# Patient Record
Sex: FEMALE | Race: WHITE | Employment: OTHER | URBAN - METROPOLITAN AREA
[De-identification: names, ages, dates, MRNs, and addresses within clinical notes are randomized per-mention and may not be internally consistent; named-entity substitution may affect disease eponyms.]

---

## 2018-03-23 ENCOUNTER — HOSPITAL ENCOUNTER (OUTPATIENT)
Dept: MAMMOGRAPHY | Age: 53
Discharge: HOME | End: 2018-03-23

## 2018-03-23 DIAGNOSIS — Z12.31 VISIT FOR SCREENING MAMMOGRAM: ICD-10-CM

## 2020-01-19 ENCOUNTER — APPOINTMENT (OUTPATIENT)
Dept: GENERAL RADIOLOGY | Age: 55
End: 2020-01-19
Payer: MEDICARE

## 2020-01-19 ENCOUNTER — HOSPITAL ENCOUNTER (EMERGENCY)
Age: 55
Discharge: HOME | End: 2020-01-19
Payer: MEDICARE

## 2020-01-19 VITALS
DIASTOLIC BLOOD PRESSURE: 83 MMHG | SYSTOLIC BLOOD PRESSURE: 152 MMHG | BODY MASS INDEX: 23.9 KG/M2 | HEART RATE: 89 BPM | HEIGHT: 64 IN | OXYGEN SATURATION: 97 % | WEIGHT: 140 LBS | RESPIRATION RATE: 16 BRPM | TEMPERATURE: 96.8 F

## 2020-01-19 PROCEDURE — 99284 EMERGENCY DEPT VISIT MOD MDM: CPT

## 2020-01-19 PROCEDURE — 6370000000 HC RX 637 (ALT 250 FOR IP): Performed by: EMERGENCY MEDICINE

## 2020-01-19 PROCEDURE — 73140 X-RAY EXAM OF FINGER(S): CPT

## 2020-01-19 PROCEDURE — 73130 X-RAY EXAM OF HAND: CPT

## 2020-01-19 RX ORDER — HYDROCODONE BITARTRATE AND ACETAMINOPHEN 5; 325 MG/1; MG/1
1 TABLET ORAL ONCE
Status: COMPLETED | OUTPATIENT
Start: 2020-01-19 | End: 2020-01-19

## 2020-01-19 RX ADMIN — HYDROCODONE BITARTRATE AND ACETAMINOPHEN 1 TABLET: 5; 325 TABLET ORAL at 15:34

## 2020-01-19 SDOH — HEALTH STABILITY: MENTAL HEALTH: HOW OFTEN DO YOU HAVE A DRINK CONTAINING ALCOHOL?: NEVER

## 2020-01-19 ASSESSMENT — PAIN DESCRIPTION - LOCATION: LOCATION: HAND

## 2020-01-19 ASSESSMENT — PAIN DESCRIPTION - ORIENTATION: ORIENTATION: LEFT

## 2020-01-19 ASSESSMENT — PAIN SCALES - GENERAL
PAINLEVEL_OUTOF10: 10
PAINLEVEL_OUTOF10: 10

## 2020-01-19 NOTE — ED PROVIDER NOTES
SOCIAL HISTORY       Social History     Tobacco Use    Smoking status: Current Every Day Smoker     Packs/day: 1.00     Types: Cigarettes   Substance Use Topics    Alcohol use: Never     Frequency: Never    Drug use: Not Currently       SCREENINGS    Adrianna Coma Scale  Eye Opening: Spontaneous  Best Verbal Response: Oriented  Best Motor Response: Obeys commands  [place] Coma Scale Score: 15        PHYSICAL EXAM    (up to 7 for level 4, 8 or more for level 5)     ED Triage Vitals [01/19/20 1511]   BP Temp Temp Source Pulse Resp SpO2 Height Weight   (!) 152/83 96.8 °F (36 °C) Temporal 89 16 97 % 5' 4\" (1.626 m) 140 lb (63.5 kg)       Physical Exam  Vitals signs and nursing note reviewed. Constitutional:       Appearance: She is well-developed. She is not diaphoretic. HENT:      Head: Normocephalic and atraumatic. Nose: Nose normal.   Eyes:      General:         Right eye: No discharge. Left eye: No discharge. Neck:      Musculoskeletal: Normal range of motion and neck supple. Pulmonary:      Effort: Pulmonary effort is normal. No respiratory distress. Musculoskeletal: Normal range of motion. Left hand: She exhibits tenderness and swelling. Hands:       Comments: Tenderness, edema, and ecchymosis to left ring finger and left dorsal hand. PMS intact to left ring finger and left hand, decreased movement due to pain. Skin:     General: Skin is warm and dry. Neurological:      Mental Status: She is alert and oriented to person, place, and time. Psychiatric:         Behavior: Behavior normal.         DIAGNOSTIC RESULTS   LABS:    Labs Reviewed - No data to display    All other labs were within normal range or not returned as of this dictation. EKG: All EKG's are interpreted by the Emergency Department Physician in the absence of a cardiologist.  Please see their note for interpretation of EKG.       RADIOLOGY:   Non-plain film images such as CT, Ultrasound and MRI are read by the radiologist. Santiago Ballesteros radiographic images are visualized and preliminarily interpreted by the  ED Provider with the below findings:        Interpretation per the Radiologist below, if available at the time of this note:    XR HAND LEFT (MIN 3 VIEWS)   Final Result   Possible fracture of the proximal aspect of the 4th proximal phalanx. Correlate with focal exam findings. No other fracture suggested         XR FINGER LEFT (MIN 2 VIEWS)   Final Result   Possible fracture of the proximal aspect of the 4th proximal phalanx. Correlate with focal exam findings. No other fracture suggested           No results found. PROCEDURES   Unless otherwise noted below, none     Procedures    CRITICAL CARE TIME   N/A    CONSULTS:  None      EMERGENCY DEPARTMENT COURSE and DIFFERENTIAL DIAGNOSIS/MDM:   Vitals:    Vitals:    01/19/20 1511   BP: (!) 152/83   Pulse: 89   Resp: 16   Temp: 96.8 °F (36 °C)   TempSrc: Temporal   SpO2: 97%   Weight: 140 lb (63.5 kg)   Height: 5' 4\" (1.626 m)       Patient was given thefollowing medications:  Medications   HYDROcodone-acetaminophen (NORCO) 5-325 MG per tablet 1 tablet (1 tablet Oral Given 1/19/20 1534)     Patient is nontoxic, in no apparent distress, sitting on the bed. X-ray of left hand and left ring finger ordered. Patient given Norco for pain. X-ray of left hand and left finger positive for possible fracture of the fourth proximal phalanx. 1615 -patient and family updated at this time that she possibly has a fracture of the fourth proximal phalanx. Informed that a splint will be applied, instructed to follow-up with primary care physician, and orthopedics. Instructed to take Tylenol and/or Motrin for pain, instructed on R ICE.  We have discussed the diagnosis and risks, and we agree with discharging home to follow-up with their primary doctor or the referral orthopedist. We also discussed returning to the Emergency Department immediately if new or worsening symptoms occur. We have discussed the symptoms which are most concerning (e.g., changing or worsening pain, numbness, weakness) that necessitate immediate return. Differential diagnoses include but are not limited to compartment syndrome, tendon or neurovascular injury, fracture, dislocation, and contusion. FINAL IMPRESSION      1. Closed nondisplaced fracture of proximal phalanx of left ring finger, initial encounter    2. Contusion of left hand, initial encounter          DISPOSITION/PLAN   DISPOSITION        PATIENT REFERREDTO:  [place] ([facility] ED  [address]  [phone]    If symptoms worsen    [facility]) Pre-Services  [phone]          DISCHARGE MEDICATIONS:  There are no discharge medications for this patient. DISCONTINUED MEDICATIONS:  There are no discharge medications for this patient.              (Please note that portions of this note were completed with a voice recognition program.  Efforts were made to edit the dictations but occasionally words are mis-transcribed.)    BEST Jung CNP (electronically signed)            BEST Jung CNP  01/19/20 0827

## 2020-01-23 ENCOUNTER — OFFICE VISIT (OUTPATIENT)
Dept: ORTHOPEDIC SURGERY | Age: 55
End: 2020-01-23
Payer: MEDICARE

## 2020-01-23 ENCOUNTER — TELEPHONE (OUTPATIENT)
Dept: ORTHOPEDIC SURGERY | Age: 55
End: 2020-01-23

## 2020-01-23 VITALS — HEIGHT: 64 IN | WEIGHT: 139.99 LBS | RESPIRATION RATE: 16 BRPM | BODY MASS INDEX: 23.9 KG/M2

## 2020-01-23 PROBLEM — S62.645A NONDISPLACED FRACTURE OF PROXIMAL PHALANX OF LEFT RING FINGER, INITIAL ENCOUNTER FOR CLOSED FRACTURE: Status: ACTIVE | Noted: 2020-01-23

## 2020-01-23 PROBLEM — S63.92XA HAND SPRAIN, LEFT, INITIAL ENCOUNTER: Status: ACTIVE | Noted: 2020-01-23

## 2020-01-23 PROCEDURE — G8427 DOCREV CUR MEDS BY ELIG CLIN: HCPCS | Performed by: ORTHOPAEDIC SURGERY

## 2020-01-23 PROCEDURE — G8484 FLU IMMUNIZE NO ADMIN: HCPCS | Performed by: ORTHOPAEDIC SURGERY

## 2020-01-23 PROCEDURE — L3809 WHFO W/O JOINTS PRE OTS: HCPCS | Performed by: ORTHOPAEDIC SURGERY

## 2020-01-23 PROCEDURE — G8420 CALC BMI NORM PARAMETERS: HCPCS | Performed by: ORTHOPAEDIC SURGERY

## 2020-01-23 PROCEDURE — 3017F COLORECTAL CA SCREEN DOC REV: CPT | Performed by: ORTHOPAEDIC SURGERY

## 2020-01-23 PROCEDURE — 99203 OFFICE O/P NEW LOW 30 MIN: CPT | Performed by: ORTHOPAEDIC SURGERY

## 2020-01-23 PROCEDURE — 4004F PT TOBACCO SCREEN RCVD TLK: CPT | Performed by: ORTHOPAEDIC SURGERY

## 2020-01-23 RX ORDER — NAPROXEN 500 MG/1
500 TABLET ORAL 2 TIMES DAILY WITH MEALS
Qty: 60 TABLET | Refills: 3 | Status: ON HOLD | OUTPATIENT
Start: 2020-01-23 | End: 2020-02-07 | Stop reason: ALTCHOICE

## 2020-01-23 NOTE — PROGRESS NOTES
support and facilitate healing. The patient was educated and fit by a healthcare professional with expert knowledge and specialization in brace application while under the direct supervision of the physician. Verbal and written instructions for the use of and application of this item were provided. They were instructed to contact the office immediately should the brace result in increased pain, decreased sensation, increased swelling or worsening of the condition. Treatment Plan: I discussed with the patient and family that I believe this is adequately treated with conservative care, and we will today supply her with a TKO brace that will be more convenient and helpful to use. This can come off for hand washing and bathing and gentle protected range of motion. The patient did inquire about pain medication and I have recommended we change her to Naprosyn and Tylenol in alternating usage. I will see her back in approximately 2 weeks with repeat x-rays and exam.    Please note that this transcription was created using voice recognition software. Any errors are unintentional and may be due to voice recognition transcription.

## 2020-02-06 ENCOUNTER — APPOINTMENT (OUTPATIENT)
Dept: CT IMAGING | Age: 55
DRG: 189 | End: 2020-02-06
Payer: MEDICARE

## 2020-02-06 ENCOUNTER — APPOINTMENT (OUTPATIENT)
Dept: GENERAL RADIOLOGY | Age: 55
DRG: 189 | End: 2020-02-06
Payer: MEDICARE

## 2020-02-06 ENCOUNTER — HOSPITAL ENCOUNTER (INPATIENT)
Age: 55
LOS: 5 days | Discharge: HOME | DRG: 189 | End: 2020-02-12
Attending: EMERGENCY MEDICINE | Admitting: INTERNAL MEDICINE
Payer: MEDICARE

## 2020-02-06 LAB
A/G RATIO: 0.8 (ref 1.1–2.2)
ALBUMIN SERPL-MCNC: 3.4 G/DL (ref 3.4–5)
ALP BLD-CCNC: 159 U/L (ref 40–129)
ALT SERPL-CCNC: 31 U/L (ref 10–40)
ANION GAP SERPL CALCULATED.3IONS-SCNC: 10 MMOL/L (ref 3–16)
AST SERPL-CCNC: 22 U/L (ref 15–37)
BASE EXCESS VENOUS: 8.7 MMOL/L (ref -3–3)
BASOPHILS ABSOLUTE: 0 K/UL (ref 0–0.2)
BASOPHILS RELATIVE PERCENT: 0.2 %
BILIRUB SERPL-MCNC: 1.3 MG/DL (ref 0–1)
BUN BLDV-MCNC: 10 MG/DL (ref 7–20)
CALCIUM SERPL-MCNC: 9.3 MG/DL (ref 8.3–10.6)
CARBOXYHEMOGLOBIN: 2.7 % (ref 0–1.5)
CHLORIDE BLD-SCNC: 91 MMOL/L (ref 99–110)
CO2: 33 MMOL/L (ref 21–32)
CREAT SERPL-MCNC: 0.6 MG/DL (ref 0.6–1.1)
D DIMER: 330 NG/ML DDU (ref 0–229)
EOSINOPHILS ABSOLUTE: 0 K/UL (ref 0–0.6)
EOSINOPHILS RELATIVE PERCENT: 0.1 %
GFR AFRICAN AMERICAN: >60
GFR NON-AFRICAN AMERICAN: >60
GLOBULIN: 4.3 G/DL
GLUCOSE BLD-MCNC: 234 MG/DL (ref 70–99)
HCG QUALITATIVE: NEGATIVE
HCO3 VENOUS: 36.7 MMOL/L (ref 23–29)
HCT VFR BLD CALC: 43.6 % (ref 36–48)
HEMOGLOBIN: 15.1 G/DL (ref 12–16)
LYMPHOCYTES ABSOLUTE: 1.2 K/UL (ref 1–5.1)
LYMPHOCYTES RELATIVE PERCENT: 8.7 %
MAGNESIUM: 2 MG/DL (ref 1.8–2.4)
MCH RBC QN AUTO: 31.6 PG (ref 26–34)
MCHC RBC AUTO-ENTMCNC: 34.6 G/DL (ref 31–36)
MCV RBC AUTO: 91.2 FL (ref 80–100)
METHEMOGLOBIN VENOUS: 0.5 %
MONOCYTES ABSOLUTE: 1.1 K/UL (ref 0–1.3)
MONOCYTES RELATIVE PERCENT: 8.4 %
NEUTROPHILS ABSOLUTE: 11 K/UL (ref 1.7–7.7)
NEUTROPHILS RELATIVE PERCENT: 82.6 %
O2 CONTENT, VEN: 21 VOL %
O2 SAT, VEN: 96 %
O2 THERAPY: ABNORMAL
PCO2, VEN: 63.8 MMHG (ref 40–50)
PDW BLD-RTO: 14 % (ref 12.4–15.4)
PH VENOUS: 7.38 (ref 7.35–7.45)
PLATELET # BLD: 249 K/UL (ref 135–450)
PMV BLD AUTO: 7.6 FL (ref 5–10.5)
PO2, VEN: 86.2 MMHG (ref 25–40)
POTASSIUM SERPL-SCNC: 4 MMOL/L (ref 3.5–5.1)
RAPID INFLUENZA  B AGN: NEGATIVE
RAPID INFLUENZA A AGN: NEGATIVE
RBC # BLD: 4.78 M/UL (ref 4–5.2)
SODIUM BLD-SCNC: 134 MMOL/L (ref 136–145)
TCO2 CALC VENOUS: 39 MMOL/L
TOTAL PROTEIN: 7.7 G/DL (ref 6.4–8.2)
TROPONIN: 0.04 NG/ML
WBC # BLD: 13.3 K/UL (ref 4–11)

## 2020-02-06 PROCEDURE — 82803 BLOOD GASES ANY COMBINATION: CPT

## 2020-02-06 PROCEDURE — 2700000000 HC OXYGEN THERAPY PER DAY

## 2020-02-06 PROCEDURE — 87804 INFLUENZA ASSAY W/OPTIC: CPT

## 2020-02-06 PROCEDURE — 6360000002 HC RX W HCPCS: Performed by: EMERGENCY MEDICINE

## 2020-02-06 PROCEDURE — 71045 X-RAY EXAM CHEST 1 VIEW: CPT

## 2020-02-06 PROCEDURE — 94761 N-INVAS EAR/PLS OXIMETRY MLT: CPT

## 2020-02-06 PROCEDURE — 84703 CHORIONIC GONADOTROPIN ASSAY: CPT

## 2020-02-06 PROCEDURE — 71260 CT THORAX DX C+: CPT

## 2020-02-06 PROCEDURE — 85379 FIBRIN DEGRADATION QUANT: CPT

## 2020-02-06 PROCEDURE — 84484 ASSAY OF TROPONIN QUANT: CPT

## 2020-02-06 PROCEDURE — 93005 ELECTROCARDIOGRAM TRACING: CPT | Performed by: EMERGENCY MEDICINE

## 2020-02-06 PROCEDURE — 36415 COLL VENOUS BLD VENIPUNCTURE: CPT

## 2020-02-06 PROCEDURE — 99291 CRITICAL CARE FIRST HOUR: CPT

## 2020-02-06 PROCEDURE — 83735 ASSAY OF MAGNESIUM: CPT

## 2020-02-06 PROCEDURE — 6370000000 HC RX 637 (ALT 250 FOR IP): Performed by: EMERGENCY MEDICINE

## 2020-02-06 PROCEDURE — 87040 BLOOD CULTURE FOR BACTERIA: CPT

## 2020-02-06 PROCEDURE — 80053 COMPREHEN METABOLIC PANEL: CPT

## 2020-02-06 PROCEDURE — 96374 THER/PROPH/DIAG INJ IV PUSH: CPT

## 2020-02-06 PROCEDURE — 85025 COMPLETE CBC W/AUTO DIFF WBC: CPT

## 2020-02-06 RX ORDER — IPRATROPIUM BROMIDE AND ALBUTEROL SULFATE 2.5; .5 MG/3ML; MG/3ML
1 SOLUTION RESPIRATORY (INHALATION)
Status: ACTIVE | OUTPATIENT
Start: 2020-02-06 | End: 2020-02-06

## 2020-02-06 RX ORDER — ASPIRIN 81 MG/1
324 TABLET, CHEWABLE ORAL ONCE
Status: COMPLETED | OUTPATIENT
Start: 2020-02-06 | End: 2020-02-06

## 2020-02-06 RX ORDER — METHYLPREDNISOLONE SODIUM SUCCINATE 40 MG/ML
40 INJECTION, POWDER, LYOPHILIZED, FOR SOLUTION INTRAMUSCULAR; INTRAVENOUS ONCE
Status: COMPLETED | OUTPATIENT
Start: 2020-02-06 | End: 2020-02-06

## 2020-02-06 RX ADMIN — ASPIRIN 81 MG 324 MG: 81 TABLET ORAL at 22:01

## 2020-02-06 RX ADMIN — METHYLPREDNISOLONE SODIUM SUCCINATE 40 MG: 40 INJECTION, POWDER, FOR SOLUTION INTRAMUSCULAR; INTRAVENOUS at 22:01

## 2020-02-06 ASSESSMENT — HEART SCORE: ECG: 1

## 2020-02-07 PROBLEM — J96.21 ACUTE ON CHRONIC RESPIRATORY FAILURE WITH HYPOXIA AND HYPERCAPNIA (HCC): Status: ACTIVE | Noted: 2020-02-07

## 2020-02-07 PROBLEM — R77.8 ELEVATED TROPONIN: Status: ACTIVE | Noted: 2020-02-07

## 2020-02-07 PROBLEM — R41.82 ALTERED MENTAL STATUS: Status: ACTIVE | Noted: 2020-02-07

## 2020-02-07 PROBLEM — J44.1 COPD EXACERBATION (HCC): Status: ACTIVE | Noted: 2020-02-07

## 2020-02-07 PROBLEM — R79.89 ELEVATED TROPONIN: Status: ACTIVE | Noted: 2020-02-07

## 2020-02-07 PROBLEM — J96.22 ACUTE ON CHRONIC RESPIRATORY FAILURE WITH HYPOXIA AND HYPERCAPNIA (HCC): Status: ACTIVE | Noted: 2020-02-07

## 2020-02-07 PROBLEM — G93.40 ACUTE ENCEPHALOPATHY: Status: ACTIVE | Noted: 2020-02-07

## 2020-02-07 PROBLEM — R91.8 PULMONARY INFILTRATES: Status: ACTIVE | Noted: 2020-02-07

## 2020-02-07 PROBLEM — R59.0 MEDIASTINAL ADENOPATHY: Status: ACTIVE | Noted: 2020-02-07

## 2020-02-07 PROBLEM — Z72.0 TOBACCO ABUSE: Chronic | Status: ACTIVE | Noted: 2020-02-07

## 2020-02-07 PROBLEM — J96.01 ACUTE RESPIRATORY FAILURE WITH HYPOXIA (HCC): Status: ACTIVE | Noted: 2020-02-07

## 2020-02-07 PROBLEM — R73.9 HYPERGLYCEMIA: Status: ACTIVE | Noted: 2020-02-07

## 2020-02-07 PROBLEM — D72.829 LEUKOCYTOSIS: Status: ACTIVE | Noted: 2020-02-07

## 2020-02-07 PROBLEM — T17.908A ASPIRATION INTO AIRWAY: Status: ACTIVE | Noted: 2020-02-07

## 2020-02-07 PROBLEM — E87.1 HYPONATREMIA: Status: ACTIVE | Noted: 2020-02-07

## 2020-02-07 PROBLEM — F03.90 DEMENTIA (HCC): Chronic | Status: ACTIVE | Noted: 2020-02-07

## 2020-02-07 LAB
AMORPHOUS: ABNORMAL /HPF
ANION GAP SERPL CALCULATED.3IONS-SCNC: 9 MMOL/L (ref 3–16)
BACTERIA: ABNORMAL /HPF
BASOPHILS ABSOLUTE: 0 K/UL (ref 0–0.2)
BASOPHILS RELATIVE PERCENT: 0.2 %
BILIRUBIN URINE: ABNORMAL
BLOOD, URINE: NEGATIVE
BUN BLDV-MCNC: 10 MG/DL (ref 7–20)
CALCIUM SERPL-MCNC: 9.1 MG/DL (ref 8.3–10.6)
CHLORIDE BLD-SCNC: 90 MMOL/L (ref 99–110)
CLARITY: ABNORMAL
CO2: 33 MMOL/L (ref 21–32)
COLOR: YELLOW
CREAT SERPL-MCNC: 0.6 MG/DL (ref 0.6–1.1)
EKG ATRIAL RATE: 97 BPM
EKG DIAGNOSIS: NORMAL
EKG P AXIS: 74 DEGREES
EKG P-R INTERVAL: 118 MS
EKG Q-T INTERVAL: 334 MS
EKG QRS DURATION: 80 MS
EKG QTC CALCULATION (BAZETT): 424 MS
EKG R AXIS: 61 DEGREES
EKG T AXIS: 80 DEGREES
EKG VENTRICULAR RATE: 97 BPM
EOSINOPHILS ABSOLUTE: 0 K/UL (ref 0–0.6)
EOSINOPHILS RELATIVE PERCENT: 0 %
EPITHELIAL CELLS, UA: ABNORMAL /HPF
GFR AFRICAN AMERICAN: >60
GFR NON-AFRICAN AMERICAN: >60
GLUCOSE BLD-MCNC: 165 MG/DL (ref 70–99)
GLUCOSE BLD-MCNC: 186 MG/DL (ref 70–99)
GLUCOSE BLD-MCNC: 255 MG/DL (ref 70–99)
GLUCOSE BLD-MCNC: 351 MG/DL (ref 70–99)
GLUCOSE URINE: 500 MG/DL
HCT VFR BLD CALC: 42.7 % (ref 36–48)
HEMOGLOBIN: 14.5 G/DL (ref 12–16)
KETONES, URINE: ABNORMAL MG/DL
LEUKOCYTE ESTERASE, URINE: NEGATIVE
LV EF: 55 %
LVEF MODALITY: NORMAL
LYMPHOCYTES ABSOLUTE: 0.8 K/UL (ref 1–5.1)
LYMPHOCYTES RELATIVE PERCENT: 5.9 %
MAGNESIUM: 2 MG/DL (ref 1.8–2.4)
MCH RBC QN AUTO: 31.2 PG (ref 26–34)
MCHC RBC AUTO-ENTMCNC: 33.9 G/DL (ref 31–36)
MCV RBC AUTO: 92 FL (ref 80–100)
MICROSCOPIC EXAMINATION: YES
MONOCYTES ABSOLUTE: 0.5 K/UL (ref 0–1.3)
MONOCYTES RELATIVE PERCENT: 4.1 %
NEUTROPHILS ABSOLUTE: 11.6 K/UL (ref 1.7–7.7)
NEUTROPHILS RELATIVE PERCENT: 89.8 %
NITRITE, URINE: NEGATIVE
PDW BLD-RTO: 14.1 % (ref 12.4–15.4)
PERFORMED ON: ABNORMAL
PH UA: 6.5 (ref 5–8)
PLATELET # BLD: 236 K/UL (ref 135–450)
PMV BLD AUTO: 7 FL (ref 5–10.5)
POTASSIUM SERPL-SCNC: 4.6 MMOL/L (ref 3.5–5.1)
PROCALCITONIN: 0.29 NG/ML (ref 0–0.15)
PROTEIN UA: ABNORMAL MG/DL
RBC # BLD: 4.65 M/UL (ref 4–5.2)
RBC UA: ABNORMAL /HPF (ref 0–2)
SODIUM BLD-SCNC: 132 MMOL/L (ref 136–145)
SPECIFIC GRAVITY UA: >=1.03 (ref 1–1.03)
TSH REFLEX: 0.51 UIU/ML (ref 0.27–4.2)
URINE TYPE: ABNORMAL
UROBILINOGEN, URINE: >=8 E.U./DL
WBC # BLD: 12.9 K/UL (ref 4–11)
WBC UA: ABNORMAL /HPF (ref 0–5)

## 2020-02-07 PROCEDURE — 99223 1ST HOSP IP/OBS HIGH 75: CPT | Performed by: INTERNAL MEDICINE

## 2020-02-07 PROCEDURE — 87206 SMEAR FLUORESCENT/ACID STAI: CPT

## 2020-02-07 PROCEDURE — 0BC78ZZ EXTIRPATION OF MATTER FROM LEFT MAIN BRONCHUS, VIA NATURAL OR ARTIFICIAL OPENING ENDOSCOPIC: ICD-10-PCS | Performed by: INTERNAL MEDICINE

## 2020-02-07 PROCEDURE — 36415 COLL VENOUS BLD VENIPUNCTURE: CPT

## 2020-02-07 PROCEDURE — 6370000000 HC RX 637 (ALT 250 FOR IP): Performed by: INTERNAL MEDICINE

## 2020-02-07 PROCEDURE — 6370000000 HC RX 637 (ALT 250 FOR IP): Performed by: NURSE PRACTITIONER

## 2020-02-07 PROCEDURE — 99152 MOD SED SAME PHYS/QHP 5/>YRS: CPT | Performed by: INTERNAL MEDICINE

## 2020-02-07 PROCEDURE — 87205 SMEAR GRAM STAIN: CPT

## 2020-02-07 PROCEDURE — 87116 MYCOBACTERIA CULTURE: CPT

## 2020-02-07 PROCEDURE — 94761 N-INVAS EAR/PLS OXIMETRY MLT: CPT

## 2020-02-07 PROCEDURE — 87102 FUNGUS ISOLATION CULTURE: CPT

## 2020-02-07 PROCEDURE — 6360000002 HC RX W HCPCS: Performed by: INTERNAL MEDICINE

## 2020-02-07 PROCEDURE — 87040 BLOOD CULTURE FOR BACTERIA: CPT

## 2020-02-07 PROCEDURE — 2700000000 HC OXYGEN THERAPY PER DAY

## 2020-02-07 PROCEDURE — 87015 SPECIMEN INFECT AGNT CONCNTJ: CPT

## 2020-02-07 PROCEDURE — 0BC38ZZ EXTIRPATION OF MATTER FROM RIGHT MAIN BRONCHUS, VIA NATURAL OR ARTIFICIAL OPENING ENDOSCOPIC: ICD-10-PCS | Performed by: INTERNAL MEDICINE

## 2020-02-07 PROCEDURE — 2580000003 HC RX 258

## 2020-02-07 PROCEDURE — 93306 TTE W/DOPPLER COMPLETE: CPT

## 2020-02-07 PROCEDURE — 3609027000 HC BRONCHOSCOPY: Performed by: INTERNAL MEDICINE

## 2020-02-07 PROCEDURE — 85025 COMPLETE CBC W/AUTO DIFF WBC: CPT

## 2020-02-07 PROCEDURE — 3609011800 HC BRONCHOSCOPY/TRANSBRONCHIAL LUNG BIOPSY: Performed by: INTERNAL MEDICINE

## 2020-02-07 PROCEDURE — 3609010900 HC BRONCHOSCOPY THERAPUTIC ASPIRATION INITIAL: Performed by: INTERNAL MEDICINE

## 2020-02-07 PROCEDURE — 94669 MECHANICAL CHEST WALL OSCILL: CPT

## 2020-02-07 PROCEDURE — 2580000003 HC RX 258: Performed by: INTERNAL MEDICINE

## 2020-02-07 PROCEDURE — 6360000004 HC RX CONTRAST MEDICATION: Performed by: EMERGENCY MEDICINE

## 2020-02-07 PROCEDURE — 0BC18ZZ EXTIRPATION OF MATTER FROM TRACHEA, VIA NATURAL OR ARTIFICIAL OPENING ENDOSCOPIC: ICD-10-PCS | Performed by: INTERNAL MEDICINE

## 2020-02-07 PROCEDURE — 84443 ASSAY THYROID STIM HORMONE: CPT

## 2020-02-07 PROCEDURE — 83735 ASSAY OF MAGNESIUM: CPT

## 2020-02-07 PROCEDURE — 94150 VITAL CAPACITY TEST: CPT

## 2020-02-07 PROCEDURE — 31645 BRNCHSC W/THER ASPIR 1ST: CPT | Performed by: INTERNAL MEDICINE

## 2020-02-07 PROCEDURE — 93010 ELECTROCARDIOGRAM REPORT: CPT | Performed by: INTERNAL MEDICINE

## 2020-02-07 PROCEDURE — 84145 PROCALCITONIN (PCT): CPT

## 2020-02-07 PROCEDURE — 7100000011 HC PHASE II RECOVERY - ADDTL 15 MIN: Performed by: INTERNAL MEDICINE

## 2020-02-07 PROCEDURE — 7100000010 HC PHASE II RECOVERY - FIRST 15 MIN: Performed by: INTERNAL MEDICINE

## 2020-02-07 PROCEDURE — 88305 TISSUE EXAM BY PATHOLOGIST: CPT

## 2020-02-07 PROCEDURE — 80048 BASIC METABOLIC PNL TOTAL CA: CPT

## 2020-02-07 PROCEDURE — 1200000000 HC SEMI PRIVATE

## 2020-02-07 PROCEDURE — 2709999900 HC NON-CHARGEABLE SUPPLY: Performed by: INTERNAL MEDICINE

## 2020-02-07 PROCEDURE — 87070 CULTURE OTHR SPECIMN AEROBIC: CPT

## 2020-02-07 PROCEDURE — 94640 AIRWAY INHALATION TREATMENT: CPT

## 2020-02-07 PROCEDURE — 88112 CYTOPATH CELL ENHANCE TECH: CPT

## 2020-02-07 PROCEDURE — 81001 URINALYSIS AUTO W/SCOPE: CPT

## 2020-02-07 RX ORDER — FENTANYL CITRATE 50 UG/ML
INJECTION, SOLUTION INTRAMUSCULAR; INTRAVENOUS PRN
Status: DISCONTINUED | OUTPATIENT
Start: 2020-02-07 | End: 2020-02-07 | Stop reason: ALTCHOICE

## 2020-02-07 RX ORDER — METHYLPREDNISOLONE SODIUM SUCCINATE 40 MG/ML
40 INJECTION, POWDER, LYOPHILIZED, FOR SOLUTION INTRAMUSCULAR; INTRAVENOUS EVERY 12 HOURS
Status: DISCONTINUED | OUTPATIENT
Start: 2020-02-07 | End: 2020-02-11

## 2020-02-07 RX ORDER — ONDANSETRON 2 MG/ML
4 INJECTION INTRAMUSCULAR; INTRAVENOUS EVERY 4 HOURS PRN
Status: DISCONTINUED | OUTPATIENT
Start: 2020-02-07 | End: 2020-02-12 | Stop reason: HOSPADM

## 2020-02-07 RX ORDER — POTASSIUM CHLORIDE 20 MEQ/1
40 TABLET, EXTENDED RELEASE ORAL PRN
Status: DISCONTINUED | OUTPATIENT
Start: 2020-02-07 | End: 2020-02-10

## 2020-02-07 RX ORDER — IPRATROPIUM BROMIDE AND ALBUTEROL SULFATE 2.5; .5 MG/3ML; MG/3ML
1 SOLUTION RESPIRATORY (INHALATION)
Status: DISCONTINUED | OUTPATIENT
Start: 2020-02-07 | End: 2020-02-12 | Stop reason: HOSPADM

## 2020-02-07 RX ORDER — POTASSIUM CHLORIDE 7.45 MG/ML
10 INJECTION INTRAVENOUS PRN
Status: DISCONTINUED | OUTPATIENT
Start: 2020-02-07 | End: 2020-02-10

## 2020-02-07 RX ORDER — ACETAMINOPHEN 160 MG/5ML
650 SOLUTION ORAL EVERY 4 HOURS PRN
Status: DISCONTINUED | OUTPATIENT
Start: 2020-02-07 | End: 2020-02-10

## 2020-02-07 RX ORDER — DEXTROSE MONOHYDRATE 50 MG/ML
100 INJECTION, SOLUTION INTRAVENOUS PRN
Status: DISCONTINUED | OUTPATIENT
Start: 2020-02-07 | End: 2020-02-12 | Stop reason: HOSPADM

## 2020-02-07 RX ORDER — NICOTINE 21 MG/24HR
1 PATCH, TRANSDERMAL 24 HOURS TRANSDERMAL DAILY
Status: DISCONTINUED | OUTPATIENT
Start: 2020-02-07 | End: 2020-02-12 | Stop reason: HOSPADM

## 2020-02-07 RX ORDER — PREDNISONE 1 MG/1
5 TABLET ORAL DAILY
Status: DISCONTINUED | OUTPATIENT
Start: 2020-02-07 | End: 2020-02-07

## 2020-02-07 RX ORDER — ONDANSETRON 4 MG/1
4 TABLET, ORALLY DISINTEGRATING ORAL EVERY 4 HOURS PRN
Status: DISCONTINUED | OUTPATIENT
Start: 2020-02-07 | End: 2020-02-10

## 2020-02-07 RX ORDER — PROMETHAZINE HYDROCHLORIDE 25 MG/ML
12.5 INJECTION, SOLUTION INTRAMUSCULAR; INTRAVENOUS EVERY 4 HOURS PRN
Status: DISCONTINUED | OUTPATIENT
Start: 2020-02-07 | End: 2020-02-10

## 2020-02-07 RX ORDER — PROMETHAZINE HYDROCHLORIDE 6.25 MG/5ML
12.5 SYRUP ORAL EVERY 4 HOURS PRN
Status: DISCONTINUED | OUTPATIENT
Start: 2020-02-07 | End: 2020-02-10

## 2020-02-07 RX ORDER — ACETAMINOPHEN 325 MG/1
650 TABLET ORAL EVERY 4 HOURS PRN
Status: DISCONTINUED | OUTPATIENT
Start: 2020-02-07 | End: 2020-02-12 | Stop reason: HOSPADM

## 2020-02-07 RX ORDER — PROMETHAZINE HYDROCHLORIDE 25 MG/1
12.5 TABLET ORAL EVERY 4 HOURS PRN
Status: DISCONTINUED | OUTPATIENT
Start: 2020-02-07 | End: 2020-02-10

## 2020-02-07 RX ORDER — ALBUTEROL SULFATE 90 UG/1
2 AEROSOL, METERED RESPIRATORY (INHALATION) EVERY 4 HOURS PRN
Status: DISCONTINUED | OUTPATIENT
Start: 2020-02-07 | End: 2020-02-07

## 2020-02-07 RX ORDER — BENZONATATE 100 MG/1
200 CAPSULE ORAL 3 TIMES DAILY PRN
Status: DISCONTINUED | OUTPATIENT
Start: 2020-02-07 | End: 2020-02-12 | Stop reason: HOSPADM

## 2020-02-07 RX ORDER — NICOTINE POLACRILEX 4 MG
15 LOZENGE BUCCAL PRN
Status: DISCONTINUED | OUTPATIENT
Start: 2020-02-07 | End: 2020-02-12 | Stop reason: HOSPADM

## 2020-02-07 RX ORDER — SODIUM CHLORIDE 9 MG/ML
INJECTION, SOLUTION INTRAVENOUS
Status: COMPLETED
Start: 2020-02-07 | End: 2020-02-07

## 2020-02-07 RX ORDER — MAGNESIUM SULFATE 1 G/100ML
1 INJECTION INTRAVENOUS PRN
Status: DISCONTINUED | OUTPATIENT
Start: 2020-02-07 | End: 2020-02-10

## 2020-02-07 RX ORDER — SODIUM CHLORIDE 0.9 % (FLUSH) 0.9 %
10 SYRINGE (ML) INJECTION PRN
Status: DISCONTINUED | OUTPATIENT
Start: 2020-02-07 | End: 2020-02-12 | Stop reason: HOSPADM

## 2020-02-07 RX ORDER — PREDNISONE 20 MG/1
40 TABLET ORAL DAILY
Status: DISCONTINUED | OUTPATIENT
Start: 2020-02-07 | End: 2020-02-07

## 2020-02-07 RX ORDER — CALCIUM CARBONATE 200(500)MG
1000 TABLET,CHEWABLE ORAL 3 TIMES DAILY PRN
Status: DISCONTINUED | OUTPATIENT
Start: 2020-02-07 | End: 2020-02-12 | Stop reason: HOSPADM

## 2020-02-07 RX ORDER — ACETAMINOPHEN 650 MG/1
650 SUPPOSITORY RECTAL EVERY 4 HOURS PRN
Status: DISCONTINUED | OUTPATIENT
Start: 2020-02-07 | End: 2020-02-10

## 2020-02-07 RX ORDER — DEXTROSE MONOHYDRATE 25 G/50ML
12.5 INJECTION, SOLUTION INTRAVENOUS PRN
Status: DISCONTINUED | OUTPATIENT
Start: 2020-02-07 | End: 2020-02-12 | Stop reason: HOSPADM

## 2020-02-07 RX ORDER — ALBUTEROL SULFATE 90 UG/1
2 AEROSOL, METERED RESPIRATORY (INHALATION) 4 TIMES DAILY
Status: DISCONTINUED | OUTPATIENT
Start: 2020-02-07 | End: 2020-02-07

## 2020-02-07 RX ORDER — MIDAZOLAM HYDROCHLORIDE 5 MG/ML
INJECTION INTRAMUSCULAR; INTRAVENOUS PRN
Status: DISCONTINUED | OUTPATIENT
Start: 2020-02-07 | End: 2020-02-07 | Stop reason: ALTCHOICE

## 2020-02-07 RX ADMIN — ENOXAPARIN SODIUM 40 MG: 40 INJECTION SUBCUTANEOUS at 08:58

## 2020-02-07 RX ADMIN — INSULIN LISPRO 1 UNITS: 100 INJECTION, SOLUTION INTRAVENOUS; SUBCUTANEOUS at 20:43

## 2020-02-07 RX ADMIN — AMPICILLIN SODIUM AND SULBACTAM SODIUM 3 G: 2; 1 INJECTION, POWDER, FOR SOLUTION INTRAMUSCULAR; INTRAVENOUS at 22:40

## 2020-02-07 RX ADMIN — AMPICILLIN SODIUM AND SULBACTAM SODIUM 3 G: 2; 1 INJECTION, POWDER, FOR SOLUTION INTRAMUSCULAR; INTRAVENOUS at 11:54

## 2020-02-07 RX ADMIN — IPRATROPIUM BROMIDE AND ALBUTEROL SULFATE 1 AMPULE: .5; 3 SOLUTION RESPIRATORY (INHALATION) at 03:08

## 2020-02-07 RX ADMIN — IPRATROPIUM BROMIDE AND ALBUTEROL SULFATE 1 AMPULE: .5; 3 SOLUTION RESPIRATORY (INHALATION) at 08:55

## 2020-02-07 RX ADMIN — IPRATROPIUM BROMIDE AND ALBUTEROL SULFATE 1 AMPULE: .5; 3 SOLUTION RESPIRATORY (INHALATION) at 19:51

## 2020-02-07 RX ADMIN — CEFTRIAXONE SODIUM 2 G: 2 INJECTION, POWDER, FOR SOLUTION INTRAMUSCULAR; INTRAVENOUS at 03:23

## 2020-02-07 RX ADMIN — IPRATROPIUM BROMIDE AND ALBUTEROL SULFATE 1 AMPULE: .5; 3 SOLUTION RESPIRATORY (INHALATION) at 11:18

## 2020-02-07 RX ADMIN — INSULIN LISPRO 2 UNITS: 100 INJECTION, SOLUTION INTRAVENOUS; SUBCUTANEOUS at 13:13

## 2020-02-07 RX ADMIN — VANCOMYCIN HYDROCHLORIDE 1250 MG: 10 INJECTION, POWDER, LYOPHILIZED, FOR SOLUTION INTRAVENOUS at 05:44

## 2020-02-07 RX ADMIN — IPRATROPIUM BROMIDE AND ALBUTEROL SULFATE 1 AMPULE: .5; 3 SOLUTION RESPIRATORY (INHALATION) at 16:05

## 2020-02-07 RX ADMIN — IOPAMIDOL 75 ML: 755 INJECTION, SOLUTION INTRAVENOUS at 00:09

## 2020-02-07 RX ADMIN — SODIUM CHLORIDE 500 ML: 9 INJECTION, SOLUTION INTRAVENOUS at 03:23

## 2020-02-07 RX ADMIN — INSULIN LISPRO 10 UNITS: 100 INJECTION, SOLUTION INTRAVENOUS; SUBCUTANEOUS at 17:15

## 2020-02-07 RX ADMIN — METHYLPREDNISOLONE SODIUM SUCCINATE 40 MG: 40 INJECTION, POWDER, FOR SOLUTION INTRAMUSCULAR; INTRAVENOUS at 08:58

## 2020-02-07 RX ADMIN — Medication 10 ML: at 08:58

## 2020-02-07 RX ADMIN — METHYLPREDNISOLONE SODIUM SUCCINATE 40 MG: 40 INJECTION, POWDER, FOR SOLUTION INTRAMUSCULAR; INTRAVENOUS at 20:40

## 2020-02-07 RX ADMIN — AMPICILLIN SODIUM AND SULBACTAM SODIUM 3 G: 2; 1 INJECTION, POWDER, FOR SOLUTION INTRAMUSCULAR; INTRAVENOUS at 17:14

## 2020-02-07 ASSESSMENT — PAIN SCALES - GENERAL
PAINLEVEL_OUTOF10: 3
PAINLEVEL_OUTOF10: 0

## 2020-02-07 NOTE — PROGRESS NOTES
4 Eyes Skin Assessment     The patient is being assess for   Admission    I agree that 2 RN's have performed a thorough Head to Toe Skin Assessment on the patient. ALL assessment sites listed below have been assessed. Areas assessed by both nurses:   [x]   Head, Face, and Ears   [x]   Shoulders, Back, and Chest, Abdomen  [x]   Arms, Elbows, and Hands   [x]   Coccyx, Sacrum, and Ischium  [x]   Legs, Feet, and Heel        Pink bilat heels, scatches to right ankle and an abrasion to left shin. Back pink but blanchable. **SHARE this note so that the co-signing nurse is able to place an eSignature**    Co-signer eSignature: Electronically signed by Reid Dale RN on 2/7/20 at 6:47 AM    Does the Patient have Skin Breakdown?   No          Tony Prevention initiated:  No   Wound Care Orders initiated:  No      [facility] nurse consulted for Pressure Injury (Stage 3,4, Unstageable, DTI, NWPT, Complex wounds)and New or Established Ostomies:  No      Primary Nurse eSignature: Electronically signed by Otilia Gonzlaez RN on 2/7/20 at 2:51 AM      '

## 2020-02-07 NOTE — PROGRESS NOTES
Speech Language Pathology  Attempt Note    SLP acknowledged order for BSE, reviewed pt's chart, spoke with RN. Per order and RN, pt currently NPO for bronch to be completed this AM.  ST to continue to follow and re-attempt BSE at a later time.     Thank you,  Jose L Palomino M.S. [id number] [facility]  Speech-language pathologist  [phone]

## 2020-02-07 NOTE — PLAN OF CARE
Pt bed locked and in lowest position, 2/4 side rails up, call light within reach, fall band and non skid footwear on pt. Bed alarm is on bed and engaged. Pt instructed not to get up without calling the nurse and pt verbalizes understanding. Pt resting comfortably at this time with RR 16. Will continue to assess and monitor.

## 2020-02-07 NOTE — PROGRESS NOTES
RESPIRATORY THERAPY ASSESSMENT    Name:  Claire Caal Record Number:  [id number]  Age: 63 y.o. Gender: female  : 1965  Today's Date:  2020  Room:  1917/4449-14    Assessment     Is the patient being admitted for a COPD or Asthma exacerbation? No   (If yes the patient will be seen every 4 hours for the first 24 hours and then reassessed)    Patient Admission Diagnosis      Allergies  No Known Allergies    Minimum Predicted Vital Capacity:     821          Actual Vital Capacity:      500              Pulmonary History:COPD  Home Oxygen Therapy:  room air  Home Respiratory Therapy:None   Current Respiratory Therapy:  Albuterol/atrovent  Treatment Type: HHN, IS  Medications: Albuterol/Ipratropium    Respiratory Severity Index(RSI)   Patients with orders for inhalation medications, oxygen, or any therapeutic treatment modality will be placed on Respiratory Protocol. They will be assessed with the first treatment and at least every 72 hours thereafter. The following severity scale will be used to determine frequency of treatment intervention. Smoking History: Pulmonary Disease or Smoking History, Greater than 15 pack year = 2    Social History  Social History     Tobacco Use    Smoking status: Current Every Day Smoker     Packs/day: 1.00     Types: Cigarettes    Smokeless tobacco: Never Used   Substance Use Topics    Alcohol use: Never     Frequency: Never    Drug use: Not Currently       Recent Surgical History: None = 0  Past Surgical History  History reviewed. No pertinent surgical history.     Level of Consciousness: Alert, Oriented, and Cooperative = 0    Level of Activity: Walking with assistance = 1    Respiratory Pattern: Dyspnea with exertion;Irregular pattern;or RR less than 6 = 2    Breath Sounds: Absent bilaterally and/or with wheezes = 3    Sputum   ,  ,    Cough: Strong, productive = 1    Vital Signs   BP (!) 144/84   Pulse 90   Temp 98.4 °F (36.9 °C) (Oral)   Resp 18 Ht 5' 4\" (1.626 m)   Wt 170 lb (77.1 kg)   SpO2 94%   BMI 29.18 kg/m²   SPO2 (COPD values may differ): 86-87% on room air or greater than 92% on FiO2 35- 50% = 3    Peak Flow (asthma only): not applicable = 0    RSI: 91-55 = Q6H or QID and Q4HPRN for dyspnea        Plan       Goals: medication delivery    Patient/caregiver was educated on the proper method of use for Respiratory Care Devices:  Yes      Level of patient/caregiver understanding able to:   ? Verbalize understanding   ? Demonstrate understanding       ? Teach back        ? Needs reinforcement       ? No available caregiver               ? Other:     Response to education:  Excellent     Is patient being placed on Home Treatment Regimen? No     Does the patient have everything they need prior to discharge? NA     Comments: pt seen and chart reviewed    Plan of Care: duoneb q4w/a    Electronically signed by Corey Parikh RCP on 2/7/2020 at 3:19 AM    Respiratory Protocol Guidelines     1. Assessment and treatment by Respiratory Therapy will be initiated for medication and therapeutic interventions upon initiation of aerosolized medication. 2. Physician will be contacted for respiratory rate (RR) greater than 35 breaths per minute. Therapy will be held for heart rate (HR) greater than 140 beats per minute, pending direction from physician. 3. Bronchodilators will be administered via Metered Dose Inhaler (MDI) with spacer when the following criteria are met:  a. Alert and cooperative     b. HR < 140 bpm  c. RR < 30 bpm                d. Can demonstrate a 2-3 second inspiratory hold  4. Bronchodilators will be administered via Hand Held Nebulizer [facility] to patients when ANY of the following criteria are met  a. Incognizant or uncooperative          b. Patients treated with HHN at Home        c. Unable to demonstrate proper use of MDI with spacer     d. RR > 30 bpm   5.  Bronchodilators will be delivered via Metered Dose Inhaler (MDI), HHN, Aerogen to intubated patients on mechanical ventilation. 6. Inhalation medication orders will be delivered and/or substituted as outlined below. Aerosolized Medications Ordering and Administration Guidelines:    1. All Medications will be ordered by a physician, and their frequency and/or modality will be adjusted as defined by the patients Respiratory Severity Index (RSI) score. 2. If the patient does not have documented COPD, consider discontinuing anticholinergics when RSI is less than 9.  3. If the bronchospasm worsens (increased RSI), then the bronchodilator frequency can be increased to a maximum of every 4 hours. If greater than every 4 hours is required, the physician will be contacted. 4. If the bronchospasm improves, the frequency of the bronchodilator can be decreased, based on the patient's RSI, but not less than home treatment regimen frequency. 5. Bronchodilator(s) will be discontinued if patient has a RSI less than 9 and has received no scheduled or as needed treatment for 72  Hrs. Patients Ordered on a Mucolytic Agent:    1. Must always be administered with a bronchodilator. 2. Discontinue if patient experiences worsened bronchospasm, or secretions have lessened to the point that the patient is able to clear them with a cough. Anti-inflammatory and Combination Medications:    1. If the patient lacks prior history of lung disease, is not using inhaled anti-inflammatory medication at home, and lacks wheezing by examination or by history for at least 24 hours, contact physician for possible discontinuation.

## 2020-02-07 NOTE — PROCEDURES
Procedure: Bronchoscopy with therapeutic aspiration    Indication: Cavitary pneumonia, lethargy, likely COPD, obesity, smoker. ASA 3, Mallampati 3    The patient was examined by Dr. Angelia Oswald earlier this morning. I examined the patient myself and discussed with her as well as with her daughter. Her daughter has taken the patient to live with her recently, earlier was living with her son. She has been a heavy smoker for several decades, has a diagnosis of COPD but has not seen a primary care physician or pulmonologist.  The patient herself mentions that she has a history of dementia. The patient's daughter mentions that she, her  and her mother all got sick with fever and respiratory symptoms about the same time. The patient herself has got progressively worse with increased confusion, dyspnea and fatigue. There was a history of increased sleepiness. The patient has no history of JAYJAY, but her daughter mentions that she snores \"really bad\". She has a good appetite, denies any dysphagia or choking. There is no recent weight loss. She had no GI or  complaints. The rest of the review of systems was unremarkable. This was an obese patient, requiring supplemental oxygen and mildly tachypneic. HEENT with edentulous state, class III airway, relatively large tongue. Lungs with bilateral wheezes and diminished air entry. Cardiac exam with distant heart sounds. Abdomen was soft and fatty. She has no clubbing, edema. She is neurologically awake, alert and oriented. No obvious focal deficit. The bronchoscopy procedure was discussed with the patient and with her daughter. The small risk of progressive hypoxemia needing intubation and mechanical ventilation was discussed with her. The patient and her daughter agreed to proceed. She was n.p.o. Consent was obtained. Procedure details: The patient was taken to the endoscopy room, a timeout was performed.   Her oropharynx was sprayed with topical

## 2020-02-07 NOTE — CONSULTS
Pharmacy to Dose Vancomycin    Dx: Endocarditis  Goal trough = > 15-20 mcg/mL  Pt wt = 77.1 kg  Estimated Creatinine Clearance: 105 mL/min (based on SCr of 0.6 mg/dL). Start Vancomycin 1250 mg IVPB q12h. Vancomycin trough prior to 4th dose (2/8 1400).   Bijan Mena, Pharm D.2/7/2020 1:46 AM

## 2020-02-07 NOTE — CONSULTS
INPATIENT PULMONARY CRITICAL CARE CONSULT NOTE      Chief Complaint/Referring Provider:  Patient is being seen at the request of Dr. Arianne Sterling  for a consultation for extensive smoking history, hospitalized with COPD exacerbation, acute on chronic respiratory failure, abnormal CT chest, small bilateral cavitary lesion     Presenting HPI: Patient was brought to the hospital because of increasing confusion along with shortness of breath    As per the admitting provider-Yeny Lackey is a 63 y.o. female. She is accompanied by her duahgter. She presents because for the past several days she has felt fatigued, more confused than usual, and dyspneic. Patient apparently has known dementia. Daughter relates she has also been sleeping quite a bit and has been very difficult to wake up. She also describes a chronic cough which seems worse than usual, but she denies expectoraion of sputum, hemoptysis, and pleuritic pain.     Patient has an extensive smoking history and continues to smoke. She lives with her daughter and son in law both of whom also smoke. Patient denies any alcohol use or illicit substance use. Patient denies any known diagnosis of JAYJAY. She does not normally use O2 at home.     Patient apparently has been having increasing shortness of breath along with malaise and change in the mental status for last 3 days time  Patient when seen this morning was sleeping in the bed snoring and not waking up easily, patient was not having any increased work of breathing when seen, patient had a T-max of 99.3 °F when she came to the hospital but has been afebrile this morning, patient's oxygen requirements have gone up, patient is on 4 L of nasal cannula oxygen with saturation 93%, patient was not 2 L of nasal cannula oxygen when she came the saturations of 82%, patient is hemodynamically maintained, patient's blood sugars are not controlled, patient urine output has not been recorded in the epic for review, no other pertinent review of system of concern which could be objectively evaluate  Patient does not take supplemental oxygen at home      Patient Active Problem List    Diagnosis Date Noted    COPD exacerbation ([facility] 02/07/2020    Acute on chronic respiratory failure with hypoxia and hypercapnia (HCC) 02/07/2020    Tobacco abuse 02/07/2020    Dementia ([facility] 02/07/2020    Pulmonary infiltrates 02/07/2020    Mediastinal adenopathy 02/07/2020    Leukocytosis 02/07/2020    Hyponatremia 02/07/2020    Hyperglycemia 02/07/2020    Elevated troponin 02/07/2020    Acute encephalopathy 02/07/2020    Aspiration into airway 02/07/2020    Hand sprain, left, initial encounter 01/23/2020    Nondisplaced fracture of proximal phalanx of left ring finger, initial encounter for closed fracture 01/23/2020       Past Medical History:   Diagnosis Date    COPD (chronic obstructive pulmonary disease) ([facility]     Dementia ([facility]         History reviewed. No pertinent surgical history. History reviewed. No pertinent family history. Social History     Tobacco Use    Smoking status: Current Every Day Smoker     Packs/day: 1.00     Types: Cigarettes    Smokeless tobacco: Never Used   Substance Use Topics    Alcohol use: Never     Frequency: Never        No Known Allergies        Physical Exam:  Blood pressure 133/80, pulse 66, temperature 97.6 °F (36.4 °C), temperature source Oral, resp. rate 16, height 5' 4\" (1.626 m), weight 170 lb (77.1 kg), SpO2 93 %.'   Constitutional:  No acute distress. While lying in the bed, patient was snoring  HENT:  Oropharynx is clear and moist. No thyromegaly. Eyes:  Conjunctivae are normal. Pupils equal, round, and reactive to light. No scleral icterus. Neck: . No tracheal deviation present. No obvious thyroid mass. Cardiovascular: Normal rate, regular rhythm, normal heart sounds. No right ventricular heave. No lower extremity edema. Pulmonary/Chest: Scattered wheezes. No rales. Chest wall is not dull to percussion. No accessory muscle usage or stridor. No increased chest congestion, decreased breath sound density  Abdominal: Soft. Bowel sounds present. No distension or hernia. No tenderness. Musculoskeletal: No cyanosis. No clubbing. No obvious joint deformity. Lymphadenopathy: No cervical or supraclavicular adenopathy. Skin: Skin is warm and dry. No rash or nodules on the exposed extremities. Neurologic: Sleeping and snoring when seen        Results:  CBC:   Recent Labs     02/06/20 2010 02/07/20  0503   WBC 13.3* 12.9*   HGB 15.1 14.5   HCT 43.6 42.7   MCV 91.2 92.0    236     BMP:   Recent Labs     02/06/20 2010 02/07/20  0503   * 132*   K 4.0 4.6   CL 91* 90*   CO2 33* 33*   BUN 10 10   CREATININE 0.6 0.6     LIVER PROFILE:   Recent Labs     02/06/20 2010   AST 22   ALT 31   BILITOT 1.3*   ALKPHOS 159*       Imaging:  I have reviewed radiology images personally. CT Chest Pulmonary Embolism W Contrast   Final Result   1. No evidence of pulmonary embolus, or aortic dissection   2. Scattered ill-defined nodular opacities bilaterally, differential   considerations to include septic emboli. Several appear cavitary   3. Nonspecific mediastinal and hilar adenopathy, which may be reactive or   neoplastic in etiology         XR CHEST PORTABLE   Final Result   Increased bilateral lower lung opacity likely is related to soft tissue   attenuation. Lower lung airspace disease is not excluded. PA and lateral   study may be helpful if indicated. Xr Hand Left (min 3 Views)    Result Date: 1/19/2020  EXAMINATION: THREE XRAY VIEWS OF THE LEFT HAND;   XRAY VIEWS OF THE LEFT FINGER 1/19/2020 3:39 pm COMPARISON: None.  HISTORY: ORDERING SYSTEM PROVIDED HISTORY: injury TECHNOLOGIST PROVIDED HISTORY: Reason for exam:->injury Reason for Exam: finger pain to 3rd, 4th and 5th digits Acuity: Acute Type of Exam: Initial FINDINGS: Thin longitudinal lucency through the lateral aspect of the proximal aspect of the 4th proximal phalanx, seen in the AP view. No definite cortical disruption is seen. No other findings suspicious for fracture. No dislocation. Possible fracture of the proximal aspect of the 4th proximal phalanx. Correlate with focal exam findings. No other fracture suggested     Xr Finger Left (min 2 Views)    Result Date: 1/19/2020  EXAMINATION: THREE XRAY VIEWS OF THE LEFT HAND;   XRAY VIEWS OF THE LEFT FINGER 1/19/2020 3:39 pm COMPARISON: None. HISTORY: ORDERING SYSTEM PROVIDED HISTORY: injury TECHNOLOGIST PROVIDED HISTORY: Reason for exam:->injury Reason for Exam: finger pain to 3rd, 4th and 5th digits Acuity: Acute Type of Exam: Initial FINDINGS: Thin longitudinal lucency through the lateral aspect of the proximal aspect of the 4th proximal phalanx, seen in the AP view. No definite cortical disruption is seen. No other findings suspicious for fracture. No dislocation. Possible fracture of the proximal aspect of the 4th proximal phalanx. Correlate with focal exam findings. No other fracture suggested     Xr Chest Portable    Result Date: 2/6/2020  EXAMINATION: ONE XRAY VIEW OF THE CHEST 2/6/2020 8:16 pm COMPARISON: 11/29/2008 HISTORY: ORDERING SYSTEM PROVIDED HISTORY: SOB TECHNOLOGIST PROVIDED HISTORY: Reason for exam:->SOB Reason for Exam: ams Acuity: Acute Type of Exam: Initial FINDINGS: No acute bony abnormality. The heart size is within normal limits, stable. Increased bibasilar opacity baby due to soft tissue attenuation. No effusion or overt edema is identified. Increased bilateral lower lung opacity likely is related to soft tissue attenuation. Lower lung airspace disease is not excluded. PA and lateral study may be helpful if indicated.      Ct Chest Pulmonary Embolism W Contrast    Result Date: 2/7/2020  EXAMINATION: CTA OF THE CHEST 2/6/2020 11:45 pm TECHNIQUE: CTA of the chest was performed after the administration of intravenous contrast.  Multiplanar reformatted images are provided for review. MIP images are provided for review. Dose modulation, iterative reconstruction, and/or weight based adjustment of the mA/kV was utilized to reduce the radiation dose to as low as reasonably achievable. COMPARISON: Chest x-ray dated February 6, 2020 HISTORY: ORDERING SYSTEM PROVIDED HISTORY: SOB elevated Ddimer TECHNOLOGIST PROVIDED HISTORY: Reason for exam:->SOB elevated Ddimer Reason for Exam: Altered Mental Status (daughter reports that patient has been having decreased conciousness for the past 3 days, pt hard to wake up per daughter, history of dementia ) FINDINGS: Pulmonary Arteries: Pulmonary arteries are adequately opacified for evaluation. No evidence of intraluminal filling defect to suggest pulmonary embolism. Main pulmonary artery is normal in caliber. Mediastinum: Nonspecific mediastinal and hilar lymph nodes are present. Right hilar lymph node measures 1.8 x 1.6 cm. Left hilar lymph node measures 1.7 x 1.2 cm. AP window lymph node measures 7 mm in short axis. Upper right paratracheal lymph node measures 8 mm in short axis. Subcarinal lymph node measures 8 mm in short axis no evidence of thoracic aortic aneurysm formation is present. Coronary arterial calcifications are noted. Heart size appears normal. Lungs/pleura: Ill-defined nodular infiltrates are seen within the lungs bilaterally, likely infectious in etiology. Differential considerations would include septic emboli. Focal areas of cystic change are seen within the lungs, predominantly within the upper lobes, suggesting cavitation. No pneumothorax is present. Bulla formation is seen within the apices bilaterally, right greater than left Upper Abdomen: Images through the upper abdomen demonstrate enlargement of the left lobe of the liver, which crosses the midline, into the left upper quadrant Soft Tissues/Bones: No acute osseous abnormality is present.      1. No evidence of pulmonary embolus, or aortic dissection 2. Scattered ill-defined nodular opacities bilaterally, differential considerations to include septic emboli. Several appear cavitary 3. Nonspecific mediastinal and hilar adenopathy, which may be reactive or neoplastic in etiology     Results for Sheela Baker (MRN [id number]) as of 2/7/2020 09:07   Ref. Range 2/6/2020 20:10 2/7/2020 05:03   Sodium Latest Ref Range: 136 - 145 mmol/L 134 (L) 132 (L)   Potassium Latest Ref Range: 3.5 - 5.1 mmol/L 4.0 4.6   Chloride Latest Ref Range: 99 - 110 mmol/L 91 (L) 90 (L)   CO2 Latest Ref Range: 21 - 32 mmol/L 33 (H) 33 (H)   BUN Latest Ref Range: 7 - 20 mg/dL 10 10   Creatinine Latest Ref Range: 0.6 - 1.1 mg/dL 0.6 0.6   Anion Gap Latest Ref Range: 3 - 16  10 9   GFR Non- Latest Ref Range: >60  >60 >60   GFR  Latest Ref Range: >60  >60 >60   Magnesium Latest Ref Range: 1.80 - 2.40 mg/dL 2.00 2.00   Glucose Latest Ref Range: 70 - 99 mg/dL 234 (H) 255 (H)   Calcium Latest Ref Range: 8.3 - 10.6 mg/dL 9.3 9.1   Total Protein Latest Ref Range: 6.4 - 8.2 g/dL 7.7    Procalcitonin Latest Ref Range: 0.00 - 0.15 ng/mL  0.29 (H)     Results for Sheela Baker (MRN [id number]) as of 2/7/2020 09:07   Ref. Range 2/6/2020 20:10   Troponin Latest Ref Range: <0.01 ng/mL 0.04 (H)     Results for Sheela Baker (MRN [id number]) as of 2/7/2020 09:07   Ref.  Range 2/6/2020 20:10 2/7/2020 05:03   WBC Latest Ref Range: 4.0 - 11.0 K/uL 13.3 (H) 12.9 (H)   RBC Latest Ref Range: 4.00 - 5.20 M/uL 4.78 4.65   Hemoglobin Quant Latest Ref Range: 12.0 - 16.0 g/dL 15.1 14.5   Hematocrit Latest Ref Range: 36.0 - 48.0 % 43.6 42.7   MCV Latest Ref Range: 80.0 - 100.0 fL 91.2 92.0   MCH Latest Ref Range: 26.0 - 34.0 pg 31.6 31.2   MCHC Latest Ref Range: 31.0 - 36.0 g/dL 34.6 33.9   MPV Latest Ref Range: 5.0 - 10.5 fL 7.6 7.0   RDW Latest Ref Range: 12.4 - 15.4 % 14.0 14.1   Platelet Count Latest Ref Range: 135 - 450 K/uL 249 236   Neutrophils % Latest Units: % 82.6 89.8   Lymphocyte % Latest Units: % 8.7 5.9   Monocytes % Latest Units: % 8.4 4.1     Results for Navid Tompkins (MRN [id number]) as of 2/7/2020 09:07   Ref. Range 2/6/2020 20:17   Rapid Influenza A Ag Latest Ref Range: Negative  Negative   Rapid Influenza B Ag Latest Ref Range: Negative  Negative   RAPID INFLUENZA A/B ANTIGENS Unknown Rpt     Results for Navid Tompkins (MRN [id number]) as of 2/7/2020 09:07   Ref. Range 2/6/2020 20:10   Carboxyhemoglobin Latest Ref Range: 0.0 - 1.5 % 2.7 (H)   O2 Therapy Unknown Unknown   pH, Agusto Latest Ref Range: 7.350 - 7.450  7.378   pCO2, Agusto Latest Ref Range: 40.0 - 50.0 mmHg 63.8 (H)   pO2, Agusto Latest Ref Range: 25.0 - 40.0 mmHg 86.2 (H)   HCO3, Venous Latest Ref Range: 23.0 - 29.0 mmol/L 36.7 (H)   TC02 (Calc), Agusto Latest Ref Range: Not Established mmol/L 39   Base Excess, Agusto Latest Ref Range: -3.0 - 3.0 mmol/L 8.7 (H)   O2 Content, Agusto Latest Ref Range: Not Established VOL % 21   MetHgb, Agusto Latest Ref Range: <1.5 % 0.5   O2 Sat, Agusto Latest Ref Range: Not Established % 96       Echocardiogram: None in Epic   PFT: None in Epic         Assessment:  Active Problems:    COPD exacerbation (HCC)    Acute on chronic respiratory failure with hypoxia and hypercapnia (HCC)    Tobacco abuse    Dementia (HCC)    Pulmonary infiltrates    Mediastinal adenopathy    Leukocytosis    Hyponatremia    Hyperglycemia    Elevated troponin    Acute encephalopathy    Aspiration into airway  Resolved Problems:    * No resolved hospital problems.  *          Plan:   · Oxygen supplementation to keep saturation between 90 to 94% only  · Pulmonary toilet  · Bronchodilators to continue  · Low-dose IV Solu-Medrol to continue  · Patient's CT of the chest reviewed and patient has multiple pulmonary infiltrates with some nodular cavities and there is a possibility that patient may be aspirating into the airways and may have chronic aspiration pneumonia which may be contributing to the radiological picture  · Patient has been started on IV Rocephin and vancomycin by the admitting provider-given that patient may have a competent of aspiration into the airways will change antibiotics to IV Unasyn-titration to be done as per clinical status and cultures  · Patient may benefit from a bronchoscopy for diagnostic and therapeutic purposes-patient has been kept provisionally n.p.o.-bronchoscopy to be done by Dr. Ortega Stover  · Patient's blood sugars are on the higher side  · Blood glucose monitoring with sliding scale insulin added-titration of the insulins as per blood glucose monitoring as per IM  · TSH ordered to see if patient has any hypothyroidism  · Patient also has slightly elevated troponin and evaluation management by IM  · Urinalysis ordered to see if patient has any UTI contributing to the clinical picture  · Neurochecks  · Patient has compensated respiratory failure at this time and hypercarbia may be contributing factor for lethargy but it is compensated and if the mentation does not improve then patient may require CT of the head-to be decided upon by internal medicine team  · Nicotine replacement therapy if the patient wants  · Monitor for any worsening hypoventilation  · Patient may have to be evaluated down the line for any suspected sleep apnea by PCP if deemed appropriate  · PUD and DVT prophylaxis as per IM  · Speech evaluation ordered to make sure that patient does not have any oropharyngeal dysphagia and patient is not micro-aspirating  · Please make sure that patient's flu shot and pneumonia vaccines are up-to-date prior to discharge    Case discussed with nursing    Other management depending on patient's clinical status, follow-up on above recommendations and bronchoscopy findings      Electronically signed by:  Renny Blakely MD    2/7/2020    9:18 AM.

## 2020-02-07 NOTE — PLAN OF CARE
Problem: Falls - Risk of:  Goal: Will remain free from falls  Description  Will remain free from falls  2/7/2020 0944 by April Francis RN  Outcome: Ongoing     Problem: Pain:  Goal: Pain level will decrease  Description  Pain level will decrease  Outcome: Ongoing

## 2020-02-07 NOTE — CARE COORDINATION
CASE MANAGEMENT INITIAL ASSESSMENT      Reviewed chart and met with patient today, re: 63 y.o. female   Explained Case Management role/services. Family present: daughter Odilon Morgan  Primary contact information: [phone] date/status: 2/7/20  Diagnosis: AMS  Is this a Readmission?:  n    Insurance: medicare  Precert required for SNF - N        3 night stay required - Y    Living arrangements, Adls, care needs, prior to admission: lives in trailer with daughter son and granddaughter    Transportation: private    [email or web address] [place] at home: Walker__Cane__RTS__ BSC__Shower Chair__  02__ HHN__ CPAP__  [facility] Bed__ W/C___ Other__________    Services in the home and/or outpatient, prior to admission: none    Dialysis Facility (if applicable) none  · Name:  · Address:  · Dialysis Schedule:  · Phone:  · Fax:    PT/OT recs: none    Hospital Exemption Notification (HEN): needed for SNF    Barriers to discharge: none    Plan/comments: spoke with daughter. Patient plans on returning home at discharge. Lives in trailer with multiple family members. [employer] family provides transportation to appointments. Provided PCP list. Encouraged making appointment ASAP. Denied any DCP needs.      ECOC on chart for MD signature

## 2020-02-07 NOTE — H&P
Hospital Medicine History & Physical      Patient: Pura Brown  :   1965  MRN:   [id number]  Date of Service: 20    CHIEF COMPLAINT:  Confusion, dyspnea    HISTORY OF PRESENT ILLNESS:    Pura Brown is a 63 y.o. female. She is accompanied by her duahgter. She presents because for the past several days she has felt fatigued, more confused than usual, and dyspneic. Patient apparently has known dementia. Daughter relates she has also been sleeping quite a bit and has been very difficult to wake up. She also describes a chronic cough which seems worse than usual, but she denies expectoraion of sputum, hemoptysis, and pleuritic pain. Patient has an extensive smoking history and continues to smoke. She lives with her daughter and son in law both of whom also smoke. Patient denies any alcohol use or illicit substance use. Patient denies any known diagnosis of JAYJAY. She does not normally use O2 at home. Review of Systems:  All pertinent positives and negatives are as noted in the HPI section. All other systems were reviewed and are negative. Past Medical History:   Diagnosis Date    [facility]     Dementia ([facility]        History reviewed. No pertinent surgical history. Prior to Admission medications    Medication Sig Start Date End Date Taking? Authorizing Provider   naproxen (NAPROSYN) 500 MG tablet Take 1 tablet by mouth 2 times daily (with meals) 20  Yes Dorys Yousif MD       Allergies:   Patient has no known allergies. Social:   reports that she has been smoking cigarettes. She has been smoking about 1.00 pack per day. She has never used smokeless tobacco.   reports no history of alcohol use. Social History     Substance and Sexual Activity   Drug Use Not Currently       History reviewed. No pertinent family history. PHYSICAL EXAM:  I performed this physical examination.     Vitals:  Patient Vitals for the past TROPONINI 0.04 (H) 02/06/2020     No results for input(s): PHART, JRR8CDS, PO2ART in the last 72 hours. IMAGING:  Xr Hand Left (min 3 Views)    Result Date: 1/19/2020  EXAMINATION: THREE XRAY VIEWS OF THE LEFT HAND;   XRAY VIEWS OF THE LEFT FINGER 1/19/2020 3:39 pm COMPARISON: None. HISTORY: ORDERING SYSTEM PROVIDED HISTORY: injury TECHNOLOGIST PROVIDED HISTORY: Reason for exam:->injury Reason for Exam: finger pain to 3rd, 4th and 5th digits Acuity: Acute Type of Exam: Initial FINDINGS: Thin longitudinal lucency through the lateral aspect of the proximal aspect of the 4th proximal phalanx, seen in the AP view. No definite cortical disruption is seen. No other findings suspicious for fracture. No dislocation. Possible fracture of the proximal aspect of the 4th proximal phalanx. Correlate with focal exam findings. No other fracture suggested     Xr Finger Left (min 2 Views)    Result Date: 1/19/2020  EXAMINATION: THREE XRAY VIEWS OF THE LEFT HAND;   XRAY VIEWS OF THE LEFT FINGER 1/19/2020 3:39 pm COMPARISON: None. HISTORY: ORDERING SYSTEM PROVIDED HISTORY: injury TECHNOLOGIST PROVIDED HISTORY: Reason for exam:->injury Reason for Exam: finger pain to 3rd, 4th and 5th digits Acuity: Acute Type of Exam: Initial FINDINGS: Thin longitudinal lucency through the lateral aspect of the proximal aspect of the 4th proximal phalanx, seen in the AP view. No definite cortical disruption is seen. No other findings suspicious for fracture. No dislocation. Possible fracture of the proximal aspect of the 4th proximal phalanx. Correlate with focal exam findings. No other fracture suggested     Xr Chest Portable    Result Date: 2/6/2020  EXAMINATION: ONE XRAY VIEW OF THE CHEST 2/6/2020 8:16 pm COMPARISON: 11/29/2008 HISTORY: ORDERING SYSTEM PROVIDED HISTORY: SOB TECHNOLOGIST PROVIDED HISTORY: Reason for exam:->SOB Reason for Exam: ams Acuity: Acute Type of Exam: Initial FINDINGS: No acute bony abnormality.   The heart etiology. Differential considerations would include septic emboli. Focal areas of cystic change are seen within the lungs, predominantly within the upper lobes, suggesting cavitation. No pneumothorax is present. Bulla formation is seen within the apices bilaterally, right greater than left Upper Abdomen: Images through the upper abdomen demonstrate enlargement of the left lobe of the liver, which crosses the midline, into the left upper quadrant Soft Tissues/Bones: No acute osseous abnormality is present. 1. No evidence of pulmonary embolus, or aortic dissection 2. Scattered ill-defined nodular opacities bilaterally, differential considerations to include septic emboli. Several appear cavitary 3. Nonspecific mediastinal and hilar adenopathy, which may be reactive or neoplastic in etiology       I directly reviewed all recent imaging studies as well as pertinent prior studies. Radiology reports may or may not be available at the time of my review. My comments and findings are as follows: No additional comment. EKG:  New and pertinent prior tracings were directly reviewed. My interpretation is as follows:  Normal sinus rhythm. Active Hospital Problems    Diagnosis Date Noted    [facility] [J44.1] 02/07/2020    Acute on chronic respiratory failure with hypoxia and hypercapnia (HCC) [C27.20, J96.22] 02/07/2020    Tobacco abuse [Z72.0] 02/07/2020    Dementia ([facility] [F03.90] 02/07/2020       ASSESSMENT & PLAN  COPD,   Respiratory failure  Tobacco abuse  -  Cultures:  Blood, sputum, urine Ag's  -  Abx:  Ceftriaxone, vancomycin  -  Adjuncts: Solumedrol 40mg IV q12h    Abnormal CT chest.  -  Widespread small bilateral cavitary lesions concerning for septic embolization.  -  Blood cultures, high dose ceftriaxone and vancomycin, and transthoracic echocardiogram requested. -  Will request pulmonary input.     DVT prophylaxis: SCDs, lovenox  Code Status:  Full  Disposition:  Inpatient    [facility] MD

## 2020-02-07 NOTE — ED PROVIDER NOTES
[address] [facility]  ED  eMERGENCY dEPARTMENTeNCOUnter      Pt Name: Sanjana Castro  MRN: [id number]  [place] 1965  Date of evaluation: 2/6/2020  Provider: Lisa Evans MD    CHIEF COMPLAINT       Chief Complaint   Patient presents with    Altered Mental Status     daughter reports that patient has been having decreased conciousness for the past 3 days, pt hard to wake up per daughter, history of dementia          HISTORY OF PRESENT ILLNESS   (Location/Symptom, Timing/Onset,Context/Setting, Quality, Duration, Modifying Factors, Severity)  Note limiting factors. Sanjana Castro is a 63 y.o. female who presents to the emergency department for    Shortness of breath, malaise and changes in her mental status according to her daughter. Her daughter states that she is usually active and sharp but over the past 3 days she has noticed decreased mentation. Patient herself complains of shortness of breath. She does have a history of COPD. She denies any cardiac history and denies any chest pain. Nursing notes were reviewed. REVIEW OF SYSTEMS    (2-9 systems for level 4, 10 or more for level 5)     Review of Systems    Positive and pertinent negative as per HPI. Except as noted above in the ROS, all other systems were reviewed and were negative. PAST MEDICAL HISTORY     Past Medical History:   Diagnosis Date    COPD (chronic obstructive pulmonary disease) ([facility] [address]     Dementia ([facility] [address]          SURGICALHISTORY     History reviewed. No pertinent surgical history. CURRENT MEDICATIONS       Previous Medications    NAPROXEN (NAPROSYN) 500 MG TABLET    Take 1 tablet by mouth 2 times daily (with meals)       ALLERGIES     Patient has no known allergies. FAMILY HISTORY     History reviewed. No pertinent family history.        SOCIAL HISTORY       Social History     Socioeconomic History    Marital status:      Spouse name: None    Number of children: None    Years of education: None Nose: Nose normal.      Mouth/Throat:      Mouth: Mucous membranes are dry. Eyes:      General: No scleral icterus. Right eye: No discharge. Left eye: No discharge. Conjunctiva/sclera: Conjunctivae normal.   Neck:      Musculoskeletal: Neck supple. Cardiovascular:      Rate and Rhythm: Normal rate. Pulmonary:      Effort: Pulmonary effort is normal. No respiratory distress. Comments: Diminished air entry bilaterally with wheezing. No prolongation of expiration able to speak in almost full sentences. Chest:      Chest wall: No tenderness. Abdominal:      General: Bowel sounds are normal. There is no distension. Palpations: Abdomen is soft. Tenderness: There is no abdominal tenderness. There is no guarding or rebound. Skin:     Coloration: Skin is not pale. Neurological:      Mental Status: She is alert. Psychiatric:         Mood and Affect: Mood normal.         Behavior: Behavior normal.           DIAGNOSTIC RESULTS     EKG: All EKG's are interpreted by the Emergency Department Physician who either signs or Co-signs this chart in the absence of a cardiologist.    12 lead EKG shows Normal sinus rhythm, NH interval QRS QTC normal.  Normal axis. No acute ischemic changes    RADIOLOGY:   Non-plain film images such as CT, Ultrasound and MRI are read by the radiologist. Plain radiographic images are visualized and preliminarily interpreted by the emergency physician with the below findings:        Interpretation per the Radiologist below, if available at the time of this note:    CT Chest Pulmonary Embolism W Contrast   Final Result   1. No evidence of pulmonary embolus, or aortic dissection   2. Scattered ill-defined nodular opacities bilaterally, differential   considerations to include septic emboli. Several appear cavitary   3.  Nonspecific mediastinal and hilar adenopathy, which may be reactive or   neoplastic in etiology         XR CHEST PORTABLE   Final Result Increased bilateral lower lung opacity likely is related to soft tissue   attenuation. Lower lung airspace disease is not excluded. PA and lateral   study may be helpful if indicated.                ED BEDSIDE ULTRASOUND:   Performed by ED Physician - none    LABS:  Labs Reviewed   CBC WITH AUTO DIFFERENTIAL - Abnormal; Notable for the following components:       Result Value    WBC 13.3 (*)     Neutrophils Absolute 11.0 (*)     All other components within normal limits    Narrative:     Performed at:  [place] [address] [employer]   Phone [phone]   COMPREHENSIVE METABOLIC PANEL - Abnormal; Notable for the following components:    Sodium 134 (*)     Chloride 91 (*)     CO2 33 (*)     Glucose 234 (*)     Albumin/Globulin Ratio 0.8 (*)     Total Bilirubin 1.3 (*)     Alkaline Phosphatase 159 (*)     All other components within normal limits    Narrative:     Performed at:  [place] [address] [employer]   Phone [phone]   BLOOD GAS, VENOUS - Abnormal; Notable for the following components:    pCO2, Agusto 63.8 (*)     pO2, Agusto 86.2 (*)     HCO3, Venous 36.7 (*)     Base Excess, Agusto 8.7 (*)     Carboxyhemoglobin 2.7 (*)     All other components within normal limits    Narrative:     Performed at:  [address], [facility] [employer]   Phone [phone]   TROPONIN - Abnormal; Notable for the following components:    Troponin 0.04 (*)     All other components within normal limits    Narrative:     Performed at:  [address], [facility] [employer]   Phone [phone]   D-DIMER, QUANTITATIVE - Abnormal; Notable for the following components:    D-Dimer, Quant 330 (*)     All other components within normal limits    Narrative:     Performed at:  [facility]  [address] [place] [address], provider and therefore she will need to be admitted to the hospitalist on duty this evening. I did speak to the hospitalist on duty who has agreed to admit this patient to his service. The patient did have an elevated troponin, she is given oral aspirin. EKG does not show any signs of acute ischemia. CRITICAL CARE TIME   Total Critical Care time was 35 minutes, excluding separately reportable procedures. There was a high probability of clinically significant/life threatening deterioration in the patient's condition which required my urgent intervention. CONSULTS:  IP CONSULT TO HOSPITALIST    PROCEDURES:  Unless otherwise noted above, none     Procedures    FINAL IMPRESSION      1. Dyspnea and respiratory abnormalities    2. Elevated troponin    3. Cavitary lesion of lung    4. Hypoxia          DISPOSITION/PLAN   DISPOSITION Decision To Admit 02/07/2020 12:47:37 AM      PATIENT REFERREDTO:  No follow-up provider specified.     DISCHARGEMEDICATIONS:  New Prescriptions    No medications on file          (Please note that portions of this note were completed with a voice recognition program.  Efforts were made to edit the dictations but occasionally words are mis-transcribed.)    Micha Leon MD (electronically signed)  Attending Emergency Physician        Micha Leon MD  02/07/20 2395

## 2020-02-08 PROBLEM — F17.200 SMOKER: Chronic | Status: ACTIVE | Noted: 2020-02-07

## 2020-02-08 LAB
ANION GAP SERPL CALCULATED.3IONS-SCNC: 6 MMOL/L (ref 3–16)
BASOPHILS ABSOLUTE: 0 K/UL (ref 0–0.2)
BASOPHILS RELATIVE PERCENT: 0.1 %
BUN BLDV-MCNC: 14 MG/DL (ref 7–20)
CALCIUM SERPL-MCNC: 9.2 MG/DL (ref 8.3–10.6)
CHLORIDE BLD-SCNC: 94 MMOL/L (ref 99–110)
CO2: 36 MMOL/L (ref 21–32)
CREAT SERPL-MCNC: <0.5 MG/DL (ref 0.6–1.1)
EOSINOPHILS ABSOLUTE: 0 K/UL (ref 0–0.6)
EOSINOPHILS RELATIVE PERCENT: 0 %
GFR AFRICAN AMERICAN: >60
GFR NON-AFRICAN AMERICAN: >60
GLUCOSE BLD-MCNC: 195 MG/DL (ref 70–99)
GLUCOSE BLD-MCNC: 239 MG/DL (ref 70–99)
GLUCOSE BLD-MCNC: 263 MG/DL (ref 70–99)
GLUCOSE BLD-MCNC: 298 MG/DL (ref 70–99)
GLUCOSE BLD-MCNC: 340 MG/DL (ref 70–99)
HCT VFR BLD CALC: 40.9 % (ref 36–48)
HEMOGLOBIN: 13.8 G/DL (ref 12–16)
LYMPHOCYTES ABSOLUTE: 0.9 K/UL (ref 1–5.1)
LYMPHOCYTES RELATIVE PERCENT: 7.3 %
MAGNESIUM: 2.3 MG/DL (ref 1.8–2.4)
MCH RBC QN AUTO: 31.1 PG (ref 26–34)
MCHC RBC AUTO-ENTMCNC: 33.9 G/DL (ref 31–36)
MCV RBC AUTO: 91.9 FL (ref 80–100)
MONOCYTES ABSOLUTE: 0.8 K/UL (ref 0–1.3)
MONOCYTES RELATIVE PERCENT: 6.5 %
NEUTROPHILS ABSOLUTE: 10.5 K/UL (ref 1.7–7.7)
NEUTROPHILS RELATIVE PERCENT: 86.1 %
PDW BLD-RTO: 13.8 % (ref 12.4–15.4)
PERFORMED ON: ABNORMAL
PLATELET # BLD: 237 K/UL (ref 135–450)
PMV BLD AUTO: 7.5 FL (ref 5–10.5)
POTASSIUM SERPL-SCNC: 4.9 MMOL/L (ref 3.5–5.1)
RBC # BLD: 4.45 M/UL (ref 4–5.2)
SODIUM BLD-SCNC: 136 MMOL/L (ref 136–145)
WBC # BLD: 12.2 K/UL (ref 4–11)

## 2020-02-08 PROCEDURE — G0008 ADMIN INFLUENZA VIRUS VAC: HCPCS | Performed by: INTERNAL MEDICINE

## 2020-02-08 PROCEDURE — 94762 N-INVAS EAR/PLS OXIMTRY CONT: CPT

## 2020-02-08 PROCEDURE — 83036 HEMOGLOBIN GLYCOSYLATED A1C: CPT

## 2020-02-08 PROCEDURE — 36415 COLL VENOUS BLD VENIPUNCTURE: CPT

## 2020-02-08 PROCEDURE — 6360000002 HC RX W HCPCS: Performed by: INTERNAL MEDICINE

## 2020-02-08 PROCEDURE — 80048 BASIC METABOLIC PNL TOTAL CA: CPT

## 2020-02-08 PROCEDURE — 2580000003 HC RX 258: Performed by: INTERNAL MEDICINE

## 2020-02-08 PROCEDURE — 6370000000 HC RX 637 (ALT 250 FOR IP): Performed by: INTERNAL MEDICINE

## 2020-02-08 PROCEDURE — 92610 EVALUATE SWALLOWING FUNCTION: CPT

## 2020-02-08 PROCEDURE — 94640 AIRWAY INHALATION TREATMENT: CPT

## 2020-02-08 PROCEDURE — 2700000000 HC OXYGEN THERAPY PER DAY

## 2020-02-08 PROCEDURE — 6370000000 HC RX 637 (ALT 250 FOR IP): Performed by: NURSE PRACTITIONER

## 2020-02-08 PROCEDURE — 90686 IIV4 VACC NO PRSV 0.5 ML IM: CPT | Performed by: INTERNAL MEDICINE

## 2020-02-08 PROCEDURE — 85025 COMPLETE CBC W/AUTO DIFF WBC: CPT

## 2020-02-08 PROCEDURE — 92526 ORAL FUNCTION THERAPY: CPT

## 2020-02-08 PROCEDURE — 1200000000 HC SEMI PRIVATE

## 2020-02-08 PROCEDURE — 99233 SBSQ HOSP IP/OBS HIGH 50: CPT | Performed by: INTERNAL MEDICINE

## 2020-02-08 PROCEDURE — 94669 MECHANICAL CHEST WALL OSCILL: CPT

## 2020-02-08 PROCEDURE — 83735 ASSAY OF MAGNESIUM: CPT

## 2020-02-08 RX ADMIN — INFLUENZA A VIRUS A/BRISBANE/02/2018 IVR-190 (H1N1) ANTIGEN (PROPIOLACTONE INACTIVATED), INFLUENZA A VIRUS A/KANSAS/14/2017 X-327 (H3N2) ANTIGEN (PROPIOLACTONE INACTIVATED), INFLUENZA B VIRUS B/MARYLAND/15/2016 ANTIGEN (PROPIOLACTONE INACTIVATED), INFLUENZA B VIRUS B/PHUKET/3073/2013 BVR-1B ANTIGEN (PROPIOLACTONE INACTIVATED) 0.5 ML: 15; 15; 15; 15 INJECTION, SUSPENSION INTRAMUSCULAR at 10:24

## 2020-02-08 RX ADMIN — IPRATROPIUM BROMIDE AND ALBUTEROL SULFATE 1 AMPULE: .5; 3 SOLUTION RESPIRATORY (INHALATION) at 08:00

## 2020-02-08 RX ADMIN — AMPICILLIN SODIUM AND SULBACTAM SODIUM 3 G: 2; 1 INJECTION, POWDER, FOR SOLUTION INTRAMUSCULAR; INTRAVENOUS at 10:10

## 2020-02-08 RX ADMIN — IPRATROPIUM BROMIDE AND ALBUTEROL SULFATE 1 AMPULE: .5; 3 SOLUTION RESPIRATORY (INHALATION) at 19:47

## 2020-02-08 RX ADMIN — Medication 10 ML: at 20:47

## 2020-02-08 RX ADMIN — Medication 10 ML: at 17:52

## 2020-02-08 RX ADMIN — AMPICILLIN SODIUM AND SULBACTAM SODIUM 3 G: 2; 1 INJECTION, POWDER, FOR SOLUTION INTRAMUSCULAR; INTRAVENOUS at 04:17

## 2020-02-08 RX ADMIN — INSULIN LISPRO 4 UNITS: 100 INJECTION, SOLUTION INTRAVENOUS; SUBCUTANEOUS at 20:44

## 2020-02-08 RX ADMIN — INSULIN LISPRO 6 UNITS: 100 INJECTION, SOLUTION INTRAVENOUS; SUBCUTANEOUS at 12:13

## 2020-02-08 RX ADMIN — AMPICILLIN SODIUM AND SULBACTAM SODIUM 3 G: 2; 1 INJECTION, POWDER, FOR SOLUTION INTRAMUSCULAR; INTRAVENOUS at 17:52

## 2020-02-08 RX ADMIN — AMPICILLIN SODIUM AND SULBACTAM SODIUM 3 G: 2; 1 INJECTION, POWDER, FOR SOLUTION INTRAMUSCULAR; INTRAVENOUS at 22:29

## 2020-02-08 RX ADMIN — METHYLPREDNISOLONE SODIUM SUCCINATE 40 MG: 40 INJECTION, POWDER, FOR SOLUTION INTRAMUSCULAR; INTRAVENOUS at 10:20

## 2020-02-08 RX ADMIN — IPRATROPIUM BROMIDE AND ALBUTEROL SULFATE 1 AMPULE: .5; 3 SOLUTION RESPIRATORY (INHALATION) at 12:45

## 2020-02-08 RX ADMIN — IPRATROPIUM BROMIDE AND ALBUTEROL SULFATE 1 AMPULE: .5; 3 SOLUTION RESPIRATORY (INHALATION) at 16:27

## 2020-02-08 RX ADMIN — INSULIN LISPRO 6 UNITS: 100 INJECTION, SOLUTION INTRAVENOUS; SUBCUTANEOUS at 17:18

## 2020-02-08 RX ADMIN — INSULIN LISPRO 2 UNITS: 100 INJECTION, SOLUTION INTRAVENOUS; SUBCUTANEOUS at 10:19

## 2020-02-08 RX ADMIN — ENOXAPARIN SODIUM 40 MG: 40 INJECTION SUBCUTANEOUS at 10:12

## 2020-02-08 RX ADMIN — METHYLPREDNISOLONE SODIUM SUCCINATE 40 MG: 40 INJECTION, POWDER, FOR SOLUTION INTRAMUSCULAR; INTRAVENOUS at 20:44

## 2020-02-08 ASSESSMENT — PAIN SCALES - GENERAL
PAINLEVEL_OUTOF10: 0
PAINLEVEL_OUTOF10: 5

## 2020-02-08 NOTE — PLAN OF CARE
Problem: Nutrition  Intervention: Swallowing evaluation  Note:   SLP completed evaluation. Please refer to notes in EMR. Ashia Dotson, [address] [facility] [address]  Speech-Language Pathologist  (desk #): [phone]

## 2020-02-08 NOTE — PROGRESS NOTES
jugular venous distention. Trachea midline. Respiratory:  Normal respiratory effort at rest. Bilaterally with wheezing and rhonchi, coarse breath sounds. Currently requiring high flow O2 to maintain sats    Cardiovascular: Regular rate and rhythm with normal S1/S2 without murmurs, rubs or gallops. Abdomen: Soft, non-tender, non-distended with normal bowel sounds. Musculoskeletal: No clubbing, cyanosis or edema bilaterally. Full range of motion without deformity. Skin: Skin color, texture, turgor normal.  No rashes or lesions. Neurologic:  Neurovascularly intact without any focal sensory/motor deficits. Cranial nerves: II-XII intact, grossly non-focal.  Psychiatric: Alert and oriented, known impaired memory and cog   Capillary Refill: Brisk,< 3 seconds   Peripheral Pulses: +2 palpable, equal bilaterally       Labs:   Recent Labs     02/06/20 2010 02/07/20  0503 02/08/20  0634   WBC 13.3* 12.9* 12.2*   HGB 15.1 14.5 13.8   HCT 43.6 42.7 40.9    236 237     Recent Labs     02/06/20 2010 02/07/20  0503 02/08/20  0634   * 132* 136   K 4.0 4.6 4.9   CL 91* 90* 94*   CO2 33* 33* 36*   BUN 10 10 14   CREATININE 0.6 0.6 <0.5*   CALCIUM 9.3 9.1 9.2     Recent Labs     02/06/20 2010   AST 22   ALT 31   BILITOT 1.3*   ALKPHOS 159*     No results for input(s): INR in the last 72 hours. Recent Labs     02/06/20 2010   TROPONINI 0.04*       Urinalysis:      Lab Results   Component Value Date    NITRU Negative 02/07/2020    WBCUA None seen 02/07/2020    BACTERIA 2+ 02/07/2020    RBCUA 0-2 02/07/2020    BLOODU Negative 02/07/2020    SPECGRAV >=1.030 02/07/2020    GLUCOSEU 500 02/07/2020    GLUCOSEU NEGATIVE 08/14/2010       Radiology:  CT Chest Pulmonary Embolism W Contrast   Final Result   1. No evidence of pulmonary embolus, or aortic dissection   2. Scattered ill-defined nodular opacities bilaterally, differential   considerations to include septic emboli. Several appear cavitary   3.  Nonspecific mediastinal and hilar adenopathy, which may be reactive or   neoplastic in etiology         XR CHEST PORTABLE   Final Result   Increased bilateral lower lung opacity likely is related to soft tissue   attenuation. Lower lung airspace disease is not excluded. PA and lateral   study may be helpful if indicated. Assessment/Plan:    Active Hospital Problems    Diagnosis    COPD exacerbation ([facility] [address] [J44.1]    Acute on chronic respiratory failure with hypoxia and hypercapnia (HCC) [J96.21, J96.22]    Tobacco abuse [Z72.0]    Dementia (HCC) [F03.90]    Pulmonary infiltrates [R91.8]    Mediastinal adenopathy [R59.0]    Leukocytosis [D72.829]    Hyponatremia [E87.1]    Hyperglycemia [R73.9]    Elevated troponin [R79.89]    Acute encephalopathy [G93.40]    Aspiration into airway [T17.908A]    Mucus plugging of bronchi [J98.09]    Cavitary lesion of lung [J98.4]     Acute respiratory failure with hypoxia and hypercapnia: multifactorial in the setting of abn CT, COPDE, suspected asp PNA  - supplemental o2 wean as tolerated. PT does not wear O2 at home but suspect may need   - monitor O2  - VBG on admission reviewed   - bronch on 2/7 mucous plugging     Abn CT   - CT on arrival:    1. No evidence of pulmonary embolus, or aortic dissection  2. Scattered ill-defined nodular opacities bilaterally, differential  considerations to include septic emboli.  Several appear cavitary  3.  Nonspecific mediastinal and hilar adenopathy, which may be reactive or  neoplastic in etiology  - long time smoking hx high risk for neoplasm   - Pulmonology consulted appreciate input   - 2/7 Bronchoscopy completed     Possible Asp PNA that occurred with acute encephalopathy   - Cont Unasyn  - follow cultures  - asp prec, SLP- diet per their rec's     Acute on chronic metabolic encephalopathy: resolve back to baseline, suspect in the setting of acute resp failure with hypoxia    - hx of anoxic brain injury from remote OD attempt  - monitor     COPDe  - Pulm following, IHBD, steroids, Pulm expansion   - smoking cessation   - Pulm following  - supportive O2     Hyperglycemia- suspect underlying DM, check A1C  - FSBS ACHS with SSI   - cab control meals     Tobacco use disorder: cessation education, Nicotine replacement     DVT Prophylaxis: Lovenox   Diet: DIET CARDIAC;  Code Status: Full Code    PT/OT Eval Status: NA    Dispo - pending work up     Krysten Shah, APRN - CNP

## 2020-02-08 NOTE — PROGRESS NOTES
Perfect Serve to Dr. Blanca Agustin sent        Jim Evans  2/8/20 9:31 AM   [phone] Hospital or Facility: [facility] From: Madhav Goodwin RE: Annalee Severe 1965 RM: 768 Pt oxygen needs increasing,RN notified me pt sats 85%, placed pt on high flow nasal cannula, 8 lpm sats now 92 - 94%. Need high flow non heated oxygen order.  Thank you Need Callback: NO CALLBACK REQ RESPIRATORY THERAPY

## 2020-02-08 NOTE — PLAN OF CARE
Problem: Falls - Risk of:  Goal: Will remain free from falls  Description  Will remain free from falls  Outcome: Ongoing  Note:   Bed in lowest position, wheels locked, 2/4 side rails up, nonskid footwear on. Bed/ chair check alarm in place, call light within reach. Pt instructed to call out when needing assistance. Pt stated understanding. Nurse will continue to monitor. Problem: Pain:  Goal: Pain level will decrease  Description  Pain level will decrease  Outcome: Ongoing  Note:   Pt scoring pain on 0-10 scale. Pain medications given per MAR. Pt instructed to call out when pain level increasing. Call light within reach. Nurse will continue to reassess and monitor.

## 2020-02-08 NOTE — PROGRESS NOTES
Plan  Requires SLP Intervention: Yes  Duration/Frequency of Treatment: 3x/wk for 1 week until 02/15/20          Recommended Diet and Intervention  Diet Solids Recommendation: Regular  Liquid Consistency Recommendation: Thin  Recommended Form of Meds: PO          Compensatory Swallowing Strategies  · Small bites/sips;  · Eat/Feed slowly;  · Remain upright for 30-45 minutes after meals;  · Upright as possible for all oral intake    Treatment/Goals  Short-term Goals  Timeframe for Short-term Goals: 1 week, 3x/wk until 02/15/20  1. The patient will tolerate recommended diet without observed clinical signs of aspiration    Long-term Goals  Timeframe for Long-term Goals: 1-2 weeks  1. Pt will tolerate recommended diet without oberved s/s of aspiration. General  Chart Reviewed: Yes  Behavior/Cognition: Alert; Cooperative;Pleasant mood;Confused(oriented to self and place only; has dementia)  Respiratory Status: O2 via nasual cannula  O2 Device: Nasal cannula  Liters of Oxygen: 3 L(pt c/o SOB; O2 sat 79-91%. SLP called RN to room. RN increased O2 to 5lpm and O2 sat increased to 86%. RN in room throughout remainder of session.)  Communication Observation: Functional  Follows Directions: Simple  Dentition: Dentures top;Dentures bottom  Patient Positioning: Upright in bed  Baseline Vocal Quality: Normal;Hoarse  Volitional Cough: Strong  Prior Dysphagia History: Pt reports no Hx dysphagia, but pt not reliable historian given dementia. No Hx of dysphagia noted in EMR. Daughter at bedside but unable to corroborate pt's reports as daughter is asleep throughout evaluation. Consistencies Administered: Reg solid; Dysphagia Soft and Bite-Sized (Dysphagia III); Dysphagia Minced and Moist (Dysphagia II); Dysphagia Pureed (Dysphagia I); Thin - teaspoon; Thin - cup; Thin - straw           Vision/Hearing  Vision  Vision: Within Functional Limits  Hearing  Hearing: Within functional limits    Oral Motor Deficits  Oral/Motor  Oral Motor:

## 2020-02-08 NOTE — PROGRESS NOTES
PROFILE:   Recent Labs     02/06/20 2010   AST 22   ALT 31   BILITOT 1.3*   ALKPHOS 159*     UA:  Recent Labs     02/07/20  1920   COLORU Yellow   PHUR 6.5   WBCUA None seen   RBCUA 0-2   BACTERIA 2+*   CLARITYU SL CLOUDY*   SPECGRAV >=1.030   LEUKOCYTESUR Negative   UROBILINOGEN >=8.0*   BILIRUBINUR SMALL*   BLOODU Negative   GLUCOSEU 500*   AMORPHOUS 4+       Imaging:  I have reviewed radiology images personally. CT Chest Pulmonary Embolism W Contrast   Final Result   1. No evidence of pulmonary embolus, or aortic dissection   2. Scattered ill-defined nodular opacities bilaterally, differential   considerations to include septic emboli. Several appear cavitary   3. Nonspecific mediastinal and hilar adenopathy, which may be reactive or   neoplastic in etiology         XR CHEST PORTABLE   Final Result   Increased bilateral lower lung opacity likely is related to soft tissue   attenuation. Lower lung airspace disease is not excluded. PA and lateral   study may be helpful if indicated. Xr Hand Left (min 3 Views)    Result Date: 1/19/2020  EXAMINATION: THREE XRAY VIEWS OF THE LEFT HAND;   XRAY VIEWS OF THE LEFT FINGER 1/19/2020 3:39 pm COMPARISON: None. HISTORY: ORDERING SYSTEM PROVIDED HISTORY: injury TECHNOLOGIST PROVIDED HISTORY: Reason for exam:->injury Reason for Exam: finger pain to 3rd, 4th and 5th digits Acuity: Acute Type of Exam: Initial FINDINGS: Thin longitudinal lucency through the lateral aspect of the proximal aspect of the 4th proximal phalanx, seen in the AP view. No definite cortical disruption is seen. No other findings suspicious for fracture. No dislocation. Possible fracture of the proximal aspect of the 4th proximal phalanx. Correlate with focal exam findings. No other fracture suggested     Xr Finger Left (min 2 Views)    Result Date: 1/19/2020  EXAMINATION: THREE XRAY VIEWS OF THE LEFT HAND;   XRAY VIEWS OF THE LEFT FINGER 1/19/2020 3:39 pm COMPARISON: None.  HISTORY: has been having decreased conciousness for the past 3 days, pt hard to wake up per daughter, history of dementia ) FINDINGS: Pulmonary Arteries: Pulmonary arteries are adequately opacified for evaluation. No evidence of intraluminal filling defect to suggest pulmonary embolism. Main pulmonary artery is normal in caliber. Mediastinum: Nonspecific mediastinal and hilar lymph nodes are present. Right hilar lymph node measures 1.8 x 1.6 cm. Left hilar lymph node measures 1.7 x 1.2 cm. AP window lymph node measures 7 mm in short axis. Upper right paratracheal lymph node measures 8 mm in short axis. Subcarinal lymph node measures 8 mm in short axis no evidence of thoracic aortic aneurysm formation is present. Coronary arterial calcifications are noted. Heart size appears normal. Lungs/pleura: Ill-defined nodular infiltrates are seen within the lungs bilaterally, likely infectious in etiology. Differential considerations would include septic emboli. Focal areas of cystic change are seen within the lungs, predominantly within the upper lobes, suggesting cavitation. No pneumothorax is present. Bulla formation is seen within the apices bilaterally, right greater than left Upper Abdomen: Images through the upper abdomen demonstrate enlargement of the left lobe of the liver, which crosses the midline, into the left upper quadrant Soft Tissues/Bones: No acute osseous abnormality is present. 1. No evidence of pulmonary embolus, or aortic dissection 2. Scattered ill-defined nodular opacities bilaterally, differential considerations to include septic emboli. Several appear cavitary 3. Nonspecific mediastinal and hilar adenopathy, which may be reactive or neoplastic in etiology       Assessment and plan:  Acute on chronic respiratory failure with hypoxia and hypercarbia. Oxygen requirement slightly higher. COPD exacerbation, mucous plugging. Status post bronchoscopy 2/7/2020. Continue Unasyn, Solu-Medrol, DuoNeb. Can change to oral steroid and possibly Augmentin by tomorrow. Cavitary lung lesions, possible pneumonia. Status post bronchoscopy. 4+ WBCs, no AFB or fungi, no bacteria so far. Mediastinal adenopathy. Likely reactive. Leukocytosis. Counts stable. Hyponatremia. Improved  Dementia. Per IM. Family had mentioned she only had memory loss. DVT prophylaxis. Lovenox. Discussed with patient and son at the bedside. She probably needs a couple of days at least to recover.         Electronically signed by:  Edgard Orlando MD    2/8/2020    6:58 PM.

## 2020-02-09 ENCOUNTER — APPOINTMENT (OUTPATIENT)
Dept: GENERAL RADIOLOGY | Age: 55
DRG: 189 | End: 2020-02-09
Payer: MEDICARE

## 2020-02-09 LAB
CULTURE, RESPIRATORY: NORMAL
ESTIMATED AVERAGE GLUCOSE: 125.5 MG/DL
GLUCOSE BLD-MCNC: 191 MG/DL (ref 70–99)
GLUCOSE BLD-MCNC: 197 MG/DL (ref 70–99)
GLUCOSE BLD-MCNC: 374 MG/DL (ref 70–99)
GLUCOSE BLD-MCNC: 386 MG/DL (ref 70–99)
GRAM STAIN RESULT: NORMAL
HBA1C MFR BLD: 6 %
PERFORMED ON: ABNORMAL

## 2020-02-09 PROCEDURE — 2700000000 HC OXYGEN THERAPY PER DAY

## 2020-02-09 PROCEDURE — 2580000003 HC RX 258: Performed by: INTERNAL MEDICINE

## 2020-02-09 PROCEDURE — 6370000000 HC RX 637 (ALT 250 FOR IP): Performed by: INTERNAL MEDICINE

## 2020-02-09 PROCEDURE — 99233 SBSQ HOSP IP/OBS HIGH 50: CPT | Performed by: INTERNAL MEDICINE

## 2020-02-09 PROCEDURE — 6370000000 HC RX 637 (ALT 250 FOR IP): Performed by: NURSE PRACTITIONER

## 2020-02-09 PROCEDURE — 71046 X-RAY EXAM CHEST 2 VIEWS: CPT

## 2020-02-09 PROCEDURE — 94669 MECHANICAL CHEST WALL OSCILL: CPT

## 2020-02-09 PROCEDURE — 6360000002 HC RX W HCPCS: Performed by: INTERNAL MEDICINE

## 2020-02-09 PROCEDURE — 1200000000 HC SEMI PRIVATE

## 2020-02-09 PROCEDURE — 94640 AIRWAY INHALATION TREATMENT: CPT

## 2020-02-09 PROCEDURE — 94761 N-INVAS EAR/PLS OXIMETRY MLT: CPT

## 2020-02-09 RX ADMIN — AMPICILLIN SODIUM AND SULBACTAM SODIUM 3 G: 2; 1 INJECTION, POWDER, FOR SOLUTION INTRAMUSCULAR; INTRAVENOUS at 16:23

## 2020-02-09 RX ADMIN — Medication 10 ML: at 09:37

## 2020-02-09 RX ADMIN — IPRATROPIUM BROMIDE AND ALBUTEROL SULFATE 1 AMPULE: .5; 3 SOLUTION RESPIRATORY (INHALATION) at 20:48

## 2020-02-09 RX ADMIN — AMPICILLIN SODIUM AND SULBACTAM SODIUM 3 G: 2; 1 INJECTION, POWDER, FOR SOLUTION INTRAMUSCULAR; INTRAVENOUS at 04:28

## 2020-02-09 RX ADMIN — IPRATROPIUM BROMIDE AND ALBUTEROL SULFATE 1 AMPULE: .5; 3 SOLUTION RESPIRATORY (INHALATION) at 16:08

## 2020-02-09 RX ADMIN — IPRATROPIUM BROMIDE AND ALBUTEROL SULFATE 1 AMPULE: .5; 3 SOLUTION RESPIRATORY (INHALATION) at 12:50

## 2020-02-09 RX ADMIN — Medication 10 ML: at 20:13

## 2020-02-09 RX ADMIN — INSULIN LISPRO 5 UNITS: 100 INJECTION, SOLUTION INTRAVENOUS; SUBCUTANEOUS at 20:20

## 2020-02-09 RX ADMIN — INSULIN LISPRO 2 UNITS: 100 INJECTION, SOLUTION INTRAVENOUS; SUBCUTANEOUS at 12:41

## 2020-02-09 RX ADMIN — INSULIN LISPRO 2 UNITS: 100 INJECTION, SOLUTION INTRAVENOUS; SUBCUTANEOUS at 09:23

## 2020-02-09 RX ADMIN — METHYLPREDNISOLONE SODIUM SUCCINATE 40 MG: 40 INJECTION, POWDER, FOR SOLUTION INTRAMUSCULAR; INTRAVENOUS at 20:13

## 2020-02-09 RX ADMIN — METHYLPREDNISOLONE SODIUM SUCCINATE 40 MG: 40 INJECTION, POWDER, FOR SOLUTION INTRAMUSCULAR; INTRAVENOUS at 09:36

## 2020-02-09 RX ADMIN — IPRATROPIUM BROMIDE AND ALBUTEROL SULFATE 1 AMPULE: .5; 3 SOLUTION RESPIRATORY (INHALATION) at 08:37

## 2020-02-09 RX ADMIN — AMPICILLIN SODIUM AND SULBACTAM SODIUM 3 G: 2; 1 INJECTION, POWDER, FOR SOLUTION INTRAMUSCULAR; INTRAVENOUS at 22:42

## 2020-02-09 RX ADMIN — AMPICILLIN SODIUM AND SULBACTAM SODIUM 3 G: 2; 1 INJECTION, POWDER, FOR SOLUTION INTRAMUSCULAR; INTRAVENOUS at 09:43

## 2020-02-09 RX ADMIN — INSULIN LISPRO 10 UNITS: 100 INJECTION, SOLUTION INTRAVENOUS; SUBCUTANEOUS at 16:51

## 2020-02-09 RX ADMIN — ENOXAPARIN SODIUM 40 MG: 40 INJECTION SUBCUTANEOUS at 09:35

## 2020-02-09 RX ADMIN — BENZONATATE 200 MG: 100 CAPSULE ORAL at 09:42

## 2020-02-09 ASSESSMENT — PAIN SCALES - GENERAL
PAINLEVEL_OUTOF10: 0
PAINLEVEL_OUTOF10: 0

## 2020-02-09 NOTE — PROGRESS NOTES
normal S1/S2 without murmurs, rubs or gallops. Abdomen: Soft, non-tender, non-distended with normal bowel sounds. Musculoskeletal: No clubbing, cyanosis or edema bilaterally. Full range of motion without deformity. Skin: Skin color, texture, turgor normal.  No rashes or lesions. Neurologic:  Neurovascularly intact without any focal sensory/motor deficits. Cranial nerves: II-XII intact, grossly non-focal.  Psychiatric: Alert and oriented, known impaired memory and cog   Capillary Refill: Brisk,< 3 seconds   Peripheral Pulses: +2 palpable, equal bilaterally       Labs:   Recent Labs     02/06/20 2010 02/07/20  0503 02/08/20  0634   WBC 13.3* 12.9* 12.2*   HGB 15.1 14.5 13.8   HCT 43.6 42.7 40.9    236 237     Recent Labs     02/06/20 2010 02/07/20  0503 02/08/20  0634   * 132* 136   K 4.0 4.6 4.9   CL 91* 90* 94*   CO2 33* 33* 36*   BUN 10 10 14   CREATININE 0.6 0.6 <0.5*   CALCIUM 9.3 9.1 9.2     Recent Labs     02/06/20 2010   AST 22   ALT 31   BILITOT 1.3*   ALKPHOS 159*     No results for input(s): INR in the last 72 hours. Recent Labs     02/06/20 2010   TROPONINI 0.04*       Urinalysis:      Lab Results   Component Value Date    NITRU Negative 02/07/2020    WBCUA None seen 02/07/2020    BACTERIA 2+ 02/07/2020    RBCUA 0-2 02/07/2020    BLOODU Negative 02/07/2020    SPECGRAV >=1.030 02/07/2020    GLUCOSEU 500 02/07/2020    GLUCOSEU NEGATIVE 08/14/2010       Radiology:  CT Chest Pulmonary Embolism W Contrast   Final Result   1. No evidence of pulmonary embolus, or aortic dissection   2. Scattered ill-defined nodular opacities bilaterally, differential   considerations to include septic emboli. Several appear cavitary   3. Nonspecific mediastinal and hilar adenopathy, which may be reactive or   neoplastic in etiology         XR CHEST PORTABLE   Final Result   Increased bilateral lower lung opacity likely is related to soft tissue   attenuation. Lower lung airspace disease is not excluded. PA and lateral   study may be helpful if indicated. Assessment/Plan:    Active Hospital Problems    Diagnosis    COPD exacerbation ([facility] [address] [J44.1]    Acute on chronic respiratory failure with hypoxia and hypercapnia (HCC) [J96.21, J96.22]    Smoker [F17.200]    Dementia (HCC) [F03.90]    Pulmonary infiltrates [R91.8]    Mediastinal adenopathy [R59.0]    Leukocytosis [D72.829]    Hyponatremia [E87.1]    Hyperglycemia [R73.9]    Elevated troponin [R79.89]    Acute encephalopathy [G93.40]    Aspiration into airway [T17.908A]    Mucus plugging of bronchi [J98.09]    Cavitary lesion of lung [J98.4]     Acute respiratory failure with hypoxia and hypercapnia: multifactorial in the setting of abn CT, COPDE, suspected asp PNA  - supplemental o2 wean as tolerated. PT does not wear O2 at home but suspect may need   - monitor O2  - VBG on admission reviewed   - bronch on 2/7 mucous plugging     Abn CT   - CT on arrival:    1. No evidence of pulmonary embolus, or aortic dissection  2. Scattered ill-defined nodular opacities bilaterally, differential  considerations to include septic emboli.  Several appear cavitary  3.  Nonspecific mediastinal and hilar adenopathy, which may be reactive or  neoplastic in etiology  - long time smoking hx high risk for neoplasm   - Pulmonology consulted appreciate input   - 2/7 Bronchoscopy completed - nothing growing thus far     Possible Asp PNA that occurred with acute encephalopathy   - Cont Unasyn  - follow cultures  - asp prec, SLP- diet per their rec's     Acute on chronic metabolic encephalopathy: resolve back to baseline, suspect in the setting of acute resp failure with hypoxia    - hx of anoxic brain injury from remote OD attempt  - monitor     COPDe  - Pulm following, IHBD, steroids, Pulm expansion   - smoking cessation   - Pulm following  - supportive O2     Hyperglycemia- suspect underlying DM, check A1C  - FSBS ACHS with SSI   - cab control meals

## 2020-02-09 NOTE — PROGRESS NOTES
CREATININE 0.6 0.6 <0.5*     LIVER PROFILE:   Recent Labs     02/06/20 2010   AST 22   ALT 31   BILITOT 1.3*   ALKPHOS 159*     UA:  Recent Labs     02/07/20  1920   COLORU Yellow   PHUR 6.5   WBCUA None seen   RBCUA 0-2   BACTERIA 2+*   CLARITYU SL CLOUDY*   SPECGRAV >=1.030   LEUKOCYTESUR Negative   UROBILINOGEN >=8.0*   BILIRUBINUR SMALL*   BLOODU Negative   GLUCOSEU 500*   AMORPHOUS 4+       Imaging:  I have reviewed radiology images personally. CT Chest Pulmonary Embolism W Contrast   Final Result   1. No evidence of pulmonary embolus, or aortic dissection   2. Scattered ill-defined nodular opacities bilaterally, differential   considerations to include septic emboli. Several appear cavitary   3. Nonspecific mediastinal and hilar adenopathy, which may be reactive or   neoplastic in etiology         XR CHEST PORTABLE   Final Result   Increased bilateral lower lung opacity likely is related to soft tissue   attenuation. Lower lung airspace disease is not excluded. PA and lateral   study may be helpful if indicated. Xr Hand Left (min 3 Views)    Result Date: 1/19/2020  EXAMINATION: THREE XRAY VIEWS OF THE LEFT HAND;   XRAY VIEWS OF THE LEFT FINGER 1/19/2020 3:39 pm COMPARISON: None. HISTORY: ORDERING SYSTEM PROVIDED HISTORY: injury TECHNOLOGIST PROVIDED HISTORY: Reason for exam:->injury Reason for Exam: finger pain to 3rd, 4th and 5th digits Acuity: Acute Type of Exam: Initial FINDINGS: Thin longitudinal lucency through the lateral aspect of the proximal aspect of the 4th proximal phalanx, seen in the AP view. No definite cortical disruption is seen. No other findings suspicious for fracture. No dislocation. Possible fracture of the proximal aspect of the 4th proximal phalanx. Correlate with focal exam findings.   No other fracture suggested     Xr Finger Left (min 2 Views)    Result Date: 1/19/2020  EXAMINATION: THREE XRAY VIEWS OF THE LEFT HAND;   XRAY VIEWS OF THE LEFT FINGER 1/19/2020 Status (daughter reports that patient has been having decreased conciousness for the past 3 days, pt hard to wake up per daughter, history of dementia ) FINDINGS: Pulmonary Arteries: Pulmonary arteries are adequately opacified for evaluation. No evidence of intraluminal filling defect to suggest pulmonary embolism. Main pulmonary artery is normal in caliber. Mediastinum: Nonspecific mediastinal and hilar lymph nodes are present. Right hilar lymph node measures 1.8 x 1.6 cm. Left hilar lymph node measures 1.7 x 1.2 cm. AP window lymph node measures 7 mm in short axis. Upper right paratracheal lymph node measures 8 mm in short axis. Subcarinal lymph node measures 8 mm in short axis no evidence of thoracic aortic aneurysm formation is present. Coronary arterial calcifications are noted. Heart size appears normal. Lungs/pleura: Ill-defined nodular infiltrates are seen within the lungs bilaterally, likely infectious in etiology. Differential considerations would include septic emboli. Focal areas of cystic change are seen within the lungs, predominantly within the upper lobes, suggesting cavitation. No pneumothorax is present. Bulla formation is seen within the apices bilaterally, right greater than left Upper Abdomen: Images through the upper abdomen demonstrate enlargement of the left lobe of the liver, which crosses the midline, into the left upper quadrant Soft Tissues/Bones: No acute osseous abnormality is present. 1. No evidence of pulmonary embolus, or aortic dissection 2. Scattered ill-defined nodular opacities bilaterally, differential considerations to include septic emboli. Several appear cavitary 3. Nonspecific mediastinal and hilar adenopathy, which may be reactive or neoplastic in etiology       Assessment and plan:  Acute on chronic respiratory failure with hypoxia and hypercarbia. Oxygen to keep SaO2 >90%  COPD exacerbation, mucous plugging. Status post bronchoscopy 2/7/2020.  Continue Unasyn, Solu-Medrol, DuoNeb. Can change to oral steroid and possibly Augmentin by tomorrow. Has not improved clinically. I have recommended repeat bronchoscopy by my partner Dr. Kirstin Bejarano tomorrow. The procedure was discussed with her. She agrees. Repeat CXR. Cavitary lung lesions, possible pneumonia. Status post bronchoscopy. 4+ WBCs, no AFB or fungi, no bacteria so far. Mediastinal adenopathy. Likely reactive. Leukocytosis. Counts stable. Hyponatremia. Improved  Dementia. Per IM. Family had mentioned she only had memory loss. DVT prophylaxis. Lovenox. Discussed with patient, no family at bedside.         Electronically signed by:  Ramirez Boyle MD    2/9/2020    7:02 AM.

## 2020-02-10 LAB
APPEARANCE BAL (LAVAGE): ABNORMAL
CLOT EVALUATION BAL: ABNORMAL
COLOR LAVAGE: ABNORMAL
GLUCOSE BLD-MCNC: 175 MG/DL (ref 70–99)
GLUCOSE BLD-MCNC: 197 MG/DL (ref 70–99)
GLUCOSE BLD-MCNC: 268 MG/DL (ref 70–99)
GLUCOSE BLD-MCNC: 444 MG/DL (ref 70–99)
MACROPHAGES, BAL: 3 % (ref 90–95)
NUMBER OF CELLS COUNTED BAL (LAVAGE): 100
PERFORMED ON: ABNORMAL
RBC, BAL: 4850 /CUMM
SEGMENTED NEUTROPHILS, BAL: 97 % (ref 5–10)
WBC/EPI CELLS BAL: 2825 /CUMM

## 2020-02-10 PROCEDURE — 88312 SPECIAL STAINS GROUP 1: CPT

## 2020-02-10 PROCEDURE — 6360000002 HC RX W HCPCS: Performed by: INTERNAL MEDICINE

## 2020-02-10 PROCEDURE — 2500000003 HC RX 250 WO HCPCS: Performed by: INTERNAL MEDICINE

## 2020-02-10 PROCEDURE — 7100000011 HC PHASE II RECOVERY - ADDTL 15 MIN: Performed by: INTERNAL MEDICINE

## 2020-02-10 PROCEDURE — 94150 VITAL CAPACITY TEST: CPT

## 2020-02-10 PROCEDURE — 87070 CULTURE OTHR SPECIMN AEROBIC: CPT

## 2020-02-10 PROCEDURE — 3609010800 HC BRONCHOSCOPY ALVEOLAR LAVAGE: Performed by: INTERNAL MEDICINE

## 2020-02-10 PROCEDURE — 31624 DX BRONCHOSCOPE/LAVAGE: CPT | Performed by: INTERNAL MEDICINE

## 2020-02-10 PROCEDURE — 94640 AIRWAY INHALATION TREATMENT: CPT

## 2020-02-10 PROCEDURE — 87102 FUNGUS ISOLATION CULTURE: CPT

## 2020-02-10 PROCEDURE — 0B9D8ZX DRAINAGE OF RIGHT MIDDLE LUNG LOBE, VIA NATURAL OR ARTIFICIAL OPENING ENDOSCOPIC, DIAGNOSTIC: ICD-10-PCS | Performed by: INTERNAL MEDICINE

## 2020-02-10 PROCEDURE — 94669 MECHANICAL CHEST WALL OSCILL: CPT

## 2020-02-10 PROCEDURE — 0B978ZZ DRAINAGE OF LEFT MAIN BRONCHUS, VIA NATURAL OR ARTIFICIAL OPENING ENDOSCOPIC: ICD-10-PCS | Performed by: INTERNAL MEDICINE

## 2020-02-10 PROCEDURE — 2709999900 HC NON-CHARGEABLE SUPPLY: Performed by: INTERNAL MEDICINE

## 2020-02-10 PROCEDURE — 6370000000 HC RX 637 (ALT 250 FOR IP): Performed by: INTERNAL MEDICINE

## 2020-02-10 PROCEDURE — 87206 SMEAR FLUORESCENT/ACID STAI: CPT

## 2020-02-10 PROCEDURE — 87106 FUNGI IDENTIFICATION YEAST: CPT

## 2020-02-10 PROCEDURE — 88305 TISSUE EXAM BY PATHOLOGIST: CPT

## 2020-02-10 PROCEDURE — 89051 BODY FLUID CELL COUNT: CPT

## 2020-02-10 PROCEDURE — 88112 CYTOPATH CELL ENHANCE TECH: CPT

## 2020-02-10 PROCEDURE — 94761 N-INVAS EAR/PLS OXIMETRY MLT: CPT

## 2020-02-10 PROCEDURE — 99233 SBSQ HOSP IP/OBS HIGH 50: CPT | Performed by: INTERNAL MEDICINE

## 2020-02-10 PROCEDURE — 2580000003 HC RX 258: Performed by: INTERNAL MEDICINE

## 2020-02-10 PROCEDURE — 87015 SPECIMEN INFECT AGNT CONCNTJ: CPT

## 2020-02-10 PROCEDURE — 3609011000 HC BRONCHOSCOPY THERAPUTIC ASPIRATION SUBSEQUENT: Performed by: INTERNAL MEDICINE

## 2020-02-10 PROCEDURE — 1200000000 HC SEMI PRIVATE

## 2020-02-10 PROCEDURE — 99152 MOD SED SAME PHYS/QHP 5/>YRS: CPT | Performed by: INTERNAL MEDICINE

## 2020-02-10 PROCEDURE — G0500 MOD SEDAT ENDO SERVICE >5YRS: HCPCS | Performed by: INTERNAL MEDICINE

## 2020-02-10 PROCEDURE — 2700000000 HC OXYGEN THERAPY PER DAY

## 2020-02-10 PROCEDURE — 31645 BRNCHSC W/THER ASPIR 1ST: CPT | Performed by: INTERNAL MEDICINE

## 2020-02-10 PROCEDURE — 87205 SMEAR GRAM STAIN: CPT

## 2020-02-10 PROCEDURE — 0B938ZZ DRAINAGE OF RIGHT MAIN BRONCHUS, VIA NATURAL OR ARTIFICIAL OPENING ENDOSCOPIC: ICD-10-PCS | Performed by: INTERNAL MEDICINE

## 2020-02-10 PROCEDURE — 87116 MYCOBACTERIA CULTURE: CPT

## 2020-02-10 PROCEDURE — 6370000000 HC RX 637 (ALT 250 FOR IP): Performed by: NURSE PRACTITIONER

## 2020-02-10 PROCEDURE — 7100000010 HC PHASE II RECOVERY - FIRST 15 MIN: Performed by: INTERNAL MEDICINE

## 2020-02-10 RX ORDER — MIDAZOLAM HYDROCHLORIDE 5 MG/ML
INJECTION INTRAMUSCULAR; INTRAVENOUS PRN
Status: DISCONTINUED | OUTPATIENT
Start: 2020-02-10 | End: 2020-02-10 | Stop reason: ALTCHOICE

## 2020-02-10 RX ORDER — 0.9 % SODIUM CHLORIDE 0.9 %
INTRAVENOUS SOLUTION INTRAVENOUS CONTINUOUS PRN
Status: COMPLETED | OUTPATIENT
Start: 2020-02-10 | End: 2020-02-10

## 2020-02-10 RX ORDER — LIDOCAINE HYDROCHLORIDE 20 MG/ML
INJECTION, SOLUTION INFILTRATION; PERINEURAL PRN
Status: DISCONTINUED | OUTPATIENT
Start: 2020-02-10 | End: 2020-02-10 | Stop reason: ALTCHOICE

## 2020-02-10 RX ORDER — FENTANYL CITRATE 50 UG/ML
INJECTION, SOLUTION INTRAMUSCULAR; INTRAVENOUS PRN
Status: DISCONTINUED | OUTPATIENT
Start: 2020-02-10 | End: 2020-02-10 | Stop reason: ALTCHOICE

## 2020-02-10 RX ORDER — MAGNESIUM HYDROXIDE 1200 MG/15ML
LIQUID ORAL CONTINUOUS PRN
Status: COMPLETED | OUTPATIENT
Start: 2020-02-10 | End: 2020-02-10

## 2020-02-10 RX ADMIN — ENOXAPARIN SODIUM 40 MG: 40 INJECTION SUBCUTANEOUS at 14:15

## 2020-02-10 RX ADMIN — AMPICILLIN SODIUM AND SULBACTAM SODIUM 3 G: 2; 1 INJECTION, POWDER, FOR SOLUTION INTRAMUSCULAR; INTRAVENOUS at 23:01

## 2020-02-10 RX ADMIN — INSULIN LISPRO 6 UNITS: 100 INJECTION, SOLUTION INTRAVENOUS; SUBCUTANEOUS at 17:39

## 2020-02-10 RX ADMIN — IPRATROPIUM BROMIDE AND ALBUTEROL SULFATE 1 AMPULE: .5; 3 SOLUTION RESPIRATORY (INHALATION) at 21:04

## 2020-02-10 RX ADMIN — AMPICILLIN SODIUM AND SULBACTAM SODIUM 3 G: 2; 1 INJECTION, POWDER, FOR SOLUTION INTRAMUSCULAR; INTRAVENOUS at 14:16

## 2020-02-10 RX ADMIN — AMPICILLIN SODIUM AND SULBACTAM SODIUM 3 G: 2; 1 INJECTION, POWDER, FOR SOLUTION INTRAMUSCULAR; INTRAVENOUS at 17:39

## 2020-02-10 RX ADMIN — IPRATROPIUM BROMIDE AND ALBUTEROL SULFATE 1 AMPULE: .5; 3 SOLUTION RESPIRATORY (INHALATION) at 15:13

## 2020-02-10 RX ADMIN — INSULIN LISPRO 6 UNITS: 100 INJECTION, SOLUTION INTRAVENOUS; SUBCUTANEOUS at 20:41

## 2020-02-10 RX ADMIN — METHYLPREDNISOLONE SODIUM SUCCINATE 40 MG: 40 INJECTION, POWDER, FOR SOLUTION INTRAMUSCULAR; INTRAVENOUS at 14:15

## 2020-02-10 RX ADMIN — INSULIN LISPRO 2 UNITS: 100 INJECTION, SOLUTION INTRAVENOUS; SUBCUTANEOUS at 14:32

## 2020-02-10 RX ADMIN — AMPICILLIN SODIUM AND SULBACTAM SODIUM 3 G: 2; 1 INJECTION, POWDER, FOR SOLUTION INTRAMUSCULAR; INTRAVENOUS at 05:05

## 2020-02-10 RX ADMIN — METHYLPREDNISOLONE SODIUM SUCCINATE 40 MG: 40 INJECTION, POWDER, FOR SOLUTION INTRAMUSCULAR; INTRAVENOUS at 20:40

## 2020-02-10 RX ADMIN — IPRATROPIUM BROMIDE AND ALBUTEROL SULFATE 1 AMPULE: .5; 3 SOLUTION RESPIRATORY (INHALATION) at 07:32

## 2020-02-10 RX ADMIN — Medication 10 ML: at 20:40

## 2020-02-10 ASSESSMENT — PAIN SCALES - GENERAL
PAINLEVEL_OUTOF10: 5
PAINLEVEL_OUTOF10: 0

## 2020-02-10 ASSESSMENT — PAIN DESCRIPTION - PAIN TYPE
TYPE: ACUTE PAIN

## 2020-02-10 ASSESSMENT — PAIN - FUNCTIONAL ASSESSMENT: PAIN_FUNCTIONAL_ASSESSMENT: 0-10

## 2020-02-10 ASSESSMENT — PAIN DESCRIPTION - LOCATION
LOCATION: ABDOMEN

## 2020-02-10 NOTE — PROGRESS NOTES
Pulmonary & Critical Care Inpatient Progress Note   Chantal Connor MD     REASON FOR TODAY'S VISIT:  pulm nodules    SUBJECTIVE:   Plan for bronch today  Has congestion  On high flow oxygen still     Scheduled Meds:   enoxaparin  40 mg Subcutaneous Daily    ipratropium-albuterol  1 ampule Inhalation Q4H WA    methylPREDNISolone  40 mg Intravenous Q12H    insulin lispro  0-12 Units Subcutaneous TID WC    insulin lispro  0-6 Units Subcutaneous Nightly    ampicillin-sulbactam  3 g Intravenous Q6H    nicotine  1 patch Transdermal Daily       Continuous Infusions:   dextrose         PRN Meds:  midazolam, fentaNYL, sodium chloride flush, potassium chloride **OR** potassium alternative oral replacement **OR** potassium chloride, magnesium sulfate, acetaminophen, acetaminophen, acetaminophen, ondansetron, melatonin ER, ondansetron, promethazine, promethazine, promethazine, calcium carbonate, benzonatate, perflutren lipid microspheres, glucose, dextrose, glucagon (rDNA), dextrose    ALLERGIES:  Patient has No Known Allergies. Objective:   PHYSICAL EXAM:  /80   Pulse 83   Temp 98 °F (36.7 °C) (Temporal)   Resp 16   Ht 5' 4\" (1.626 m)   Wt 170 lb (77.1 kg)   SpO2 95%   BMI 29.18 kg/m²    Physical Exam  Constitutional:       General: She is not in acute distress. Appearance: She is well-developed. She is not diaphoretic. HENT:      Head: Normocephalic and atraumatic. Mouth/Throat:      Pharynx: No oropharyngeal exudate. Eyes:      Pupils: Pupils are equal, round, and reactive to light. Neck:      Musculoskeletal: Neck supple. Vascular: No JVD. Cardiovascular:      Heart sounds: Normal heart sounds. No murmur. No friction rub. No gallop. Pulmonary:      Effort: Pulmonary effort is normal.      Breath sounds: No wheezing or rales. Abdominal:      General: Bowel sounds are normal. There is no distension. Palpations: Abdomen is soft. Tenderness:  There is no abdominal tenderness. Musculoskeletal: Normal range of motion. Lymphadenopathy:      Cervical: No cervical adenopathy. Skin:     General: Skin is warm and dry. Findings: No rash. Neurological:      Mental Status: She is alert. Cranial Nerves: No cranial nerve deficit. Comments: CN 2-12 grossly intact            Data Reviewed:   LABS:  CBC:  Recent Labs     02/08/20  0634   WBC 12.2*   HGB 13.8   HCT 40.9   MCV 91.9        BMP:  Recent Labs     02/08/20  0634      K 4.9   CL 94*   CO2 36*   BUN 14   CREATININE <0.5*     LIVER PROFILE: No results for input(s): AST, ALT, LIPASE, ALB, BILIDIR, BILITOT, ALKPHOS in the last 72 hours. Invalid input(s): AMYLASE  PT/INR:No results for input(s): PROTIME, INR in the last 72 hours. APTT: No results for input(s): APTT in the last 72 hours. UA:  Recent Labs     02/07/20  1920   COLORU Yellow   PHUR 6.5   WBCUA None seen   RBCUA 0-2   BACTERIA 2+*   CLARITYU SL CLOUDY*   SPECGRAV >=1.030   LEUKOCYTESUR Negative   UROBILINOGEN >=8.0*   BILIRUBINUR SMALL*   BLOODU Negative   GLUCOSEU 500*   AMORPHOUS 4+     No results for input(s): PHART, LEX7CXH, PO2ART in the last 72 hours. CT chest personally reviewed, bilateral nodular opacities          Assessment:     1. Acute resp failure, hypoxic  2. Multifocal pneumonia, multiple pulmonary nodules  3. COPD with acute exac  4.  Tobacco dependence    Plan:      -Plan for bronch today  -Empiric atb  -Pulm toileting and bronchodilators  -Steroids  -Titrate FIO2 as needed  -Smoking cessation counseling    Krystal Barnhart MD

## 2020-02-10 NOTE — PRE SEDATION
Sedation Pre-Procedure Note    Patient Name: Tram Rodriguez   YOB: 1965  Room/Bed: 7099/4672-23  Medical Record Number: [id number]  Date: 2/10/2020   Time: 8:02 AM       Indication:  Resp Failure    Consent: I have discussed with the patient and/or the patient representative the indication, alternatives, and the possible risks and/or complications of the planned procedure and the anesthesia methods. The patient and/or patient representative appear to understand and agree to proceed. Vital Signs:   Vitals:    02/10/20 0734   BP:    Pulse:    Resp:    Temp:    SpO2: 95%       Past Medical History:   has a past medical history of COPD (chronic obstructive pulmonary disease) ([facility] [address] and Dementia ([facility] [address] Past Surgical History:   has no past surgical history on file. Medications:   Scheduled Meds:    enoxaparin  40 mg Subcutaneous Daily    ipratropium-albuterol  1 ampule Inhalation Q4H WA    methylPREDNISolone  40 mg Intravenous Q12H    insulin lispro  0-12 Units Subcutaneous TID WC    insulin lispro  0-6 Units Subcutaneous Nightly    ampicillin-sulbactam  3 g Intravenous Q6H    nicotine  1 patch Transdermal Daily     Continuous Infusions:    dextrose       PRN Meds: sodium chloride flush, potassium chloride **OR** potassium alternative oral replacement **OR** potassium chloride, magnesium sulfate, acetaminophen, acetaminophen, acetaminophen, ondansetron, melatonin ER, ondansetron, promethazine, promethazine, promethazine, calcium carbonate, benzonatate, perflutren lipid microspheres, glucose, dextrose, glucagon (rDNA), dextrose  Home Meds:   Prior to Admission medications    Not on File     Coumadin Use Last 7 Days:  no  Antiplatelet drug therapy use last 7 days: no  Other anticoagulant use last 7 days: no  Additional Medication Information:        Pre-Sedation Documentation and Exam:   I have reviewed the patient's history and review of systems.     Mallampati Airway Assessment:  Mallampati Class II - (soft palate, fauces & uvula are visible)    Prior History of Anesthesia Complications:   none    ASA Classification:  Class 3 - A patient with severe systemic disease that limits activity but is not incapacitating    Sedation/ Anesthesia Plan:   intravenous sedation    Medications Planned:   midazolam (Versed) intravenously and fentanyl intravenously    Patient is an appropriate candidate for plan of sedation: yes    Electronically signed by Bill Esposito MD on 2/10/2020 at 8:02 AM

## 2020-02-10 NOTE — PROGRESS NOTES
Report called to Floor RN,VSS, pt on 6 liters and remain NPO until 1330 due to lidocaine given during procedure

## 2020-02-10 NOTE — PROGRESS NOTES
normal S1/S2 without murmurs, rubs or gallops. Abdomen: Soft, non-tender, non-distended with normal bowel sounds. Musculoskeletal: No clubbing, cyanosis or edema bilaterally. Full range of motion without deformity. Skin: Skin color, texture, turgor normal.  No rashes or lesions. Neurologic:  Neurovascularly intact without any focal sensory/motor deficits. Cranial nerves: II-XII intact, grossly non-focal.  Psychiatric: Alert and oriented, known impaired memory and cog   Capillary Refill: Brisk,< 3 seconds   Peripheral Pulses: +2 palpable, equal bilaterally       Labs:   Recent Labs     02/08/20  0634   WBC 12.2*   HGB 13.8   HCT 40.9        Recent Labs     02/08/20  0634      K 4.9   CL 94*   CO2 36*   BUN 14   CREATININE <0.5*   CALCIUM 9.2     Urinalysis:      Lab Results   Component Value Date    NITRU Negative 02/07/2020    WBCUA None seen 02/07/2020    BACTERIA 2+ 02/07/2020    RBCUA 0-2 02/07/2020    BLOODU Negative 02/07/2020    SPECGRAV >=1.030 02/07/2020    GLUCOSEU 500 02/07/2020    GLUCOSEU NEGATIVE 08/14/2010       Radiology:  XR CHEST STANDARD (2 VW)   Final Result   No acute process. CT Chest Pulmonary Embolism W Contrast   Final Result   1. No evidence of pulmonary embolus, or aortic dissection   2. Scattered ill-defined nodular opacities bilaterally, differential   considerations to include septic emboli. Several appear cavitary   3. Nonspecific mediastinal and hilar adenopathy, which may be reactive or   neoplastic in etiology         XR CHEST PORTABLE   Final Result   Increased bilateral lower lung opacity likely is related to soft tissue   attenuation. Lower lung airspace disease is not excluded. PA and lateral   study may be helpful if indicated.              Assessment/Plan:    Active Hospital Problems    Diagnosis    COPD exacerbation ([facility] [address] [J44.1]    Acute on chronic respiratory failure with hypoxia and hypercapnia (HCC) [J96.21, J96.22]    Smoker [F17.200]   

## 2020-02-10 NOTE — CARE COORDINATION
CM spoke to Olaf Reed and updated that pt is having a bronchoscopy today and is still on 2L NC. Spoke to [place] with [facility] regarding updates and he is following for possible new home O2 needs.  CM following-Ary Ny RN

## 2020-02-10 NOTE — OP NOTE
Preoperative Diagnosis:  1. Multiple pulm nodules  2. Acute resp failure    Postoperative Diagnosis:  1. Multiple pulm nodules  2. Acute resp failure    Procedure Performed:  1. Flexible bronchoscopy  2. Therapeutic aspiration of endobronchial secretions  3. Bronchoalveolar lavage of right middle lobe    Findings:  1. Improved airway patency after aspiration of secretions from the tracheobronchial tree    Recommendations:  1. Await results of collected specimen    Indications: Willian Arredondo is a pleasant 63year old female who has had ongoing respiratory failure and nodules on imaging. Bronchoscopy indicated for further evaluation of findings and to assist with pulmonary toileting. After review of the procedure and associated risks consent was obtained to proceed. ASA 3, Mallampati 2    Procedure Details: The patient was correctly identified as  Willian Arredondo, a safety timeout was performed. After sedation was administered with versed and fentanyl intravenously as well as topical lidocaine an adult therapeutic bronchoscope was inserted through the mouth and into the airways. Tenacious white secretions were aspirated from the tracheobronchial tree with subsequent improved airway patency. An airway exam was then performed, all subsegments were well visualized and did not appear to show any acute abnormality. The bronchoscope was wedged into the right middle lobe and a bronchoalveolar lavage was performed. Bronchoscope was withdrawn and the procedure was terminated. There was no immediate complications, the patient tolerated the procedure well. Estimated blood loss was less than 1 cc. Specimens:  RML BAL    Sedation Details:  Sedation start time 1013  Sedation stop time 1023  Total moderate sedation time 10 minutes  I was physically present and the patient was monitored continuously 1:1 throughout the entire procedure while administering sedation.

## 2020-02-10 NOTE — PROGRESS NOTES
Speech Language Pathology  Attempt Note    SLP reviewed chart and per chart review, pt is currently NPO for possible bronchoscopy this date. SLP to re-attempt as schedule allows. Thank you!      Anjelica Le, [address], [address] [facility]  Speech-Language Pathologist

## 2020-02-10 NOTE — PLAN OF CARE
Pt bed locked and in lowest position, 2/4 side rails up, call light within reach, fall band and non skid footwear on pt. Bed alarm is on bed and engaged. Pt instructed not to get up without calling the nurse and pt verbalizes understanding but is forgetful. Family bedside and avasys in room to help remind pt. Pt resting comfortably at this time with RR 18. Will continue to assess and monitor.

## 2020-02-11 LAB
BLOOD CULTURE, ROUTINE: NORMAL
CULTURE, BLOOD 2: NORMAL
GLUCOSE BLD-MCNC: 191 MG/DL (ref 70–99)
GLUCOSE BLD-MCNC: 210 MG/DL (ref 70–99)
GLUCOSE BLD-MCNC: 354 MG/DL (ref 70–99)
GLUCOSE BLD-MCNC: 466 MG/DL (ref 70–99)
PERFORMED ON: ABNORMAL

## 2020-02-11 PROCEDURE — 6370000000 HC RX 637 (ALT 250 FOR IP): Performed by: INTERNAL MEDICINE

## 2020-02-11 PROCEDURE — 1200000000 HC SEMI PRIVATE

## 2020-02-11 PROCEDURE — 94761 N-INVAS EAR/PLS OXIMETRY MLT: CPT

## 2020-02-11 PROCEDURE — 2580000003 HC RX 258: Performed by: INTERNAL MEDICINE

## 2020-02-11 PROCEDURE — 6370000000 HC RX 637 (ALT 250 FOR IP): Performed by: NURSE PRACTITIONER

## 2020-02-11 PROCEDURE — 2580000003 HC RX 258

## 2020-02-11 PROCEDURE — 2700000000 HC OXYGEN THERAPY PER DAY

## 2020-02-11 PROCEDURE — 94640 AIRWAY INHALATION TREATMENT: CPT

## 2020-02-11 PROCEDURE — 99233 SBSQ HOSP IP/OBS HIGH 50: CPT | Performed by: INTERNAL MEDICINE

## 2020-02-11 PROCEDURE — 6360000002 HC RX W HCPCS: Performed by: INTERNAL MEDICINE

## 2020-02-11 PROCEDURE — 94669 MECHANICAL CHEST WALL OSCILL: CPT

## 2020-02-11 RX ORDER — PREDNISONE 20 MG/1
40 TABLET ORAL DAILY
Status: DISCONTINUED | OUTPATIENT
Start: 2020-02-12 | End: 2020-02-12 | Stop reason: HOSPADM

## 2020-02-11 RX ORDER — SODIUM CHLORIDE 9 MG/ML
INJECTION, SOLUTION INTRAVENOUS
Status: COMPLETED
Start: 2020-02-11 | End: 2020-02-11

## 2020-02-11 RX ADMIN — AMPICILLIN SODIUM AND SULBACTAM SODIUM 3 G: 2; 1 INJECTION, POWDER, FOR SOLUTION INTRAMUSCULAR; INTRAVENOUS at 17:42

## 2020-02-11 RX ADMIN — BENZONATATE 200 MG: 100 CAPSULE ORAL at 23:03

## 2020-02-11 RX ADMIN — ENOXAPARIN SODIUM 40 MG: 40 INJECTION SUBCUTANEOUS at 09:33

## 2020-02-11 RX ADMIN — INSULIN LISPRO 4 UNITS: 100 INJECTION, SOLUTION INTRAVENOUS; SUBCUTANEOUS at 12:14

## 2020-02-11 RX ADMIN — INSULIN LISPRO 5 UNITS: 100 INJECTION, SOLUTION INTRAVENOUS; SUBCUTANEOUS at 20:06

## 2020-02-11 RX ADMIN — IPRATROPIUM BROMIDE AND ALBUTEROL SULFATE 1 AMPULE: .5; 3 SOLUTION RESPIRATORY (INHALATION) at 09:04

## 2020-02-11 RX ADMIN — INSULIN HUMAN 5 UNITS: 100 INJECTION, SOLUTION PARENTERAL at 01:03

## 2020-02-11 RX ADMIN — IPRATROPIUM BROMIDE AND ALBUTEROL SULFATE 1 AMPULE: .5; 3 SOLUTION RESPIRATORY (INHALATION) at 19:23

## 2020-02-11 RX ADMIN — AMPICILLIN SODIUM AND SULBACTAM SODIUM 3 G: 2; 1 INJECTION, POWDER, FOR SOLUTION INTRAMUSCULAR; INTRAVENOUS at 09:37

## 2020-02-11 RX ADMIN — INSULIN LISPRO 12 UNITS: 100 INJECTION, SOLUTION INTRAVENOUS; SUBCUTANEOUS at 18:18

## 2020-02-11 RX ADMIN — AMPICILLIN SODIUM AND SULBACTAM SODIUM 3 G: 2; 1 INJECTION, POWDER, FOR SOLUTION INTRAMUSCULAR; INTRAVENOUS at 05:18

## 2020-02-11 RX ADMIN — METHYLPREDNISOLONE SODIUM SUCCINATE 40 MG: 40 INJECTION, POWDER, FOR SOLUTION INTRAMUSCULAR; INTRAVENOUS at 09:34

## 2020-02-11 RX ADMIN — SODIUM CHLORIDE 250 ML: 9 INJECTION, SOLUTION INTRAVENOUS at 17:43

## 2020-02-11 RX ADMIN — IPRATROPIUM BROMIDE AND ALBUTEROL SULFATE 1 AMPULE: .5; 3 SOLUTION RESPIRATORY (INHALATION) at 12:42

## 2020-02-11 RX ADMIN — Medication 10 ML: at 09:35

## 2020-02-11 RX ADMIN — INSULIN LISPRO 2 UNITS: 100 INJECTION, SOLUTION INTRAVENOUS; SUBCUTANEOUS at 09:29

## 2020-02-11 RX ADMIN — AMPICILLIN SODIUM AND SULBACTAM SODIUM 3 G: 2; 1 INJECTION, POWDER, FOR SOLUTION INTRAMUSCULAR; INTRAVENOUS at 22:53

## 2020-02-11 RX ADMIN — IPRATROPIUM BROMIDE AND ALBUTEROL SULFATE 1 AMPULE: .5; 3 SOLUTION RESPIRATORY (INHALATION) at 15:22

## 2020-02-11 ASSESSMENT — PAIN SCALES - GENERAL
PAINLEVEL_OUTOF10: 0
PAINLEVEL_OUTOF10: 5
PAINLEVEL_OUTOF10: 0
PAINLEVEL_OUTOF10: 0

## 2020-02-11 NOTE — PROGRESS NOTES
Oriented, and Cooperative = 0    Level of Activity: Walking with assistance = 1    Respiratory Pattern: Dyspnea with exertion;Irregular pattern;or RR less than 6 = 2    Breath Sounds: Absent bilaterally and/or with wheezes = 3    Sputum   ,  , Sputum How Obtained: Spontaneous cough  Cough: Strong, productive = 1    Vital Signs   /74   Pulse 61   Temp 98.1 °F (36.7 °C) (Oral)   Resp 16   Ht 5' 4\" (1.626 m)   Wt 170 lb (77.1 kg)   SpO2 95%   BMI 29.18 kg/m²   SPO2 (COPD values may differ): 86-87% on room air or greater than 92% on FiO2 35- 50% = 3    Peak Flow (asthma only): not applicable = 0    RSI: 32-54 = Q6H or QID and Q4HPRN for dyspnea        Plan       Goals: medication delivery    Patient/caregiver was educated on the proper method of use for Respiratory Care Devices:  Yes      Level of patient/caregiver understanding able to:   ? Verbalize understanding   ? Demonstrate understanding       ? Teach back        ? Needs reinforcement       ? No available caregiver               ? Other:     Response to education:  Very Good     Is patient being placed on Home Treatment Regimen? No     Does the patient have everything they need prior to discharge? NA     Comments: pt seen and chart reviewed    Plan of Care: duoneb q4w/a    Electronically signed by Juli Poole RCP on 2/10/2020 at 9:09 PM    Respiratory Protocol Guidelines     1. Assessment and treatment by Respiratory Therapy will be initiated for medication and therapeutic interventions upon initiation of aerosolized medication. 2. Physician will be contacted for respiratory rate (RR) greater than 35 breaths per minute. Therapy will be held for heart rate (HR) greater than 140 beats per minute, pending direction from physician. 3. Bronchodilators will be administered via Metered Dose Inhaler (MDI) with spacer when the following criteria are met:  a.  Alert and cooperative     b. HR < 140 bpm  c. RR < 30 bpm                d. Can demonstrate a 2-3 second inspiratory hold  4. Bronchodilators will be administered via Hand Held Nebulizer [facility]) to patients when ANY of the following criteria are met  a. Incognizant or uncooperative          b. Patients treated with HHN at Home        c. Unable to demonstrate proper use of MDI with spacer     d. RR > 30 bpm   5. Bronchodilators will be delivered via Metered Dose Inhaler (MDI), HHN, Aerogen to intubated patients on mechanical ventilation. 6. Inhalation medication orders will be delivered and/or substituted as outlined below. Aerosolized Medications Ordering and Administration Guidelines:    1. All Medications will be ordered by a physician, and their frequency and/or modality will be adjusted as defined by the patients Respiratory Severity Index (RSI) score. 2. If the patient does not have documented COPD, consider discontinuing anticholinergics when RSI is less than 9.  3. If the bronchospasm worsens (increased RSI), then the bronchodilator frequency can be increased to a maximum of every 4 hours. If greater than every 4 hours is required, the physician will be contacted. 4. If the bronchospasm improves, the frequency of the bronchodilator can be decreased, based on the patient's RSI, but not less than home treatment regimen frequency. 5. Bronchodilator(s) will be discontinued if patient has a RSI less than 9 and has received no scheduled or as needed treatment for 72  Hrs. Patients Ordered on a Mucolytic Agent:    1. Must always be administered with a bronchodilator. 2. Discontinue if patient experiences worsened bronchospasm, or secretions have lessened to the point that the patient is able to clear them with a cough. Anti-inflammatory and Combination Medications:    1.  If the patient lacks prior history of lung disease, is not using inhaled anti-inflammatory medication at home, and lacks wheezing by examination or by history for at least 24 hours, contact physician for possible discontinuation.

## 2020-02-11 NOTE — PROGRESS NOTES
Hospitalist Progress Note      PCP: No primary care provider on file. Date of Admission: 2/6/2020    Chief Complaint: Confusion, dyspnea    Hospital Course:   64 yo female with extensive hx was admitted to [facility] with acute respiratory failure, COPDAE, abd chest CT. Subjective:   Supplemental O2 has been weaned down to 3 LPM from 6. Afebrile. VSS. Dyspnea slightly better today. No chest pain. No N/V/D. Medications:  Reviewed    Infusion Medications    dextrose       Scheduled Medications    [START ON 2/12/2020] predniSONE  40 mg Oral Daily    enoxaparin  40 mg Subcutaneous Daily    ipratropium-albuterol  1 ampule Inhalation Q4H WA    insulin lispro  0-12 Units Subcutaneous TID WC    insulin lispro  0-6 Units Subcutaneous Nightly    ampicillin-sulbactam  3 g Intravenous Q6H    nicotine  1 patch Transdermal Daily     PRN Meds: sodium chloride flush, acetaminophen, ondansetron, melatonin ER, calcium carbonate, benzonatate, perflutren lipid microspheres, glucose, dextrose, glucagon (rDNA), dextrose    No intake or output data in the 24 hours ending 02/11/20 1413    Physical Exam Performed:    /80   Pulse 87   Temp 97.3 °F (36.3 °C) (Axillary)   Resp 16   Ht 5' 4\" (1.626 m)   Wt 170 lb (77.1 kg)   SpO2 94%   BMI 29.18 kg/m²     General appearance: No apparent distress, appears stated age and cooperative. HEENT: Pupils equal, round, and reactive to light. Conjunctivae/corneas clear. Neck: Supple, with full range of motion. No jugular venous distention. Trachea midline. Respiratory:  Normal respiratory effort at rest. Bilaterally with wheezing and rhonchi, coarse breath sounds. Currently requiring 6L O2 to maintain sats    Cardiovascular: Regular rate and rhythm with normal S1/S2 without murmurs, rubs or gallops. Abdomen: Soft, non-tender, non-distended with normal bowel sounds. Musculoskeletal: No clubbing, cyanosis or edema bilaterally.   Full range of motion without deformity. Skin: Skin color, texture, turgor normal.  No rashes or lesions. Neurologic:  Neurovascularly intact without any focal sensory/motor deficits. Cranial nerves: II-XII intact, grossly non-focal.  Psychiatric: Alert and oriented, known impaired memory and cog   Capillary Refill: Brisk,< 3 seconds   Peripheral Pulses: +2 palpable, equal bilaterally       Labs:   No results for input(s): WBC, HGB, HCT, PLT in the last 72 hours. No results for input(s): NA, K, CL, CO2, BUN, CREATININE, CALCIUM, PHOS in the last 72 hours. Invalid input(s): MAGNES  Urinalysis:      Lab Results   Component Value Date    NITRU Negative 02/07/2020    WBCUA None seen 02/07/2020    BACTERIA 2+ 02/07/2020    RBCUA 0-2 02/07/2020    BLOODU Negative 02/07/2020    SPECGRAV >=1.030 02/07/2020    GLUCOSEU 500 02/07/2020    GLUCOSEU NEGATIVE 08/14/2010       Radiology:  XR CHEST STANDARD (2 VW)   Final Result   No acute process. CT Chest Pulmonary Embolism W Contrast   Final Result   1. No evidence of pulmonary embolus, or aortic dissection   2. Scattered ill-defined nodular opacities bilaterally, differential   considerations to include septic emboli. Several appear cavitary   3. Nonspecific mediastinal and hilar adenopathy, which may be reactive or   neoplastic in etiology         XR CHEST PORTABLE   Final Result   Increased bilateral lower lung opacity likely is related to soft tissue   attenuation. Lower lung airspace disease is not excluded. PA and lateral   study may be helpful if indicated.              Assessment/Plan:    Active Hospital Problems    Diagnosis    COPD exacerbation ([facility] [address] [J44.1]    Acute on chronic respiratory failure with hypoxia and hypercapnia (HCC) [J96.21, J96.22]    Smoker [F17.200]    Dementia (HCC) [F03.90]    Pulmonary infiltrates [R91.8]    Mediastinal adenopathy [R59.0]    Leukocytosis [D72.829]    Hyponatremia [E87.1]    Hyperglycemia [R73.9]    Elevated troponin [R79.89]    Acute encephalopathy [G93.40]    Aspiration into airway [T17.908A]    Mucus plugging of bronchi [J98.09]    Cavitary lesion of lung [J98.4]       Acute hypoxic hypercarbic respiratory failure:  - Multifactorial in the setting of abnormal CT, COPDAE, suspected aspiration pneumonia. CT chest showed:  1. No evidence of pulmonary embolus, or aortic dissection. 2. Scattered ill-defined nodular opacities bilaterally, differential considerations to include septic emboli. Several appear cavitary. 3. Nonspecific mediastinal and hilar adenopathy, which may be reactive or neoplastic in etiology. - Wean supplemental O2 as tolerated. Pt does not wear O2 at home but suspect may need at discharge. She is currently on 3 LPM.   - Pulmonology consulted/following. S/p bronch on 2/7 and 2/10. Bronch wash with NRF. F/u repeat bronch cx's. - Pt is currently on IV unasyn (s 2/7/20). Blood cultures are NGTD.  - Continue steroids and inhaled bronchodilator therapy. - Pulmonary toilet with IS and acapella. Aspiration pneumonia POA w/o evidence of sepsis:  - Management as noted above. Acute on chronic metabolic encephalopathy:   - Resolved back to baseline, suspect in the setting of acute resp failure with hypoxia/hypercarbia. - History of anoxic brain injury from remote OD attempt. Hyperglycemia likely secondary to steroids:  - A1C 6.0. Continue medium dose SSI ACHS. Tobacco use disorder:  - Cessation education, Nicotine patch. DVT Prophylaxis: Lovenox   Diet: DIET GENERAL;  Code Status: Full Code    PT/OT Eval Status: NA    Dispo - 1-2 days and when okay from Pulmonary standpoint.      [address] [facility], APRN - CNP

## 2020-02-11 NOTE — PROGRESS NOTES
Pt transferred to a1 room 104. Pt alert to self and place, but forgetful and repeats questions. Assessment completed as documented. Call light within reach. Bed check in place, call light within reach.

## 2020-02-11 NOTE — PLAN OF CARE
Pt bed locked and in lowest position, 2/4 side rails up, call light within reach, fall band and non skid footwear on pt. Pt resting comfortably at this time with RR 16. Will continue to assess and monitor.

## 2020-02-12 ENCOUNTER — TELEPHONE (OUTPATIENT)
Dept: PULMONOLOGY | Age: 55
End: 2020-02-12

## 2020-02-12 VITALS
WEIGHT: 177.6 LBS | TEMPERATURE: 98 F | OXYGEN SATURATION: 97 % | SYSTOLIC BLOOD PRESSURE: 129 MMHG | HEIGHT: 64 IN | DIASTOLIC BLOOD PRESSURE: 74 MMHG | RESPIRATION RATE: 18 BRPM | HEART RATE: 85 BPM | BODY MASS INDEX: 30.32 KG/M2

## 2020-02-12 LAB
ANION GAP SERPL CALCULATED.3IONS-SCNC: 11 MMOL/L (ref 3–16)
BUN BLDV-MCNC: 18 MG/DL (ref 7–20)
CALCIUM SERPL-MCNC: 8.7 MG/DL (ref 8.3–10.6)
CHLORIDE BLD-SCNC: 97 MMOL/L (ref 99–110)
CO2: 26 MMOL/L (ref 21–32)
CREAT SERPL-MCNC: 0.7 MG/DL (ref 0.6–1.1)
GFR AFRICAN AMERICAN: >60
GFR NON-AFRICAN AMERICAN: >60
GLUCOSE BLD-MCNC: 138 MG/DL (ref 70–99)
GLUCOSE BLD-MCNC: 362 MG/DL (ref 70–99)
GLUCOSE BLD-MCNC: 379 MG/DL (ref 70–99)
HCT VFR BLD CALC: 45.3 % (ref 36–48)
HEMOGLOBIN: 15.1 G/DL (ref 12–16)
MCH RBC QN AUTO: 30.9 PG (ref 26–34)
MCHC RBC AUTO-ENTMCNC: 33.4 G/DL (ref 31–36)
MCV RBC AUTO: 92.6 FL (ref 80–100)
PDW BLD-RTO: 13.7 % (ref 12.4–15.4)
PERFORMED ON: ABNORMAL
PERFORMED ON: ABNORMAL
PLATELET # BLD: 255 K/UL (ref 135–450)
PMV BLD AUTO: 7.7 FL (ref 5–10.5)
POTASSIUM REFLEX MAGNESIUM: 4.5 MMOL/L (ref 3.5–5.1)
RBC # BLD: 4.89 M/UL (ref 4–5.2)
SODIUM BLD-SCNC: 134 MMOL/L (ref 136–145)
WBC # BLD: 7.1 K/UL (ref 4–11)

## 2020-02-12 PROCEDURE — 6360000002 HC RX W HCPCS: Performed by: INTERNAL MEDICINE

## 2020-02-12 PROCEDURE — 36415 COLL VENOUS BLD VENIPUNCTURE: CPT

## 2020-02-12 PROCEDURE — 94640 AIRWAY INHALATION TREATMENT: CPT

## 2020-02-12 PROCEDURE — 6370000000 HC RX 637 (ALT 250 FOR IP): Performed by: INTERNAL MEDICINE

## 2020-02-12 PROCEDURE — 2580000003 HC RX 258: Performed by: INTERNAL MEDICINE

## 2020-02-12 PROCEDURE — 6370000000 HC RX 637 (ALT 250 FOR IP): Performed by: NURSE PRACTITIONER

## 2020-02-12 PROCEDURE — 94761 N-INVAS EAR/PLS OXIMETRY MLT: CPT

## 2020-02-12 PROCEDURE — 85027 COMPLETE CBC AUTOMATED: CPT

## 2020-02-12 PROCEDURE — 2700000000 HC OXYGEN THERAPY PER DAY

## 2020-02-12 PROCEDURE — 94669 MECHANICAL CHEST WALL OSCILL: CPT

## 2020-02-12 PROCEDURE — 99233 SBSQ HOSP IP/OBS HIGH 50: CPT | Performed by: INTERNAL MEDICINE

## 2020-02-12 PROCEDURE — 80048 BASIC METABOLIC PNL TOTAL CA: CPT

## 2020-02-12 RX ORDER — GREEN TEA/HOODIA GORDONII 315-12.5MG
1 CAPSULE ORAL 2 TIMES DAILY
Qty: 28 TABLET | Refills: 0 | Status: SHIPPED | OUTPATIENT
Start: 2020-02-12 | End: 2020-02-26

## 2020-02-12 RX ORDER — AMOXICILLIN AND CLAVULANATE POTASSIUM 875; 125 MG/1; MG/1
1 TABLET, FILM COATED ORAL 2 TIMES DAILY
Qty: 28 TABLET | Refills: 0 | Status: SHIPPED | OUTPATIENT
Start: 2020-02-12 | End: 2020-02-26

## 2020-02-12 RX ORDER — NICOTINE 21 MG/24HR
1 PATCH, TRANSDERMAL 24 HOURS TRANSDERMAL DAILY
Qty: 30 PATCH | Refills: 3 | Status: SHIPPED | OUTPATIENT
Start: 2020-02-13 | End: 2021-08-04

## 2020-02-12 RX ORDER — PREDNISONE 20 MG/1
40 TABLET ORAL DAILY
Qty: 8 TABLET | Refills: 0 | Status: SHIPPED | OUTPATIENT
Start: 2020-02-13 | End: 2020-02-17

## 2020-02-12 RX ADMIN — IPRATROPIUM BROMIDE AND ALBUTEROL SULFATE 1 AMPULE: .5; 3 SOLUTION RESPIRATORY (INHALATION) at 11:32

## 2020-02-12 RX ADMIN — AMPICILLIN SODIUM AND SULBACTAM SODIUM 3 G: 2; 1 INJECTION, POWDER, FOR SOLUTION INTRAMUSCULAR; INTRAVENOUS at 04:34

## 2020-02-12 RX ADMIN — AMPICILLIN SODIUM AND SULBACTAM SODIUM 3 G: 2; 1 INJECTION, POWDER, FOR SOLUTION INTRAMUSCULAR; INTRAVENOUS at 10:48

## 2020-02-12 RX ADMIN — ENOXAPARIN SODIUM 40 MG: 40 INJECTION SUBCUTANEOUS at 08:53

## 2020-02-12 RX ADMIN — Medication 10 ML: at 04:34

## 2020-02-12 RX ADMIN — IPRATROPIUM BROMIDE AND ALBUTEROL SULFATE 1 AMPULE: .5; 3 SOLUTION RESPIRATORY (INHALATION) at 07:50

## 2020-02-12 RX ADMIN — IPRATROPIUM BROMIDE AND ALBUTEROL SULFATE 1 AMPULE: .5; 3 SOLUTION RESPIRATORY (INHALATION) at 15:31

## 2020-02-12 RX ADMIN — INSULIN LISPRO 10 UNITS: 100 INJECTION, SOLUTION INTRAVENOUS; SUBCUTANEOUS at 14:28

## 2020-02-12 RX ADMIN — Medication 10 ML: at 10:49

## 2020-02-12 RX ADMIN — ACETAMINOPHEN 650 MG: 325 TABLET ORAL at 00:09

## 2020-02-12 RX ADMIN — PREDNISONE 40 MG: 20 TABLET ORAL at 08:53

## 2020-02-12 RX ADMIN — Medication 1 MG: at 00:09

## 2020-02-12 ASSESSMENT — PAIN SCALES - GENERAL
PAINLEVEL_OUTOF10: 2
PAINLEVEL_OUTOF10: 0
PAINLEVEL_OUTOF10: 0
PAINLEVEL_OUTOF10: 2
PAINLEVEL_OUTOF10: 0

## 2020-02-12 ASSESSMENT — PAIN DESCRIPTION - PAIN TYPE
TYPE: ACUTE PAIN
TYPE: ACUTE PAIN

## 2020-02-12 ASSESSMENT — PAIN DESCRIPTION - DESCRIPTORS
DESCRIPTORS: CRAMPING
DESCRIPTORS: ACHING;DISCOMFORT

## 2020-02-12 ASSESSMENT — PAIN DESCRIPTION - LOCATION
LOCATION: ABDOMEN
LOCATION: GENERALIZED

## 2020-02-12 NOTE — PROGRESS NOTES
Oxygen documentation:    1. O2 saturation at REST on ROOM AIR = 93%    If saturation is 89% or above please proceed with steps 2 and 3. 2. O2 saturation with AMBULATION of 20 feet on ROOM AIR = 88%  3.  O2 saturation with AMBULATION on 2 liter/min = 93%    DCP notified: ______

## 2020-02-12 NOTE — CARE COORDINATION
CASE MANAGEMENT DISCHARGE SUMMARY      Discharge to: Home    New Durable Medical Equipment ordered/agency: new continuous home oxygen. Order written, [employer] aware of need and will deliver to bedside. Transportation:    Family/car: Pt to arrange    Writer met with pt, asked if she would like any home care due to new O2, pt declined wanting any home care. Pt denies any other needs.   RAYRAY Gill-JODY

## 2020-02-12 NOTE — PROGRESS NOTES
Mental Status: She is alert. Cranial Nerves: No cranial nerve deficit. Comments: CN 2-12 grossly intact            Data Reviewed:   LABS:  CBC:  Recent Labs     02/12/20  0953   WBC 7.1   HGB 15.1   HCT 45.3   MCV 92.6        BMP:  No results for input(s): NA, K, CL, CO2, PHOS, BUN, CREATININE in the last 72 hours. Invalid input(s): CA  LIVER PROFILE: No results for input(s): AST, ALT, LIPASE, ALB, BILIDIR, BILITOT, ALKPHOS in the last 72 hours. Invalid input(s): AMYLASE  PT/INR:No results for input(s): PROTIME, INR in the last 72 hours. APTT: No results for input(s): APTT in the last 72 hours. UA:  No results for input(s): NITRITE, COLORU, PHUR, LABCAST, WBCUA, RBCUA, MUCUS, TRICHOMONAS, YEAST, BACTERIA, CLARITYU, SPECGRAV, LEUKOCYTESUR, UROBILINOGEN, BILIRUBINUR, BLOODU, GLUCOSEU, AMORPHOUS in the last 72 hours. Invalid input(s): KETONESU  No results for input(s): PHART, [id number], PO2ART in the last 72 hours. CT chest personally reviewed, bilateral nodular opacities          Assessment:     1. Acute resp failure, hypoxic  2. Multifocal pneumonia, multiple pulmonary nodules  3. COPD with acute exac  4.  Tobacco dependence    Plan:      -Augmentin for a 2 week total atb course  -Pulm toileting and bronchodilators  -prednisone 40mg for 4 more days  -Titrate FIO2 as needed, anticipate need for home O2 post discharge   -Smoking cessation counseling  -Plan for further outpatient pulm follow up after eventual discharge, I suspect a component of JAYJAY contributing to her intermittent worsening hypoxia as well     Please contact with questions, DC planning per BINU Boland MD

## 2020-02-12 NOTE — PLAN OF CARE
Assess respiratory status q shift and prn. Educate pt on COPD disease process, medications, s/s of exacerbation, and follow up.

## 2020-02-12 NOTE — DISCHARGE SUMMARY
Hospital Medicine Discharge Summary    Patient ID: Aye Martin      Patient's PCP: No primary care provider on file. Admit Date: 2/6/2020     Discharge Date:   2/12/2020    Admitting Physician: Manpreet Soni MD     Discharge Physician: BEST Murray - CNP     Discharge Diagnoses: Active Hospital Problems    Diagnosis    COPD exacerbation ([facility] [address] [J44.1]    Acute on chronic respiratory failure with hypoxia and hypercapnia (HCC) [J96.21, J96.22]    Smoker [F17.200]    Dementia (HCC) [F03.90]    Pulmonary infiltrates [R91.8]    Mediastinal adenopathy [R59.0]    Leukocytosis [D72.829]    Hyponatremia [E87.1]    Hyperglycemia [R73.9]    Elevated troponin [R79.89]    Acute encephalopathy [G93.40]    Aspiration into airway [T17.908A]    Mucus plugging of bronchi [J98.09]    Cavitary lesion of lung [J98.4]       The patient was seen and examined on day of discharge and this discharge summary is in conjunction with any daily progress note from day of discharge. Hospital Course: Aye Martin is a 63 y.o. female. She is accompanied by her daughter. She presents because for the past several days she has felt fatigued, more confused than usual, and dyspneic. Patient apparently has known dementia. Daughter relates she has also been sleeping quite a bit and has been very difficult to wake up. She also describes a chronic cough which seems worse than usual, but she denies expectoraion of sputum, hemoptysis, and pleuritic pain. Patient has an extensive smoking history and continues to smoke. She lives with her daughter and son in law both of whom also smoke. Patient denies any alcohol use or illicit substance use. Patient denies any known diagnosis of JAYJAY. She does not normally use O2 at home. Acute hypoxic hypercarbic respiratory failure (clinically improved):  - Multifactorial in the setting of abnormal CT, COPDAE, suspected aspiration pneumonia. CT chest showed:  1. No evidence of pulmonary embolus, or aortic dissection. 2. Scattered ill-defined nodular opacities bilaterally, differential considerations to include septic emboli. Several appear cavitary. 3. Nonspecific mediastinal and hilar adenopathy, which may be reactive or neoplastic in etiology. - Wean supplemental O2 as tolerated. Pt does not wear O2 at home. She is currently on 1.5 LPM. Home O2 evaluation completed, pt needs 2 LPM with exertion/ambulation. Counseled pt to not smoke while on oxygen -- pt verbalized understanding.   - Pulmonology consulted. S/p bronch on 2/7 and 2/10. Bronch wash from 2/7 grew normal respiratory hadley. BAL from 2/10 shows no organisms. - Pt is started on IV unasyn (s 2/7/20). Blood cultures are NGTD. Pulmonary recommending 2 week course of augmentin. Pt started on probiotic. - She was started on IV solumedrol on admission, changed to PO prednisone 40 mg for 5 days.   - Pulmonary toilet with IS and acapella. - She is to follow-up with Pulmonary as an outpt.      Aspiration pneumonia POA w/o evidence of sepsis:  - Management as noted above.      Acute on chronic metabolic encephalopathy:   - Resolved back to baseline, suspect in the setting of acute resp failure with hypoxia/hypercarbia. - History of anoxic brain injury from remote OD attempt.     Hyperglycemia likely secondary to steroids:  - A1C 6.0. SSI ACHS while inpt.      Tobacco use disorder:  - Cessation education, Nicotine patch. Physical Exam Performed:     BP (!) 106/54   Pulse 88   Temp 98.1 °F (36.7 °C) (Oral)   Resp 16   Ht 5' 4\" (1.626 m)   Wt 177 lb 9.6 oz (80.6 kg)   SpO2 94%   BMI 30.48 kg/m²       General appearance:  No apparent distress, appears stated age and cooperative. HEENT:  Normal cephalic, atraumatic without obvious deformity. Pupils equal, round, and reactive to light. Extra ocular muscles intact. Conjunctivae/corneas clear. Neck: Supple, with full range of motion.  No jugular venous distention. Trachea midline. Respiratory:  Normal respiratory effort. Clear to auscultation, bilaterally without Rales/Wheezes/Rhonchi. Cardiovascular:  Regular rate and rhythm with normal S1/S2 without murmurs, rubs or gallops. Abdomen: Soft, non-tender, non-distended with normal bowel sounds. Musculoskeletal:  No clubbing, cyanosis or edema bilaterally. Full range of motion without deformity. Skin: Skin color, texture, turgor normal.  No rashes or lesions. Neurologic:  Neurovascularly intact without any focal sensory/motor deficits. Cranial nerves: II-XII intact, grossly non-focal.  Psychiatric:  Alert and oriented, thought content appropriate, normal insight  Capillary Refill: Brisk,< 3 seconds   Peripheral Pulses: +2 palpable, equal bilaterally       Labs: For convenience and continuity at follow-up the following most recent labs are provided:      CBC:    Lab Results   Component Value Date    WBC 7.1 02/12/2020    HGB 15.1 02/12/2020    HCT 45.3 02/12/2020     02/12/2020       Renal:    Lab Results   Component Value Date     02/12/2020    K 4.5 02/12/2020    CL 97 02/12/2020    CO2 26 02/12/2020    BUN 18 02/12/2020    CREATININE 0.7 02/12/2020    CALCIUM 8.7 02/12/2020         Significant Diagnostic Studies    Radiology:   XR CHEST STANDARD (2 VW)   Final Result   No acute process. CT Chest Pulmonary Embolism W Contrast   Final Result   1. No evidence of pulmonary embolus, or aortic dissection   2. Scattered ill-defined nodular opacities bilaterally, differential   considerations to include septic emboli. Several appear cavitary   3. Nonspecific mediastinal and hilar adenopathy, which may be reactive or   neoplastic in etiology         XR CHEST PORTABLE   Final Result   Increased bilateral lower lung opacity likely is related to soft tissue   attenuation. Lower lung airspace disease is not excluded. PA and lateral   study may be helpful if indicated.                 Consults:

## 2020-02-13 LAB
CULTURE, RESPIRATORY: NORMAL
GRAM STAIN RESULT: NORMAL

## 2020-03-08 PROBLEM — R77.8 ELEVATED TROPONIN: Status: RESOLVED | Noted: 2020-02-07 | Resolved: 2020-03-08

## 2020-03-08 PROBLEM — R79.89 ELEVATED TROPONIN: Status: RESOLVED | Noted: 2020-02-07 | Resolved: 2020-03-08

## 2020-03-09 LAB
FUNGUS (MYCOLOGY) CULTURE: NORMAL
FUNGUS STAIN: NORMAL

## 2020-03-16 LAB
FUNGUS (MYCOLOGY) CULTURE: ABNORMAL
FUNGUS STAIN: ABNORMAL
ORGANISM: ABNORMAL

## 2020-03-24 LAB
AFB CULTURE (MYCOBACTERIA): NORMAL
AFB SMEAR: NORMAL

## 2020-03-31 LAB
AFB CULTURE (MYCOBACTERIA): NORMAL
AFB SMEAR: NORMAL

## 2020-09-22 ENCOUNTER — APPOINTMENT (OUTPATIENT)
Dept: CT IMAGING | Age: 55
DRG: 907 | End: 2020-09-22
Payer: COMMERCIAL

## 2020-09-22 ENCOUNTER — APPOINTMENT (OUTPATIENT)
Dept: GENERAL RADIOLOGY | Age: 55
DRG: 907 | End: 2020-09-22
Payer: COMMERCIAL

## 2020-09-22 ENCOUNTER — HOSPITAL ENCOUNTER (INPATIENT)
Age: 55
LOS: 2 days | Discharge: HOME | DRG: 907 | End: 2020-09-25
Attending: EMERGENCY MEDICINE | Admitting: INTERNAL MEDICINE
Payer: COMMERCIAL

## 2020-09-22 DIAGNOSIS — R09.02 HYPOXIA: ICD-10-CM

## 2020-09-22 DIAGNOSIS — T40.601A OPIATE OVERDOSE, ACCIDENTAL OR UNINTENTIONAL, INITIAL ENCOUNTER (HCC): Primary | ICD-10-CM

## 2020-09-22 DIAGNOSIS — S82.851A CLOSED TRIMALLEOLAR FRACTURE OF RIGHT ANKLE, INITIAL ENCOUNTER: ICD-10-CM

## 2020-09-22 DIAGNOSIS — J44.1 COPD EXACERBATION (HCC): ICD-10-CM

## 2020-09-22 DIAGNOSIS — F19.10 POLYSUBSTANCE ABUSE (HCC): ICD-10-CM

## 2020-09-22 LAB
A/G RATIO: 1.6 (ref 1.1–2.2)
ALBUMIN SERPL-MCNC: 4.2 G/DL (ref 3.4–5)
ALP BLD-CCNC: 162 U/L (ref 40–129)
ALT SERPL-CCNC: 25 U/L (ref 10–40)
ANION GAP SERPL CALCULATED.3IONS-SCNC: 10 MMOL/L (ref 3–16)
AST SERPL-CCNC: 38 U/L (ref 15–37)
BASOPHILS ABSOLUTE: 0 K/UL (ref 0–0.2)
BASOPHILS RELATIVE PERCENT: 0.2 %
BILIRUB SERPL-MCNC: 0.7 MG/DL (ref 0–1)
BUN BLDV-MCNC: 7 MG/DL (ref 7–20)
CALCIUM SERPL-MCNC: 9.1 MG/DL (ref 8.3–10.6)
CHLORIDE BLD-SCNC: 97 MMOL/L (ref 99–110)
CO2: 29 MMOL/L (ref 21–32)
CREAT SERPL-MCNC: 0.8 MG/DL (ref 0.6–1.1)
EKG ATRIAL RATE: 92 BPM
EKG DIAGNOSIS: NORMAL
EKG P-R INTERVAL: 128 MS
EKG Q-T INTERVAL: 386 MS
EKG QRS DURATION: 92 MS
EKG QTC CALCULATION (BAZETT): 477 MS
EKG R AXIS: 142 DEGREES
EKG T AXIS: 139 DEGREES
EKG VENTRICULAR RATE: 92 BPM
EOSINOPHILS ABSOLUTE: 0 K/UL (ref 0–0.6)
EOSINOPHILS RELATIVE PERCENT: 0.3 %
GFR AFRICAN AMERICAN: >60
GFR NON-AFRICAN AMERICAN: >60
GLOBULIN: 2.7 G/DL
GLUCOSE BLD-MCNC: 327 MG/DL (ref 70–99)
HCT VFR BLD CALC: 45.7 % (ref 36–48)
HEMOGLOBIN: 15.8 G/DL (ref 12–16)
LYMPHOCYTES ABSOLUTE: 0.4 K/UL (ref 1–5.1)
LYMPHOCYTES RELATIVE PERCENT: 2.6 %
MCH RBC QN AUTO: 31.6 PG (ref 26–34)
MCHC RBC AUTO-ENTMCNC: 34.5 G/DL (ref 31–36)
MCV RBC AUTO: 91.6 FL (ref 80–100)
MONOCYTES ABSOLUTE: 0.4 K/UL (ref 0–1.3)
MONOCYTES RELATIVE PERCENT: 2.5 %
NEUTROPHILS ABSOLUTE: 14.4 K/UL (ref 1.7–7.7)
NEUTROPHILS RELATIVE PERCENT: 94.4 %
PDW BLD-RTO: 13 % (ref 12.4–15.4)
PLATELET # BLD: 242 K/UL (ref 135–450)
PMV BLD AUTO: 7.8 FL (ref 5–10.5)
POTASSIUM REFLEX MAGNESIUM: 4.8 MMOL/L (ref 3.5–5.1)
RBC # BLD: 4.98 M/UL (ref 4–5.2)
SODIUM BLD-SCNC: 136 MMOL/L (ref 136–145)
TOTAL PROTEIN: 6.9 G/DL (ref 6.4–8.2)
TROPONIN: <0.01 NG/ML
WBC # BLD: 15.3 K/UL (ref 4–11)

## 2020-09-22 PROCEDURE — 73610 X-RAY EXAM OF ANKLE: CPT

## 2020-09-22 PROCEDURE — 99221 1ST HOSP IP/OBS SF/LOW 40: CPT | Performed by: NURSE PRACTITIONER

## 2020-09-22 PROCEDURE — 72125 CT NECK SPINE W/O DYE: CPT

## 2020-09-22 PROCEDURE — 73590 X-RAY EXAM OF LOWER LEG: CPT

## 2020-09-22 PROCEDURE — 70450 CT HEAD/BRAIN W/O DYE: CPT

## 2020-09-22 PROCEDURE — G0378 HOSPITAL OBSERVATION PER HR: HCPCS

## 2020-09-22 PROCEDURE — 27818 TREATMENT OF ANKLE FRACTURE: CPT

## 2020-09-22 PROCEDURE — 93010 ELECTROCARDIOGRAM REPORT: CPT | Performed by: INTERNAL MEDICINE

## 2020-09-22 PROCEDURE — 6370000000 HC RX 637 (ALT 250 FOR IP)

## 2020-09-22 PROCEDURE — 2580000003 HC RX 258: Performed by: INTERNAL MEDICINE

## 2020-09-22 PROCEDURE — 85025 COMPLETE CBC W/AUTO DIFF WBC: CPT

## 2020-09-22 PROCEDURE — 6360000002 HC RX W HCPCS: Performed by: EMERGENCY MEDICINE

## 2020-09-22 PROCEDURE — 99285 EMERGENCY DEPT VISIT HI MDM: CPT

## 2020-09-22 PROCEDURE — 71046 X-RAY EXAM CHEST 2 VIEWS: CPT

## 2020-09-22 PROCEDURE — 99152 MOD SED SAME PHYS/QHP 5/>YRS: CPT

## 2020-09-22 PROCEDURE — 73564 X-RAY EXAM KNEE 4 OR MORE: CPT

## 2020-09-22 PROCEDURE — 96374 THER/PROPH/DIAG INJ IV PUSH: CPT

## 2020-09-22 PROCEDURE — 80053 COMPREHEN METABOLIC PANEL: CPT

## 2020-09-22 PROCEDURE — 6360000002 HC RX W HCPCS: Performed by: INTERNAL MEDICINE

## 2020-09-22 PROCEDURE — 6370000000 HC RX 637 (ALT 250 FOR IP): Performed by: INTERNAL MEDICINE

## 2020-09-22 PROCEDURE — 84484 ASSAY OF TROPONIN QUANT: CPT

## 2020-09-22 PROCEDURE — 93005 ELECTROCARDIOGRAM TRACING: CPT | Performed by: NURSE PRACTITIONER

## 2020-09-22 RX ORDER — NICOTINE 21 MG/24HR
1 PATCH, TRANSDERMAL 24 HOURS TRANSDERMAL DAILY
Status: DISCONTINUED | OUTPATIENT
Start: 2020-09-22 | End: 2020-09-25 | Stop reason: HOSPADM

## 2020-09-22 RX ORDER — SODIUM CHLORIDE 9 MG/ML
INJECTION, SOLUTION INTRAVENOUS CONTINUOUS
Status: DISCONTINUED | OUTPATIENT
Start: 2020-09-22 | End: 2020-09-23

## 2020-09-22 RX ORDER — SODIUM CHLORIDE 0.9 % (FLUSH) 0.9 %
10 SYRINGE (ML) INJECTION EVERY 12 HOURS SCHEDULED
Status: DISCONTINUED | OUTPATIENT
Start: 2020-09-22 | End: 2020-09-25 | Stop reason: HOSPADM

## 2020-09-22 RX ORDER — OXYCODONE HYDROCHLORIDE 5 MG/1
TABLET ORAL
Status: COMPLETED
Start: 2020-09-22 | End: 2020-09-22

## 2020-09-22 RX ORDER — OXYCODONE HYDROCHLORIDE 5 MG/1
5 TABLET ORAL ONCE
Status: COMPLETED | OUTPATIENT
Start: 2020-09-22 | End: 2020-09-22

## 2020-09-22 RX ORDER — PROMETHAZINE HYDROCHLORIDE 25 MG/1
12.5 TABLET ORAL EVERY 6 HOURS PRN
Status: DISCONTINUED | OUTPATIENT
Start: 2020-09-22 | End: 2020-09-25 | Stop reason: HOSPADM

## 2020-09-22 RX ORDER — PROPOFOL 10 MG/ML
200 INJECTION, EMULSION INTRAVENOUS ONCE
Status: COMPLETED | OUTPATIENT
Start: 2020-09-22 | End: 2020-09-22

## 2020-09-22 RX ORDER — ONDANSETRON 2 MG/ML
4 INJECTION INTRAMUSCULAR; INTRAVENOUS EVERY 6 HOURS PRN
Status: DISCONTINUED | OUTPATIENT
Start: 2020-09-22 | End: 2020-09-25 | Stop reason: HOSPADM

## 2020-09-22 RX ORDER — POLYETHYLENE GLYCOL 3350 17 G/17G
17 POWDER, FOR SOLUTION ORAL DAILY PRN
Status: DISCONTINUED | OUTPATIENT
Start: 2020-09-22 | End: 2020-09-25 | Stop reason: HOSPADM

## 2020-09-22 RX ORDER — KETOROLAC TROMETHAMINE 30 MG/ML
15 INJECTION, SOLUTION INTRAMUSCULAR; INTRAVENOUS EVERY 6 HOURS PRN
Status: DISPENSED | OUTPATIENT
Start: 2020-09-22 | End: 2020-09-24

## 2020-09-22 RX ORDER — ACETAMINOPHEN 650 MG/1
650 SUPPOSITORY RECTAL EVERY 6 HOURS PRN
Status: DISCONTINUED | OUTPATIENT
Start: 2020-09-22 | End: 2020-09-25 | Stop reason: HOSPADM

## 2020-09-22 RX ORDER — SODIUM CHLORIDE 0.9 % (FLUSH) 0.9 %
10 SYRINGE (ML) INJECTION PRN
Status: DISCONTINUED | OUTPATIENT
Start: 2020-09-22 | End: 2020-09-25 | Stop reason: HOSPADM

## 2020-09-22 RX ORDER — TRAMADOL HYDROCHLORIDE 50 MG/1
50 TABLET ORAL EVERY 4 HOURS PRN
Status: DISCONTINUED | OUTPATIENT
Start: 2020-09-22 | End: 2020-09-25 | Stop reason: HOSPADM

## 2020-09-22 RX ORDER — ACETAMINOPHEN 325 MG/1
650 TABLET ORAL EVERY 6 HOURS PRN
Status: DISCONTINUED | OUTPATIENT
Start: 2020-09-22 | End: 2020-09-25 | Stop reason: HOSPADM

## 2020-09-22 RX ADMIN — PROPOFOL 70 MG: 10 INJECTION, EMULSION INTRAVENOUS at 15:19

## 2020-09-22 RX ADMIN — OXYCODONE HYDROCHLORIDE 5 MG: 5 TABLET ORAL at 15:32

## 2020-09-22 RX ADMIN — KETOROLAC TROMETHAMINE 15 MG: 30 INJECTION, SOLUTION INTRAMUSCULAR at 20:42

## 2020-09-22 RX ADMIN — SODIUM CHLORIDE: 9 INJECTION, SOLUTION INTRAVENOUS at 20:41

## 2020-09-22 RX ADMIN — Medication 10 ML: at 20:42

## 2020-09-22 ASSESSMENT — PAIN SCALES - GENERAL
PAINLEVEL_OUTOF10: 9
PAINLEVEL_OUTOF10: 8

## 2020-09-22 ASSESSMENT — PAIN DESCRIPTION - PROGRESSION: CLINICAL_PROGRESSION: NOT CHANGED

## 2020-09-22 ASSESSMENT — PAIN DESCRIPTION - ORIENTATION
ORIENTATION: RIGHT
ORIENTATION: RIGHT;LOWER

## 2020-09-22 ASSESSMENT — PAIN DESCRIPTION - DESCRIPTORS: DESCRIPTORS: SHARP;THROBBING

## 2020-09-22 ASSESSMENT — PAIN DESCRIPTION - ONSET: ONSET: ON-GOING

## 2020-09-22 ASSESSMENT — PAIN DESCRIPTION - LOCATION
LOCATION: ANKLE
LOCATION: ANKLE;BACK

## 2020-09-22 ASSESSMENT — PAIN - FUNCTIONAL ASSESSMENT: PAIN_FUNCTIONAL_ASSESSMENT: PREVENTS OR INTERFERES WITH MANY ACTIVE NOT PASSIVE ACTIVITIES

## 2020-09-22 ASSESSMENT — PAIN DESCRIPTION - PAIN TYPE: TYPE: ACUTE PAIN

## 2020-09-22 NOTE — ED PROVIDER NOTES
CHIEF COMPLAINT  Drug Overdose (pt snorted fentanyl.  squad called for unresponsive.  squad gave 4 mg of narcan IN.  pt started to come around.)      [facility]  Beti Tran is a 63 y.o. female who presents to the ED complaining of overdose. Patient is nontoxic in appearance and in no acute distress. Patient presents to the emergency department via EMS. EMS reports dispatched for unresponsive. Patient reports that she snorted fentanyl. States that she does have a history of opiate drug abuse. EMS gave 4 mg of Narcan intranasally and patient returned to baseline mentation. Patient did vomit in route to the emergency department. She currently complains of right ankle pain as well as midsternal chest discomfort. Patient does not recall the event. Reports 9 out of 10 aching discomfort of the right ankle. Exacerbated with touch or movement. Denies relieving factors or radiation of pain. Denies any numbness or tingling or weakness of the extremity. Unknown if patient struck her head. She denies any neck pain. No back pain. Denies headache or visual disturbances, lightheadedness or dizziness. No associated shortness of breath or difficulty breathing. Denies abdominal pain. Currently denies nausea. No diarrhea constipation reported. She denies any recent illness, cough or congestion or URI-like symptoms. No recent fever or chills reported. No other complaints, modifying factors or associated symptoms. Nursing notes reviewed.    Past Medical History:   Diagnosis Date    COPD (chronic obstructive pulmonary disease) ([facility]     Dementia ([facility]      Past Surgical History:   Procedure Laterality Date    BRONCHOSCOPY N/A 2/7/2020    BRONCHOSCOPY performed by Arturo Pat MD at [address] [place] [address]  2/7/2020    BRONCHOSCOPY THERAPUTIC ASPIRATION INITIAL performed by Arturo Pat MD at [address] [place] [address] N/A 2/10/2020    BRONCHOSCOPY ALVEOLAR LAVAGE performed by Bridger Holt MD at [address]  2/10/2020    BRONCHOSCOPY THERAPUTIC ASPIRATION SUBSEQUENT [facility]     No family history on file. Social History     Socioeconomic History    Marital status:      Spouse name: Not on file    Number of children: Not on file    Years of education: Not on file    Highest education level: Not on file   Occupational History    Not on file   Social Needs    Financial resource strain: Not on file    Food insecurity     Worry: Not on file     Inability: Not on file    Transportation needs     Medical: Not on file     Non-medical: Not on file   Tobacco Use    Smoking status: Current Every Day Smoker     Packs/day: 1.00     Types: Cigarettes    Smokeless tobacco: Never Used   Substance and Sexual Activity    Alcohol use: Never     Frequency: Never    Drug use: Not Currently    Sexual activity: Not on file   Lifestyle    Physical activity     Days per week: Not on file     Minutes per session: Not on file    Stress: Not on file   Relationships    Social connections     Talks on phone: Not on file     Gets together: Not on file     Attends Baptism service: Not on file     Active member of club or organization: Not on file     Attends meetings of clubs or organizations: Not on file     Relationship status: Not on file    Intimate partner violence     Fear of current or ex partner: Not on file     Emotionally abused: Not on file     Physically abused: Not on file     Forced sexual activity: Not on file   Other Topics Concern    Not on file   Social History Narrative    Not on file     No current facility-administered medications for this encounter.       Current Outpatient Medications   Medication Sig Dispense Refill    nicotine (NICODERM CQ) 14 MG/24HR Place 1 patch onto the skin daily 30 patch 3     No Known Allergies    REVIEW OF SYSTEMS  10 systems reviewed, pertinent positives per HPI otherwise noted to be negative    PHYSICAL EXAM  There were no vitals taken for this visit. GENERAL APPEARANCE: Awake and alert. Cooperative. No acute distress. Poor hygiene. HEAD: Normocephalic. Atraumatic. EYES: EOM's grossly intact. Pupils 1 mm bilaterally. Bilateral conjunctiva clear and without exudate. No conjunctival injection bilaterally. No scleral icterus. ENT: Mucous membranes are moist.    NECK: Normal ROM. Trachea is midline. CHEST: Equal and symmetric chest rise and fall. Reproducible tenderness with palpation to the mid sternum. No crepitus. No step-offs. HEART: Regular rate and rhythm without murmurs. LUNGS: Respirations unlabored. Speaking comfortably in full sentences. Bilateral lung sounds clear to auscultation without wheezing, rhonchi or rales. ABDOMEN: Soft, non-distended and non-tender. No hernias or masses appreciated. No organomegaly. No rebound or guarding. Bowel sounds audible and active in all four quadrants. EXTREMITIES: Obvious deformity of the right ankle. Patient is able to move the toes appropriately. Pedal pulses palpable, circulation intact. She does have diffuse tenderness with palpation to the medial lateral malleoli. SKIN: Pink ,warm and dry. No acute rashes. NEUROLOGICAL: Alert and oriented x 4. Speech clear. No gross facial drooping. RADIOLOGY  Xr Chest (2 Vw)    Result Date: 9/22/2020  EXAMINATION: TWO XRAY VIEWS OF THE CHEST 9/22/2020 11:56 am COMPARISON: 02/09/2020 HISTORY: ORDERING SYSTEM PROVIDED HISTORY: chest pain TECHNOLOGIST PROVIDED HISTORY: Reason for exam:->chest pain Reason for Exam: Chest pain Acuity: Acute Type of Exam: Initial FINDINGS: The lungs are without acute focal process. There is no effusion or pneumothorax. The cardiomediastinal silhouette is stable. The osseous structures are stable. No acute process.      Xr Knee Right (min 4 Views)    Result Date: 9/22/2020  EXAMINATION: FOUR XRAY VIEWS OF THE Right (min 3 Views)    Result Date: 9/22/2020  EXAMINATION: THREE XRAY VIEWS OF THE RIGHT ANKLE 9/22/2020 11:56 am COMPARISON: None. HISTORY: ORDERING SYSTEM PROVIDED HISTORY: fall TECHNOLOGIST PROVIDED HISTORY: Reason for exam:->fall Reason for Exam: Drug overdose Acuity: Acute Type of Exam: Initial FINDINGS: Lateral and posterior talar dislocation. Trimalleolar fracture. No other fractures. Diffuse soft tissue swelling. Trimalleolar fracture with posterolateral dislocation of the talus     Ct Head Wo Contrast    Result Date: 9/22/2020  EXAMINATION: CT OF THE HEAD WITHOUT CONTRAST  9/22/2020 11:31 am TECHNIQUE: CT of the head was performed without the administration of intravenous contrast. Dose modulation, iterative reconstruction, and/or weight based adjustment of the mA/kV was utilized to reduce the radiation dose to as low as reasonably achievable. COMPARISON: None. HISTORY: ORDERING SYSTEM PROVIDED HISTORY: fall TECHNOLOGIST PROVIDED HISTORY: If patient is on cardiac monitor and/or pulse ox, they may be taken off cardiac monitor and pulse ox, left on O2 if currently on. All monitors reattached when patient returns to room. Has a \"code stroke\" or \"stroke alert\" been called? ->No Reason for exam:->fall Reason for Exam: no head or neck trauma complaints of pain at time of scan. pt fell and possibly broke her rt  ankle, already xrayed prior to scan Acuity: Acute Type of Exam: Initial FINDINGS: BRAIN/VENTRICLES: There is no acute intracranial hemorrhage, mass effect or midline shift. No abnormal extra-axial fluid collection. The gray-white differentiation is maintained without evidence of an acute infarct. There is prominence of the ventricles and sulci due to global parenchymal volume loss. There are nonspecific areas of hypoattenuation within the periventricular and subcortical white matter, which likely represent chronic microvascular ischemic change.  ORBITS: The visualized portion of the orbits demonstrate no acute abnormality. SINUSES: The visualized paranasal sinuses and mastoid air cells demonstrate no acute abnormality. SOFT TISSUES/SKULL: No acute abnormality of the visualized skull or soft tissues. No acute intracranial abnormality. Ct Cervical Spine Wo Contrast    Result Date: 9/22/2020  EXAMINATION: CT OF THE CERVICAL SPINE WITHOUT CONTRAST 9/22/2020 11:31 am TECHNIQUE: CT of the cervical spine was performed without the administration of intravenous contrast. Multiplanar reformatted images are provided for review. Dose modulation, iterative reconstruction, and/or weight based adjustment of the mA/kV was utilized to reduce the radiation dose to as low as reasonably achievable. COMPARISON: None. HISTORY: ORDERING SYSTEM PROVIDED HISTORY: fall TECHNOLOGIST PROVIDED HISTORY: If patient is on cardiac monitor and/or pulse ox, they may be taken off cardiac monitor and pulse ox, left on O2 if currently on. All monitors reattached when patient returns to room. Reason for exam:->fall neck pain. FINDINGS: BONES/ALIGNMENT: There is no acute fracture or traumatic malalignment. DEGENERATIVE CHANGES: No significant degenerative changes. SOFT TISSUES: There is no prevertebral soft tissue swelling. No acute abnormality of the cervical spine. ED COURSE  I have evaluated this patient in collaboration with Dr. Ana Pastrana. Patient seen and evaluated. Old records reviewed. Patient presenting for evaluation of opiate overdose and  obvious deformity of right ankle. Afebrile. Vital stable. Not tachycardic or tachypneic. Not hypoxic on supplemental oxygen following receiving 4 mg of intranasal Narcan after snorting fentanyl. CBC with white blood cell count 15.3 and neutrophil 40.4. No bandemia with leukocytosis. CMP with hyperglycemia, glucose 327 and otherwise unremarkable. Troponin is negative. CT head and cervical spine unremarkable.   EKG interpretation per attending, please see her documentation for additional details. X-ray of the right ankle with reports of trimalleolar fracture with posterior lateral dislocation of the talus. X-ray of the chest unremarkable. I spoke with Dr. Mobley Overall. We thoroughly discussed the history, physical exam, laboratory and imaging studies, as well as, emergency department course. Based upon that discussion, plan for reduction and pending medical clearance Ortho will either see patient inpatient or outpatient. X-ray of the right knee with reports of cortical irregularity proximal tibia involving the medial corner, could represent a nondisplaced fracture. X-ray of the right tib-fib with reports of oblique fracture through the distal fibula with approximately 10 to 15 degrees of apex anterior angulation. Please see conscious sedation and reduction per attending's documentation. Post reduction of the right ankle with reports of successful partial reduction of the ankle, acute comminuted fracture of the distal fibula with widening of the medial space of the ankle mortise, suggesting ligamentous injury. Patient was placed in a long leg OCL per attending, please see her documentation for additional details regarding his placement. Patient received oxycodone per attending while in the ED. A discussion was had with the patient regarding labs and imaging results as well as plan of care. Patient was monitored in the emergency department for several hours, unable to be weaned from oxygen and maintain oxygen saturation independently. Plan for admission to the hospital.   Patient verbalizes understanding, all questions were answered and she is agreeable with the plan of care. Dr. Diaz Miguel spoke with hospitalist. They thoroughly discussed the history, physical exam, laboratory and imaging studies, as well as, emergency department course. Based upon that discussion, it was decided to admit Colby Felix to the hospital for further evaulation/treatment. MDM  Results for orders placed or performed during the hospital encounter of 09/22/20   CBC Auto Differential   Result Value Ref Range    WBC 15.3 (H) 4.0 - 11.0 K/uL    RBC 4.98 4.00 - 5.20 M/uL    Hemoglobin 15.8 12.0 - 16.0 g/dL    Hematocrit 45.7 36.0 - 48.0 %    MCV 91.6 80.0 - 100.0 fL    MCH 31.6 26.0 - 34.0 pg    MCHC 34.5 31.0 - 36.0 g/dL    RDW 13.0 12.4 - 15.4 %    Platelets 951 561 - 607 K/uL    MPV 7.8 5.0 - 10.5 fL    Neutrophils % 94.4 %    Lymphocytes % 2.6 %    Monocytes % 2.5 %    Eosinophils % 0.3 %    Basophils % 0.2 %    Neutrophils Absolute 14.4 (H) 1.7 - 7.7 K/uL    Lymphocytes Absolute 0.4 (L) 1.0 - 5.1 K/uL    Monocytes Absolute 0.4 0.0 - 1.3 K/uL    Eosinophils Absolute 0.0 0.0 - 0.6 K/uL    Basophils Absolute 0.0 0.0 - 0.2 K/uL   Comprehensive Metabolic Panel w/ Reflex to MG   Result Value Ref Range    Sodium 136 136 - 145 mmol/L    Potassium reflex Magnesium 4.8 3.5 - 5.1 mmol/L    Chloride 97 (L) 99 - 110 mmol/L    CO2 29 21 - 32 mmol/L    Anion Gap 10 3 - 16    Glucose 327 (H) 70 - 99 mg/dL    BUN 7 7 - 20 mg/dL    CREATININE 0.8 0.6 - 1.1 mg/dL    GFR Non-African American >60 >60    GFR African American >60 >60    Calcium 9.1 8.3 - 10.6 mg/dL    Total Protein 6.9 6.4 - 8.2 g/dL    Alb 4.2 3.4 - 5.0 g/dL    Albumin/Globulin Ratio 1.6 1.1 - 2.2    Total Bilirubin 0.7 0.0 - 1.0 mg/dL    Alkaline Phosphatase 162 (H) 40 - 129 U/L    ALT 25 10 - 40 U/L    AST 38 (H) 15 - 37 U/L    Globulin 2.7 g/dL   Troponin   Result Value Ref Range    Troponin <0.01 <0.01 ng/mL   EKG 12 Lead   Result Value Ref Range    Ventricular Rate 92 BPM    Atrial Rate 92 BPM    P-R Interval 128 ms    QRS Duration 92 ms    Q-T Interval 386 ms    QTc Calculation (Bazett) 477 ms    R Axis 142 degrees    T Axis 139 degrees    Diagnosis       Normal sinus rhythm arm leads are reversed. please repeat EKGAbnormal ECGConfirmed by Jovany Amador MD, [address] ([id number]) on 9/22/2020 3:59:18 PM       Final Impression    1.  Opiate overdose, accidental or unintentional, initial encounter ([facility]    2. Closed trimalleolar fracture of right ankle, initial encounter    3. Polysubstance abuse ([facility]    4. Hypoxia        Blood pressure 114/63, pulse 79, temperature 97.4 °F (36.3 °C), temperature source Temporal, resp. rate 11, weight 160 lb (72.6 kg), SpO2 98 %. DISPOSITION  Patient was admitted to the hospital.       DISCLAIMER:  Please note this report has been produced using speech recognition Dragon software and may contain errors related to that system including errors in grammar, punctuation, and spelling, as well as words and phrases that may be inappropriate. If there are any questions or concerns please feel free to contact the dictating provider for clarification.                 Moe Dang, BEST - [facility]  09/25/20 1437

## 2020-09-22 NOTE — H&P
Hospital Medicine History & Physical      PCP: No primary care provider on file. Date of Admission: 9/22/2020    Date of Service: Pt seen/examined on 9/22/2020     Chief Complaint:    Chief Complaint   Patient presents with    Drug Overdose     pt snorted fentanyl.  squad called for unresponsive.  squad gave 4 mg of narcan IN.  pt started to come around. History Of Present Illness: The patient is a 63 y.o. female with dementia and COPD who presents to [facility] with c/o drug overdose. She states she does not normally use drugs. She uses Cannabis. Today she snorted Fentanyl. EMS was called as she was unresponsive. She was given Narcan. Patient is alert and oriented. O2 sats stable on RA. She denies fevers, chills, cough, chest pain, dyspnea, abdominal pain, nausea, vomiting, diarrhea, or dysuria. Labs with leukocytosis. She fell and has a Trimalleolar fracture of the right ankle. Patient admitted to med-surg. Ortho consulted. Past Medical History:        Diagnosis Date    COPD [facility]chronic obstructive [facility] disease) ([facility]     Dementia ([facility]        Past Surgical History:        Procedure Laterality Date    BRONCHOSCOPY N/A 2/7/2020    BRONCHOSCOPY performed by Trey Weeks MD at [address] [place] [address]  2/7/2020    BRONCHOSCOPY THERAPUTIC ASPIRATION INITIAL performed by Trey Weeks MD at [address] [place] [address] N/A 2/10/2020    BRONCHOSCOPY ALVEOLAR LAVAGE performed by Maxx Martinez MD at [address] [place] [address]  2/10/2020    BRONCHOSCOPY THERAPUTIC ASPIRATION SUBSEQUENT performed by Maxx Martinez MD at [address] [place] [address]       Medications Prior to Admission:    Prior to Admission medications    Medication Sig Start Date End Date Taking? Authorizing Provider   nicotine (NICODERM CQ) 14 MG/24HR Place 1 patch onto the skin daily 2/13/20   [address] [place] [address], [facility]       Allergies:  Patient has no known allergies.     Social History:    TOBACCO:   reports that she has been smoking cigarettes. She has been smoking about 1.00 pack per day. She has never used smokeless tobacco.  ETOH:   reports no history of alcohol use. Family History:   Positive as follows:    History reviewed. No pertinent family history. REVIEW OF SYSTEMS:       Constitutional: Negative for fever   HENT: Negative for sore throat   Respiratory: Negative  for dyspnea, cough   Cardiovascular: Negative for chest pain   Gastrointestinal: Negative for vomiting, diarrhea   Genitourinary: Negative for hematuria   Musculoskeletal: + right ankle pain  Skin: Negative for rash   Neurological: Negative for syncope   Psychiatric/Behavorial: Negative for anxiety    PHYSICAL EXAM:    /70   Pulse 76   Temp 97.5 °F (36.4 °C) (Temporal)   Resp 18   Wt 160 lb (72.6 kg)   SpO2 93%   BMI 27.46 kg/m²     Gen: No distress. Alert. Eyes: PERRL. No sclera icterus. No conjunctival injection. ENT: No discharge. Pharynx clear. Neck: Trachea midline. Resp: No accessory muscle use. No crackles. + wheezes. No rhonchi. CV: Regular rate. Regular rhythm. No murmur. No rub. No edema. GI: Non-tender. Non-distended. No masses. No organomegaly. Normal bowel sounds. No hernia. Skin: Warm and dry. No nodule on exposed extremities. No rash on exposed extremities. M/S: Right ankle splint  Neuro: Awake. Grossly nonfocal    Psych: Oriented x 3. No anxiety or agitation.      CBC:   Recent Labs     09/22/20  1208   WBC 15.3*   HGB 15.8   HCT 45.7   MCV 91.6        BMP:   Recent Labs     09/22/20  1208      K 4.8   CL 97*   CO2 29   BUN 7   CREATININE 0.8     LIVER PROFILE:   Recent Labs     09/22/20  1208   AST 38*   ALT 25   BILITOT 0.7   ALKPHOS 162*         CARDIAC ENZYMES  Recent Labs     09/22/20  1208   TROPONINI <0.01       U/A:    Lab Results   Component Value Date    COLORU Yellow 02/07/2020    WBCUA None seen 02/07/2020    RBCUA 0-2 02/07/2020    BACTERIA 2+ 02/07/2020    CLARITYU SL CLOUDY 02/07/2020    SPECGRAV >=1.030 02/07/2020    LEUKOCYTESUR Negative 02/07/2020    BLOODU Negative 02/07/2020    GLUCOSEU 500 02/07/2020    GLUCOSEU NEGATIVE 08/14/2010    AMORPHOUS 4+ 02/07/2020       CULTURES  N/A    EKG:  I have reviewed the EKG with the following interpretation:   NSR, arm leads reversed    RADIOLOGY  XR ANKLE RIGHT (MIN 3 VIEWS)   Final Result   Successful partial reduction of the ankle. Acute, comminuted fracture of the distal fibula. Widening of the medial space of the ankle mortise, suggesting ligamentous   injury. XR TIBIA FIBULA RIGHT (2 VIEWS)   Final Result   Oblique fracture through the distal fibula metadiaphysis with approximately   10-15 degrees of apex anterior angulation. XR KNEE RIGHT (MIN 4 VIEWS)   Final Result   Cortical irregularity proximal tibia involving the medial corner could   represent a nondisplaced fracture      Otherwise, no bony or joint abnormality         CT CERVICAL SPINE WO CONTRAST   Final Result   No acute abnormality of the cervical spine. XR CHEST (2 VW)   Final Result   No acute process. XR ANKLE RIGHT (MIN 3 VIEWS)   Final Result   Trimalleolar fracture with posterolateral dislocation of the talus         CT HEAD WO CONTRAST   Final Result   No acute intracranial abnormality. Active Problems:    * No active hospital problems. *  Resolved Problems:    * No resolved hospital problems. *        ASSESSMENT/PLAN:  Accidental drug overdose  - snorted fentanyl. Responded to Narcan. Does not normally use drugs. If she does, she states she smokes Cannabis. Has a h/o opiate drug abuse. - admitted to med-surg. Trimalleolar fracture  Posterolateral dislocation of talus  Possible nondisplaced fracture of proximal tibia- denies knee pain  - s/p reduction in the ED  - Splint in place  - Ortho consult  - pain control: Tramadol, Toradol. Acute hypoxia  - supplemental O2. Weaned as tolerated. - sats on RA in the ED. Leukocytosis  - likely reactive  - repeat CBC. Dementia  - not on any medications. COPD  - stable. No AE. Tobacco Dependence  - Recommended cessation  - nicotine patch ordered    Cannabis use  Opiate use (denies use on a daily basis)  - recommended cessation    Not on any medications currently. She does not f/w PCP.      DVT Prophylaxis: Lovenox  Diet: General diet; NPO after midnight  Code Status: Full Code    Osiris Whitney FNP-C  9/22/2020

## 2020-09-22 NOTE — ED NOTES
Family called and updated that she would be admitted to the hospital instead of discharged.      Mk Borrego RN  09/22/20 [id number]

## 2020-09-22 NOTE — PROGRESS NOTES
Bedside for conscious sedation. Patient placed on 5lpm nc with etc02. sp02 97%, etc02 47-08, rr 15, no complications. Patient recovering etc02 45-53.

## 2020-09-22 NOTE — ED NOTES
Bed: 12  Expected date:   Expected time:   Means of arrival:   Comments:  cristiana Grullon, JODY  09/22/20 0164

## 2020-09-22 NOTE — ED NOTES
1727- Perfect serve sent to Dr. Lus Scheuermann for admission     [phone]- Call was returned by Dr. Fuentes Weathers for admission      [facility]  09/22/20 [facility]  09/22/20 8351

## 2020-09-22 NOTE — ED PROVIDER NOTES
I independently examined and evaluated Henry Burleson. In brief, patient is a 13-year-old female presents to the emergency department for evaluation after patient was found unconscious s/p narcan and awake on presentation to ED. Focused exam revealed gross deformity over the right ankle, intact skin, 2+ DP and DP pulses, and patient was able to move all toes bilaterally. Differential diagnosis includes fracture, dislocation, fracture dislocation, sprain, among others. Given this, x-ray of the right ankle, tib-fib, and knee obtained. CT head obtained to assess for possible intracranial bleed given patient's altered mental status at seen and patient unable to recall what happened to cause her ankle deformity. CT head and CT C-spine negative. X-ray showed trimalleolar fracture, dislocation of talus, and cortical irregularity of the proximal tibia involving the medial corner which can represent a nondisplaced fracture. Orthopedics physician on call consulted. No acute surgical intervention for orthopedics. They recommended close reduction and splint at bedside and close follow-up. Consent for sedation and closed reduction obtained. Preparations made including placing patient on cardiac monitor and call respiratory to bedside. PROCEDURE:  PROCEDURAL SEDATION    Pre-sedation Assessment    Intended Procedure: bedside reduction of right trimalleolar fracture with talus dislocation     Pre-Procedure Assessment/Plan:  Airway:Normal  ASA 2 - Patient with mild systemic disease with no functional limitations    Level of Sedation Plan: Moderate sedation    Post Procedure plan: Return to same level of care    I assessed the patient and find that the patient is in satisfactory condition to proceed with the planned procedure and sedation plan. The benefits, risks, and alternatives of procedural sedation were discussed with Henry Burleson.  We discussed the potential side effects or adverse reactions that could occur with propofol. An opportunity to ask questions was provided, and consent was given for the procedure. Oxygen was administered, and the appropriate pre-procedural policies were followed. Hospital protocol for cardiac, oxygen saturation, and blood pressure monitoring occurred. For details of the dosage administered, see the procedural sedation documentation. Nestor Fontaine tolerated the medication and procedure without complications. Nestor Fontaine woke up without difficulty, and was sent home with someone to evaluate them during the post-sedation period. Total amount of inservice time: start time [phone], end time 1530   Propofol total of 70mg given 40mg at 1519, 30mg at 1521    PROCEDURE:  JOINT REDUCTION  The benefits, risks, and alternatives of closed reduction of right trimalleolar fracture were discussed with Nestor Fontaine . An opportunity to ask questions was provided, and consent was given for the procedure. Once adequately anesthetized, the right trimalleolar fracture with talus dislocation was easily reduced using traction-countertraction. Following successful reduction, immobilization was performed and the extremity's neurovascular status was checked and it was intact. Short splint applied. The patient tolerated the procedure without complications. Patien able to move toes and had intact sensation after procedure. The Ekg interpreted by me shows  normal sinus rhythm with a rate of 92  Axis is   Normal  QTc is  within an acceptable range  Intervals and Durations are unremarkable. ST Segments: nonspecific changes  No significant change from prior EKG dated 02/28/20    Patient observed in the emergency department after the sedation. Despite almost 2 hours of observation and adequate time to metabolize propofol, patient continued to intermittently desaturate to 80s on room air. Given this, hospitalist consulted for admission for hypoxia and trimalleolar fracture. Admit.     All diagnostic, treatment, and disposition decisions were made by myself in conjunction with the advanced practice provider. For all further details of the patient's emergency department visit, please see the advanced practice provider's documentation. Comment: Please note this report has been produced using speech recognition software and may contain errors related to that system including errors in grammar, punctuation, and spelling, as well as words and phrases that may be inappropriate. If there are any questions or concerns please feel free to contact the dictating provider for clarification.        Saul Crook MD  09/23/20 5194

## 2020-09-23 ENCOUNTER — APPOINTMENT (OUTPATIENT)
Dept: GENERAL RADIOLOGY | Age: 55
DRG: 907 | End: 2020-09-23
Payer: COMMERCIAL

## 2020-09-23 ENCOUNTER — ANESTHESIA EVENT (OUTPATIENT)
Dept: OPERATING ROOM | Age: 55
DRG: 907 | End: 2020-09-23
Payer: COMMERCIAL

## 2020-09-23 ENCOUNTER — ANESTHESIA (OUTPATIENT)
Dept: OPERATING ROOM | Age: 55
DRG: 907 | End: 2020-09-23
Payer: COMMERCIAL

## 2020-09-23 LAB
ANION GAP SERPL CALCULATED.3IONS-SCNC: 13 MMOL/L (ref 3–16)
BASE EXCESS ARTERIAL: -6.1 MMOL/L (ref -3–3)
BASE EXCESS ARTERIAL: 0.2 MMOL/L (ref -3–3)
BUN BLDV-MCNC: 7 MG/DL (ref 7–20)
CALCIUM SERPL-MCNC: 10.1 MG/DL (ref 8.3–10.6)
CARBOXYHEMOGLOBIN ARTERIAL: 1 % (ref 0–1.5)
CARBOXYHEMOGLOBIN ARTERIAL: 1.3 % (ref 0–1.5)
CHLORIDE BLD-SCNC: 104 MMOL/L (ref 99–110)
CO2: 22 MMOL/L (ref 21–32)
CREAT SERPL-MCNC: 0.7 MG/DL (ref 0.6–1.1)
GFR AFRICAN AMERICAN: >60
GFR NON-AFRICAN AMERICAN: >60
GLUCOSE BLD-MCNC: 119 MG/DL (ref 70–99)
GLUCOSE BLD-MCNC: 183 MG/DL (ref 70–99)
HCO3 ARTERIAL: 23.9 MMOL/L (ref 21–29)
HCO3 ARTERIAL: 25.4 MMOL/L (ref 21–29)
HEMOGLOBIN, ART, EXTENDED: 15.7 G/DL (ref 12–16)
HEMOGLOBIN, ART, EXTENDED: 15.9 G/DL (ref 12–16)
LACTIC ACID, SEPSIS: 3 MMOL/L (ref 0.4–1.9)
LACTIC ACID: 4.4 MMOL/L (ref 0.4–2)
METHEMOGLOBIN ARTERIAL: 0.1 %
METHEMOGLOBIN ARTERIAL: 0.3 %
O2 CONTENT ARTERIAL: 21 ML/DL
O2 CONTENT ARTERIAL: 21 ML/DL
O2 SAT, ARTERIAL: 91 %
O2 SAT, ARTERIAL: 96.1 %
O2 THERAPY: ABNORMAL
O2 THERAPY: NORMAL
PCO2 ARTERIAL: 42.9 MMHG (ref 35–45)
PCO2 ARTERIAL: 66.6 MMHG (ref 35–45)
PERFORMED ON: ABNORMAL
PH ARTERIAL: 7.17 (ref 7.35–7.45)
PH ARTERIAL: 7.39 (ref 7.35–7.45)
PO2 ARTERIAL: 75.6 MMHG (ref 75–108)
PO2 ARTERIAL: 83.1 MMHG (ref 75–108)
POTASSIUM SERPL-SCNC: 4.6 MMOL/L (ref 3.5–5.1)
SARS-COV-2, NAAT: NOT DETECTED
SODIUM BLD-SCNC: 139 MMOL/L (ref 136–145)
TCO2 ARTERIAL: 26 MMOL/L
TCO2 ARTERIAL: 26.7 MMOL/L

## 2020-09-23 PROCEDURE — 94750 HC PULMONARY COMPLIANCE STUDY: CPT

## 2020-09-23 PROCEDURE — 83605 ASSAY OF LACTIC ACID: CPT

## 2020-09-23 PROCEDURE — 6370000000 HC RX 637 (ALT 250 FOR IP): Performed by: INTERNAL MEDICINE

## 2020-09-23 PROCEDURE — 6360000002 HC RX W HCPCS: Performed by: NURSE ANESTHETIST, CERTIFIED REGISTERED

## 2020-09-23 PROCEDURE — 3600000004 HC SURGERY LEVEL 4 BASE: Performed by: ORTHOPAEDIC SURGERY

## 2020-09-23 PROCEDURE — 2580000003 HC RX 258: Performed by: ORTHOPAEDIC SURGERY

## 2020-09-23 PROCEDURE — 2500000003 HC RX 250 WO HCPCS: Performed by: NURSE ANESTHETIST, CERTIFIED REGISTERED

## 2020-09-23 PROCEDURE — 36415 COLL VENOUS BLD VENIPUNCTURE: CPT

## 2020-09-23 PROCEDURE — 36600 WITHDRAWAL OF ARTERIAL BLOOD: CPT

## 2020-09-23 PROCEDURE — 99232 SBSQ HOSP IP/OBS MODERATE 35: CPT | Performed by: INTERNAL MEDICINE

## 2020-09-23 PROCEDURE — C1769 GUIDE WIRE: HCPCS | Performed by: ORTHOPAEDIC SURGERY

## 2020-09-23 PROCEDURE — 94761 N-INVAS EAR/PLS OXIMETRY MLT: CPT

## 2020-09-23 PROCEDURE — 6360000002 HC RX W HCPCS: Performed by: INTERNAL MEDICINE

## 2020-09-23 PROCEDURE — 94640 AIRWAY INHALATION TREATMENT: CPT

## 2020-09-23 PROCEDURE — C1713 ANCHOR/SCREW BN/BN,TIS/BN: HCPCS | Performed by: ORTHOPAEDIC SURGERY

## 2020-09-23 PROCEDURE — 6360000002 HC RX W HCPCS: Performed by: PHYSICIAN ASSISTANT

## 2020-09-23 PROCEDURE — 2580000003 HC RX 258: Performed by: INTERNAL MEDICINE

## 2020-09-23 PROCEDURE — 3700000000 HC ANESTHESIA ATTENDED CARE: Performed by: ORTHOPAEDIC SURGERY

## 2020-09-23 PROCEDURE — 80048 BASIC METABOLIC PNL TOTAL CA: CPT

## 2020-09-23 PROCEDURE — 99291 CRITICAL CARE FIRST HOUR: CPT | Performed by: INTERNAL MEDICINE

## 2020-09-23 PROCEDURE — 2720000010 HC SURG SUPPLY STERILE: Performed by: ORTHOPAEDIC SURGERY

## 2020-09-23 PROCEDURE — 0QSJ04Z REPOSITION RIGHT FIBULA WITH INTERNAL FIXATION DEVICE, OPEN APPROACH: ICD-10-PCS | Performed by: ORTHOPAEDIC SURGERY

## 2020-09-23 PROCEDURE — 3209999900 FLUORO FOR SURGICAL PROCEDURES

## 2020-09-23 PROCEDURE — U0002 COVID-19 LAB TEST NON-CDC: HCPCS

## 2020-09-23 PROCEDURE — 2700000000 HC OXYGEN THERAPY PER DAY

## 2020-09-23 PROCEDURE — 64445 NJX AA&/STRD SCIATIC NRV IMG: CPT | Performed by: ANESTHESIOLOGY

## 2020-09-23 PROCEDURE — 3600000014 HC SURGERY LEVEL 4 ADDTL 15MIN: Performed by: ORTHOPAEDIC SURGERY

## 2020-09-23 PROCEDURE — 96376 TX/PRO/DX INJ SAME DRUG ADON: CPT

## 2020-09-23 PROCEDURE — 6360000002 HC RX W HCPCS: Performed by: ANESTHESIOLOGY

## 2020-09-23 PROCEDURE — 2500000003 HC RX 250 WO HCPCS: Performed by: INTERNAL MEDICINE

## 2020-09-23 PROCEDURE — 82803 BLOOD GASES ANY COMBINATION: CPT

## 2020-09-23 PROCEDURE — 0BH17EZ INSERTION OF ENDOTRACHEAL AIRWAY INTO TRACHEA, VIA NATURAL OR ARTIFICIAL OPENING: ICD-10-PCS | Performed by: INTERNAL MEDICINE

## 2020-09-23 PROCEDURE — 2709999900 HC NON-CHARGEABLE SUPPLY: Performed by: ORTHOPAEDIC SURGERY

## 2020-09-23 PROCEDURE — 6360000002 HC RX W HCPCS

## 2020-09-23 PROCEDURE — 6370000000 HC RX 637 (ALT 250 FOR IP): Performed by: ORTHOPAEDIC SURGERY

## 2020-09-23 PROCEDURE — 2000000000 HC ICU R&B

## 2020-09-23 PROCEDURE — 94002 VENT MGMT INPAT INIT DAY: CPT

## 2020-09-23 PROCEDURE — 6360000002 HC RX W HCPCS: Performed by: ORTHOPAEDIC SURGERY

## 2020-09-23 PROCEDURE — 3700000001 HC ADD 15 MINUTES (ANESTHESIA): Performed by: ORTHOPAEDIC SURGERY

## 2020-09-23 PROCEDURE — 71045 X-RAY EXAM CHEST 1 VIEW: CPT

## 2020-09-23 PROCEDURE — 2580000003 HC RX 258: Performed by: ANESTHESIOLOGY

## 2020-09-23 PROCEDURE — 5A1935Z RESPIRATORY VENTILATION, LESS THAN 24 CONSECUTIVE HOURS: ICD-10-PCS | Performed by: INTERNAL MEDICINE

## 2020-09-23 DEVICE — LOCKING SCREW
Type: IMPLANTABLE DEVICE | Site: ANKLE | Status: FUNCTIONAL
Brand: VARIAX

## 2020-09-23 DEVICE — DISTAL LATERAL FIBULA PLATE, 5 HOLE
Type: IMPLANTABLE DEVICE | Site: ANKLE | Status: FUNCTIONAL
Brand: VARIAX

## 2020-09-23 DEVICE — BONE SCREW
Type: IMPLANTABLE DEVICE | Site: ANKLE | Status: FUNCTIONAL
Brand: VARIAX

## 2020-09-23 RX ORDER — GLYCOPYRROLATE 0.2 MG/ML
INJECTION INTRAMUSCULAR; INTRAVENOUS PRN
Status: DISCONTINUED | OUTPATIENT
Start: 2020-09-23 | End: 2020-09-25 | Stop reason: SDUPTHER

## 2020-09-23 RX ORDER — SODIUM CHLORIDE, SODIUM LACTATE, POTASSIUM CHLORIDE, CALCIUM CHLORIDE 600; 310; 30; 20 MG/100ML; MG/100ML; MG/100ML; MG/100ML
INJECTION, SOLUTION INTRAVENOUS CONTINUOUS PRN
Status: DISCONTINUED | OUTPATIENT
Start: 2020-09-23 | End: 2020-09-25 | Stop reason: SDUPTHER

## 2020-09-23 RX ORDER — SODIUM CHLORIDE, SODIUM LACTATE, POTASSIUM CHLORIDE, CALCIUM CHLORIDE 600; 310; 30; 20 MG/100ML; MG/100ML; MG/100ML; MG/100ML
INJECTION, SOLUTION INTRAVENOUS CONTINUOUS
Status: DISCONTINUED | OUTPATIENT
Start: 2020-09-23 | End: 2020-09-23

## 2020-09-23 RX ORDER — SODIUM CHLORIDE 0.9 % (FLUSH) 0.9 %
10 SYRINGE (ML) INJECTION PRN
Status: DISCONTINUED | OUTPATIENT
Start: 2020-09-23 | End: 2020-09-25 | Stop reason: HOSPADM

## 2020-09-23 RX ORDER — LEVOFLOXACIN 5 MG/ML
750 INJECTION, SOLUTION INTRAVENOUS EVERY 24 HOURS
Status: DISCONTINUED | OUTPATIENT
Start: 2020-09-23 | End: 2020-09-25 | Stop reason: HOSPADM

## 2020-09-23 RX ORDER — CHLORHEXIDINE GLUCONATE 0.12 MG/ML
15 RINSE ORAL 2 TIMES DAILY
Status: DISCONTINUED | OUTPATIENT
Start: 2020-09-23 | End: 2020-09-24

## 2020-09-23 RX ORDER — PROPOFOL 10 MG/ML
INJECTION, EMULSION INTRAVENOUS
Status: DISPENSED
Start: 2020-09-23 | End: 2020-09-24

## 2020-09-23 RX ORDER — SODIUM CHLORIDE 0.9 % (FLUSH) 0.9 %
10 SYRINGE (ML) INJECTION EVERY 12 HOURS SCHEDULED
Status: DISCONTINUED | OUTPATIENT
Start: 2020-09-23 | End: 2020-09-25 | Stop reason: HOSPADM

## 2020-09-23 RX ORDER — PROPOFOL 10 MG/ML
INJECTION, EMULSION INTRAVENOUS
Status: COMPLETED
Start: 2020-09-23 | End: 2020-09-23

## 2020-09-23 RX ORDER — MIDAZOLAM HYDROCHLORIDE 1 MG/ML
2 INJECTION INTRAMUSCULAR; INTRAVENOUS
Status: DISCONTINUED | OUTPATIENT
Start: 2020-09-23 | End: 2020-09-24

## 2020-09-23 RX ORDER — MIDAZOLAM HYDROCHLORIDE 1 MG/ML
2 INJECTION INTRAMUSCULAR; INTRAVENOUS ONCE
Status: DISCONTINUED | OUTPATIENT
Start: 2020-09-23 | End: 2020-09-23

## 2020-09-23 RX ORDER — FENTANYL CITRATE 50 UG/ML
50 INJECTION, SOLUTION INTRAMUSCULAR; INTRAVENOUS
Status: DISCONTINUED | OUTPATIENT
Start: 2020-09-23 | End: 2020-09-24

## 2020-09-23 RX ORDER — PROPOFOL 10 MG/ML
INJECTION, EMULSION INTRAVENOUS PRN
Status: DISCONTINUED | OUTPATIENT
Start: 2020-09-23 | End: 2020-09-25 | Stop reason: SDUPTHER

## 2020-09-23 RX ORDER — IPRATROPIUM BROMIDE AND ALBUTEROL SULFATE 2.5; .5 MG/3ML; MG/3ML
1 SOLUTION RESPIRATORY (INHALATION)
Status: DISCONTINUED | OUTPATIENT
Start: 2020-09-23 | End: 2020-09-25 | Stop reason: HOSPADM

## 2020-09-23 RX ORDER — FUROSEMIDE 10 MG/ML
40 INJECTION INTRAMUSCULAR; INTRAVENOUS ONCE
Status: COMPLETED | OUTPATIENT
Start: 2020-09-23 | End: 2020-09-23

## 2020-09-23 RX ORDER — POLYVINYL ALCOHOL 14 MG/ML
1 SOLUTION/ DROPS OPHTHALMIC EVERY 4 HOURS
Status: DISCONTINUED | OUTPATIENT
Start: 2020-09-23 | End: 2020-09-24

## 2020-09-23 RX ORDER — SENNA AND DOCUSATE SODIUM 50; 8.6 MG/1; MG/1
1 TABLET, FILM COATED ORAL 2 TIMES DAILY
Status: DISCONTINUED | OUTPATIENT
Start: 2020-09-23 | End: 2020-09-25 | Stop reason: HOSPADM

## 2020-09-23 RX ORDER — IPRATROPIUM BROMIDE AND ALBUTEROL SULFATE 2.5; .5 MG/3ML; MG/3ML
1 SOLUTION RESPIRATORY (INHALATION) EVERY 4 HOURS PRN
Status: DISCONTINUED | OUTPATIENT
Start: 2020-09-23 | End: 2020-09-25 | Stop reason: HOSPADM

## 2020-09-23 RX ORDER — LIDOCAINE HYDROCHLORIDE 10 MG/ML
INJECTION, SOLUTION INFILTRATION; PERINEURAL PRN
Status: DISCONTINUED | OUTPATIENT
Start: 2020-09-23 | End: 2020-09-25 | Stop reason: SDUPTHER

## 2020-09-23 RX ORDER — OXYCODONE HYDROCHLORIDE 5 MG/1
5 TABLET ORAL EVERY 4 HOURS PRN
Status: DISCONTINUED | OUTPATIENT
Start: 2020-09-23 | End: 2020-09-25 | Stop reason: HOSPADM

## 2020-09-23 RX ORDER — ONDANSETRON 2 MG/ML
INJECTION INTRAMUSCULAR; INTRAVENOUS PRN
Status: DISCONTINUED | OUTPATIENT
Start: 2020-09-23 | End: 2020-09-25 | Stop reason: SDUPTHER

## 2020-09-23 RX ORDER — MIDAZOLAM HYDROCHLORIDE 1 MG/ML
INJECTION INTRAMUSCULAR; INTRAVENOUS
Status: DISPENSED
Start: 2020-09-23 | End: 2020-09-24

## 2020-09-23 RX ORDER — MINERAL OIL AND WHITE PETROLATUM 150; 830 MG/G; MG/G
OINTMENT OPHTHALMIC EVERY 4 HOURS
Status: DISCONTINUED | OUTPATIENT
Start: 2020-09-23 | End: 2020-09-24

## 2020-09-23 RX ORDER — FENTANYL CITRATE 50 UG/ML
INJECTION, SOLUTION INTRAMUSCULAR; INTRAVENOUS PRN
Status: DISCONTINUED | OUTPATIENT
Start: 2020-09-23 | End: 2020-09-25 | Stop reason: SDUPTHER

## 2020-09-23 RX ORDER — MAGNESIUM HYDROXIDE 1200 MG/15ML
LIQUID ORAL CONTINUOUS PRN
Status: COMPLETED | OUTPATIENT
Start: 2020-09-23 | End: 2020-09-23

## 2020-09-23 RX ORDER — ROCURONIUM BROMIDE 10 MG/ML
INJECTION, SOLUTION INTRAVENOUS PRN
Status: DISCONTINUED | OUTPATIENT
Start: 2020-09-23 | End: 2020-09-25 | Stop reason: SDUPTHER

## 2020-09-23 RX ORDER — PROPOFOL 10 MG/ML
10 INJECTION, EMULSION INTRAVENOUS
Status: DISCONTINUED | OUTPATIENT
Start: 2020-09-23 | End: 2020-09-24

## 2020-09-23 RX ORDER — METHYLPREDNISOLONE SODIUM SUCCINATE 40 MG/ML
40 INJECTION, POWDER, LYOPHILIZED, FOR SOLUTION INTRAMUSCULAR; INTRAVENOUS DAILY
Status: DISCONTINUED | OUTPATIENT
Start: 2020-09-23 | End: 2020-09-24

## 2020-09-23 RX ADMIN — METHYLPREDNISOLONE SODIUM SUCCINATE 40 MG: 40 INJECTION, POWDER, FOR SOLUTION INTRAMUSCULAR; INTRAVENOUS at 11:52

## 2020-09-23 RX ADMIN — Medication 2 G: at 14:52

## 2020-09-23 RX ADMIN — GLYCOPYRROLATE 0.2 MG: 0.2 INJECTION, SOLUTION INTRAMUSCULAR; INTRAVENOUS at 15:42

## 2020-09-23 RX ADMIN — ROCURONIUM BROMIDE 50 MG: 10 INJECTION, SOLUTION INTRAVENOUS at 16:30

## 2020-09-23 RX ADMIN — FUROSEMIDE 40 MG: 10 INJECTION, SOLUTION INTRAMUSCULAR; INTRAVENOUS at 18:50

## 2020-09-23 RX ADMIN — IPRATROPIUM BROMIDE AND ALBUTEROL SULFATE 1 AMPULE: .5; 3 SOLUTION RESPIRATORY (INHALATION) at 22:40

## 2020-09-23 RX ADMIN — PROPOFOL 50 MG: 10 INJECTION, EMULSION INTRAVENOUS at 16:18

## 2020-09-23 RX ADMIN — MIDAZOLAM HYDROCHLORIDE 2 MG: 1 INJECTION, SOLUTION INTRAMUSCULAR; INTRAVENOUS at 18:15

## 2020-09-23 RX ADMIN — IPRATROPIUM BROMIDE AND ALBUTEROL SULFATE 1 AMPULE: .5; 3 SOLUTION RESPIRATORY (INHALATION) at 17:23

## 2020-09-23 RX ADMIN — TRAMADOL HYDROCHLORIDE 50 MG: 50 TABLET, FILM COATED ORAL at 00:11

## 2020-09-23 RX ADMIN — PROPOFOL 50 MCG/KG/MIN: 10 INJECTION, EMULSION INTRAVENOUS at 18:51

## 2020-09-23 RX ADMIN — KETOROLAC TROMETHAMINE 15 MG: 30 INJECTION, SOLUTION INTRAMUSCULAR at 06:13

## 2020-09-23 RX ADMIN — FAMOTIDINE 20 MG: 10 INJECTION INTRAVENOUS at 20:47

## 2020-09-23 RX ADMIN — TRAMADOL HYDROCHLORIDE 50 MG: 50 TABLET, FILM COATED ORAL at 08:29

## 2020-09-23 RX ADMIN — SODIUM CHLORIDE: 9 INJECTION, SOLUTION INTRAVENOUS at 06:54

## 2020-09-23 RX ADMIN — LIDOCAINE HYDROCHLORIDE 40 MG: 10 INJECTION, SOLUTION INFILTRATION; PERINEURAL at 15:05

## 2020-09-23 RX ADMIN — PROPOFOL 150 MG: 10 INJECTION, EMULSION INTRAVENOUS at 16:30

## 2020-09-23 RX ADMIN — FENTANYL CITRATE 50 MCG: 50 INJECTION INTRAMUSCULAR; INTRAVENOUS at 15:05

## 2020-09-23 RX ADMIN — WHITE PETROLATUM 57.7 %-MINERAL OIL 31.9 % EYE OINTMENT: at 18:50

## 2020-09-23 RX ADMIN — SODIUM CHLORIDE, POTASSIUM CHLORIDE, SODIUM LACTATE AND CALCIUM CHLORIDE: 600; 310; 30; 20 INJECTION, SOLUTION INTRAVENOUS at 15:00

## 2020-09-23 RX ADMIN — PROPOFOL 30 MG: 10 INJECTION, EMULSION INTRAVENOUS at 16:07

## 2020-09-23 RX ADMIN — PROPOFOL 30 MG: 10 INJECTION, EMULSION INTRAVENOUS at 16:13

## 2020-09-23 RX ADMIN — IPRATROPIUM BROMIDE AND ALBUTEROL SULFATE 1 AMPULE: .5; 3 SOLUTION RESPIRATORY (INHALATION) at 11:58

## 2020-09-23 RX ADMIN — PROPOFOL 30 MCG/KG/MIN: 10 INJECTION, EMULSION INTRAVENOUS at 17:12

## 2020-09-23 RX ADMIN — FENTANYL CITRATE 50 MCG: 0.05 INJECTION, SOLUTION INTRAMUSCULAR; INTRAVENOUS at 18:15

## 2020-09-23 RX ADMIN — FENTANYL CITRATE 50 MCG: 50 INJECTION INTRAMUSCULAR; INTRAVENOUS at 15:57

## 2020-09-23 RX ADMIN — CHLORHEXIDINE GLUCONATE 0.12% ORAL RINSE 15 ML: 1.2 LIQUID ORAL at 20:47

## 2020-09-23 RX ADMIN — PROPOFOL 30 MG: 10 INJECTION, EMULSION INTRAVENOUS at 16:01

## 2020-09-23 RX ADMIN — MIDAZOLAM HYDROCHLORIDE 2 MG: 1 INJECTION, SOLUTION INTRAMUSCULAR; INTRAVENOUS at 20:20

## 2020-09-23 RX ADMIN — Medication 10 ML: at 20:48

## 2020-09-23 RX ADMIN — PROPOFOL 50 MCG/KG/MIN: 10 INJECTION, EMULSION INTRAVENOUS at 23:54

## 2020-09-23 RX ADMIN — SUGAMMADEX 200 MG: 100 INJECTION, SOLUTION INTRAVENOUS at 16:20

## 2020-09-23 RX ADMIN — PROPOFOL 150 MG: 10 INJECTION, EMULSION INTRAVENOUS at 15:05

## 2020-09-23 RX ADMIN — LEVOFLOXACIN 750 MG: 5 INJECTION, SOLUTION INTRAVENOUS at 19:30

## 2020-09-23 RX ADMIN — ONDANSETRON 4 MG: 2 INJECTION, SOLUTION INTRAMUSCULAR; INTRAVENOUS at 15:05

## 2020-09-23 RX ADMIN — Medication 2 G: at 20:52

## 2020-09-23 RX ADMIN — MIDAZOLAM HYDROCHLORIDE 2 MG: 1 INJECTION, SOLUTION INTRAMUSCULAR; INTRAVENOUS at 23:21

## 2020-09-23 RX ADMIN — ROCURONIUM BROMIDE 40 MG: 10 INJECTION, SOLUTION INTRAVENOUS at 15:05

## 2020-09-23 ASSESSMENT — PULMONARY FUNCTION TESTS
PIF_VALUE: 0
PIF_VALUE: 0
PIF_VALUE: 4
PIF_VALUE: 18
PIF_VALUE: 0
PIF_VALUE: 26
PIF_VALUE: 0
PIF_VALUE: 26
PIF_VALUE: 0
PIF_VALUE: 0
PIF_VALUE: 3
PIF_VALUE: 0
PIF_VALUE: 26
PIF_VALUE: 0
PIF_VALUE: 26
PIF_VALUE: 0
PIF_VALUE: 26
PIF_VALUE: 0
PIF_VALUE: 30
PIF_VALUE: 0
PIF_VALUE: 25
PIF_VALUE: 23
PIF_VALUE: 0
PIF_VALUE: 26
PIF_VALUE: 30
PIF_VALUE: 0
PIF_VALUE: 0
PIF_VALUE: 30
PIF_VALUE: 34
PIF_VALUE: 0
PIF_VALUE: 25
PIF_VALUE: 26
PIF_VALUE: 0
PIF_VALUE: 26
PIF_VALUE: 0
PIF_VALUE: 0
PIF_VALUE: 41
PIF_VALUE: 0
PIF_VALUE: 34
PIF_VALUE: 24
PIF_VALUE: 0
PIF_VALUE: 24
PIF_VALUE: 0
PIF_VALUE: 20
PIF_VALUE: 0
PIF_VALUE: 27
PIF_VALUE: 0
PIF_VALUE: 2
PIF_VALUE: 0
PIF_VALUE: 24
PIF_VALUE: 0
PIF_VALUE: 26
PIF_VALUE: 29
PIF_VALUE: 0
PIF_VALUE: 1
PIF_VALUE: 0
PIF_VALUE: 0
PIF_VALUE: 25
PIF_VALUE: 25
PIF_VALUE: 0
PIF_VALUE: 25
PIF_VALUE: 0
PIF_VALUE: 15
PIF_VALUE: 0
PIF_VALUE: 26
PIF_VALUE: 0
PIF_VALUE: 26
PIF_VALUE: 0
PIF_VALUE: 25
PIF_VALUE: 0
PIF_VALUE: 27
PIF_VALUE: 0
PIF_VALUE: 26
PIF_VALUE: 0
PIF_VALUE: 29
PIF_VALUE: 0
PIF_VALUE: 27
PIF_VALUE: 0
PIF_VALUE: 33
PIF_VALUE: 26
PIF_VALUE: 0
PIF_VALUE: 3
PIF_VALUE: 0
PIF_VALUE: 26
PIF_VALUE: 0
PIF_VALUE: 31
PIF_VALUE: 0
PIF_VALUE: 20
PIF_VALUE: 0
PIF_VALUE: 26
PIF_VALUE: 27
PIF_VALUE: 15
PIF_VALUE: 0
PIF_VALUE: 0
PIF_VALUE: 27
PIF_VALUE: 25
PIF_VALUE: 0
PIF_VALUE: 27
PIF_VALUE: 0
PIF_VALUE: 0
PIF_VALUE: 27
PIF_VALUE: 24
PIF_VALUE: 8
PIF_VALUE: 0
PIF_VALUE: 21
PIF_VALUE: 0
PIF_VALUE: 24
PIF_VALUE: 0
PIF_VALUE: 24
PIF_VALUE: 0
PIF_VALUE: 36
PIF_VALUE: 0
PIF_VALUE: 37
PIF_VALUE: 0
PIF_VALUE: 25
PIF_VALUE: 0
PIF_VALUE: 27
PIF_VALUE: 0
PIF_VALUE: 26
PIF_VALUE: 26
PIF_VALUE: 29
PIF_VALUE: 25
PIF_VALUE: 0
PIF_VALUE: 26
PIF_VALUE: 0
PIF_VALUE: 1
PIF_VALUE: 0
PIF_VALUE: 32
PIF_VALUE: 0
PIF_VALUE: 25
PIF_VALUE: 0
PIF_VALUE: 0
PIF_VALUE: 20
PIF_VALUE: 26
PIF_VALUE: 0
PIF_VALUE: 27
PIF_VALUE: 0
PIF_VALUE: 38
PIF_VALUE: 0
PIF_VALUE: 30
PIF_VALUE: 0
PIF_VALUE: 0
PIF_VALUE: 24
PIF_VALUE: 36
PIF_VALUE: 0
PIF_VALUE: 0
PIF_VALUE: 24
PIF_VALUE: 0
PIF_VALUE: 1
PIF_VALUE: 0
PIF_VALUE: 0
PIF_VALUE: 35
PIF_VALUE: 0
PIF_VALUE: 0
PIF_VALUE: 11
PIF_VALUE: 0
PIF_VALUE: 25
PIF_VALUE: 0
PIF_VALUE: 36
PIF_VALUE: 0
PIF_VALUE: 0
PIF_VALUE: 25
PIF_VALUE: 0
PIF_VALUE: 26
PIF_VALUE: 0
PIF_VALUE: 26
PIF_VALUE: 0
PIF_VALUE: 6
PIF_VALUE: 0
PIF_VALUE: 22
PIF_VALUE: 0
PIF_VALUE: 33
PIF_VALUE: 0
PIF_VALUE: 29
PIF_VALUE: 0
PIF_VALUE: 6
PIF_VALUE: 0
PIF_VALUE: 25
PIF_VALUE: 35
PIF_VALUE: 0
PIF_VALUE: 0
PIF_VALUE: 29
PIF_VALUE: 32
PIF_VALUE: 0
PIF_VALUE: 35
PIF_VALUE: 0
PIF_VALUE: 34
PIF_VALUE: 0
PIF_VALUE: 41
PIF_VALUE: 0
PIF_VALUE: 26
PIF_VALUE: 0
PIF_VALUE: 24
PIF_VALUE: 48
PIF_VALUE: 0
PIF_VALUE: 0
PIF_VALUE: 20
PIF_VALUE: 0
PIF_VALUE: 0
PIF_VALUE: 23
PIF_VALUE: 0
PIF_VALUE: 26
PIF_VALUE: 0
PIF_VALUE: 21
PIF_VALUE: 0
PIF_VALUE: 31
PIF_VALUE: 0
PIF_VALUE: 26
PIF_VALUE: 0
PIF_VALUE: 25
PIF_VALUE: 0
PIF_VALUE: 0
PIF_VALUE: 26
PIF_VALUE: 25
PIF_VALUE: 15
PIF_VALUE: 0
PIF_VALUE: 24
PIF_VALUE: 0
PIF_VALUE: 24
PIF_VALUE: 0
PIF_VALUE: 0
PIF_VALUE: 24
PIF_VALUE: 3
PIF_VALUE: 0
PIF_VALUE: 29
PIF_VALUE: 0
PIF_VALUE: 24
PIF_VALUE: 0
PIF_VALUE: 26
PIF_VALUE: 25
PIF_VALUE: 2
PIF_VALUE: 0
PIF_VALUE: 26
PIF_VALUE: 0
PIF_VALUE: 26
PIF_VALUE: 26
PIF_VALUE: 0
PIF_VALUE: 5
PIF_VALUE: 0
PIF_VALUE: 43
PIF_VALUE: 17
PIF_VALUE: 0
PIF_VALUE: 29
PIF_VALUE: 24
PIF_VALUE: 0

## 2020-09-23 ASSESSMENT — PAIN SCALES - GENERAL
PAINLEVEL_OUTOF10: 10
PAINLEVEL_OUTOF10: 0
PAINLEVEL_OUTOF10: 10
PAINLEVEL_OUTOF10: 9
PAINLEVEL_OUTOF10: 0
PAINLEVEL_OUTOF10: 4

## 2020-09-23 ASSESSMENT — PAIN DESCRIPTION - FREQUENCY: FREQUENCY: CONTINUOUS

## 2020-09-23 ASSESSMENT — PAIN DESCRIPTION - DESCRIPTORS: DESCRIPTORS: ACHING;SHARP

## 2020-09-23 ASSESSMENT — PAIN DESCRIPTION - PAIN TYPE: TYPE: ACUTE PAIN

## 2020-09-23 NOTE — CONSULTS
0.9 % injection 10 mL, 10 mL, Intravenous, 2 times per day  sodium chloride flush 0.9 % injection 10 mL, 10 mL, Intravenous, PRN  acetaminophen (TYLENOL) tablet 650 mg, 650 mg, Oral, Q6H PRN **OR** acetaminophen (TYLENOL) suppository 650 mg, 650 mg, Rectal, Q6H PRN  polyethylene glycol (GLYCOLAX) packet 17 g, 17 g, Oral, Daily PRN  promethazine (PHENERGAN) tablet 12.5 mg, 12.5 mg, Oral, Q6H PRN **OR** ondansetron (ZOFRAN) injection 4 mg, 4 mg, Intravenous, Q6H PRN  enoxaparin (LOVENOX) injection 40 mg, 40 mg, Subcutaneous, Daily  0.9 % sodium chloride infusion, , Intravenous, Continuous  traMADol (ULTRAM) tablet 50 mg, 50 mg, Oral, Q4H PRN  ketorolac (TORADOL) injection 15 mg, 15 mg, Intravenous, Q6H PRN    Allergies:  Patient has no known allergies. Social History:   TOBACCO:   reports that she has been smoking cigarettes. She has been smoking about 1.00 pack per day. She has never used smokeless tobacco.  ETOH:   reports no history of alcohol use. DRUGS:   reports current drug use. Drug: IV. Family History:       Problem Relation Age of Onset    Cancer Father        REVIEW OF SYSTEMS:    CONSTITUTIONAL:  negative  RESPIRATORY:  negative  CARDIOVASCULAR:  negative  MUSCULOSKELETAL:  positive for  pain, joint swelling, decreased range of motion and bone pain    PHYSICAL EXAM:      General: alert, appears stated age and cooperative   Skin: Skin intact, No Erythema and Positive for Edema   Extremity: Distal NVI   DVT Exam: No evidence of DVT seen on physical exam.  Negative Liza's sign. No significant calf/ankle edema. RLE: + dorsal pedis and posterior tibial pulse palpable, +sensation intact to light touch L4-S1, thigh and calf compartments soft and compressible, motor function intact, wiggles toes.   Patient in a splint  Good cap refill <2 sec      DATA:        CBC with Differential:    Lab Results   Component Value Date    WBC 15.3 09/22/2020    RBC 4.98 09/22/2020    HGB 15.8 09/22/2020    HCT 45.7 09/22/2020     09/22/2020    MCV 91.6 09/22/2020    MCH 31.6 09/22/2020    MCHC 34.5 09/22/2020    RDW 13.0 09/22/2020    LYMPHOPCT 2.6 09/22/2020    MONOPCT 2.5 09/22/2020    BASOPCT 0.2 09/22/2020    MONOSABS 0.4 09/22/2020    LYMPHSABS 0.4 09/22/2020    EOSABS 0.0 09/22/2020    BASOSABS 0.0 09/22/2020     CMP:    Lab Results   Component Value Date     09/22/2020    K 4.8 09/22/2020    CL 97 09/22/2020    CO2 29 09/22/2020    BUN 7 09/22/2020    CREATININE 0.8 09/22/2020    GFRAA >60 09/22/2020    AGRATIO 1.6 09/22/2020    LABGLOM >60 09/22/2020    GLUCOSE 327 09/22/2020    PROT 6.9 09/22/2020    CALCIUM 9.1 09/22/2020    BILITOT 0.7 09/22/2020    ALKPHOS 162 09/22/2020    AST 38 09/22/2020    ALT 25 09/22/2020     BMP:    Lab Results   Component Value Date     09/22/2020    K 4.8 09/22/2020    CL 97 09/22/2020    CO2 29 09/22/2020    BUN 7 09/22/2020    CREATININE 0.8 09/22/2020    CALCIUM 9.1 09/22/2020    GFRAA >60 09/22/2020    LABGLOM >60 09/22/2020    GLUCOSE 327 09/22/2020         Radiology:     @  XR ANKLE RIGHT (MIN 3 VIEWS)   Final Result   Successful partial reduction of the ankle. Acute, comminuted fracture of the distal fibula. Widening of the medial space of the ankle mortise, suggesting ligamentous   injury. XR TIBIA FIBULA RIGHT (2 VIEWS)   Final Result   Oblique fracture through the distal fibula metadiaphysis with approximately   10-15 degrees of apex anterior angulation. XR KNEE RIGHT (MIN 4 VIEWS)   Final Result   Cortical irregularity proximal tibia involving the medial corner could   represent a nondisplaced fracture      Otherwise, no bony or joint abnormality         CT CERVICAL SPINE WO CONTRAST   Final Result   No acute abnormality of the cervical spine. XR CHEST (2 VW)   Final Result   No acute process.          XR ANKLE RIGHT (MIN 3 VIEWS)   Final Result   Trimalleolar fracture with posterolateral dislocation of the talus         CT

## 2020-09-23 NOTE — CONSULTS
Reason for referral and CC: *hypoxia    HISTORY OF PRESENT ILLNESS: 78-year-old female with history of opioid abuse and tobacco abuse presented to the emergency room yesterday after falling and sustaining a right ankle fracture. She had snorted fentanyl and was unresponsive. She was revived and has been stable on oxygen since yesterday. She went to the OR today for ORIF. Postoperatively she was extubated but became apneic and was reintubated and admitted to the ICU. Past Medical History:   Diagnosis Date    COPD [facility]chronic obstructive [facility] disease) [facility]     Dementia ([facility]      Past Surgical History:   Procedure Laterality Date    ANKLE SURGERY Right 09/23/2020    ORIF    BRONCHOSCOPY N/A 2/7/2020    BRONCHOSCOPY performed by Jareth Jose MD at [address] [place] [address]  2/7/2020    BRONCHOSCOPY THERAPUTIC ASPIRATION INITIAL performed by Jareth Jose MD at [address] [place] [address] N/A 2/10/2020    BRONCHOSCOPY ALVEOLAR LAVAGE performed by Minda Spencer MD at [address] [place] [address]  2/10/2020    BRONCHOSCOPY THERAPUTIC ASPIRATION SUBSEQUENT performed by Minda Spencer MD at [address] [place] [address] History  family history includes Cancer in her father. Social History:  reports that she has been smoking cigarettes. She has been smoking about 1.00 pack per day. She has never used smokeless tobacco.   reports no history of alcohol use. ALLERGIES:  Patient has No Known Allergies.   Continuous Infusions:   propofol 50 mcg/kg/min (09/23/20 1742)    lactated ringers       Scheduled Meds:   methylPREDNISolone  40 mg Intravenous Daily    ipratropium-albuterol  1 ampule Inhalation Q4H WA    midazolam        sodium chloride flush  10 mL Intravenous 2 times per day    ceFAZolin (ANCEF) IVPB  2 g Intravenous Q8H    sennosides-docusate sodium  1 tablet Oral BID    chlorhexidine  15 mL Mouth/Throat BID    famotidine (PEPCID) injection  20 mg Intravenous BID 09/23/20  1755   PHART 7.173*   RKI0CZU 66.6*   PO2ART 75.6       Chest imaging was reviewed by me and showed new diffuse R> L air space disease. ETT in place    ASSESSMENT:  · Acute hypoxic respiratory failure with apnea post-op requiring reintubation in PACU  · Post -op Bilateral ASD - possibly aspiration or pulmonary edema or pneumonitis from snorting  · Fentanyl OD(snorted) resulting in the Right ankle trauma  · Right trimalleolar fracture status post ORIF  · Tobacco abuse  · H/o COPD    PLAN:  Mechanical ventilation as per my orders. The ventilator was adjusted by me at the bedside for unstable, life threatening respiratory failure. IV Propofol for sedation, target RASS -2, with daily spontaneous awakening trial.  Fentanyl PRN pain, gtt as needed  Head of bed 30 degrees or higher at all times  Daily spontaneous breathing trial once PEEP less than 8, FiO2 less than 55%.   Increase RR and repeat ABG  lactate and bnp  · Start Levaquin for new air space disease  · Lasix 40mg IV x 1  · Sputum cx  · Lovenox DVT prophylaxis  · CCT 38 min    Thank you Nkechi Durbin MD for this consult no

## 2020-09-23 NOTE — OP NOTE
Anthony Moreno (1965)    Date of Surgery- 9/23/2020      PREOPERATIVE DIAGNOSIS:  Right ankle fracture. POSTOPERATIVE DIAGNOSIS:  Right ankle fracture. PROCEDURES PERFORMED:    1. Right ankle open reduction with internal fixation of lateral malleolus (CPT 29875)  2. Intraoperative use of x-ray with interpretation right ankle (CPT F9264774)     ASSISTANT:   Surgical Assistant: Ellie Alvarenga Yary  Scrub Person First: Henrik Sarkar; Celio Salinas      ANESTHESIA:  General with a regional block. TOURNIQUET TIME:  See OR chart     IMPLANTS USED:  [place] distal fibula locking plate      Estimated blood loss: Minimal    INDICATIONS FOR OPERATION:  The patient fell and sustained the aforementioned injury. She chose to proceed with operative intervention. At no time were guarantees implied or stated. DESCRIPTION OF OPERATION:  The patient was seen in the holding area, the appropriate extremity marked with an indelible pen. Regional anesthesia was administered by anesthesia. The patient was taken to the operative suite, identified, and placed on the OR table. General anesthesia was administered. A sandbag was placed under the Right hip. A well-padded tourniquet was placed above the Right thigh. The Right lower extremity was elevated, prepped with ChloraPrep from the toes to knee, draped in normal sterile fashion. The patient received antibiotics prior to incision. The leg was elevated, exsanguinated, the tourniquet inflated to 300 mmHg. An incision was made laterally, centered over the lateral malleolus fracture. The incision was carried through subcutaneous tissue. The fracture was identified. Soft tissue debris was removed. The fracture was reduced and held with a orthocord suture. Bone quality not amenable to lag screw. A vidal distal locking plate was then placed and secured  proximally and distally. Intraoperative fluoroscopic images were obtained.  This confirmed satisfactory placement of hardware and a well-reduced mortise. Final fluoroscopic images conformed that the mortise and medial clear space was reduced and stable to lateral stress and external rotation. The wounds were copiously irrigated. Hemostasis was obtained with electrocautery, the subcutaneous tissue closed with 0 and 2-0 Vicryl, skin closed with 4-0 Nylon. Sterile dressings and a well-padded splint were applied. The patient was awakened and taken to the postoperative area in stable condition. All sponge and needle counts were correct.        Family was not available to speak with postoperatively        Doneta Certain  Intraoperatively, 3 views Right ankle were obtained to confirm satisfactory reduction of the fractures and the mortise

## 2020-09-23 NOTE — PROGRESS NOTES
4 Eyes Skin Assessment     The patient is being assess for   Admission    I agree that 2 RN's have performed a thorough Head to Toe Skin Assessment on the patient. ALL assessment sites listed below have been assessed. Areas assessed by both nurses:   [x]   Head, Face, and Ears   [x]   Shoulders, Back, and Chest, Abdomen  [x]   Arms, Elbows, and Hands   [x]   Coccyx, Sacrum, and Ischium  [x]   Legs, Feet, and Heels        Scattered bruising and abrasions. Unable to view injury to R foot. Fluid filled abscess to top of L foot. **SHARE this note so that the co-signing nurse is able to place an eSignature**    Co-signer eSignature: Electronically signed by Issa Choi RN on 9/23/20 at 5:44 AM EDT    Does the Patient have Skin Breakdown?   No          Tony Prevention initiated:  Yes   Wound Care Orders initiated:  NA      [facility] nurse consulted for Pressure Injury (Stage 3,4, Unstageable, DTI, NWPT, Complex wounds)and New or Established Ostomies:  NA      Primary Nurse eSignature: Electronically signed by Agustin Dubon RN on 9/23/20 at 5:40 AM EDT

## 2020-09-23 NOTE — CARE COORDINATION
Case Management Assessment  Initial Evaluation      Patient Name: Taurus Guzmán  YOB: 1965  Diagnosis: Drug overdose, accidental or unintentional, initial encounter Jayme Barrientos  Date / Time: 9/22/2020 11:07 AM    Admission status/Date: OBS  Chart Reviewed: Yes      Patient Interviewed: Yes   Family Interviewed:  No      Hospitalization in the last 30 days:  No      Health Care Decision Mk@[employer] care decision maker relationship@    Met with: pt  Interview conducted  (bedside/phone): bedside  Current PCP: Saige    [facility] required for SNF : Eileen Smith          3 night stay required - Y, N    ADLS  Support Systems/Care Needs: Children, Family Members  Transportation: family    Meal Preparation: self    Housing  Living Arrangements: home with family (dtr, son, granddtr)  Steps: n/a  Intent for return to present living arrangements: Yes  Identified Issues: [id number] [facility] with Home Health Care : No Agency:(Services)  Type of Home Care Services: None  Passport/Waiver : No  :                      Phone Number:    Passport/Waiver Services: n/a          Durable Medical Equiptment   DME Provider: [employer]  Equipment:   Walker___Cane___RTS___ BSC___Shower Chair___Hospital Bed___W/C____Other________  02 at _2___Liter(s)---wears(frequency) cont_______ HHN ___ CPAP___ BiPap___   N/A____      Home O2 Use :  Yes    If No for home O2---if presently on O2 during hospitalization:  Yes  if yes CM to follow for potential DC O2 need  Informed of need for care provider to bring portable home O2 tank on day of discharge for nursing to connect prior to leaving:   Yes  Verbalized agreement/Understanding:   Yes    Community Service Affiliation  Dialysis:  No    · Agency:  · Location:  · Dialysis Schedule:  · Phone:   · Fax:     Other Community Services: (ex:PT/OT,Mental Health,Wound Clinic, [address] [place] [address] [place])    DISCHARGE PLAN: Explained Case

## 2020-09-23 NOTE — ANESTHESIA PROCEDURE NOTES
Peripheral Block    Patient location during procedure: pre-op  Start time: 9/23/2020 2:48 PM  End time: 9/23/2020 2:49 PM  Staffing  Anesthesiologist: Alana Garcia MD  Performed: anesthesiologist   Preanesthetic Checklist  Completed: patient identified, site marked, surgical consent, pre-op evaluation, timeout performed, IV checked, risks and benefits discussed, monitors and equipment checked, anesthesia consent given, oxygen available and patient being monitored  Peripheral Block  Prep: ChloraPrep  Patient monitoring: cardiac monitor, continuous pulse ox, frequent blood pressure checks and IV access  Block type: Sciatic  Laterality: right  Injection technique: single-shot  Procedures: ultrasound guided and nerve stimulator  Local infiltration: lidocaine  Infiltration strength: 1 %  Dose: 3 mL  Popliteal  Provider prep: mask and sterile gloves  Local infiltration: lidocaine  Needle  Needle gauge: 21 G  Needle length: 10 cm  Needle localization: ultrasound guidance, nerve stimulator and anatomical landmarks  Assessment  Injection assessment: negative aspiration for heme, no paresthesia on injection and local visualized surrounding nerve on ultrasound  Paresthesia pain: none  Slow fractionated injection: yes  Hemodynamics: stable  Additional Notes  Immediately prior to procedure a \"time out\" was called to verify the correct patient, allergies, laterality, procedure and equipment. Time out performed with  RN    Local Anesthetic: 0.5 %  Bupivacaine   Amount: 20 ml  in 5 ml increments after negative aspiration each time. Adductor Soto and Gluteus Rafael muscles, Femur and Sciatic Nerve are identified, the tip of the needle and the spread of the local anesthetic around the Sciatic nerve are visualized. The Sciatic nerve appeared to be anatomically normal and there were no abnormal pathologically findings seen.      Versed 2mg  Reason for block: post-op pain management and at surgeon's request

## 2020-09-23 NOTE — PROGRESS NOTES
Progress Note    Admit Date:  9/22/2020    Subjective:  Ms. Stephen Beavers seen this am. C/o right ankle pain. States she does not feel like she can care for herself at home. Objective:   Patient Vitals for the past 4 hrs:   BP Temp Temp src Pulse Resp SpO2   09/23/20 0725 116/68 98.8 °F (37.1 °C) Oral 84 16 96 %            Intake/Output Summary (Last 24 hours) at 9/23/2020 1047  Last data filed at 9/23/2020 0651  Gross per 24 hour   Intake 883 ml   Output 400 ml   Net 483 ml       Physical Exam:  Gen: No distress. Alert. Eyes: PERRL. No sclera icterus. No conjunctival injection. ENT: No discharge. Pharynx clear. Neck: Trachea midline. Resp: No accessory muscle use. No crackles. + expiratory wheezes. No rhonchi. CV: Regular rate. Regular rhythm. No murmur. No rub. No edema. GI: Non-tender. Non-distended. Normal bowel sounds. No hernia. Skin: Warm and dry. No nodule on exposed extremities. No rash on exposed extremities. M/S: Right ankle splint  Neuro: Awake. Grossly nonfocal    Psych: Oriented x 3. No anxiety or agitation. I Geoffrey Bryan have reviewed the chart on Kirsten Sebastián and personally interviewed and examined patient, reviewed the data (labs and imaging) and after discussion with my PA formulated the plan. Agree with note with the following edits. HPI:     I reviewed the patient's Past Medical History, Past Surgical History, Medications, and Allergies. Admitted for heroin OD, narcan given in Er, persistent hypoxia  Hx of copd on home oxygen  Fall with ankle fracture    Feels ok except for pain in right foot pain        General: middle aged female, Awake, alert and oriented.  Appears to be not in any distress  Mucous Membranes:  Pink , anicteric  Neck: No JVD, no carotid bruit, no thyromegaly  Chest: diminished marshall with scattered wheeze   Cardiovascular:  RRR S1S2 heard, no murmurs or gallops  Abdomen:  Soft, undistended, non tender, no organomegaly, BS present  Extremities: right ankle in cast  No edema or cyanosis. Distal pulses well felt  Neurological : grossly normal                Data:  CBC:   Recent Labs     09/22/20  1208   WBC 15.3*   HGB 15.8   HCT 45.7   MCV 91.6        BMP:   Recent Labs     09/22/20  1208      K 4.8   CL 97*   CO2 29   BUN 7   CREATININE 0.8     LIVER PROFILE:   Recent Labs     09/22/20  1208   AST 38*   ALT 25   BILITOT 0.7   ALKPHOS 162*     PT/INR: No results for input(s): PROTIME, INR in the last 72 hours. CULTURES  N/A    RADIOLOGY  XR ANKLE RIGHT (MIN 3 VIEWS)   Final Result   Successful partial reduction of the ankle. Acute, comminuted fracture of the distal fibula. Widening of the medial space of the ankle mortise, suggesting ligamentous   injury. XR TIBIA FIBULA RIGHT (2 VIEWS)   Final Result   Oblique fracture through the distal fibula metadiaphysis with approximately   10-15 degrees of apex anterior angulation. XR KNEE RIGHT (MIN 4 VIEWS)   Final Result   Cortical irregularity proximal tibia involving the medial corner could   represent a nondisplaced fracture      Otherwise, no bony or joint abnormality         CT CERVICAL SPINE WO CONTRAST   Final Result   No acute abnormality of the cervical spine. XR CHEST (2 VW)   Final Result   No acute process. XR ANKLE RIGHT (MIN 3 VIEWS)   Final Result   Trimalleolar fracture with posterolateral dislocation of the talus         CT HEAD WO CONTRAST   Final Result   No acute intracranial abnormality. Assessment/Plan:    Accidental drug overdose  - snorted fentanyl. Responded to Narcan. Does not normally use drugs. If she does, she states she smokes Cannabis. Has a h/o opiate drug abuse.    - remained neurologically stable since admission     Trimalleolar fracture - right   Posterolateral dislocation of talus  Possible nondisplaced fracture of proximal tibia- denies knee pain  - from fall after drug OD   - s/p

## 2020-09-23 NOTE — PROGRESS NOTES
Tele #[id number] with cont pulse ox and no leads placed in bag and tied to the end of patients bed along with upper dentures.

## 2020-09-23 NOTE — PROGRESS NOTES
Pt brought to 224-1 via wheelchair for hypoxia related to drug OD. Admission  complete- see flowsheets. Pt is A&Ox4. Some confusion noted at times but able to answer orientation questions. VSS  Respirations are easy, even and unlabored. Pt is on 2L via NC. SpO2 96%. Pt is on continuous pulse ox. Lungs diminished throughout with some expiratory wheezing. PIV WDL. NS infusing at 100mL/hr. Armboard in place. Plan of care and goals reviewed. Call light within reach. Bed in lowest position with wheels locked. No needs expressed at this time. Will continue to monitor.

## 2020-09-23 NOTE — PROGRESS NOTES
Physical/Occupational Therapy  PT/OT orders received. Pt is scheduled for OR today with Dr. Joy Lakhani at 1700. Will hold until after surgery. Please clarify any weight bearing restrictions and activity status post-op. Will follow-up. No charge.    Jim Hunter, PT, DPT #[id number]

## 2020-09-23 NOTE — ANESTHESIA PRE PROCEDURE
Department of Anesthesiology  Preprocedure Note       Name:  Yashira Gore   Age:  63 y.o.  :  1965                                          MRN:  [id number]         Date:  2020      Surgeon: Maribel Tabor):  Amy Gann DO    Procedure: Procedure(s):  OPEN REDUCTION INTERNAL FIXATION RIGHT ANKLE FRACTURE    Medications prior to admission:   Prior to Admission medications    Medication Sig Start Date End Date Taking?  Authorizing Provider   nicotine (NICODERM CQ) 14 MG/24HR Place 1 patch onto the skin daily 20   BEST Schneider - CNP       Current medications:    Current Facility-Administered Medications   Medication Dose Route Frequency Provider Last Rate Last Dose    nicotine (NICODERM CQ) 14 MG/24HR 1 patch  1 patch Transdermal Daily Marissa Valenzuela MD   1 patch at 20    sodium chloride flush 0.9 % injection 10 mL  10 mL Intravenous 2 times per day Marissa Valenzuela MD   10 mL at 20    sodium chloride flush 0.9 % injection 10 mL  10 mL Intravenous PRN Marissa Valenzuela MD        acetaminophen (TYLENOL) tablet 650 mg  650 mg Oral Q6H PRN Marissa Valenzuela MD        Or    acetaminophen (TYLENOL) suppository 650 mg  650 mg Rectal Q6H PRN Marissa Valenzuela MD        polyethylene glycol [facility]) packet 17 g  17 g Oral Daily PRN Marissa Valenzuela MD        promethazine (PHENERGAN) tablet 12.5 mg  12.5 mg Oral Q6H PRN Marissa Valenzuela MD        Or    [facility] 4 mg  4 mg Intravenous Q6H PRN Marissa Valenzuela MD        enoxaparin (LOVENOX) injection 40 mg  40 mg Subcutaneous Daily Marissa Valenzuela MD        0.9 % sodium chloride infusion   Intravenous Continuous Marissa Valenzuela  mL/hr at 20 0654      traMADol (ULTRAM) tablet 50 mg  50 mg Oral Q4H PRN Marissa Valenzuela MD   50 mg at 20 0011    ketorolac (TORADOL) injection 15 mg  15 mg Intravenous Q6H PRN Marissa Valenzuela MD   15 mg at 09/23/20 6767       Allergies:  No Known Allergies    Problem List:    Patient Active Problem List   Diagnosis Code    Hand sprain, left, initial encounter S63. 92XA    Nondisplaced fracture of proximal phalanx of left ring finger, initial encounter for closed fracture S62.645A    COPD exacerbation ([facility]) J44.1    Acute on chronic respiratory failure with hypoxia and hypercapnia ([facility]) J96.21, J96.22    Smoker F17.200    Dementia ([facility]) F03.90    Pulmonary infiltrates R91.8    Mediastinal adenopathy R59.0    Leukocytosis D72.829    Hyponatremia E87.1    Hyperglycemia R73.9    Acute encephalopathy G93.40    Aspiration into airway T17.908A    Mucus plugging of bronchi J98.09    Cavitary lesion of lung J98.4    Opiate overdose ([facility]) T40.601A    Closed trimalleolar fracture of right ankle S82.851A    Hypoxia R09.02    Polysubstance abuse ([facility]) F19.10       Past Medical History:        Diagnosis Date    COPD (chronic obstructive pulmonary disease) ([facility])     Dementia ([facility]        Past Surgical History:        Procedure Laterality Date    BRONCHOSCOPY N/A 2/7/2020    BRONCHOSCOPY performed by Luis Antonio Devlin MD at [address] [place] [address]  2/7/2020    BRONCHOSCOPY THERAPUTIC ASPIRATION INITIAL performed by Luis Antonio Devlin MD at [address] [place] [address] N/A 2/10/2020    BRONCHOSCOPY ALVEOLAR LAVAGE performed by Galileo Mejia MD at [address] [place] [address]  2/10/2020    BRONCHOSCOPY THERAPUTIC ASPIRATION SUBSEQUENT performed by Galileo Mejia MD at [address] History:    Social History     Tobacco Use    Smoking status: Current Every Day Smoker     Packs/day: 1.00     Types: Cigarettes    Smokeless tobacco: Never Used   Substance Use Topics    Alcohol use: Never     Frequency: Never                                Ready to quit: No  Counseling given: Yes      Vital Signs (Current):   Vitals:    09/22/20 1812 09/22/20 2034 09/23/20 7892 09/23/20 0725   BP: 124/70 (!) 108/55 127/68 116/68   Pulse: 76 81 82 84   Resp: 18 16 16 16   Temp: 97.5 °F (36.4 °C) 99.1 °F (37.3 °C) 98.9 °F (37.2 °C) 98.8 °F (37.1 °C)   TempSrc: Temporal Oral Oral Oral   SpO2: 93% 96% 94% 96%   Weight:  179 lb 3.2 oz (81.3 kg)     Height:  5' 4\" (1.626 m)                                                BP Readings from Last 3 Encounters:   09/23/20 116/68   02/12/20 129/74   01/19/20 (!) 152/83       NPO Status:                                                                                 BMI:   Wt Readings from Last 3 Encounters:   09/22/20 179 lb 3.2 oz (81.3 kg)   02/12/20 177 lb 9.6 oz (80.6 kg)   01/23/20 139 lb 15.9 oz (63.5 kg)     Body mass index is 30.76 kg/m².     CBC:   Lab Results   Component Value Date    WBC 15.3 09/22/2020    RBC 4.98 09/22/2020    HGB 15.8 09/22/2020    HCT 45.7 09/22/2020    MCV 91.6 09/22/2020    RDW 13.0 09/22/2020     09/22/2020       CMP:   Lab Results   Component Value Date     09/22/2020    K 4.8 09/22/2020    CL 97 09/22/2020    CO2 29 09/22/2020    BUN 7 09/22/2020    CREATININE 0.8 09/22/2020    GFRAA >60 09/22/2020    AGRATIO 1.6 09/22/2020    LABGLOM >60 09/22/2020    GLUCOSE 327 09/22/2020    PROT 6.9 09/22/2020    CALCIUM 9.1 09/22/2020    BILITOT 0.7 09/22/2020    ALKPHOS 162 09/22/2020    AST 38 09/22/2020    ALT 25 09/22/2020       POC Tests:   Recent Labs     09/23/20  0001   POCGLU 119*       Coags: No results found for: PROTIME, INR, APTT    HCG (If Applicable):   Lab Results   Component Value Date    PREGTESTUR Neg 08/14/2010        ABGs: No results found for: PHART, PO2ART, CSA3IJL, NVD9UGT, BEART, X0HDGHZP     Type & Screen (If Applicable):  No results found for: LABABO, LABRH    Drug/Infectious Status (If Applicable):  No results found for: HIV, HEPCAB    COVID-19 Screening (If Applicable): No results found for: COVID19      Anesthesia Evaluation  Patient summary reviewed and Nursing notes reviewed no history of anesthetic complications:   Airway: Mallampati: II     Neck ROM: full   Dental:          Pulmonary:Negative Pulmonary ROS and normal exam    (+) COPD:                             Cardiovascular:Negative CV ROS                      Neuro/Psych:   Negative Neuro/Psych ROS  (+) psychiatric history:             ROS comment: Dementia   GI/Hepatic/Renal: Neg GI/Hepatic/Renal ROS       (-) hiatal hernia and GERD       Endo/Other: Negative Endo/Other ROS                    Abdominal:           Vascular:                                        Anesthesia Plan      general     ASA 2     (I discussed with the patient the risks and benefits of regional anesthesia (inlcuding infection, bleeding, damage to nerves and surrounding structures) PIV, General, IV Narcotics, PACU. All questions were answered the patient agrees with the plan and wishes to proceed.)  Induction: intravenous. Pre-Operative Diagnosis: Drug overdose, accidental or unintentional, initial encounter [T50.585A]    63 y.o.   BMI:  Body mass index is 30.76 kg/m².      Vitals:    09/23/20 0725 09/23/20 1200 09/23/20 1311 09/23/20 1420   BP: 116/68  111/65 129/74   Pulse: 84  79 71   Resp: 16 16 17 16   Temp: 98.8 °F (37.1 °C)  98.9 °F (37.2 °C) 96.9 °F (36.1 °C)   TempSrc: Oral  Oral Temporal   SpO2: 96% 98% 99% 97%   Weight:       Height:           No Known Allergies    Social History     Tobacco Use    Smoking status: Current Every Day Smoker     Packs/day: 1.00     Types: Cigarettes    Smokeless tobacco: Never Used   Substance Use Topics    Alcohol use: Never     Frequency: Never       LABS:    CBC  Lab Results   Component Value Date/Time    WBC 15.3 (H) 09/22/2020 12:08 PM    HGB 15.8 09/22/2020 12:08 PM    HCT 45.7 09/22/2020 12:08 PM     09/22/2020 12:08 PM     RENAL  Lab Results   Component Value Date/Time     09/22/2020 12:08 PM    K 4.8 09/22/2020 12:08 PM    CL 97 (L) 09/22/2020 12:08 PM    CO2 29 09/22/2020 12:08

## 2020-09-23 NOTE — PROGRESS NOTES
09/23/20 1927   Vent Information   $Ventilation $Initial Day   Skin Assessment Clean, dry, & intact   Vent Type 840   Vent Mode AC/VC   Vt Ordered 450 mL   Rate Set 20 bmp   Peak Flow 60 L/min   Pressure Support 0 cmH20   FiO2  50 %   SpO2 100 %   SpO2/FiO2 ratio 200   Sensitivity 3   PEEP/CPAP 5   I Time/ I Time % 0 s   Humidification Source Heated wire   Humidification Temp 37   Humidification Temp Measured 37   Circuit Condensation Drained   Vent Patient Data   High Peep/I Pressure 0   Peak Inspiratory Pressure 24 cmH2O   Mean Airway Pressure 10 cmH20   Rate Measured 20 br/min   Vt Exhaled 479 mL   Minute Volume 9.64 Liters   I:E Ratio 1:2.70   Plateau Pressure 16 FIO81   Static Compliance 44 mL/cmH2O   Dynamic Compliance 25 mL/cmH2O   Cough/Sputum   Sputum How Obtained Suctioned;Endotracheal   Cough Productive   Sputum Amount Large   Sputum Color Creamy; Red   Tenacity Thick   Spontaneous Breathing Trial (SBT) RT Doc   Pulse 91   Additional Respiratory  Assessments   Resp 20   Position Semi-Donaldson's   Alarm Settings   High Pressure Alarm 40 cmH2O   Low Minute Volume Alarm 4 L/min   Apnea (secs) 20 secs   High Respiratory Rate 40 br/min   Low Exhaled Vt  300 mL   ETT (adult)   Placement Date/Time: 09/23/20 1507   Preoxygenation: Yes  Mask Ventilation: Ventilated by mask with oral airway (2)  Technique: Direct laryngoscopy;Stylet  Type: Cuffed  Tube Size: 7 mm  Laryngoscope: Mac  Blade Size: 3  Location: Oral  Grade View: Fu. ..    Secured at 21 cm   Measured From Lips   ET Placement Left   Secured By Commercial tube kearns   Site Condition Dry

## 2020-09-23 NOTE — PROGRESS NOTES
RESPIRATORY THERAPY ASSESSMENT    Name:  Claire Caal Record Number:  [id number]  Age: 63 y.o. Gender: female  : 1965  Today's Date:  2020  Room:  0224/0224-01    Assessment     Is the patient being admitted for a COPD or Asthma exacerbation? No   (If yes the patient will be seen every 4 hours for the first 24 hours and then reassessed)    Patient Admission Diagnosis      Allergies  No Known Allergies    Minimum Predicted Vital Capacity:                Actual Vital Capacity:                     Pulmonary History:COPD  Home Oxygen Therapy:  room air  Home Respiratory Therapy:None   Current Respiratory Therapy:  duoneb q 4 w/a  Treatment Type: HHN  Medications: Albuterol/Ipratropium    Respiratory Severity Index(RSI)   Patients with orders for inhalation medications, oxygen, or any therapeutic treatment modality will be placed on Respiratory Protocol. They will be assessed with the first treatment and at least every 72 hours thereafter. The following severity scale will be used to determine frequency of treatment intervention.     Smoking History: Pulmonary Disease or Smoking History, Greater than 15 pack year = 2    Social History  Social History     Tobacco Use    Smoking status: Current Every Day Smoker     Packs/day: 1.00     Types: Cigarettes    Smokeless tobacco: Never Used   Substance Use Topics    Alcohol use: Never     Frequency: Never    Drug use: Yes     Types: IV     Comment: Fenanyl and heroin       Recent Surgical History: Surgery of Extremities = 1  Past Surgical History  Past Surgical History:   Procedure Laterality Date    BRONCHOSCOPY N/A 2020    BRONCHOSCOPY performed by Krishna Bailey MD at [address] [place] [address]  2020    BRONCHOSCOPY THERAPUTIC [place] performed by Krishna Bailey MD at [address] [place] [address] N/A 2/10/2020    BRONCHOSCOPY ALVEOLAR LAVAGE performed by Sofia Gabriel MD at [facility] BRONCHOSCOPY  2/10/2020    BRONCHOSCOPY THERAPUTIC ASPIRATION SUBSEQUENT performed by Julio Corona MD at [facility]       Level of Consciousness: Alert, Oriented, and Cooperative = 0    Level of Activity: Walking with assistance = 1    Respiratory Pattern: Regular Pattern; RR 8-20 = 0    Breath Sounds: Absent bilaterally and/or with wheezes = 3    Sputum   ,  , Sputum How Obtained: Spontaneous cough  Cough: Strong, spontaneous, non-productive = 0    Vital Signs   /68   Pulse 84   Temp 98.8 °F (37.1 °C) (Oral)   Resp 16   Ht 5' 4\" (1.626 m)   Wt 179 lb 3.2 oz (81.3 kg)   SpO2 98%   BMI 30.76 kg/m²   SPO2 (COPD values may differ): 86-87% on room air or greater than 92% on FiO2 35- 50% = 3    Peak Flow (asthma only): not applicable = 0    RSI: 9-99 = TID (three times daily) and Q4hr PRN for dyspnea        Plan       Goals: medication delivery, mobilize retained secretions, volume expansion and improve oxygenation    Patient/caregiver was educated on the proper method of use for Respiratory Care Devices:  Yes      Level of patient/caregiver understanding able to:   ? Verbalize understanding   ? Demonstrate understanding       ? Teach back        ? Needs reinforcement       ? No available caregiver               ? Other:     Response to education:  Excellent     Is patient being placed on Home Treatment Regimen? No     Does the patient have everything they need prior to discharge? NA     Comments: chart reviewed and patient assessed    Plan of Care: as ordered and prn for wheezing and hypoxia    Electronically signed by Niki Everett RCP on 9/23/2020 at 12:49 PM    Respiratory Protocol Guidelines     1. Assessment and treatment by Respiratory Therapy will be initiated for medication and therapeutic interventions upon initiation of aerosolized medication. 2. Physician will be contacted for respiratory rate (RR) greater than 35 breaths per minute.  Therapy will be held for heart rate (HR) greater than 140 beats per minute, pending direction from physician. 3. Bronchodilators will be administered via Metered Dose Inhaler (MDI) with spacer when the following criteria are met:  a. Alert and cooperative     b. HR < 140 bpm  c. RR < 30 bpm                d. Can demonstrate a 2-3 second inspiratory hold  4. Bronchodilators will be administered via Hand Held Nebulizer [facility]) to patients when ANY of the following criteria are met  a. Incognizant or uncooperative          b. Patients treated with HHN at Home        c. Unable to demonstrate proper use of MDI with spacer     d. RR > 30 bpm   5. Bronchodilators will be delivered via Metered Dose Inhaler (MDI), HHN, Aerogen to intubated patients on mechanical ventilation. 6. Inhalation medication orders will be delivered and/or substituted as outlined below. Aerosolized Medications Ordering and Administration Guidelines:    1. All Medications will be ordered by a physician, and their frequency and/or modality will be adjusted as defined by the patients Respiratory Severity Index (RSI) score. 2. If the patient does not have documented COPD, consider discontinuing anticholinergics when RSI is less than 9.  3. If the bronchospasm worsens (increased RSI), then the bronchodilator frequency can be increased to a maximum of every 4 hours. If greater than every 4 hours is required, the physician will be contacted. 4. If the bronchospasm improves, the frequency of the bronchodilator can be decreased, based on the patient's RSI, but not less than home treatment regimen frequency. 5. Bronchodilator(s) will be discontinued if patient has a RSI less than 9 and has received no scheduled or as needed treatment for 72  Hrs. Patients Ordered on a Mucolytic Agent:    1. Must always be administered with a bronchodilator.     2. Discontinue if patient experiences worsened bronchospasm, or secretions have lessened to the point that the patient is able to clear them with a cough.    Anti-inflammatory and Combination Medications:    1. If the patient lacks prior history of lung disease, is not using inhaled anti-inflammatory medication at home, and lacks wheezing by examination or by history for at least 24 hours, contact physician for possible discontinuation.

## 2020-09-24 ENCOUNTER — APPOINTMENT (OUTPATIENT)
Dept: GENERAL RADIOLOGY | Age: 55
DRG: 907 | End: 2020-09-24
Payer: COMMERCIAL

## 2020-09-24 VITALS — SYSTOLIC BLOOD PRESSURE: 111 MMHG | DIASTOLIC BLOOD PRESSURE: 62 MMHG | OXYGEN SATURATION: 80 %

## 2020-09-24 LAB
ALBUMIN SERPL-MCNC: 3.5 G/DL (ref 3.4–5)
ALP BLD-CCNC: 106 U/L (ref 40–129)
ALT SERPL-CCNC: 14 U/L (ref 10–40)
ANION GAP SERPL CALCULATED.3IONS-SCNC: 8 MMOL/L (ref 3–16)
AST SERPL-CCNC: 19 U/L (ref 15–37)
BASE EXCESS ARTERIAL: 4.7 MMOL/L (ref -3–3)
BASOPHILS ABSOLUTE: 0 K/UL (ref 0–0.2)
BASOPHILS RELATIVE PERCENT: 0.3 %
BILIRUB SERPL-MCNC: 0.7 MG/DL (ref 0–1)
BILIRUBIN DIRECT: 0.3 MG/DL (ref 0–0.3)
BILIRUBIN, INDIRECT: 0.4 MG/DL (ref 0–1)
BUN BLDV-MCNC: 9 MG/DL (ref 7–20)
CALCIUM SERPL-MCNC: 9.4 MG/DL (ref 8.3–10.6)
CARBOXYHEMOGLOBIN ARTERIAL: 1.1 % (ref 0–1.5)
CHLORIDE BLD-SCNC: 97 MMOL/L (ref 99–110)
CO2: 28 MMOL/L (ref 21–32)
CREAT SERPL-MCNC: 0.8 MG/DL (ref 0.6–1.1)
EOSINOPHILS ABSOLUTE: 0 K/UL (ref 0–0.6)
EOSINOPHILS RELATIVE PERCENT: 0.1 %
GFR AFRICAN AMERICAN: >60
GFR NON-AFRICAN AMERICAN: >60
GLUCOSE BLD-MCNC: 126 MG/DL (ref 70–99)
GLUCOSE BLD-MCNC: 138 MG/DL (ref 70–99)
GLUCOSE BLD-MCNC: 247 MG/DL (ref 70–99)
HCO3 ARTERIAL: 27 MMOL/L (ref 21–29)
HCT VFR BLD CALC: 40.2 % (ref 36–48)
HEMOGLOBIN, ART, EXTENDED: 14.2 G/DL (ref 12–16)
HEMOGLOBIN: 14.1 G/DL (ref 12–16)
LACTIC ACID: 2.2 MMOL/L (ref 0.4–2)
LACTIC ACID: 3.5 MMOL/L (ref 0.4–2)
LYMPHOCYTES ABSOLUTE: 1.3 K/UL (ref 1–5.1)
LYMPHOCYTES RELATIVE PERCENT: 13.3 %
MAGNESIUM: 1.6 MG/DL (ref 1.8–2.4)
MCH RBC QN AUTO: 32 PG (ref 26–34)
MCHC RBC AUTO-ENTMCNC: 34.9 G/DL (ref 31–36)
MCV RBC AUTO: 91.5 FL (ref 80–100)
METHEMOGLOBIN ARTERIAL: 0.3 %
MONOCYTES ABSOLUTE: 0.4 K/UL (ref 0–1.3)
MONOCYTES RELATIVE PERCENT: 4.4 %
NEUTROPHILS ABSOLUTE: 7.7 K/UL (ref 1.7–7.7)
NEUTROPHILS RELATIVE PERCENT: 81.9 %
O2 CONTENT ARTERIAL: 19 ML/DL
O2 SAT, ARTERIAL: 95.5 %
O2 THERAPY: ABNORMAL
PCO2 ARTERIAL: 33 MMHG (ref 35–45)
PDW BLD-RTO: 12.7 % (ref 12.4–15.4)
PERFORMED ON: ABNORMAL
PERFORMED ON: ABNORMAL
PH ARTERIAL: 7.53 (ref 7.35–7.45)
PHOSPHORUS: 1 MG/DL (ref 2.5–4.9)
PLATELET # BLD: 166 K/UL (ref 135–450)
PMV BLD AUTO: 8.1 FL (ref 5–10.5)
PO2 ARTERIAL: 67.9 MMHG (ref 75–108)
POTASSIUM SERPL-SCNC: 3.8 MMOL/L (ref 3.5–5.1)
PRO-BNP: 543 PG/ML (ref 0–124)
PROCALCITONIN: 1.7 NG/ML (ref 0–0.15)
RBC # BLD: 4.4 M/UL (ref 4–5.2)
SODIUM BLD-SCNC: 133 MMOL/L (ref 136–145)
TCO2 ARTERIAL: 28 MMOL/L
TOTAL PROTEIN: 6.2 G/DL (ref 6.4–8.2)
TROPONIN: <0.01 NG/ML
WBC # BLD: 9.4 K/UL (ref 4–11)

## 2020-09-24 PROCEDURE — 80069 RENAL FUNCTION PANEL: CPT

## 2020-09-24 PROCEDURE — 82803 BLOOD GASES ANY COMBINATION: CPT

## 2020-09-24 PROCEDURE — 71045 X-RAY EXAM CHEST 1 VIEW: CPT

## 2020-09-24 PROCEDURE — 84145 PROCALCITONIN (PCT): CPT

## 2020-09-24 PROCEDURE — 6360000002 HC RX W HCPCS: Performed by: ORTHOPAEDIC SURGERY

## 2020-09-24 PROCEDURE — 36415 COLL VENOUS BLD VENIPUNCTURE: CPT

## 2020-09-24 PROCEDURE — 80076 HEPATIC FUNCTION PANEL: CPT

## 2020-09-24 PROCEDURE — 6360000002 HC RX W HCPCS: Performed by: INTERNAL MEDICINE

## 2020-09-24 PROCEDURE — 85025 COMPLETE CBC W/AUTO DIFF WBC: CPT

## 2020-09-24 PROCEDURE — 2580000003 HC RX 258: Performed by: ORTHOPAEDIC SURGERY

## 2020-09-24 PROCEDURE — 99233 SBSQ HOSP IP/OBS HIGH 50: CPT | Performed by: INTERNAL MEDICINE

## 2020-09-24 PROCEDURE — 6370000000 HC RX 637 (ALT 250 FOR IP): Performed by: ORTHOPAEDIC SURGERY

## 2020-09-24 PROCEDURE — 2500000003 HC RX 250 WO HCPCS: Performed by: INTERNAL MEDICINE

## 2020-09-24 PROCEDURE — 6360000002 HC RX W HCPCS: Performed by: HOSPITALIST

## 2020-09-24 PROCEDURE — 2000000000 HC ICU R&B

## 2020-09-24 PROCEDURE — 83880 ASSAY OF NATRIURETIC PEPTIDE: CPT

## 2020-09-24 PROCEDURE — 93005 ELECTROCARDIOGRAM TRACING: CPT | Performed by: INTERNAL MEDICINE

## 2020-09-24 PROCEDURE — 2700000000 HC OXYGEN THERAPY PER DAY

## 2020-09-24 PROCEDURE — 83605 ASSAY OF LACTIC ACID: CPT

## 2020-09-24 PROCEDURE — 87070 CULTURE OTHR SPECIMN AEROBIC: CPT

## 2020-09-24 PROCEDURE — 94750 HC PULMONARY COMPLIANCE STUDY: CPT

## 2020-09-24 PROCEDURE — 94640 AIRWAY INHALATION TREATMENT: CPT

## 2020-09-24 PROCEDURE — 94003 VENT MGMT INPAT SUBQ DAY: CPT

## 2020-09-24 PROCEDURE — 6370000000 HC RX 637 (ALT 250 FOR IP): Performed by: INTERNAL MEDICINE

## 2020-09-24 PROCEDURE — 84484 ASSAY OF TROPONIN QUANT: CPT

## 2020-09-24 PROCEDURE — 83735 ASSAY OF MAGNESIUM: CPT

## 2020-09-24 PROCEDURE — 99291 CRITICAL CARE FIRST HOUR: CPT | Performed by: INTERNAL MEDICINE

## 2020-09-24 PROCEDURE — 87205 SMEAR GRAM STAIN: CPT

## 2020-09-24 PROCEDURE — 2580000003 HC RX 258: Performed by: INTERNAL MEDICINE

## 2020-09-24 PROCEDURE — 6360000002 HC RX W HCPCS: Performed by: ANESTHESIOLOGY

## 2020-09-24 PROCEDURE — 94761 N-INVAS EAR/PLS OXIMETRY MLT: CPT

## 2020-09-24 RX ORDER — CARBOXYMETHYLCELLULOSE SODIUM 10 MG/ML
1 GEL OPHTHALMIC EVERY 4 HOURS
Status: DISCONTINUED | OUTPATIENT
Start: 2020-09-24 | End: 2020-09-24

## 2020-09-24 RX ORDER — PROPOFOL 10 MG/ML
10 INJECTION, EMULSION INTRAVENOUS
Status: DISCONTINUED | OUTPATIENT
Start: 2020-09-24 | End: 2020-09-24

## 2020-09-24 RX ORDER — MAGNESIUM SULFATE IN WATER 40 MG/ML
4 INJECTION, SOLUTION INTRAVENOUS ONCE
Status: COMPLETED | OUTPATIENT
Start: 2020-09-24 | End: 2020-09-24

## 2020-09-24 RX ADMIN — Medication 10 ML: at 08:22

## 2020-09-24 RX ADMIN — MIDAZOLAM HYDROCHLORIDE 2 MG: 1 INJECTION, SOLUTION INTRAMUSCULAR; INTRAVENOUS at 04:52

## 2020-09-24 RX ADMIN — IPRATROPIUM BROMIDE AND ALBUTEROL SULFATE 1 AMPULE: .5; 3 SOLUTION RESPIRATORY (INHALATION) at 09:34

## 2020-09-24 RX ADMIN — FENTANYL CITRATE 50 MCG: 0.05 INJECTION, SOLUTION INTRAMUSCULAR; INTRAVENOUS at 03:26

## 2020-09-24 RX ADMIN — PROPOFOL 50 MCG/KG/MIN: 10 INJECTION, EMULSION INTRAVENOUS at 02:55

## 2020-09-24 RX ADMIN — OXYCODONE HYDROCHLORIDE 5 MG: 5 TABLET ORAL at 23:06

## 2020-09-24 RX ADMIN — LEVOFLOXACIN 750 MG: 5 INJECTION, SOLUTION INTRAVENOUS at 18:35

## 2020-09-24 RX ADMIN — FENTANYL CITRATE 50 MCG: 0.05 INJECTION, SOLUTION INTRAMUSCULAR; INTRAVENOUS at 05:47

## 2020-09-24 RX ADMIN — SODIUM PHOSPHATE, MONOBASIC, MONOHYDRATE 30 MMOL: 276; 142 INJECTION, SOLUTION INTRAVENOUS at 09:07

## 2020-09-24 RX ADMIN — FAMOTIDINE 20 MG: 10 INJECTION INTRAVENOUS at 19:50

## 2020-09-24 RX ADMIN — KETOROLAC TROMETHAMINE 15 MG: 30 INJECTION, SOLUTION INTRAMUSCULAR at 17:34

## 2020-09-24 RX ADMIN — Medication 2 G: at 02:48

## 2020-09-24 RX ADMIN — MIDAZOLAM HYDROCHLORIDE 2 MG: 1 INJECTION, SOLUTION INTRAMUSCULAR; INTRAVENOUS at 06:58

## 2020-09-24 RX ADMIN — MIDAZOLAM HYDROCHLORIDE 2 MG: 1 INJECTION, SOLUTION INTRAMUSCULAR; INTRAVENOUS at 03:03

## 2020-09-24 RX ADMIN — FENTANYL CITRATE 50 MCG: 0.05 INJECTION, SOLUTION INTRAMUSCULAR; INTRAVENOUS at 08:22

## 2020-09-24 RX ADMIN — IPRATROPIUM BROMIDE AND ALBUTEROL SULFATE 1 AMPULE: .5; 3 SOLUTION RESPIRATORY (INHALATION) at 07:02

## 2020-09-24 RX ADMIN — TRAMADOL HYDROCHLORIDE 50 MG: 50 TABLET, FILM COATED ORAL at 19:49

## 2020-09-24 RX ADMIN — WHITE PETROLATUM 57.7 %-MINERAL OIL 31.9 % EYE OINTMENT: at 04:28

## 2020-09-24 RX ADMIN — METHYLPREDNISOLONE SODIUM SUCCINATE 40 MG: 40 INJECTION, POWDER, FOR SOLUTION INTRAMUSCULAR; INTRAVENOUS at 08:21

## 2020-09-24 RX ADMIN — CHLORHEXIDINE GLUCONATE 0.12% ORAL RINSE 15 ML: 1.2 LIQUID ORAL at 08:22

## 2020-09-24 RX ADMIN — ACETAMINOPHEN 650 MG: 325 TABLET ORAL at 23:06

## 2020-09-24 RX ADMIN — IPRATROPIUM BROMIDE AND ALBUTEROL SULFATE 1 AMPULE: .5; 3 SOLUTION RESPIRATORY (INHALATION) at 23:11

## 2020-09-24 RX ADMIN — MAGNESIUM SULFATE 4 G: 4 INJECTION INTRAVENOUS at 04:38

## 2020-09-24 RX ADMIN — ENOXAPARIN SODIUM 40 MG: 40 INJECTION SUBCUTANEOUS at 08:22

## 2020-09-24 RX ADMIN — DOCUSATE SODIUM 50MG AND SENNOSIDES 8.6MG 1 TABLET: 8.6; 5 TABLET, FILM COATED ORAL at 19:49

## 2020-09-24 RX ADMIN — IPRATROPIUM BROMIDE AND ALBUTEROL SULFATE 1 AMPULE: .5; 3 SOLUTION RESPIRATORY (INHALATION) at 19:03

## 2020-09-24 RX ADMIN — OXYCODONE HYDROCHLORIDE 5 MG: 5 TABLET ORAL at 17:27

## 2020-09-24 RX ADMIN — IPRATROPIUM BROMIDE AND ALBUTEROL SULFATE 1 AMPULE: .5; 3 SOLUTION RESPIRATORY (INHALATION) at 15:35

## 2020-09-24 RX ADMIN — Medication 10 ML: at 19:50

## 2020-09-24 RX ADMIN — Medication 10 ML: at 23:06

## 2020-09-24 RX ADMIN — HYDROMORPHONE HYDROCHLORIDE 0.5 MG: 1 INJECTION, SOLUTION INTRAMUSCULAR; INTRAVENOUS; SUBCUTANEOUS at 18:45

## 2020-09-24 RX ADMIN — FAMOTIDINE 20 MG: 10 INJECTION INTRAVENOUS at 08:22

## 2020-09-24 ASSESSMENT — PULMONARY FUNCTION TESTS
PIF_VALUE: 0
PIF_VALUE: 22
PIF_VALUE: 0
PIF_VALUE: 28
PIF_VALUE: 0
PIF_VALUE: 21
PIF_VALUE: 0
PIF_VALUE: 18
PIF_VALUE: 0
PIF_VALUE: 22
PIF_VALUE: 0
PIF_VALUE: 20
PIF_VALUE: 0

## 2020-09-24 ASSESSMENT — PAIN SCALES - GENERAL
PAINLEVEL_OUTOF10: 9
PAINLEVEL_OUTOF10: 6
PAINLEVEL_OUTOF10: 8
PAINLEVEL_OUTOF10: 0
PAINLEVEL_OUTOF10: 6
PAINLEVEL_OUTOF10: 9
PAINLEVEL_OUTOF10: 9
PAINLEVEL_OUTOF10: 4
PAINLEVEL_OUTOF10: 0

## 2020-09-24 ASSESSMENT — PAIN DESCRIPTION - ONSET: ONSET: ON-GOING

## 2020-09-24 ASSESSMENT — PAIN - FUNCTIONAL ASSESSMENT
PAIN_FUNCTIONAL_ASSESSMENT: PREVENTS OR INTERFERES SOME ACTIVE ACTIVITIES AND ADLS
PAIN_FUNCTIONAL_ASSESSMENT: PREVENTS OR INTERFERES SOME ACTIVE ACTIVITIES AND ADLS

## 2020-09-24 ASSESSMENT — PAIN DESCRIPTION - PAIN TYPE: TYPE: ACUTE PAIN

## 2020-09-24 ASSESSMENT — PAIN DESCRIPTION - LOCATION: LOCATION: ANKLE

## 2020-09-24 NOTE — PROGRESS NOTES
Pt medicated with Ultram 50mg po for C/O R chest pain rated 9/10 Pt given an incentive spirometer. Pt able to reach 1250cc on incentive spirometer. Pt coughing and deep breathing well. Ice applied to R ankle.

## 2020-09-24 NOTE — FLOWSHEET NOTE
09/24/20 0077   Pain Assessment   Pain Level 8   Pain Type Acute pain   Pain Location Ankle   Pain Orientation Right   Patient's Stated Pain Goal 3   Pain Descriptors Stabbing; Sharp   Pain Frequency Continuous   Pain Onset On-going   Clinical Progression Not changed   Functional Pain Assessment Prevents or interferes some active activities and ADLs   Non-Pharmaceutical Pain Intervention(s) Rest;Repositioned     Gave prn dilaudid per order, see MAR.

## 2020-09-24 NOTE — PROGRESS NOTES
Comprehensive Nutrition Assessment    Type and Reason for Visit:  Initial, Consult(TF management)    Nutrition Recommendations/Plan:   1. Continue NPO until medically cleared to receive nutrition therapy. 2. TF recommendations - Vital High-Protein with a goal rate of 45 ml/hr x 20 hours. Start with 20 ml/hr and increase by 15 ml every 4 hours, as tolerated by patient, until goal rate can be achieved and maintained. Water flushes, 60 ml every 6 hours or per MD guidance. Please order and administer one proteinex P2Go once daily via feeding tube. 3. Monitor po intake, weight and nutrition related labs. Nutrition Assessment:  pt is nutritionally compromised AEB history of drug use and she is at risk for further compromise AEB current intubation and need for EN as sole source of nutrition; will provide TF recommendation so it can be started as appropriate    Malnutrition Assessment:  Malnutrition Status: At risk for malnutrition  Findings of the 6 clinical characteristics of malnutrition:  Energy Intake:  Unable to assess  Weight Loss:  No significant weight loss     Body Fat Loss:  No significant body fat loss     Muscle Mass Loss:  No significant muscle mass loss    Fluid Accumulation:  Unable to assess     Strength:  Not Performed    Estimated Daily Nutrient Needs:  Energy (kcal):  914-1163 kcals/day based on 11-14 kcals/kg/CBW; Weight Used for Energy Requirements:  Current     Protein (g):  108-124 g protein/day based on 2-2.3 g/kg/IBW;  Weight Used for Protein Requirements:  Ideal        Fluid (ml/day):  900-1200 mls/day; Weight Used for Fluid Requirements:  Current      Nutrition Related Findings:  pt was admitted for drug overdose from snorting fentanyl, she responded when given narcan; she also had a fractured right ankle and surgery was performed yesterday; she is on famotidine, senokot, midazolam, ketorolac, ultram and propofol 10mcg/kg/min; she has a history of dementia (untreated), COPD, chronic drug use, and she smokes; she has poor dentition (upper dentures); skin color is appropriate for ethnicity, warm, dry w/ scattered abrasion and hemorrhoids; abdomen is soft, round, non-tender w/ hypoactive bowel sounds      Wounds:  Surgical Wound       Current Nutrition Therapies:    DIET GENERAL; Anthropometric Measures:  · Height: 5' 4\" (162.6 cm)  · Current Body Weight: 183 lb 1.6 oz (83.1 kg)(9/24/20 bed)   · Admission Body Weight: 179 lb 3.2 oz (81.3 kg)(9/22/20 standing scale)    · Usual Body Weight: 177 lb (80.3 kg)(previous admission 2/6/20)     · Ideal Body Weight: 120 lbs; % Ideal Body Weight 152.6 %   · BMI: 31.4   · BMI Categories: Obese Class 1 (BMI 30.0-34. 9)       Nutrition Diagnosis:   · Inadequate oral intake related to impaired respiratory function, acute injury/trauma, inadequate protein-energy intake as evidenced by NPO or clear liquid status due to medical condition, intubation, wounds      Nutrition Interventions:   Food and/or Nutrient Delivery:  Continue NPO, Start Tube Feeding  Nutrition Education/Counseling:  No recommendation at this time   Coordination of Nutrition Care:  Continued Inpatient Monitoring, Interdisciplinary Rounds    Goals:  pt will continue NPO until medically cleared to start tube feed by MD       Nutrition Monitoring and Evaluation:   Behavioral-Environmental Outcomes:  (n/a)   Food/Nutrient Intake Outcomes:  Diet Advancement/Tolerance  Physical Signs/Symptoms Outcomes:  Biochemical Data, Hemodynamic Status, Nutrition Focused Physical Findings, Skin, Weight     Discharge Planning:     Too soon to determine     Electronically signed by Lewis Dexter on 9/24/20 at 1:09 PM EDT    Contact: [phone]

## 2020-09-24 NOTE — PROGRESS NOTES
Physical/Occupational Therapy Note  PT/OT orders received yesterday (9/23) prior to surgery. Pt was apneic after surgery and had to be re-intubated and transferred up to ICU. Per protocol will need new orders to initiate therapy once pt is medically appropriate. No charge.    Sylwia Diallo, PT, DPT #[id number]

## 2020-09-24 NOTE — PROGRESS NOTES
Pt c/o CP over r chest. Not radiating. Constant. HR , Bp 151/74 (98), RR 20, SPO2 98% on 1L/Nc. Gave 15 mg toradol IVP. Troponin and ECG ordered.

## 2020-09-24 NOTE — PROGRESS NOTES
Patient is not able to demonstrate the ability to move from a reclining position to an upright position within the recliner due to being intubated and sedated. Lima Busby

## 2020-09-24 NOTE — PROGRESS NOTES
Tried to call daughter, Chelsea Lara. Phone was disconnected. Need to update phone number when she calls back.

## 2020-09-24 NOTE — PROGRESS NOTES
Dr. Lonn Holstein notified of new ABG results. No new vent changes. Also notified of negative covid result. Able to take patient out of precautions at this time.

## 2020-09-24 NOTE — PROGRESS NOTES
09/23/20 2240   Vent Information   Vent Type 840   Vent Mode AC/VC   Vt Ordered 450 mL   Rate Set 20 bmp   Peak Flow 60 L/min   Pressure Support 0 cmH20   FiO2  40 %  (decreased to 30)   SpO2 100 %   SpO2/FiO2 ratio 250   Sensitivity 3   PEEP/CPAP 5   I Time/ I Time % 0 s   Humidification Source Heated wire   Humidification Temp 37   Humidification Temp Measured 36.9   Vent Patient Data   High Peep/I Pressure 0   Peak Inspiratory Pressure 20 cmH2O   Mean Airway Pressure 9.4 cmH20   Rate Measured 20 br/min   Vt Exhaled 466 mL   Minute Volume 9.34 Liters   I:E Ratio 1:2.70   Plateau Pressure 17 FSS23   Cough/Sputum   Sputum How Obtained Suctioned;Endotracheal   Cough Productive   Sputum Amount Moderate   Sputum Color Red;Creamy   Tenacity Thick   Spontaneous Breathing Trial (SBT) RT Doc   Pulse 97   Breath Sounds   Right Upper Lobe Diminished;Rhonchi   Right Middle Lobe Diminished   Right Lower Lobe Diminished   Left Upper Lobe Diminished;Rhonchi   Left Lower Lobe Diminished   Additional Respiratory  Assessments   Resp 20   Position Semi-Donaldson's   Alarm Settings   High Pressure Alarm 40 cmH2O   Low Minute Volume Alarm 4 L/min   Apnea (secs) 20 secs   High Respiratory Rate 40 br/min   Low Exhaled Vt  300 mL   ETT (adult)   Placement Date/Time: 09/23/20 1507   Preoxygenation: Yes  Mask Ventilation: Ventilated by mask with oral airway (2)  Technique: Direct laryngoscopy;Stylet  Type: Cuffed  Tube Size: 7 mm  Laryngoscope: Mac  Blade Size: 3  Location: Oral  Grade View: Fu. ..    Secured at 21 cm   Measured From Lips   ET Placement Right  (moved to right)   Secured By Commercial tube kearns   Site Condition Dry

## 2020-09-24 NOTE — PROGRESS NOTES
Care rounds completed with Dr. Roby Kanner and multidisciplinary team. Reviewed labs, meds, VS, assessment, & plan of care for today. See dictated note and new orders for details. Made aware of LA 2.2, , procalcitonin drawn, collect sputum cx, extubate without ABG.

## 2020-09-24 NOTE — PROGRESS NOTES
Pulmonary & Critical Care Medicine ICU Progress Note    CC: Post-op Resp failure    Events of Last 24 hours: awake on SBT this morning    Invasive Lines: PIV    MV:  9/24-  Vent Mode: AC/VC Rate Set: 0 bmp/Vt Ordered: 450 mL/ /FiO2 : 30 %  Recent Labs     09/23/20  1935 09/24/20  0500   PHART 7.390 7.531*   RAL3FMV 42.9 33.0*   PO2ART 83.1 67.9*       IV:   propofol Stopped (09/24/20 0730)       Vitals:  BP (!) 143/71   Pulse 118   Temp 99.8 °F (37.7 °C) (Axillary)   Resp 26   Ht 5' 4\" (1.626 m)   Wt 183 lb 1.6 oz (83.1 kg)   SpO2 96%   BMI 31.43 kg/m²   on vent    Intake/Output Summary (Last 24 hours) at 9/24/2020 0818  Last data filed at 9/24/2020 0810  Gross per 24 hour   Intake 1464 ml   Output 2340 ml   Net -876 ml     EXAM:  Constitutional: ill appearing. Not In distress. Eyes: PERRL. No sclera icterus. ENT: Normal Nose. Normal Tongue. Neck:  Trachea is midline. No thyroid tenderness. Respiratory: No accessory muscle usage. No decreased breath sounds. No wheezes. No rales. No Rhonchi. Cardiovascular: Normal S1S2. No murmur. No digit cyanosis. No digit clubbing. No LE edema. GI: Non-tender. Non-distended. Normal bowel sounds. No masses. No umbilical hernia. Skin: No rash on the exposed extremities. No Nodules on exposed extremities. Neuro: Alert. Follows commands. Moves all extremities. Psych: No agitation, no anxiety.     Scheduled Meds:   magnesium sulfate  4 g Intravenous Once    carboxymethylcellulose PF  1 drop Both Eyes Q4H    methylPREDNISolone  40 mg Intravenous Daily    ipratropium-albuterol  1 ampule Inhalation Q4H WA    sodium chloride flush  10 mL Intravenous 2 times per day    sennosides-docusate sodium  1 tablet Oral BID    chlorhexidine  15 mL Mouth/Throat BID    famotidine (PEPCID) injection  20 mg Intravenous BID    artificial tears   Both Eyes Q4H    levofloxacin  750 mg Intravenous Q24H    nicotine  1 patch Transdermal Daily    sodium chloride flush  10 mL Intravenous 2 times per day    enoxaparin  40 mg Subcutaneous Daily     PRN Meds:  ipratropium-albuterol, sodium chloride flush, oxyCODONE, HYDROmorphone, fentanNYL, midazolam, sodium chloride flush, acetaminophen **OR** acetaminophen, polyethylene glycol, promethazine **OR** ondansetron, traMADol, ketorolac    Results:  CBC:   Recent Labs     09/22/20  1208 09/24/20  0209   WBC 15.3* 9.4   HGB 15.8 14.1   HCT 45.7 40.2   MCV 91.6 91.5    166     BMP:   Recent Labs     09/22/20  1208 09/23/20  1755 09/24/20  0209    139 133*   K 4.8 4.6 3.8   CL 97* 104 97*   CO2 29 22 28   PHOS  --   --  1.0*   BUN 7 7 9   CREATININE 0.8 0.7 0.8     LIVER PROFILE:   Recent Labs     09/22/20  1208 09/24/20  0209   AST 38* 19   ALT 25 14   BILIDIR  --  0.3   BILITOT 0.7 0.7   ALKPHOS 162* 106       Cultures:  Neg rapid covid 19 test    Chest imaging was reviewed by me 9/24/20 and showed   Significantly improved aeration in the lungs.  Persistent mild ground-glass    opacities asymmetric on the right. ASSESSMENT:  · Acute hypoxic respiratory failure with apnea post-op requiring reintubation in PACU  · Post -op Bilateral ASD - most likely pulmonary edema  · Fentanyl OD(snorted) resulting in the Right ankle trauma  · Right trimalleolar fracture status post ORIF  · Tobacco abuse  · H/o COPD     PLAN:  · Mechanical ventilation as per my orders. The ventilator was adjusted by me at the bedside for unstable, life threatening respiratory failure. · IV Propofol for sedation, target RASS -2, with daily spontaneous awakening trial.  Fentanyl PRN pain, gtt as needed  · Head of bed 30 degrees or higher at all times  · Daily spontaneous breathing trial once PEEP less than 8, FiO2 less than 55%.   · Duonebs  · Levaquin for new air space disease  · Replace Phos  · Lovenox DVT prophylaxis  · CCT 32 min

## 2020-09-24 NOTE — PROGRESS NOTES
Internal Medicine ICU Progress Note      Events of Last 24 hours:     Patient is admitted to hospital with complaints of right ankle pain. She admits to snorting fentanyl. Given Narcan in the ER. Has a history of COPD. Work-up showed right ankle fracture. She was taken to the operating room yesterday. She was extubated in the PACU but developed apneic spells and was reintubated. This morning when I saw her in the ICU she is awake and alert on the ventilator. She is on a spontaneous breathing trial.  She does admit to snorting heroin. Denies any chest pain. Wants to be extubated. Invasive Lines: PIV    MV: Intubated on 2020    Recent Labs     20  19320  0500   PHART 7.390 7.531*   EXZ3JUU 42.9 33.0*   PO2ART 83.1 67.9*       MV Settings:  Vent Mode: AC/VC Rate Set: 0 bmp/Vt Ordered: 450 mL/ /FiO2 : 30 %    IV:   propofol Stopped (20 0730)       Vitals:  Temp  Av.5 °F (36.9 °C)  Min: 96.9 °F (36.1 °C)  Max: 99.8 °F (37.7 °C)  Pulse  Av.6  Min: 71  Max: 118  BP  Min: 89/56  Max: 143/71  SpO2  Av.3 %  Min: 33 %  Max: 100 %  FiO2   Av.9 %  Min: 20 %  Max: 98 %  Patient Vitals for the past 4 hrs:   BP Temp Temp src Pulse Resp SpO2 Weight   20 0800 (!) 143/71 -- -- 118 26 96 % --   20 0708 -- -- -- 100 20 100 % --   20 0700 118/78 99.8 °F (37.7 °C) Axillary 96 20 94 % --   20 0600 114/68 -- -- 94 20 97 % 183 lb 1.6 oz (83.1 kg)   20 0500 124/74 -- -- 96 18 99 % --       CVP:        Intake/Output Summary (Last 24 hours) at 2020 0817  Last data filed at 2020 0810  Gross per 24 hour   Intake 1464 ml   Output 2340 ml   Net -876 ml       EXAM:  General: Awake and alert on the ventilator. Follows commands. Eyes: PERRL. No sclera icterus. No conjunctiva injected. ENT: No discharge. Intubated  Neck: Trachea midline. Normal thyroid. Resp: No accessory muscle use. No crackles. No wheezing. + rhonchi.  No dullness on ALKPHOS 162* 106     PT/INR: No results for input(s): PROTIME, INR in the last 72 hours. APTT: No results for input(s): APTT in the last 72 hours. UA:No results for input(s): NITRITE, COLORU, PHUR, LABCAST, WBCUA, RBCUA, MUCUS, TRICHOMONAS, YEAST, BACTERIA, CLARITYU, SPECGRAV, LEUKOCYTESUR, UROBILINOGEN, BILIRUBINUR, BLOODU, GLUCOSEU, AMORPHOUS in the last 72 hours. Invalid input(s): Aleatha [place]    Cultures:  Results for Morrie Monday (MRN [id number]) as of 9/24/2020 07:59   Ref. Range 9/23/2020 18:40   SARS-CoV-2, NAAT Latest Ref Range: Not Detected  Not Detected       Films:    XR CHEST PORTABLE   Final Result   Significantly improved aeration in the lungs. Persistent mild ground-glass   opacities asymmetric on the right. XR CHEST PORTABLE   Final Result   Supportive tubing projects in normal position. Progressive bilateral airspace disease, asymmetrically greater on the right,   consistent with pulmonary edema. FLUORO FOR SURGICAL PROCEDURES   Final Result   Successful internal reduction of internal fibula. Please refer to procedure   notes for additional details. XR ANKLE RIGHT (MIN 3 VIEWS)   Final Result   Successful partial reduction of the ankle. Acute, comminuted fracture of the distal fibula. Widening of the medial space of the ankle mortise, suggesting ligamentous   injury. XR TIBIA FIBULA RIGHT (2 VIEWS)   Final Result   Oblique fracture through the distal fibula metadiaphysis with approximately   10-15 degrees of apex anterior angulation. XR KNEE RIGHT (MIN 4 VIEWS)   Final Result   Cortical irregularity proximal tibia involving the medial corner could   represent a nondisplaced fracture      Otherwise, no bony or joint abnormality         CT CERVICAL SPINE WO CONTRAST   Final Result   No acute abnormality of the cervical spine. XR CHEST (2 VW)   Final Result   No acute process.          XR ANKLE RIGHT (MIN 3 VIEWS)   Final Result Trimalleolar fracture with posterolateral dislocation of the talus         CT HEAD WO CONTRAST   Final Result   No acute intracranial abnormality. Assessment:    Principal Problem:    Closed trimalleolar fracture of right ankle  Active Problems:    COPD exacerbation (HCC)    Acute on chronic respiratory failure with hypoxia and hypercapnia (HCC)    Hyponatremia    Opiate overdose (HCC)    Polysubstance abuse ([facility] [address]  Resolved Problems:    * No resolved hospital problems. *         Plan:    #Closed fracture of the right ankle. Status post open reduction internal fixation on 9/23/2020. Today's postoperative day 1. Chest x-ray after surgery showed bilateral infiltrates. It is possible she developed flash pulmonary edema after surgery. This is considerably improved today. Her IV fluids were stopped and she was given Lasix. #Acute respiratory failure. Developed apneic spells after she was extubated after the surgery yesterday. She is reintubated. She is on a breathing trial today. I think she can likely be extubated today. Pulmonary consulted. #Drug overdose. Admits to snorting fentanyl. Was given Narcan in the ER. Awake and alert at present. Need outpatient rehab. #Acute exacerbation of COPD. Treat with nebulizers and steroids. #Mild hyponatremia. Monitor sodium. #Lovenox for DVT prophylaxis. The patient can be transferred to medical floor after extubation later today. All questions and concerns were addressed to the patient/family. Alternatives to my treatment were discussed. The note was completed using EMR. Every effort was made to ensure accuracy; however, inadvertent computerized transcription errors may be present.          [address] [place] [address] Road 8:17 AM 9/24/2020

## 2020-09-24 NOTE — PROGRESS NOTES
Shift assessment completed, see flow sheet. Pt is alert. Following commands. RASS -1    Intubated and sedated on AC # 8.0 ETT, at 21 LL. 20 / 450 /40%/ +5. SpO2 99%. Respirations are easy, even, and unlabored. Bilateral lung sounds diminished with some expiratory wheezes noted. More prevalent on the R side. VSS  BP 98/64 MAP 74    Propofol infusing at 50 mcg/kg/min. OG in place at 54. When checking residual, 25mL of coffee ground output was noted. Pt hooked to ILWS at this time. PIVs WDL. All lines infusing at this time. Bilateral soft wrist restraints in place for patient safety. All lines and monitoring devices in place. Brown is patent and secured with yellow/clear urine. Good output. Bed in lowest position with wheels locked. No needs expressed at this time. Will continue to monitor.

## 2020-09-24 NOTE — PROGRESS NOTES
Shift assessment, completed, see flow sheet. Pt is alert. RASS 0 to +1. Following commands. Intubated  # 7.5 ETT, at 22 LL. SBT 5/5 FiO2 30%. , /71 (91), SpO2 96%. Respirations are easy, even, and unlabored. Bilateral lung sounds diminished. OG in place at 55, TO LIWS. 2 PIVs, WNL with 4 g magnesium infusing. Propofol stopped for SBT. Brown in place and patent with STAT lock. Bilateral soft wrist restraints in place for pt safety. Call light within reach. Bed in lowest position. Bed alarm on.  Will continue to monitor

## 2020-09-24 NOTE — FLOWSHEET NOTE
09/24/20 1817   Pain Assessment   Pain Assessment 0-10   Pain Level 9   Pain Type Acute pain   Pain Location Ankle   Pain Orientation Right   Patient's Stated Pain Goal 3   Pain Descriptors Stabbing; Sharp   Pain Frequency Continuous   Pain Onset On-going   Clinical Progression Rapidly worsening   Functional Pain Assessment Prevents or interferes some active activities and ADLs   Non-Pharmaceutical Pain Intervention(s) Rest;Repositioned       Pt c/o R ankle pain, moaning and tachypneic. Gave prn oxycodone per order, see MAR.

## 2020-09-24 NOTE — PROGRESS NOTES
Orthopedic Surgery Progress Note    Carolyn Gayle  9/24/2020    Subjective:     Post-Operative Day # 1 s/p right ankle ORIF  Systemic or Specific Complaints: Patient reintubated after surgery and admitted to ICU. Currently under care of pulmonologist and hospitalist.  Patient is awake this morning, opens eyes to conversation. Motions she has pain in the right ankle. Objective:     /73   Pulse 110   Temp 99.8 °F (37.7 °C) (Axillary)   Resp 15   Ht 5' 4\" (1.626 m)   Wt 183 lb 1.6 oz (83.1 kg)   SpO2 93%   BMI 31.43 kg/m²     Intake/Output Summary (Last 24 hours) at 9/24/2020 0954  Last data filed at 9/24/2020 6918  Gross per 24 hour   Intake 1484 ml   Output 2340 ml   Net -856 ml         General: alert, appears stated age and cooperative   Wound: Positive for Edema and splint in place   Extremity: Distal NVI   DVT Exam: No evidence of DVT seen on physical exam.     + dp palp, +SILT L4-S1, thigh and calf compartments soft and compressible, wiggles toes    Data Review  CBC:   Lab Results   Component Value Date    WBC 9.4 09/24/2020    RBC 4.40 09/24/2020    HGB 14.1 09/24/2020    HCT 40.2 09/24/2020     09/24/2020       Assessment:     Post-Operative Day # 1 s/p right ankle ORIF, respiratory failure postoperatively required reintubation, per patient stable in ICU     Plan:      1: Continues current post-op course   2:  Continue pain control  3:  Physical therapy as per protocol -nonweightbearing right lower extremity when able to participate in therapy  4:  Medical management per hospitalist and ICU team  5: Social work for discharge planning   6: DVT prophylaxis: Per hospitalist  7: ortho stable for D/C when medically stable, pain controlled and progressing with physical therapy. We will continue to monitor the patient closely while in house.       Estee Delgadillo

## 2020-09-24 NOTE — FLOWSHEET NOTE
09/24/20 1538   Oxygen Therapy   SpO2 (!) 80 %   O2 Device Nasal cannula   O2 Flow Rate (L/min) 3 L/min    Pt was sleeping. Woke pt up and increased oxygen up to 3L/NC, pt recovered within 30 seconds. Will continue to monitor.

## 2020-09-25 ENCOUNTER — APPOINTMENT (OUTPATIENT)
Dept: GENERAL RADIOLOGY | Age: 55
DRG: 907 | End: 2020-09-25
Payer: COMMERCIAL

## 2020-09-25 VITALS
TEMPERATURE: 98.3 F | DIASTOLIC BLOOD PRESSURE: 62 MMHG | BODY MASS INDEX: 31.26 KG/M2 | HEIGHT: 64 IN | WEIGHT: 183.1 LBS | HEART RATE: 74 BPM | RESPIRATION RATE: 14 BRPM | OXYGEN SATURATION: 98 % | SYSTOLIC BLOOD PRESSURE: 103 MMHG

## 2020-09-25 LAB
ANION GAP SERPL CALCULATED.3IONS-SCNC: 9 MMOL/L (ref 3–16)
BASOPHILS ABSOLUTE: 0 K/UL (ref 0–0.2)
BASOPHILS RELATIVE PERCENT: 0.2 %
BUN BLDV-MCNC: 10 MG/DL (ref 7–20)
CALCIUM SERPL-MCNC: 8.6 MG/DL (ref 8.3–10.6)
CHLORIDE BLD-SCNC: 98 MMOL/L (ref 99–110)
CO2: 29 MMOL/L (ref 21–32)
CREAT SERPL-MCNC: 0.6 MG/DL (ref 0.6–1.1)
EKG ATRIAL RATE: 106 BPM
EKG DIAGNOSIS: NORMAL
EKG P AXIS: 66 DEGREES
EKG P-R INTERVAL: 128 MS
EKG Q-T INTERVAL: 346 MS
EKG QRS DURATION: 96 MS
EKG QTC CALCULATION (BAZETT): 459 MS
EKG R AXIS: 73 DEGREES
EKG T AXIS: 63 DEGREES
EKG VENTRICULAR RATE: 106 BPM
EOSINOPHILS ABSOLUTE: 0 K/UL (ref 0–0.6)
EOSINOPHILS RELATIVE PERCENT: 0.4 %
GFR AFRICAN AMERICAN: >60
GFR NON-AFRICAN AMERICAN: >60
GLUCOSE BLD-MCNC: 126 MG/DL (ref 70–99)
HCT VFR BLD CALC: 33.6 % (ref 36–48)
HEMOGLOBIN: 11.6 G/DL (ref 12–16)
LYMPHOCYTES ABSOLUTE: 1.4 K/UL (ref 1–5.1)
LYMPHOCYTES RELATIVE PERCENT: 19 %
MAGNESIUM: 2.2 MG/DL (ref 1.8–2.4)
MCH RBC QN AUTO: 31.7 PG (ref 26–34)
MCHC RBC AUTO-ENTMCNC: 34.4 G/DL (ref 31–36)
MCV RBC AUTO: 92.2 FL (ref 80–100)
MONOCYTES ABSOLUTE: 0.4 K/UL (ref 0–1.3)
MONOCYTES RELATIVE PERCENT: 5.3 %
NEUTROPHILS ABSOLUTE: 5.4 K/UL (ref 1.7–7.7)
NEUTROPHILS RELATIVE PERCENT: 75.1 %
PDW BLD-RTO: 12.6 % (ref 12.4–15.4)
PHOSPHORUS: 2.6 MG/DL (ref 2.5–4.9)
PLATELET # BLD: 133 K/UL (ref 135–450)
PMV BLD AUTO: 8.2 FL (ref 5–10.5)
POTASSIUM SERPL-SCNC: 3.3 MMOL/L (ref 3.5–5.1)
RBC # BLD: 3.65 M/UL (ref 4–5.2)
SODIUM BLD-SCNC: 136 MMOL/L (ref 136–145)
WBC # BLD: 7.3 K/UL (ref 4–11)

## 2020-09-25 PROCEDURE — 84100 ASSAY OF PHOSPHORUS: CPT

## 2020-09-25 PROCEDURE — 2500000003 HC RX 250 WO HCPCS: Performed by: INTERNAL MEDICINE

## 2020-09-25 PROCEDURE — 97167 OT EVAL HIGH COMPLEX 60 MIN: CPT

## 2020-09-25 PROCEDURE — 6360000002 HC RX W HCPCS: Performed by: ORTHOPAEDIC SURGERY

## 2020-09-25 PROCEDURE — 6370000000 HC RX 637 (ALT 250 FOR IP): Performed by: ORTHOPAEDIC SURGERY

## 2020-09-25 PROCEDURE — 97530 THERAPEUTIC ACTIVITIES: CPT

## 2020-09-25 PROCEDURE — 97161 PT EVAL LOW COMPLEX 20 MIN: CPT

## 2020-09-25 PROCEDURE — 36415 COLL VENOUS BLD VENIPUNCTURE: CPT

## 2020-09-25 PROCEDURE — 85025 COMPLETE CBC W/AUTO DIFF WBC: CPT

## 2020-09-25 PROCEDURE — 71045 X-RAY EXAM CHEST 1 VIEW: CPT

## 2020-09-25 PROCEDURE — 93010 ELECTROCARDIOGRAM REPORT: CPT | Performed by: INTERNAL MEDICINE

## 2020-09-25 PROCEDURE — 80048 BASIC METABOLIC PNL TOTAL CA: CPT

## 2020-09-25 PROCEDURE — 2580000003 HC RX 258: Performed by: ORTHOPAEDIC SURGERY

## 2020-09-25 PROCEDURE — 83735 ASSAY OF MAGNESIUM: CPT

## 2020-09-25 PROCEDURE — 99233 SBSQ HOSP IP/OBS HIGH 50: CPT | Performed by: INTERNAL MEDICINE

## 2020-09-25 PROCEDURE — 94640 AIRWAY INHALATION TREATMENT: CPT

## 2020-09-25 PROCEDURE — 71100 X-RAY EXAM RIBS UNI 2 VIEWS: CPT

## 2020-09-25 RX ORDER — OXYCODONE HYDROCHLORIDE 5 MG/1
5 TABLET ORAL EVERY 4 HOURS PRN
Qty: 20 TABLET | Refills: 0 | Status: SHIPPED | OUTPATIENT
Start: 2020-09-25 | End: 2020-09-30

## 2020-09-25 RX ORDER — IPRATROPIUM BROMIDE AND ALBUTEROL SULFATE 2.5; .5 MG/3ML; MG/3ML
3 SOLUTION RESPIRATORY (INHALATION)
Qty: 360 ML | Refills: 2 | Status: SHIPPED | OUTPATIENT
Start: 2020-09-25

## 2020-09-25 RX ORDER — LEVOFLOXACIN 750 MG/1
750 TABLET ORAL DAILY
Qty: 4 TABLET | Refills: 0 | Status: SHIPPED | OUTPATIENT
Start: 2020-09-25 | End: 2020-09-29

## 2020-09-25 RX ADMIN — Medication 10 ML: at 09:54

## 2020-09-25 RX ADMIN — FAMOTIDINE 20 MG: 10 INJECTION INTRAVENOUS at 09:53

## 2020-09-25 RX ADMIN — IPRATROPIUM BROMIDE AND ALBUTEROL SULFATE 1 AMPULE: .5; 3 SOLUTION RESPIRATORY (INHALATION) at 11:18

## 2020-09-25 RX ADMIN — DOCUSATE SODIUM 50MG AND SENNOSIDES 8.6MG 1 TABLET: 8.6; 5 TABLET, FILM COATED ORAL at 09:54

## 2020-09-25 RX ADMIN — TRAMADOL HYDROCHLORIDE 50 MG: 50 TABLET, FILM COATED ORAL at 12:33

## 2020-09-25 RX ADMIN — ENOXAPARIN SODIUM 40 MG: 40 INJECTION SUBCUTANEOUS at 09:53

## 2020-09-25 RX ADMIN — IPRATROPIUM BROMIDE AND ALBUTEROL SULFATE 1 AMPULE: .5; 3 SOLUTION RESPIRATORY (INHALATION) at 07:30

## 2020-09-25 ASSESSMENT — PAIN SCALES - GENERAL: PAINLEVEL_OUTOF10: 7

## 2020-09-25 NOTE — PROGRESS NOTES
Pt daughter unable to come pick her up after metnioned that daughter would need to come to unit to review discharge. Pt friend coming to pick her up with pt sons girlfriend Ba Ribeiro. Discharge reviewed and Ba Ribeiro states that they are moving their bed into pt room that has bathroom to better care fof patient. All belongings and DME carried to friend (Dawson's) car and Luisanadavey Gema also asked that contact phone number for Kortney Michelle be added to list as he is apparently the responsible party who gives all phone messages to them as they do not have cell service at the home. Personal hygiene poor of persons picking up pt at this time. Emphasis placed on need to home care to assist in care for pt leg until discharge from Ortho to be weightbearing.

## 2020-09-25 NOTE — CARE COORDINATION
[facility]        Referral received from CM to follow for home care services. I will follow for needs, and speak with patient to verify demos.         Darrion Carlson  Work mobile: [phone]  [facility] office: [phone]

## 2020-09-25 NOTE — DISCHARGE INSTR - COC
Continuity of Care Form    Patient Name: Gama Cooper   :  1965  MRN:  [id number]    Admit date:  2020  Discharge date:  2020    Code Status Order: Full Code   Advance Directives:   Advance Care Flowsheet Documentation     Date/Time Healthcare Directive Type of Healthcare Directive Copy in [address] Agent's Name Healthcare Agent's Phone Number    20 [phone]  Yes, patient has an advance directive for healthcare treatment  --  No, copy requested from family  --  --  --    20  Yes, patient has an advance directive for healthcare treatment  Durable power of  for health care  No, copy requested from family  Healthcare power of   Alok Bridges  [phone]          Admitting Physician:  Jackie Ramesh MD  PCP: No primary care provider on file. Discharging Nurse: [facility] Unit/Room#: 3008/3008-01  Discharging Unit Phone Number: ***    Emergency Contact:   Extended Emergency Contact Information  Primary Emergency Contact: cipollone, crystal  Mobile Phone: [phone]  Relation: Child   needed?  No    Past Surgical History:  Past Surgical History:   Procedure Laterality Date    ANKLE SURGERY Right 2020    ORIF    BRONCHOSCOPY N/A 2020    BRONCHOSCOPY performed by Fidelina Silva MD at [address] [place] [address]  2020    BRONCHOSCOPY THERAPUTIC ASPIRATION INITIAL performed by Fidelina Silva MD at [address] [place] [address] N/A 2/10/2020    BRONCHOSCOPY ALVEOLAR LAVAGE performed by Sylvia Carrington MD at [address] [place] [address]  2/10/2020    BRONCHOSCOPY THERAPUTIC ASPIRATION SUBSEQUENT performed by Sylvia Carrington MD at [address] [place] [address]       Immunization History:   Immunization History   Administered Date(s) Administered    Influenza, Quadv, IM, PF (6 mo and older Fluzone, Flulaval, Fluarix, and 3 yrs and older Afluria) 2020       Active Problems:  Patient Active Problem List   Diagnosis Code    Hand sprain, left, initial encounter S63. 92XA    Nondisplaced fracture of proximal phalanx of left ring finger, initial encounter for closed fracture S62.645A    COPD exacerbation ([facility]) J44.1    Acute on chronic respiratory failure with hypoxia and hypercapnia ([facility]) J96.21, J96.22    Smoker F17.200    Dementia ([facility]) F03.90    Pulmonary infiltrates R91.8    Mediastinal adenopathy R59.0    Leukocytosis D72.829    Hyponatremia E87.1    Hyperglycemia R73.9    Acute encephalopathy G93.40    Aspiration into airway T17.908A    Mucus plugging of bronchi J98.09    Cavitary lesion of lung J98.4    Opiate overdose ([facility]) T40.601A    Closed trimalleolar fracture of right ankle S82.851A    Hypoxia R09.02    Polysubstance abuse ([facility]) F19.10       Isolation/Infection:   Isolation          No Isolation        Patient Infection Status     Infection Onset Added Last Indicated Last Indicated By Review Planned Expiration Resolved Resolved By    None active    Resolved    COVID-19 Rule Out 09/23/20 09/23/20 09/23/20 COVID-19 (Ordered)   09/23/20 Rule-Out Test Resulted          Nurse Assessment:  Last Vital Signs: /62   Pulse 74   Temp 98.3 °F (36.8 °C) (Oral)   Resp 15   Ht 5' 4\" (1.626 m)   Wt 183 lb 1.6 oz (83.1 kg)   SpO2 91%   BMI 31.43 kg/m²     Last documented pain score (0-10 scale): Pain Level: 9  Last Weight:   Wt Readings from Last 1 Encounters:   09/24/20 183 lb 1.6 oz (83.1 kg)     Mental Status:  {IP PT MENTAL STATUS:20030}    IV Access:  { FREDDY IV ACCESS:277743915}    Nursing Mobility/ADLs:  Walking   {P DME SPGD:441546190}  Transfer  {P DME TRYM:770928118}  Bathing  {P DME TGGY:082435318}  Dressing  {P DME OIIH:264356953}  Toileting  {P DME HXRH:653971572}  Feeding  {[facility] DME DRJZ:839725433}  Med Admin  {P DME BSDP:771638158}  Med Delivery   {[facility] FREDDY MED Delivery:889642416}    Wound Care Documentation and Therapy: Elimination:  Continence:   · Bowel: {YES / ZP:94085}  · Bladder: {YES / KM:45796}  Urinary Catheter: {Urinary Catheter:334952341}   Colostomy/Ileostomy/Ileal Conduit: {YES / LV:78192}       Date of Last BM: ***    Intake/Output Summary (Last 24 hours) at 2020 1040  Last data filed at 2020 0812  Gross per 24 hour   Intake 1656 ml   Output 2125 ml   Net -469 ml     I/O last 3 completed shifts:   In: 8786 [P.O.:1080; I.V.:405; IV Piggyback:100]  Out:  [Urine:]    Safety Concerns:     [facility] Safety Concerns:763824191}    Impairments/Disabilities:      [facility] Impairments/Disabilities:049125362}    Nutrition Therapy:  Current Nutrition Therapy:   [facility] Diet List:919963606}    Routes of Feeding: {CHP DME Other Feedings:883851791}  Liquids: {Slp liquid thickness:29912}  Daily Fluid Restriction: {CHP DME Yes amt example:931532201}  Last Modified Barium Swallow with Video (Video Swallowing Test): {Done Not Done QSVW:117441827}    Treatments at the Time of Hospital Discharge:   Respiratory Treatments: ***  Oxygen Therapy:  {Therapy; copd oxygen:31881}  Ventilator:    {MH CC Vent JZLR:455673309}    Rehab Therapies: {THERAPEUTIC INTERVENTION:0901676150}  Weight Bearing Status/Restrictions: [address]  Weight Bearin}  Other Medical Equipment (for information only, NOT a DME order):  {EQUIPMENT:212525970}  Other Treatments: ***    Patient's personal belongings (please select all that are sent with patient):  {CHP DME Belongings:241419511}    RN SIGNATURE:  {Esignature:161538101}    CASE MANAGEMENT/SOCIAL WORK SECTION    Inpatient Status Date: 2020    Readmission Risk Assessment Score:  Readmission Risk              Risk of Unplanned Readmission:        22           Discharging to Facility/ Agency   · Name: [facility]  · Address:  · Phone:   · Fax:     / signature: {Esignature:759613174}    PHYSICIAN SECTION    Prognosis: {Prognosis:8851009842}    Condition at Discharge: [facility] Patient Condition:171907679}    Rehab Potential (if transferring to Rehab): {Prognosis:0896641213}    Recommended Labs or Other Treatments After Discharge: ***    Physician Certification: I certify the above information and transfer of Gama Cooper  is necessary for the continuing treatment of the diagnosis listed and that she requires {Admit to Appropriate Level of Care:82713} for {GREATER/LESS:603294330} 30 days.      Update Admission H&P: {CHP DME Changes in TFJMM:228323631}    PHYSICIAN SIGNATURE:  {Esignature:929032468}

## 2020-09-25 NOTE — PROGRESS NOTES
Inpatient Physical Therapy Evaluation and Treatment    Unit: ICU  Date:  9/25/2020  Patient Name:    Michel Jones  Admitting diagnosis:  Drug overdose, accidental or unintentional, initial encounter Berta Hare  Admit Date:  9/22/2020  Precautions/Restrictions/WB Status/ Lines/ Wounds/ Oxygen: Fall risk, Bed/chair alarm, Lines -IV and Brown catheter and WB Restrictions (NWB RLE)   R ankle ORIF 9/23/20    Treatment Time:  13:06 - 13:40  Treatment Number:  1   Timed Code Treatment Minutes: 24 minutes  Total Treatment Minutes:  34  minutes    Patient Goals for Therapy: \" go home \"          Discharge Recommendations: SNF (pt refusing SNF and wants to return home. If returning home, recommend 24 hr assistance)  DME needs for discharge: defer to facility (pt refusing SNF. If she returns home, recommend RW, tub transfer bench, and BSC)       Therapy recommendation for EMS Transport: can transport by wheelchair    Therapy recommendations for staff:   Assist of 1 with use of rolling walker (RW) and gait belt for all transfers to/from [facility]  to/from [facility]    History of Present Illness: The patient is a 63 y.o. female with dementia and COPD who presents to [facility] with c/o drug overdose. She states she does not normally use drugs. She uses Cannabis. Today she snorted Fentanyl. EMS was called as she was unresponsive. She was given Narcan. Patient is alert and oriented. O2 sats stable on RA. She denies fevers, chills, cough, chest pain, dyspnea, abdominal pain, nausea, vomiting, diarrhea, or dysuria. Labs with leukocytosis. She fell and has a Trimalleolar fracture of the right ankle. Patient admitted to med-surg. Home Health S4 Level Recommendation:  Level 3 Safety if returning home  AM-PAC Mobility Score    AM-PAC Inpatient Mobility Raw Score : 15       Preadmission Environment    Pt.  Lives with family (dtr + son and granddtr (2 y/o))  Home environment:  one story home  Steps to enter first floor: Colin to complete stairs at this time      Activity Tolerance   Pt completed therapy session with No adverse symptoms noted w/activity    Positioning Needs   Pt reclined in chair, no alarm needed, positioned in proper neutral alignment and pressure relief provided. Call light provided and all needs within reach    Exercises Initiated  Inocencio deferred secondary to treatment focus on functional mobility    Other  None. Patient/Family Education   Pt educated on role of inpatient PT, POC, importance of continued activity, DC recommendations, safety awareness, transfer techniques and calling for assist with mobility. Assessment  Pt seen for Physical Therapy evaluation in acute care setting. Pt demonstrated decreased Activity tolerance, Balance, ROM, Safety and Strength as well as decreased independence with Ambulation, Bed Mobility  and Transfers. Recommending SNF upon discharge as patient functioning well below baseline, demonstrates good rehab potential and unable to return home due to limited or no family support, inability to negotiate stairs to enter home/bedroom/bathroom, burden of care beyond caregiver ability, home environment not conducive to patient recovery and limited safety awareness. Goals : To be met in 3 visits:  1). Independent with LE Ex x 10 reps    To be met in 6 visits:  1). Supine to/from sit: Supervision  2). Sit to/from stand: Supervision  3). Bed to chair: Supervision  4). Gait: Ambulate 50 ft.  with  Supervision and use of gait belt and rolling walker (RW) while maintaining NWB status RLE  5).   Tolerate B LE exercises 3 sets of 10-15 reps    Rehabilitation Potential: Good  Strengths for achieving goals include:   PLOF and Pt cooperative   Barriers to achieving goals include:    Pain    Plan    To be seen 3-5 x / week  while in acute care setting for therapeutic exercises, bed mobility, transfers, progressive gait training, balance training, and family/patient education. Signature: Mynor Flores, PT, DPT, OMT-C  #[id number]      If patient discharges from this facility prior to next visit, this note will serve as the Discharge Summary.

## 2020-09-25 NOTE — PROGRESS NOTES
Plan of care reviewed. Pt verbalized understanding and has no questions at this time. Pt awake in bed. Pt states no needs at this time. Pt call light in reach. Pt knows to notify RN if there are any needs. All lines and monitoring devices in place. Pt states that she is still having pain in the right upper chest just below the shoulder. Pt unaware of any falls or trauma but states that she does have dementia and would not remember. Denies pain in the foot/ankle at this time. Pt states that she and daughter both do drugs and live together with pt granddaughter. Pt states that she does have desire to stop using drugs. Pt states that granddaughter is 3years old and states lolita father does not use drugs. Will reach out to [facility] for resources. Pt eating at this time. Remains 96% on 1L. O2 removed at this time.

## 2020-09-25 NOTE — PROGRESS NOTES
In to give pt update on moving to 2W and reason we are unable to discharge her regarding her safety and inability to remember address and any contacts. Pt states that she isnt happy but has no choice and understands. Pt calling number on file and leaving message. Pt also states that her daughter always has her phone. Asked if we should send  to check on pt daughter at address listed to make sure she is ok or if possible daughter is avoiding her. Pt states that she doesn't think that daughter is avoiding and no reason to suspect she is injured.

## 2020-09-25 NOTE — DISCHARGE SUMMARY
Name:  Karla Melara  Room:  3008/3008-01  MRN:    [id number]    Discharge Summary      This discharge summary is in conjunction with a complete physical exam done on the day of discharge. Attending Physician: Dr. Kaci Benton  Discharging Physician: Dr. Lo [place]: 9/22/2020  Discharge:   9/25/2020    HPI:  The patient is a 63 y.o. female with dementia and COPD who presents to [facility] with c/o drug overdose. She states she does not normally use drugs. She uses Cannabis. Today she snorted Fentanyl. EMS was called as she was unresponsive. She was given Narcan. Patient is alert and oriented. O2 sats stable on RA. She denies fevers, chills, cough, chest pain, dyspnea, abdominal pain, nausea, vomiting, diarrhea, or dysuria. Labs with leukocytosis. She fell and has a Trimalleolar fracture of the right ankle. Patient admitted to med-surg. Ortho consulted. Diagnoses this Admission and Hospital Course   #Closed fracture of the right ankle. Status post open reduction internal fixation on 9/23/2020. Today's postoperative day 2. Chest x-ray after surgery showed bilateral infiltrates. It is possible she developed flash pulmonary edema after surgery. She is much improved today     #Acute respiratory failure. Developed apneic spells after she was extubated after the surgery yesterday. She was reintubated. She was extubated on 9/24/2020. Pulmonary seeing patient.     #Drug overdose. Admits to snorting fentanyl. Was given Narcan in the ER. Awake and alert at present. Need outpatient rehab.     #Acute exacerbation of COPD. Treated with nebulizers and steroids.     #Mild hyponatremia resolved. Monitored sodium.     #Lovenox for DVT prophylaxis. D/c to SNF.      Procedures (Please Review Full Report for Details)  N/A    Consults    Pulmonology  Ortho      Physical Exam at Discharge:    /62   Pulse 74   Temp 98.3 °F (36.8 °C) (Oral)   Resp 14   Ht 5' 4\" (1.626 m)   Wt 183 consistent with pulmonary edema. FLUORO FOR SURGICAL PROCEDURES   Final Result   Successful internal reduction of internal fibula. Please refer to procedure   notes for additional details. XR ANKLE RIGHT (MIN 3 VIEWS)   Final Result   Successful partial reduction of the ankle. Acute, comminuted fracture of the distal fibula. Widening of the medial space of the ankle mortise, suggesting ligamentous   injury. XR TIBIA FIBULA RIGHT (2 VIEWS)   Final Result   Oblique fracture through the distal fibula metadiaphysis with approximately   10-15 degrees of apex anterior angulation. XR KNEE RIGHT (MIN 4 VIEWS)   Final Result   Cortical irregularity proximal tibia involving the medial corner could   represent a nondisplaced fracture      Otherwise, no bony or joint abnormality         CT CERVICAL SPINE WO CONTRAST   Final Result   No acute abnormality of the cervical spine. XR CHEST (2 VW)   Final Result   No acute process. XR ANKLE RIGHT (MIN 3 VIEWS)   Final Result   Trimalleolar fracture with posterolateral dislocation of the talus         CT HEAD WO CONTRAST   Final Result   No acute intracranial abnormality. Discharge Medications     Medication List      START taking these medications    ipratropium-albuterol 0.5-2.5 (3) MG/3ML Soln nebulizer solution  Commonly known as:  DUONEB  Inhale 3 mLs into the lungs every 4 hours (while awake)     levoFLOXacin 750 MG tablet  Commonly known as:  Levaquin  Take 1 tablet by mouth daily for 4 days     oxyCODONE 5 MG immediate release tablet  Commonly known as:  ROXICODONE  Take 1 tablet by mouth every 4 hours as needed for Pain for up to 5 days.         CONTINUE taking these medications    nicotine 14 MG/24HR  Commonly known as:  [address] [facility] 1 patch onto the skin daily           Where to Get Your Medications      You can get these medications from any pharmacy    Bring a paper prescription for each of

## 2020-09-25 NOTE — PROGRESS NOTES
Pt taking off O2 every hour or so and desats into the 80's when her O2 is off. O2 replaced at 1 liter/min.

## 2020-09-25 NOTE — PROGRESS NOTES
Pt is very forgetful.  Pt has asked \"When am I going home?\" 6 times tonight not ever remembering she asked the question already until the same answer is given then she says- \"Oh I already asked that question didn't I?\"

## 2020-09-25 NOTE — PROGRESS NOTES
Inpatient Occupational Therapy  Evaluation and Treatment    Unit: ICU  Date:  9/25/2020  Patient Name:    Matt Fernández  Admitting diagnosis:  Drug overdose, accidental or unintentional, initial encounter Soraida Miller Date:  9/22/2020  Precautions/Restrictions/WB Status/ Lines/ Wounds/ Oxygen: fall risk, bed/chair alarm, nieves catheter  and WB restrictions (NWB right) LE    Treatment Time:  9443-9196  Treatment Number: 1     Billable Treatment Time: 24 minutes   Total Treatment Time:   34  minutes    Patient Goals for Therapy:  \" go home \"      Discharge Recommendations: SNF, however patient wanting to go back home  DME needs for discharge: RW, Shower Chair and [facility]       Therapy recommendations for staff:   Assist of 1 with use of rolling walker (RW) for all ambulation to/from BSC/chair    History of Present Illness: per chart: 50-year-old female with history of opioid abuse and tobacco abuse presented to the emergency room yesterday after falling and sustaining a right ankle fracture. She had snorted fentanyl and was unresponsive. She was revived and has been stable on oxygen since yesterday. She went to the OR 9/23 for ORIF. Postoperatively she was extubated but became apneic and was reintubated and admitted to the ICU. Extubated in ICU 9/24     Home Health S4 Level Recommendation:  Level 3 Safety  AM-PAC Score:  16    Preadmission Environment    Pt. Lives with family (dtr + son and granddtr (2 y/o))  Home environment:    one story home  Steps to enter first floor: \"a couple\" steps to enter, cannot remember if there is a railing  Steps to second floor: N/A  Bathroom: tub/shower unit  Equipment owned: none     Preadmission Status:  Pt. Able to drive: Yes  Pt Fully independent with ADLs: Yes  Pt. Required assistance from family for:  Independent PTA, share homemaking with dtr  Pt. independent for transfers and gait and walked with No Device  History of falls Yes - just the one that resulted in R ankle fx     Pain   Yes  Location: R ankle, chest  Ratin /10  Pain Medicine Status: RN notified     Cognition    A&O Person, Place and Situation   Able to follow 2 step commands     Subjective  Patient lying supine in bed with no family present. Pt agreeable to this OT eval & tx. Upper Extremity ROM:    WFL,  pt able to perform all bed mobility, transfers, and gait without ROM limitation. Upper Extremity Strength:    BUE strength WFL, but not formally assessed w/ MMT   Attempted to test strength, however patient complained of pain in right chest area around pectoral muscle with resistance to right shld flexion    Upper Extremity Sensation    WFL    Upper Extremity Proprioception:  WFL    Coordination and Tone  WFL    Balance  Functional Sitting Balance:  WFL SBA  Functional Standing Balance:Impaired Min assist with RW    Bed mobility:    Supine to sit:   Min A  Sit to supine:   Not Tested  Rolling:    Not Tested  Scooting in sitting:  SBA  Scooting to head of bed:   Not Tested    Bridging:   Not Tested    Transfers:    Sit to stand:  Min A with RW  Stand to sit:  Min A with RW  Bed to chair:   Min A with RW  Standard toilet: Not Tested  Bed to [facility]:  Not Tested    Dressing:      UE:   Not Tested  LE:    Mod A    Bathing:    UE:  Not Tested  LE:  Not Tested    Eating:   Not Tested    Toileting:  Not Tested    Activity Tolerance   Pt completed therapy session with Pain noted with movement of right ankle  SpO2: 97% On room air  HR:   BP:     Positioning Needs:   Reclined in chair with call light and needs in reach. Exercise / Activities Initiated:   N/A    Patient/Family Education:   Role of OT  Recommendations for DC  Use of AE  Safe RW use/hand placement    Assessment of Deficits: Pt seen for Occupational therapy evaluation in acute care setting. Pt demonstrated decreased Activity tolerance, ADLs, Balance , Bed mobility, Safety Awareness, Strength, Transfers, Cognition and Coping Skills.  Pt functioning below baseline and will likely benefit from skilled occupational therapy services to maximize safety and independence. Goal(s) : To be met in 3 Visits:  1). Bed to toilet/BSC: CGA    To be met in 5 Visits:  1). Supine to/from Sit:  CGA  2). Upper Body Bathing:   SBA  3). Lower Body Bathing:   Min A  4). Upper Body Dressing:  CGA  5). Lower Body Dressing:  Min A  6). Pt to demonstrate UE exs x 15 reps with minimal cues    Rehabilitation Potential:  Good for goals listed above. Strengths for achieving goals include: Pt motivated  Barriers to achieving goals include:  Pain     Plan: To be seen 3-5 x/wk while in acute care setting for therapeutic exercises, bed mobility, transfers, dressing, bathing, family/patient education, ADL/IADL retraining, energy conservation training.      Sonam Sahni OTR/L [id number]            If patient discharges from this facility prior to next visit, this note will serve as the Discharge Summary

## 2020-09-25 NOTE — PROGRESS NOTES
Internal Medicine ICU Progress Note      Events of Last 24 hours:     Patient is admitted to hospital with complaints of right ankle pain. She admits to snorting fentanyl. Given Narcan in the ER. Has a history of COPD. Work-up showed right ankle fracture. She was taken to the operating room yesterday. She was extubated in the PACU but developed apneic spells and was reintubated. This morning when I saw her in the ICU she is awake and alert on the ventilator. She is on a spontaneous breathing trial.  She does admit to snorting heroin. Denies any chest pain. Wants to be extubated. - she is extubated on 2020. She had some chest wall pain but rib x-rays negative. Patient apparently lives with her daughter. She has a granddaughter. Both the patient and the daughter use drugs. Not sure who cares for the granddaughter. Also physical therapy is recommending SNF. Invasive Lines: PIV    MV: Intubated on 2020    Recent Labs     20  1935 20  0500   PHART 7.390 7.531*   WLU9HWA 42.9 33.0*   PO2ART 83.1 67.9*       MV Settings:  Vent Mode: AC/VC Rate Set: 0 bmp/Vt Ordered: 450 mL/ /FiO2 : 100 %    IV:      Vitals:  Temp  Av.5 °F (36.9 °C)  Min: 98.3 °F (36.8 °C)  Max: 98.7 °F (37.1 °C)  Pulse  Av.9  Min: 68  Max: 100  BP  Min: 95/49  Max: 132/61  SpO2  Av.8 %  Min: 80 %  Max: 98 %  FiO2   Av.6 %  Min: 31 %  Max: 100 %  Patient Vitals for the past 4 hrs:   Resp SpO2   20 1119 14 98 %       CVP:        Intake/Output Summary (Last 24 hours) at 2020 1355  Last data filed at 2020 6610  Gross per 24 hour   Intake 1168 ml   Output 1990 ml   Net -822 ml       EXAM:  General: Awake and alert on room air  Eyes: PERRL. No sclera icterus. No conjunctiva injected. ENT: No discharge. Neck: Trachea midline. Normal thyroid. Resp: No accessory muscle use. No crackles. No wheezing. + rhonchi. No dullness on percussion. CV: Increased rate.  Regular rhythm. No mumur or rub. No edema. No JVD. Palpable pedal pulses. GI: Non-tender. Non-distended. No masses. No organmegaly. Normal bowel sounds. No hernia. Skin: Warm and dry. No nodule on exposed extremities. No rash on exposed extremities. Lymph: No cervical LAD. No supraclavicular LAD. M/S: No cyanosis. Right ankle is wrapped. No clubbing. Neuro: Awake. Follows commands. Positive pupils/gag/corneals. Normal pain response. Psych: Oriented to time and place. Medications:  Scheduled Meds:   ipratropium-albuterol  1 ampule Inhalation Q4H WA    sodium chloride flush  10 mL Intravenous 2 times per day    sennosides-docusate sodium  1 tablet Oral BID    famotidine (PEPCID) injection  20 mg Intravenous BID    levofloxacin  750 mg Intravenous Q24H    nicotine  1 patch Transdermal Daily    sodium chloride flush  10 mL Intravenous 2 times per day    enoxaparin  40 mg Subcutaneous Daily       PRN Meds:  HYDROmorphone, ipratropium-albuterol, sodium chloride flush, oxyCODONE, sodium chloride flush, acetaminophen **OR** acetaminophen, polyethylene glycol, promethazine **OR** ondansetron, traMADol    Results:  CBC:   Recent Labs     09/24/20  0209 09/25/20  0421   WBC 9.4 7.3   HGB 14.1 11.6*   HCT 40.2 33.6*   MCV 91.5 92.2    133*     BMP:   Recent Labs     09/23/20  1755 09/24/20  0209 09/25/20  0420    133* 136   K 4.6 3.8 3.3*    97* 98*   CO2 22 28 29   PHOS  --  1.0* 2.6   BUN 7 9 10   CREATININE 0.7 0.8 0.6     LIVER PROFILE:   Recent Labs     09/24/20  0209   AST 19   ALT 14   BILIDIR 0.3   BILITOT 0.7   ALKPHOS 106     PT/INR: No results for input(s): PROTIME, INR in the last 72 hours. APTT: No results for input(s): APTT in the last 72 hours. UA:No results for input(s): NITRITE, COLORU, PHUR, LABCAST, WBCUA, RBCUA, MUCUS, TRICHOMONAS, YEAST, BACTERIA, CLARITYU, SPECGRAV, LEUKOCYTESUR, UROBILINOGEN, BILIRUBINUR, BLOODU, GLUCOSEU, AMORPHOUS in the last 72 hours.     Invalid input(s): KETONESU    Cultures:  Results for Grady Eubanks (MRN [id number]) as of 9/24/2020 07:59   Ref. Range 9/23/2020 18:40   SARS-CoV-2, NAAT Latest Ref Range: Not Detected  Not Detected       Films:    XR RIBS RIGHT (2 VIEWS)   Final Result   No acute osseous abnormality of the right ribs. XR CHEST PORTABLE   Final Result   Continued resolution of parenchymal lung disease with no new abnormality         XR CHEST PORTABLE   Final Result   Significantly improved aeration in the lungs. Persistent mild ground-glass   opacities asymmetric on the right. XR CHEST PORTABLE   Final Result   Supportive tubing projects in normal position. Progressive bilateral airspace disease, asymmetrically greater on the right,   consistent with pulmonary edema. FLUORO FOR SURGICAL PROCEDURES   Final Result   Successful internal reduction of internal fibula. Please refer to procedure   notes for additional details. XR ANKLE RIGHT (MIN 3 VIEWS)   Final Result   Successful partial reduction of the ankle. Acute, comminuted fracture of the distal fibula. Widening of the medial space of the ankle mortise, suggesting ligamentous   injury. XR TIBIA FIBULA RIGHT (2 VIEWS)   Final Result   Oblique fracture through the distal fibula metadiaphysis with approximately   10-15 degrees of apex anterior angulation. XR KNEE RIGHT (MIN 4 VIEWS)   Final Result   Cortical irregularity proximal tibia involving the medial corner could   represent a nondisplaced fracture      Otherwise, no bony or joint abnormality         CT CERVICAL SPINE WO CONTRAST   Final Result   No acute abnormality of the cervical spine. XR CHEST (2 VW)   Final Result   No acute process. XR ANKLE RIGHT (MIN 3 VIEWS)   Final Result   Trimalleolar fracture with posterolateral dislocation of the talus         CT HEAD WO CONTRAST   Final Result   No acute intracranial abnormality. Assessment:    Principal Problem:    Closed trimalleolar fracture of right ankle  Active Problems:    COPD exacerbation (HCC)    Acute on chronic respiratory failure with hypoxia and hypercapnia (HCC)    Hyponatremia    Opiate overdose (HCC)    Polysubstance abuse ([facility] [address]  Resolved Problems:    * No resolved hospital problems. *         Plan:    #Closed fracture of the right ankle. Status post open reduction internal fixation on 9/23/2020. Today's postoperative day 1. Chest x-ray after surgery showed bilateral infiltrates. It is possible she developed flash pulmonary edema after surgery. She is much improved today    #Acute respiratory failure. Developed apneic spells after she was extubated after the surgery yesterday. She was reintubated. She was extubated on 9/24/2020. Pulmonary seeing patient. #Drug overdose. Admits to snorting fentanyl. Was given Narcan in the ER. Awake and alert at present. Need outpatient rehab. #Acute exacerbation of COPD. Treat with nebulizers and steroids. #Mild hyponatremia resolved. Monitor sodium. #Lovenox for DVT prophylaxis. The patient can be transferred to medical floor after extubation later today. All questions and concerns were addressed to the patient/family. Alternatives to my treatment were discussed. The note was completed using EMR. Every effort was made to ensure accuracy; however, inadvertent computerized transcription errors may be present.          Josh Dale 1:55 PM 9/25/2020

## 2020-09-25 NOTE — CARE COORDINATION
[facility]      All docs completed and received by [facility].       Darrion Carlson  Work mobile: [phone]  [facility] office: [phone]

## 2020-09-25 NOTE — PROGRESS NOTES
Call placed to CPS regarding pt reports that pt child and pt do drugs and also are 3year old child's (pt grandkid) main caregiver. Pt reports she isnt sure how she will get home or help care for child. Pt unsure how she is to ambulate at home. Pt states she has no assistive devices, and is non-weightbearing per ortho. PT/OT order placed and called for eval prior to discharge.  called also and reviewed case and pt needs prior to discharge. CM aware pt knows no phone numbers to come pick her up. Pt has no money. Pt has no ability stated to pay for a cab. CM states that if we or pt are unable to verify safe to return home to address provided by the end of the day, will speak with MD regarding transfer to  and continue working toward pt discharge.

## 2020-09-25 NOTE — ANESTHESIA POSTPROCEDURE EVALUATION
09/25/2020 04:21 AM        HGB                      11.6 (L)            09/25/2020 04:21 AM        HCT                      33.6 (L)            09/25/2020 04:21 AM        PLT                      133 (L)             09/25/2020 04:21 AM   RENAL  Lab Results       Component                Value               Date/Time                  NA                       136                 09/25/2020 04:20 AM        K                        3.3 (L)             09/25/2020 04:20 AM        K                        4.8                 09/22/2020 12:08 PM        CL                       98 (L)              09/25/2020 04:20 AM        CO2                      29                  09/25/2020 04:20 AM        BUN                      10                  09/25/2020 04:20 AM        CREATININE               0.6                 09/25/2020 04:20 AM        GLUCOSE                  126 (H)             09/25/2020 04:20 AM   COAGS  No results found for: PROTIME, INR, APTT    Intake & Output:  @24HRIO@    Nausea & Vomiting:  No    Level of Consciousness:  Awake    Pain Assessment:  Adequate analgesia    Anesthesia Complications:  No apparent anesthetic complications, post intubation while in OR pt following commands, but not moving air when instructed to breath. Changed OA, attempted to assist with ventilation, still unable to move a significant amount of air. When Sats began to drop reintubated patient. Possible TRUJILLO? Remain intubated in ICU. Never bit down on ETT, OA in place on emergence. SUMMARY      Vital signs stable  OK to discharge from Stage I post anesthesia care.   Care transferred from Anesthesiology department on discharge from perioperative area

## 2020-09-25 NOTE — PROGRESS NOTES
Asked pt if she knew her address. Pt states no, but I am pretty sure it is in [facility]. Asked pt thoughts on going to rehab. She states she is not sure and would need to think a little bit on it. Attempt to call pt contact listed on [address]- child [phone]. Number no longer in service. Attempt again to call pt contact [address]. Number goes right to voicemail. Another message left. 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 States granddaughter lives there and is 1years old.     Order for pt to transfer to [address] [place] [address] per Hospitalist.

## 2020-09-25 NOTE — PROGRESS NOTES
Pulmonary & Critical Care Medicine ICU Progress Note    CC: Post-op Resp failure    Events of Last 24 hours: extubated to 1lpm o2    Invasive Lines: PIV    MV:  9/24-9/25/2-  Vitals:  /62   Pulse 74   Temp 98.3 °F (36.8 °C) (Oral)   Resp 15   Ht 5' 4\" (1.626 m)   Wt 183 lb 1.6 oz (83.1 kg)   SpO2 91%   BMI 31.43 kg/m²   on 1lpm    Intake/Output Summary (Last 24 hours) at 9/25/2020 0816  Last data filed at 9/25/2020 2767  Gross per 24 hour   Intake 1676 ml   Output 2125 ml   Net -449 ml     EXAM:  Constitutional: ill appearing. Not In distress. Eyes: PERRL. No sclera icterus. ENT: Normal Nose. Normal Tongue. Neck:  Trachea is midline. No thyroid tenderness. Respiratory: No accessory muscle usage. No decreased breath sounds. No wheezes. No rales. No Rhonchi. Cardiovascular: Normal S1S2. No murmur. No digit cyanosis. No digit clubbing. No LE edema. GI: Non-tender. Non-distended. Normal bowel sounds. No masses. No umbilical hernia. Skin: No rash on the exposed extremities. No Nodules on exposed extremities. Neuro: Alert. Follows commands. Moves all extremities. Psych: No agitation, no anxiety.     Scheduled Meds:   ipratropium-albuterol  1 ampule Inhalation Q4H WA    sodium chloride flush  10 mL Intravenous 2 times per day    sennosides-docusate sodium  1 tablet Oral BID    famotidine (PEPCID) injection  20 mg Intravenous BID    levofloxacin  750 mg Intravenous Q24H    nicotine  1 patch Transdermal Daily    sodium chloride flush  10 mL Intravenous 2 times per day    enoxaparin  40 mg Subcutaneous Daily     PRN Meds:  HYDROmorphone, ipratropium-albuterol, sodium chloride flush, oxyCODONE, sodium chloride flush, acetaminophen **OR** acetaminophen, polyethylene glycol, promethazine **OR** ondansetron, traMADol    Results:  CBC:   Recent Labs     09/22/20  1208 09/24/20  0209 09/25/20  0421   WBC 15.3* 9.4 7.3   HGB 15.8 14.1 11.6*   HCT 45.7 40.2 33.6*   MCV 91.6 91.5 92.2    166 133*     BMP:   Recent Labs     09/23/20  1755 09/24/20  0209 09/25/20  0420    133* 136   K 4.6 3.8 3.3*    97* 98*   CO2 22 28 29   PHOS  --  1.0* 2.6   BUN 7 9 10   CREATININE 0.7 0.8 0.6     LIVER PROFILE:   Recent Labs     09/22/20  1208 09/24/20  0209   AST 38* 19   ALT 25 14   BILIDIR  --  0.3   BILITOT 0.7 0.7   ALKPHOS 162* 106       Cultures:  Neg rapid covid 19 test  9/24 resp cx pending    Chest imaging was reviewed by me 9/24/20 and showed   Significantly improved aeration in the lungs.  Persistent mild ground-glass    opacities asymmetric on the right.         ASSESSMENT:  · Acute hypoxic respiratory failure with apnea post-op requiring reintubation in PACU  · Post -op Bilateral ASD - most likely pulmonary edema  · Fentanyl OD(snorted) resulting in the Right ankle trauma  · Right trimalleolar fracture status post ORIF  · Tobacco abuse  · H/o COPD  · Heroin abuse     PLAN:  Supplemental oxygen to maintain SaO2 >92%; wean as tolerated   · Duonebs  · Levaquin x 5 days  · Replace Phos  · PT/OT  · Lovenox DVT prophylaxis  · DC planning

## 2020-09-25 NOTE — DISCHARGE INSTR - ACTIVITY
Please take it easy at home. Increase activity as tolerated. It is good practice to ambulate often to keep your lungs healthy. Be careful not to overwork yourself and allow your body to heal. Eat a healthy, balanced diet and drink a lot of water to stay hydrated.

## 2020-09-26 LAB
CULTURE, RESPIRATORY: NORMAL
GRAM STAIN RESULT: NORMAL

## 2020-09-29 ENCOUNTER — TELEPHONE (OUTPATIENT)
Dept: INTERNAL MEDICINE CLINIC | Age: 55
End: 2020-09-29

## 2020-09-29 NOTE — TELEPHONE ENCOUNTER
Tried calling patient to make a New Patient appt. Phone number in chart is not a working number. Unable to reach Pt.

## 2020-10-02 ENCOUNTER — TELEPHONE (OUTPATIENT)
Dept: INTERNAL MEDICINE CLINIC | Age: 55
End: 2020-10-02

## 2020-10-02 RX ORDER — OXYCODONE HYDROCHLORIDE AND ACETAMINOPHEN 5; 325 MG/1; MG/1
1 TABLET ORAL EVERY 6 HOURS PRN
Qty: 20 TABLET | Refills: 0 | Status: SHIPPED | OUTPATIENT
Start: 2020-10-02 | End: 2020-10-07

## 2020-10-02 NOTE — TELEPHONE ENCOUNTER
Pt's daughter called and set Pt up for a hospital follow up appointment. Pt did not have home health come out to house yet, daughter stated the address and phone number in the chart where wrong, I call [facility] and gave them the correct info. And they stated they would get in touch with daughter and set up the home visits. In the mean time daughter was encouraged to call luna who did Pt's surgery about patients on going pain and being out of pain medication. Pt will follow up wed.  10/7/2020 in the [facility]

## 2020-10-02 NOTE — TELEPHONE ENCOUNTER
Patient is requesting a refill on pain medication. ANKLE ORIF 9/23.  Has post op appt scheduled for 10/6

## 2020-10-06 ENCOUNTER — OFFICE VISIT (OUTPATIENT)
Dept: ORTHOPEDIC SURGERY | Age: 55
End: 2020-10-06
Payer: COMMERCIAL

## 2020-10-06 VITALS — HEIGHT: 64 IN | BODY MASS INDEX: 31.24 KG/M2 | WEIGHT: 183 LBS

## 2020-10-06 PROBLEM — Z98.890 S/P ORIF (OPEN REDUCTION INTERNAL FIXATION) FRACTURE: Status: ACTIVE | Noted: 2020-10-06

## 2020-10-06 PROBLEM — Z87.81 S/P ORIF (OPEN REDUCTION INTERNAL FIXATION) FRACTURE: Status: ACTIVE | Noted: 2020-10-06

## 2020-10-06 PROCEDURE — 29405 APPL SHORT LEG CAST: CPT | Performed by: ORTHOPAEDIC SURGERY

## 2020-10-06 PROCEDURE — 99024 POSTOP FOLLOW-UP VISIT: CPT | Performed by: ORTHOPAEDIC SURGERY

## 2020-10-06 RX ORDER — OXYCODONE HYDROCHLORIDE AND ACETAMINOPHEN 5; 325 MG/1; MG/1
1 TABLET ORAL EVERY 6 HOURS PRN
Qty: 28 TABLET | Refills: 0 | Status: SHIPPED | OUTPATIENT
Start: 2020-10-06 | End: 2020-10-13

## 2020-10-06 NOTE — PROGRESS NOTES
Chief Complaint:  Post-Op Check (right ankle orif sx: 9/23/2020)      Date of surgery: 9/23/20    History of Present of Illness: The patient returns today for the first postoperative visit after right ankle ORIF. Currently has 8 out of 10 pain. Daughter admits that the patient has been bearing weight with a walker, they are requesting a prescription for a rollabout. Patient is requesting a pain medication refill. There have been no fevers or chills. Vital Signs: There were no vitals filed for this visit. Pain Assessment:       Examination:  Inspection reveals expected swelling. Incision is clean and dry. The skin is warm. Range of motion is limited by pain and swelling. The distal neurovascular exam is grossly intact. Xrays: 3 views of the right ankle are obtained and reviewed today they show anatomic reduction of the fracture, hardware is in place, there is no evidence of loosening     Impression:  Encounter Diagnoses   Name Primary?  Closed trimalleolar fracture of right ankle, initial encounter     S/P ORIF (open reduction internal fixation) fracture Yes       Office Procedures:  Orders Placed This Encounter   Procedures    XR ANKLE RIGHT (MIN 3 VIEWS)    HI APPLY SHORT LEG CAST    HI CAST SUPL SHORT LEG ADULT FIBERGLASS         Procedures    HI APPLY SHORT LEG CAST    HI CAST SUPL SHORT LEG ADULT FIBERGLASS         Treatment Plan:  Sutures were removed and steri-strips applied. Specific precautions were reviewed and emphasized. Patient will remain nonweightbearing to the right lower extremity. A well-padded well molded cast was applied. Pain medication was refilled. They will follow up in approximately four weeks for repeat evaluation and xray. Demian Florence      This dictation was performed with a verbal recognition program (DRAGON) and it was checked for errors. It is possible that there are still dictated errors within this office note.  If so, please bring any errors to my attention for an addendum. All efforts were made to ensure that this office note is accurate.

## 2020-10-07 ENCOUNTER — OFFICE VISIT (OUTPATIENT)
Dept: INTERNAL MEDICINE CLINIC | Age: 55
End: 2020-10-07

## 2020-10-07 VITALS
TEMPERATURE: 100.2 F | DIASTOLIC BLOOD PRESSURE: 79 MMHG | HEART RATE: 84 BPM | OXYGEN SATURATION: 96 % | SYSTOLIC BLOOD PRESSURE: 117 MMHG

## 2020-10-07 PROBLEM — Z00.00 HEALTH CARE MAINTENANCE: Status: ACTIVE | Noted: 2020-10-07

## 2020-10-07 PROCEDURE — 1111F DSCHRG MED/CURRENT MED MERGE: CPT | Performed by: FAMILY MEDICINE

## 2020-10-07 PROCEDURE — 99214 OFFICE O/P EST MOD 30 MIN: CPT | Performed by: FAMILY MEDICINE

## 2020-10-07 PROCEDURE — G8926 SPIRO NO PERF OR DOC: HCPCS | Performed by: FAMILY MEDICINE

## 2020-10-07 PROCEDURE — 4004F PT TOBACCO SCREEN RCVD TLK: CPT | Performed by: FAMILY MEDICINE

## 2020-10-07 PROCEDURE — 90686 IIV4 VACC NO PRSV 0.5 ML IM: CPT | Performed by: FAMILY MEDICINE

## 2020-10-07 PROCEDURE — 3023F SPIROM DOC REV: CPT | Performed by: FAMILY MEDICINE

## 2020-10-07 PROCEDURE — 3017F COLORECTAL CA SCREEN DOC REV: CPT | Performed by: FAMILY MEDICINE

## 2020-10-07 PROCEDURE — G8417 CALC BMI ABV UP PARAM F/U: HCPCS | Performed by: FAMILY MEDICINE

## 2020-10-07 PROCEDURE — G8482 FLU IMMUNIZE ORDER/ADMIN: HCPCS | Performed by: FAMILY MEDICINE

## 2020-10-07 PROCEDURE — G0008 ADMIN INFLUENZA VIRUS VAC: HCPCS | Performed by: FAMILY MEDICINE

## 2020-10-07 PROCEDURE — G8427 DOCREV CUR MEDS BY ELIG CLIN: HCPCS | Performed by: FAMILY MEDICINE

## 2020-10-07 RX ORDER — IBUPROFEN 200 MG
600 TABLET ORAL EVERY 6 HOURS PRN
COMMUNITY
End: 2021-08-04

## 2020-10-07 RX ORDER — ALBUTEROL SULFATE 90 UG/1
2 AEROSOL, METERED RESPIRATORY (INHALATION) EVERY 6 HOURS PRN
Qty: 1 INHALER | Refills: 3 | Status: SHIPPED | OUTPATIENT
Start: 2020-10-07

## 2020-10-07 NOTE — PROGRESS NOTES
SUBJECTIVE:    Chief Complaint   Patient presents with    Follow-Up from Hospital     Pt had opiate overdose and broke her ankle Pt states her chest hurts from the CPR and her ankle is in pain, she saw surgeon yesterday who is over seeing her progress.  Dementia     Is seen in the office today for follow-up of her recent hospitalization. Patient had a fentanyl overdose and was transported to the hospital after receiving Narcan. During this encounter patient had a fall with a tri-malleoli fracture of her right ankle. Patient was hospitalized and had an ORIF performed by orthopedics. Patient continues on duo nebs for treatment of her COPD. Patient has not been very active so states she has not been needing it. Patient has only using the duo nebs now at night before bed. Patient states that she is not on a controller medication nor does she have a rescue inhaler. Patient also states that she was diagnosed with dementia approximately 10 years ago after having a benzodiazepine overdose with a hypoxic brain injury. Patient did not follow-up with neurology or received any treatment for that at that point. Review of systems: Patient continues with some chest discomfort after CPR, no chest tightness, no shortness of breath, no abdominal pain, denies headaches, blurry vision, or pedal edema. MEDICATIONS:  Current Outpatient Medications   Medication Sig Dispense Refill    ibuprofen (ADVIL;MOTRIN) 200 MG tablet Take 600 mg by mouth every 6 hours as needed for Pain      albuterol sulfate HFA (PROVENTIL HFA) 108 (90 Base) MCG/ACT inhaler Inhale 2 puffs into the lungs every 6 hours as needed for Wheezing 1 Inhaler 3    oxyCODONE-acetaminophen (PERCOCET) 5-325 MG per tablet Take 1 tablet by mouth every 6 hours as needed for Pain for up to 5 days.  20 tablet 0    ipratropium-albuterol (DUONEB) 0.5-2.5 (3) MG/3ML SOLN nebulizer solution Inhale 3 mLs into the lungs every 4 hours (while awake) 360 mL 2    oxyCODONE-acetaminophen (PERCOCET) 5-325 MG per tablet Take 1 tablet by mouth every 6 hours as needed for Pain for up to 7 days. 28 tablet 0    nicotine (NICODERM CQ) 14 MG/24HR Place 1 patch onto the skin daily (Patient not taking: Reported on 10/7/2020) 30 patch 3     No current facility-administered medications for this visit. Lab Results   Component Value Date    LABA1C 6.0 02/08/2020     Lab Results   Component Value Date    WBC 7.3 09/25/2020    HGB 11.6 (L) 09/25/2020     (L) 09/25/2020    MCV 92.2 09/25/2020     Lab Results   Component Value Date     09/25/2020    K 3.3 (L) 09/25/2020    CL 98 (L) 09/25/2020    CO2 29 09/25/2020    BUN 10 09/25/2020    CREATININE 0.6 09/25/2020    GLUCOSE 126 (H) 09/25/2020       OBJECTIVE:    /79   Pulse 84   Temp 100.2 °F (37.9 °C)   SpO2 96%   HEENT:  Oropharynx clear, no lymphadenopathy,   LUNGS:  Clear and without wheezes  HEART:  Regular rhythm, no appreciable murmur  ABD:  Benign, active bowel sounds  EXT:  No edema, pulses palpable  NEURO:  No focal neurologic deficits noted. ASSESSMENT / PLAN:   1. Hypoxic respiratory injury secondary to fentanyl overdose. Resolved. 2.  COPD. Patient will have pulmonary function test ordered for gold class staging. Consider tiotropium addition after PFTs performed. Patient was given a prescription for an albuterol rescue inhaler. Tobacco cessation recommended. 3.  Trimalleolar fracture right ankle status post ORIF. Patient will continue to follow with orthopedics as scheduled. 4.  Dementia. Patient will be referred to neurology for follow-up and continued work-up. 5.  Health maintenance. Patient will have lipids, hemoglobin A1c, HIV, hepatitis C testing performed. Patient will have mammogram ordered, low-dose CT for lung cancer screening, and colonoscopy for colorectal cancer screening. Patient did have flu vaccination updated. Follow-up  3 months.

## 2020-10-16 RX ORDER — OXYCODONE HYDROCHLORIDE AND ACETAMINOPHEN 5; 325 MG/1; MG/1
1 TABLET ORAL EVERY 6 HOURS PRN
Qty: 28 TABLET | Refills: 0 | Status: SHIPPED | OUTPATIENT
Start: 2020-10-16 | End: 2020-10-23

## 2020-10-23 ENCOUNTER — TELEPHONE (OUTPATIENT)
Dept: ORTHOPEDIC SURGERY | Age: 55
End: 2020-10-23

## 2020-10-23 NOTE — TELEPHONE ENCOUNTER
PATIENT'S DAUGHTER CALLED AND STSTED THAT MOTHER WAS IN A LOT OF PAIN AND NEEDED A REFILL ON PAIN MED

## 2020-10-26 RX ORDER — TRAMADOL HYDROCHLORIDE 50 MG/1
50 TABLET ORAL EVERY 6 HOURS PRN
Qty: 28 TABLET | Refills: 0 | Status: SHIPPED | OUTPATIENT
Start: 2020-10-26 | End: 2020-11-02

## 2020-10-26 NOTE — TELEPHONE ENCOUNTER
Patients daughter called and left message requesting a refill for the patient.      Sx date 9/23/2020  Ankle ORIF

## 2020-11-03 ENCOUNTER — TELEPHONE (OUTPATIENT)
Dept: ORTHOPEDIC SURGERY | Age: 55
End: 2020-11-03

## 2020-11-06 PROBLEM — Z00.00 HEALTH CARE MAINTENANCE: Status: RESOLVED | Noted: 2020-10-07 | Resolved: 2020-11-06

## 2020-11-10 ENCOUNTER — OFFICE VISIT (OUTPATIENT)
Dept: ORTHOPEDIC SURGERY | Age: 55
End: 2020-11-10
Payer: COMMERCIAL

## 2020-11-10 VITALS — HEIGHT: 63 IN | BODY MASS INDEX: 32.43 KG/M2 | WEIGHT: 183 LBS

## 2020-11-10 PROCEDURE — L4361 PNEUMA/VAC WALK BOOT PRE OTS: HCPCS | Performed by: ORTHOPAEDIC SURGERY

## 2020-11-10 PROCEDURE — 99024 POSTOP FOLLOW-UP VISIT: CPT | Performed by: ORTHOPAEDIC SURGERY

## 2020-11-10 NOTE — PROGRESS NOTES
Chief Complaint:  Post-Op Check (CK RIGHT ANKLE )      Date of surgery: 9/23/20    History of Present of Illness: The patient returns today 7 weeks s/p right ankle ORIF. Currently has 5 out of 10 pain. Patient has been in a cast, she has been bearing weight occasionally around the house. She is here today with her daughter. Vital Signs: There were no vitals filed for this visit. Pain Assessment:  Pain Assessment  Location of Pain: Ankle  Location Modifiers: Right  Severity of Pain: 5  Quality of Pain: Sharp, Dull, Aching  Duration of Pain: A few minutes  Frequency of Pain: Constant  Result of Injury: No  Work-Related Injury: No  Are there other pain locations you wish to document?: No    Examination:  Incision is well-healed, she has 4 out of 5 strength with dorsiflexion and plantar flexion. She tolerates passive and active range of motion in all planes. The distal neurovascular exam is grossly intact. Xrays: 3 views of the right ankle are obtained and reviewed today they show anatomic reduction of the fracture with routine healing, hardware is in place, there is no evidence of loosening     Impression:  Encounter Diagnoses   Name Primary?  S/P ORIF (open reduction internal fixation) fracture Yes    Closed trimalleolar fracture of right ankle, initial encounter        Office Procedures:  Orders Placed This Encounter   Procedures    XR ANKLE RIGHT (MIN 3 VIEWS)    Breg [place] [employer] Walking Boot     Patient was prescribed a Breg [employer] Walking Boot. The right ankle will require stabilization / immobilization from this semi-rigid / rigid orthosis to improve their function. The orthosis will assist in protecting the affected area, provide functional support and facilitate healing. Patient was instructed to progress ambulation  as non weight bearing in the device. The plan of care is to progress the patient to full weight bearing status.      The patient was educated and fit by a attention for an addendum. All efforts were made to ensure that this office note is accurate.

## 2020-11-25 ENCOUNTER — TELEPHONE (OUTPATIENT)
Dept: INTERNAL MEDICINE CLINIC | Age: 55
End: 2020-11-25

## 2020-11-25 NOTE — TELEPHONE ENCOUNTER
Sent letter to Pt to reschedule her January 6th appointment with DR. Worley.  At the [facility]

## 2021-04-14 ENCOUNTER — TELEPHONE (OUTPATIENT)
Dept: PULMONOLOGY | Age: 56
End: 2021-04-14

## 2021-04-21 NOTE — TELEPHONE ENCOUNTER
Pt has not returned multiple calls to schedule appt with Dr. Zunilda Dickinson. I s/kane Conrad at referring provider office and informed them that we will wait for patient to contact us to schedule appointment. She will let Baldo Carrington know.

## 2021-08-04 ENCOUNTER — HOSPITAL ENCOUNTER (INPATIENT)
Age: 56
LOS: 14 days | Discharge: NURSING, REHAB OR LONG-TERM CARE | DRG: 207 | End: 2021-08-19
Attending: EMERGENCY MEDICINE | Admitting: INTERNAL MEDICINE
Payer: MEDICARE

## 2021-08-04 ENCOUNTER — APPOINTMENT (OUTPATIENT)
Dept: CT IMAGING | Age: 56
DRG: 207 | End: 2021-08-04
Payer: MEDICARE

## 2021-08-04 ENCOUNTER — APPOINTMENT (OUTPATIENT)
Dept: GENERAL RADIOLOGY | Age: 56
DRG: 207 | End: 2021-08-04
Payer: MEDICARE

## 2021-08-04 DIAGNOSIS — N30.00 ACUTE CYSTITIS WITHOUT HEMATURIA: ICD-10-CM

## 2021-08-04 DIAGNOSIS — J96.01 ACUTE RESPIRATORY FAILURE WITH HYPOXIA AND HYPERCAPNIA (HCC): Primary | ICD-10-CM

## 2021-08-04 DIAGNOSIS — A41.9 SEPTICEMIA (HCC): ICD-10-CM

## 2021-08-04 DIAGNOSIS — J96.02 ACUTE RESPIRATORY FAILURE WITH HYPOXIA AND HYPERCAPNIA (HCC): Primary | ICD-10-CM

## 2021-08-04 LAB
A/G RATIO: 0.9 (ref 1.1–2.2)
ALBUMIN SERPL-MCNC: 3.8 G/DL (ref 3.4–5)
ALP BLD-CCNC: 215 U/L (ref 40–129)
ALT SERPL-CCNC: 48 U/L (ref 10–40)
ANION GAP SERPL CALCULATED.3IONS-SCNC: 9 MMOL/L (ref 3–16)
AST SERPL-CCNC: 90 U/L (ref 15–37)
BASE EXCESS ARTERIAL: 5.1 MMOL/L (ref -3–3)
BASOPHILS ABSOLUTE: 0 K/UL (ref 0–0.2)
BASOPHILS RELATIVE PERCENT: 0.5 %
BILIRUB SERPL-MCNC: 1 MG/DL (ref 0–1)
BUN BLDV-MCNC: 6 MG/DL (ref 7–20)
CALCIUM SERPL-MCNC: 8.8 MG/DL (ref 8.3–10.6)
CARBOXYHEMOGLOBIN ARTERIAL: 8.9 % (ref 0–1.5)
CHLORIDE BLD-SCNC: 89 MMOL/L (ref 99–110)
CO2: 32 MMOL/L (ref 21–32)
CREAT SERPL-MCNC: <0.5 MG/DL (ref 0.6–1.1)
EOSINOPHILS ABSOLUTE: 0.1 K/UL (ref 0–0.6)
EOSINOPHILS RELATIVE PERCENT: 1.4 %
ETHANOL: NORMAL MG/DL (ref 0–0.08)
GFR AFRICAN AMERICAN: >60
GFR NON-AFRICAN AMERICAN: >60
GLOBULIN: 4.2 G/DL
GLUCOSE BLD-MCNC: 439 MG/DL (ref 70–99)
HCO3 ARTERIAL: 35.3 MMOL/L (ref 21–29)
HCT VFR BLD CALC: 46.6 % (ref 36–48)
HEMOGLOBIN, ART, EXTENDED: 15.5 G/DL (ref 12–16)
HEMOGLOBIN: 15.2 G/DL (ref 12–16)
INFLUENZA A: NOT DETECTED
INFLUENZA B: NOT DETECTED
LACTIC ACID, SEPSIS: 2.2 MMOL/L (ref 0.4–1.9)
LYMPHOCYTES ABSOLUTE: 0.9 K/UL (ref 1–5.1)
LYMPHOCYTES RELATIVE PERCENT: 14.7 %
MCH RBC QN AUTO: 29.8 PG (ref 26–34)
MCHC RBC AUTO-ENTMCNC: 32.7 G/DL (ref 31–36)
MCV RBC AUTO: 91.1 FL (ref 80–100)
METHEMOGLOBIN ARTERIAL: 0.3 %
MONOCYTES ABSOLUTE: 0.4 K/UL (ref 0–1.3)
MONOCYTES RELATIVE PERCENT: 6.4 %
NEUTROPHILS ABSOLUTE: 5 K/UL (ref 1.7–7.7)
NEUTROPHILS RELATIVE PERCENT: 77 %
O2 SAT, ARTERIAL: 98 %
O2 THERAPY: ABNORMAL
PCO2 ARTERIAL: 79.4 MMHG (ref 35–45)
PDW BLD-RTO: 16.4 % (ref 12.4–15.4)
PH ARTERIAL: 7.27 (ref 7.35–7.45)
PLATELET # BLD: 175 K/UL (ref 135–450)
PMV BLD AUTO: 7.9 FL (ref 5–10.5)
PO2 ARTERIAL: 130.1 MMHG (ref 75–108)
POTASSIUM REFLEX MAGNESIUM: 5.1 MMOL/L (ref 3.5–5.1)
PRO-BNP: 623 PG/ML (ref 0–124)
PROCALCITONIN: 0.32 NG/ML (ref 0–0.15)
RBC # BLD: 5.11 M/UL (ref 4–5.2)
SARS-COV-2 RNA, RT PCR: NOT DETECTED
SODIUM BLD-SCNC: 130 MMOL/L (ref 136–145)
TCO2 ARTERIAL: 37.8 MMOL/L
TOTAL PROTEIN: 8 G/DL (ref 6.4–8.2)
TROPONIN: <0.01 NG/ML
WBC # BLD: 6.4 K/UL (ref 4–11)

## 2021-08-04 PROCEDURE — 82803 BLOOD GASES ANY COMBINATION: CPT

## 2021-08-04 PROCEDURE — 96374 THER/PROPH/DIAG INJ IV PUSH: CPT

## 2021-08-04 PROCEDURE — 6370000000 HC RX 637 (ALT 250 FOR IP): Performed by: PHYSICIAN ASSISTANT

## 2021-08-04 PROCEDURE — 83880 ASSAY OF NATRIURETIC PEPTIDE: CPT

## 2021-08-04 PROCEDURE — 99285 EMERGENCY DEPT VISIT HI MDM: CPT

## 2021-08-04 PROCEDURE — 82077 ASSAY SPEC XCP UR&BREATH IA: CPT

## 2021-08-04 PROCEDURE — 93005 ELECTROCARDIOGRAM TRACING: CPT | Performed by: PHYSICIAN ASSISTANT

## 2021-08-04 PROCEDURE — 36600 WITHDRAWAL OF ARTERIAL BLOOD: CPT

## 2021-08-04 PROCEDURE — 2500000003 HC RX 250 WO HCPCS

## 2021-08-04 PROCEDURE — 84145 PROCALCITONIN (PCT): CPT

## 2021-08-04 PROCEDURE — 85025 COMPLETE CBC W/AUTO DIFF WBC: CPT

## 2021-08-04 PROCEDURE — 6360000002 HC RX W HCPCS: Performed by: PHYSICIAN ASSISTANT

## 2021-08-04 PROCEDURE — 80053 COMPREHEN METABOLIC PANEL: CPT

## 2021-08-04 PROCEDURE — 2700000000 HC OXYGEN THERAPY PER DAY

## 2021-08-04 PROCEDURE — 87636 SARSCOV2 & INF A&B AMP PRB: CPT

## 2021-08-04 PROCEDURE — 94660 CPAP INITIATION&MGMT: CPT

## 2021-08-04 PROCEDURE — 83605 ASSAY OF LACTIC ACID: CPT

## 2021-08-04 PROCEDURE — 84484 ASSAY OF TROPONIN QUANT: CPT

## 2021-08-04 PROCEDURE — 71045 X-RAY EXAM CHEST 1 VIEW: CPT

## 2021-08-04 RX ORDER — LEVALBUTEROL 1.25 MG/.5ML
1.26 SOLUTION, CONCENTRATE RESPIRATORY (INHALATION) ONCE
Status: COMPLETED | OUTPATIENT
Start: 2021-08-04 | End: 2021-08-04

## 2021-08-04 RX ORDER — IPRATROPIUM BROMIDE AND ALBUTEROL SULFATE 2.5; .5 MG/3ML; MG/3ML
1 SOLUTION RESPIRATORY (INHALATION) ONCE
Status: COMPLETED | OUTPATIENT
Start: 2021-08-04 | End: 2021-08-04

## 2021-08-04 RX ORDER — NICOTINE 21 MG/24HR
1 PATCH, TRANSDERMAL 24 HOURS TRANSDERMAL DAILY
Status: DISCONTINUED | OUTPATIENT
Start: 2021-08-05 | End: 2021-08-05

## 2021-08-04 RX ADMIN — LEVALBUTEROL 1.25 MG: 1.25 SOLUTION, CONCENTRATE RESPIRATORY (INHALATION) at 22:18

## 2021-08-04 RX ADMIN — IPRATROPIUM BROMIDE AND ALBUTEROL SULFATE 1 AMPULE: .5; 3 SOLUTION RESPIRATORY (INHALATION) at 21:58

## 2021-08-04 ASSESSMENT — PAIN DESCRIPTION - DESCRIPTORS: DESCRIPTORS: PRESSURE

## 2021-08-04 ASSESSMENT — PAIN DESCRIPTION - ONSET: ONSET: GRADUAL

## 2021-08-04 ASSESSMENT — PAIN SCALES - GENERAL: PAINLEVEL_OUTOF10: 5

## 2021-08-04 ASSESSMENT — PAIN DESCRIPTION - PAIN TYPE: TYPE: ACUTE PAIN

## 2021-08-04 ASSESSMENT — PAIN DESCRIPTION - LOCATION: LOCATION: CHEST

## 2021-08-04 ASSESSMENT — PAIN DESCRIPTION - FREQUENCY: FREQUENCY: CONTINUOUS

## 2021-08-05 ENCOUNTER — APPOINTMENT (OUTPATIENT)
Dept: CT IMAGING | Age: 56
DRG: 207 | End: 2021-08-05
Payer: MEDICARE

## 2021-08-05 ENCOUNTER — APPOINTMENT (OUTPATIENT)
Dept: GENERAL RADIOLOGY | Age: 56
DRG: 207 | End: 2021-08-05
Payer: MEDICARE

## 2021-08-05 PROBLEM — J96.01 ACUTE RESPIRATORY FAILURE WITH HYPOXIA AND HYPERCAPNIA (HCC): Status: ACTIVE | Noted: 2021-08-05

## 2021-08-05 PROBLEM — J96.02 ACUTE RESPIRATORY FAILURE WITH HYPOXIA AND HYPERCAPNIA (HCC): Status: ACTIVE | Noted: 2021-08-05

## 2021-08-05 LAB
A/G RATIO: 0.9 (ref 1.1–2.2)
ALBUMIN SERPL-MCNC: 3.3 G/DL (ref 3.4–5)
ALP BLD-CCNC: 183 U/L (ref 40–129)
ALT SERPL-CCNC: 37 U/L (ref 10–40)
AMPHETAMINE SCREEN, URINE: NORMAL
ANION GAP SERPL CALCULATED.3IONS-SCNC: 6 MMOL/L (ref 3–16)
AST SERPL-CCNC: 79 U/L (ref 15–37)
BACTERIA: ABNORMAL /HPF
BARBITURATE SCREEN URINE: NORMAL
BASE EXCESS ARTERIAL: 1.4 MMOL/L (ref -3–3)
BASE EXCESS ARTERIAL: 3.3 MMOL/L (ref -3–3)
BASE EXCESS ARTERIAL: 4.7 MMOL/L (ref -3–3)
BASE EXCESS ARTERIAL: 4.7 MMOL/L (ref -3–3)
BASOPHILS ABSOLUTE: 0 K/UL (ref 0–0.2)
BASOPHILS RELATIVE PERCENT: 0.6 %
BENZODIAZEPINE SCREEN, URINE: NORMAL
BILIRUB SERPL-MCNC: 1 MG/DL (ref 0–1)
BILIRUBIN URINE: NEGATIVE
BLOOD, URINE: ABNORMAL
BUN BLDV-MCNC: 6 MG/DL (ref 7–20)
CALCIUM SERPL-MCNC: 8.5 MG/DL (ref 8.3–10.6)
CANNABINOID SCREEN URINE: NORMAL
CARBOXYHEMOGLOBIN ARTERIAL: 3.6 % (ref 0–1.5)
CARBOXYHEMOGLOBIN ARTERIAL: 5.9 % (ref 0–1.5)
CARBOXYHEMOGLOBIN ARTERIAL: 7 % (ref 0–1.5)
CARBOXYHEMOGLOBIN ARTERIAL: 8.3 % (ref 0–1.5)
CHLORIDE BLD-SCNC: 93 MMOL/L (ref 99–110)
CLARITY: CLEAR
CO2: 31 MMOL/L (ref 21–32)
COCAINE METABOLITE SCREEN URINE: NORMAL
COLOR: YELLOW
CREAT SERPL-MCNC: <0.5 MG/DL (ref 0.6–1.1)
EKG ATRIAL RATE: 99 BPM
EKG DIAGNOSIS: NORMAL
EKG P AXIS: 71 DEGREES
EKG P-R INTERVAL: 124 MS
EKG Q-T INTERVAL: 340 MS
EKG QRS DURATION: 86 MS
EKG QTC CALCULATION (BAZETT): 436 MS
EKG R AXIS: 44 DEGREES
EKG T AXIS: 65 DEGREES
EKG VENTRICULAR RATE: 99 BPM
EOSINOPHILS ABSOLUTE: 0.1 K/UL (ref 0–0.6)
EOSINOPHILS RELATIVE PERCENT: 1.7 %
EPITHELIAL CELLS, UA: ABNORMAL /HPF (ref 0–5)
GFR AFRICAN AMERICAN: >60
GFR NON-AFRICAN AMERICAN: >60
GLOBULIN: 3.6 G/DL
GLUCOSE BLD-MCNC: 294 MG/DL (ref 70–99)
GLUCOSE BLD-MCNC: 311 MG/DL (ref 70–99)
GLUCOSE BLD-MCNC: 327 MG/DL (ref 70–99)
GLUCOSE BLD-MCNC: 338 MG/DL (ref 70–99)
GLUCOSE BLD-MCNC: 339 MG/DL (ref 70–99)
GLUCOSE BLD-MCNC: 423 MG/DL (ref 70–99)
GLUCOSE URINE: >=1000 MG/DL
HCO3 ARTERIAL: 31.6 MMOL/L (ref 21–29)
HCO3 ARTERIAL: 32.3 MMOL/L (ref 21–29)
HCO3 ARTERIAL: 34.3 MMOL/L (ref 21–29)
HCO3 ARTERIAL: 36.1 MMOL/L (ref 21–29)
HCT VFR BLD CALC: 43.5 % (ref 36–48)
HEMOGLOBIN, ART, EXTENDED: 15.1 G/DL (ref 12–16)
HEMOGLOBIN, ART, EXTENDED: 15.3 G/DL (ref 12–16)
HEMOGLOBIN, ART, EXTENDED: 15.6 G/DL (ref 12–16)
HEMOGLOBIN, ART, EXTENDED: 15.6 G/DL (ref 12–16)
HEMOGLOBIN: 14.3 G/DL (ref 12–16)
KETONES, URINE: NEGATIVE MG/DL
LACTIC ACID, SEPSIS: 1 MMOL/L (ref 0.4–1.9)
LEUKOCYTE ESTERASE, URINE: ABNORMAL
LYMPHOCYTES ABSOLUTE: 0.9 K/UL (ref 1–5.1)
LYMPHOCYTES RELATIVE PERCENT: 14.8 %
Lab: NORMAL
MCH RBC QN AUTO: 29.9 PG (ref 26–34)
MCHC RBC AUTO-ENTMCNC: 32.9 G/DL (ref 31–36)
MCV RBC AUTO: 90.9 FL (ref 80–100)
METHADONE SCREEN, URINE: NORMAL
METHEMOGLOBIN ARTERIAL: 0 %
METHEMOGLOBIN ARTERIAL: 0.3 %
MICROSCOPIC EXAMINATION: YES
MONOCYTES ABSOLUTE: 0.5 K/UL (ref 0–1.3)
MONOCYTES RELATIVE PERCENT: 7.4 %
NEUTROPHILS ABSOLUTE: 4.7 K/UL (ref 1.7–7.7)
NEUTROPHILS RELATIVE PERCENT: 75.5 %
NITRITE, URINE: NEGATIVE
O2 SAT, ARTERIAL: 91.5 %
O2 SAT, ARTERIAL: 91.7 %
O2 SAT, ARTERIAL: 91.9 %
O2 SAT, ARTERIAL: 94.4 %
O2 THERAPY: ABNORMAL
OPIATE SCREEN URINE: NORMAL
OXYCODONE URINE: NORMAL
PCO2 ARTERIAL: 60.3 MMHG (ref 35–45)
PCO2 ARTERIAL: 76.5 MMHG (ref 35–45)
PCO2 ARTERIAL: 85.7 MMHG (ref 35–45)
PCO2 ARTERIAL: 89.6 MMHG (ref 35–45)
PDW BLD-RTO: 16.1 % (ref 12.4–15.4)
PERFORMED ON: ABNORMAL
PH ARTERIAL: 7.22 (ref 7.35–7.45)
PH ARTERIAL: 7.22 (ref 7.35–7.45)
PH ARTERIAL: 7.23 (ref 7.35–7.45)
PH ARTERIAL: 7.35 (ref 7.35–7.45)
PH UA: 6.5
PH UA: 6.5 (ref 5–8)
PHENCYCLIDINE SCREEN URINE: NORMAL
PLATELET # BLD: 158 K/UL (ref 135–450)
PMV BLD AUTO: 7.7 FL (ref 5–10.5)
PO2 ARTERIAL: 66.8 MMHG (ref 75–108)
PO2 ARTERIAL: 73.7 MMHG (ref 75–108)
PO2 ARTERIAL: 76.2 MMHG (ref 75–108)
PO2 ARTERIAL: 88 MMHG (ref 75–108)
POTASSIUM REFLEX MAGNESIUM: 4.9 MMOL/L (ref 3.5–5.1)
PROPOXYPHENE SCREEN: NORMAL
PROTEIN UA: NEGATIVE MG/DL
RBC # BLD: 4.78 M/UL (ref 4–5.2)
RBC UA: ABNORMAL /HPF (ref 0–4)
SODIUM BLD-SCNC: 130 MMOL/L (ref 136–145)
SPECIFIC GRAVITY UA: <=1.005 (ref 1–1.03)
TCO2 ARTERIAL: 34 MMOL/L
TCO2 ARTERIAL: 34.2 MMOL/L
TCO2 ARTERIAL: 36.9 MMOL/L
TCO2 ARTERIAL: 38.8 MMOL/L
TOTAL PROTEIN: 6.9 G/DL (ref 6.4–8.2)
URINE TYPE: ABNORMAL
UROBILINOGEN, URINE: 0.2 E.U./DL
WBC # BLD: 6.2 K/UL (ref 4–11)
WBC UA: ABNORMAL /HPF (ref 0–5)

## 2021-08-05 PROCEDURE — 6370000000 HC RX 637 (ALT 250 FOR IP): Performed by: INTERNAL MEDICINE

## 2021-08-05 PROCEDURE — 93010 ELECTROCARDIOGRAM REPORT: CPT | Performed by: INTERNAL MEDICINE

## 2021-08-05 PROCEDURE — 87040 BLOOD CULTURE FOR BACTERIA: CPT

## 2021-08-05 PROCEDURE — 2000000000 HC ICU R&B

## 2021-08-05 PROCEDURE — 85025 COMPLETE CBC W/AUTO DIFF WBC: CPT

## 2021-08-05 PROCEDURE — 81001 URINALYSIS AUTO W/SCOPE: CPT

## 2021-08-05 PROCEDURE — 6360000002 HC RX W HCPCS: Performed by: INTERNAL MEDICINE

## 2021-08-05 PROCEDURE — 99291 CRITICAL CARE FIRST HOUR: CPT | Performed by: INTERNAL MEDICINE

## 2021-08-05 PROCEDURE — 2700000000 HC OXYGEN THERAPY PER DAY

## 2021-08-05 PROCEDURE — 36600 WITHDRAWAL OF ARTERIAL BLOOD: CPT

## 2021-08-05 PROCEDURE — 83036 HEMOGLOBIN GLYCOSYLATED A1C: CPT

## 2021-08-05 PROCEDURE — 5A1955Z RESPIRATORY VENTILATION, GREATER THAN 96 CONSECUTIVE HOURS: ICD-10-PCS | Performed by: INTERNAL MEDICINE

## 2021-08-05 PROCEDURE — 2580000003 HC RX 258: Performed by: INTERNAL MEDICINE

## 2021-08-05 PROCEDURE — 2500000003 HC RX 250 WO HCPCS: Performed by: INTERNAL MEDICINE

## 2021-08-05 PROCEDURE — 94761 N-INVAS EAR/PLS OXIMETRY MLT: CPT

## 2021-08-05 PROCEDURE — 6360000004 HC RX CONTRAST MEDICATION: Performed by: PHYSICIAN ASSISTANT

## 2021-08-05 PROCEDURE — 71260 CT THORAX DX C+: CPT

## 2021-08-05 PROCEDURE — 71045 X-RAY EXAM CHEST 1 VIEW: CPT

## 2021-08-05 PROCEDURE — 1200000000 HC SEMI PRIVATE

## 2021-08-05 PROCEDURE — 0BH17EZ INSERTION OF ENDOTRACHEAL AIRWAY INTO TRACHEA, VIA NATURAL OR ARTIFICIAL OPENING: ICD-10-PCS | Performed by: INTERNAL MEDICINE

## 2021-08-05 PROCEDURE — 2580000003 HC RX 258: Performed by: PHYSICIAN ASSISTANT

## 2021-08-05 PROCEDURE — 80307 DRUG TEST PRSMV CHEM ANLYZR: CPT

## 2021-08-05 PROCEDURE — 83605 ASSAY OF LACTIC ACID: CPT

## 2021-08-05 PROCEDURE — 94640 AIRWAY INHALATION TREATMENT: CPT

## 2021-08-05 PROCEDURE — 94002 VENT MGMT INPAT INIT DAY: CPT

## 2021-08-05 PROCEDURE — 82803 BLOOD GASES ANY COMBINATION: CPT

## 2021-08-05 PROCEDURE — 6360000002 HC RX W HCPCS: Performed by: PHYSICIAN ASSISTANT

## 2021-08-05 PROCEDURE — 87086 URINE CULTURE/COLONY COUNT: CPT

## 2021-08-05 PROCEDURE — 80053 COMPREHEN METABOLIC PANEL: CPT

## 2021-08-05 PROCEDURE — 36415 COLL VENOUS BLD VENIPUNCTURE: CPT

## 2021-08-05 RX ORDER — METHYLPREDNISOLONE SODIUM SUCCINATE 125 MG/2ML
125 INJECTION, POWDER, LYOPHILIZED, FOR SOLUTION INTRAMUSCULAR; INTRAVENOUS ONCE
Status: COMPLETED | OUTPATIENT
Start: 2021-08-05 | End: 2021-08-05

## 2021-08-05 RX ORDER — IPRATROPIUM BROMIDE AND ALBUTEROL SULFATE 2.5; .5 MG/3ML; MG/3ML
3 SOLUTION RESPIRATORY (INHALATION)
Status: DISCONTINUED | OUTPATIENT
Start: 2021-08-05 | End: 2021-08-11

## 2021-08-05 RX ORDER — PROPOFOL 10 MG/ML
5-50 INJECTION, EMULSION INTRAVENOUS
Status: DISCONTINUED | OUTPATIENT
Start: 2021-08-05 | End: 2021-08-13

## 2021-08-05 RX ORDER — METHYLPREDNISOLONE SODIUM SUCCINATE 40 MG/ML
40 INJECTION, POWDER, LYOPHILIZED, FOR SOLUTION INTRAMUSCULAR; INTRAVENOUS EVERY 12 HOURS
Status: COMPLETED | OUTPATIENT
Start: 2021-08-05 | End: 2021-08-07

## 2021-08-05 RX ORDER — ACETAMINOPHEN 650 MG/1
650 SUPPOSITORY RECTAL EVERY 6 HOURS PRN
Status: DISCONTINUED | OUTPATIENT
Start: 2021-08-05 | End: 2021-08-19 | Stop reason: HOSPADM

## 2021-08-05 RX ORDER — TOBRAMYCIN AND DEXAMETHASONE 3; 1 MG/ML; MG/ML
1 SUSPENSION/ DROPS OPHTHALMIC
Status: DISCONTINUED | OUTPATIENT
Start: 2021-08-05 | End: 2021-08-19 | Stop reason: HOSPADM

## 2021-08-05 RX ORDER — ACETAMINOPHEN 325 MG/1
650 TABLET ORAL EVERY 6 HOURS PRN
Status: DISCONTINUED | OUTPATIENT
Start: 2021-08-05 | End: 2021-08-19 | Stop reason: HOSPADM

## 2021-08-05 RX ORDER — ROCURONIUM BROMIDE 10 MG/ML
INJECTION, SOLUTION INTRAVENOUS
Status: COMPLETED | OUTPATIENT
Start: 2021-08-05 | End: 2021-08-05

## 2021-08-05 RX ORDER — PROPOFOL 10 MG/ML
INJECTION, EMULSION INTRAVENOUS
Status: DISPENSED
Start: 2021-08-05 | End: 2021-08-05

## 2021-08-05 RX ORDER — NICOTINE 21 MG/24HR
1 PATCH, TRANSDERMAL 24 HOURS TRANSDERMAL DAILY
Status: DISCONTINUED | OUTPATIENT
Start: 2021-08-05 | End: 2021-08-19 | Stop reason: HOSPADM

## 2021-08-05 RX ORDER — FENTANYL CITRATE 50 UG/ML
25 INJECTION, SOLUTION INTRAMUSCULAR; INTRAVENOUS
Status: DISCONTINUED | OUTPATIENT
Start: 2021-08-05 | End: 2021-08-12

## 2021-08-05 RX ORDER — PREDNISONE 20 MG/1
40 TABLET ORAL
Status: DISCONTINUED | OUTPATIENT
Start: 2021-08-08 | End: 2021-08-12

## 2021-08-05 RX ORDER — NICOTINE POLACRILEX 4 MG
15 LOZENGE BUCCAL PRN
Status: DISCONTINUED | OUTPATIENT
Start: 2021-08-05 | End: 2021-08-19 | Stop reason: HOSPADM

## 2021-08-05 RX ORDER — ALBUTEROL SULFATE 2.5 MG/3ML
2.5 SOLUTION RESPIRATORY (INHALATION)
Status: DISCONTINUED | OUTPATIENT
Start: 2021-08-05 | End: 2021-08-19 | Stop reason: HOSPADM

## 2021-08-05 RX ORDER — CHLORHEXIDINE GLUCONATE 0.12 MG/ML
15 RINSE ORAL 2 TIMES DAILY
Status: DISCONTINUED | OUTPATIENT
Start: 2021-08-05 | End: 2021-08-12

## 2021-08-05 RX ORDER — 0.9 % SODIUM CHLORIDE 0.9 %
30 INTRAVENOUS SOLUTION INTRAVENOUS ONCE
Status: COMPLETED | OUTPATIENT
Start: 2021-08-05 | End: 2021-08-05

## 2021-08-05 RX ORDER — SODIUM CHLORIDE 9 MG/ML
INJECTION, SOLUTION INTRAVENOUS CONTINUOUS
Status: DISCONTINUED | OUTPATIENT
Start: 2021-08-05 | End: 2021-08-07

## 2021-08-05 RX ORDER — SODIUM CHLORIDE 9 MG/ML
1000 INJECTION, SOLUTION INTRAVENOUS CONTINUOUS
Status: DISCONTINUED | OUTPATIENT
Start: 2021-08-05 | End: 2021-08-05

## 2021-08-05 RX ORDER — KETAMINE HYDROCHLORIDE 50 MG/ML
INJECTION, SOLUTION, CONCENTRATE INTRAMUSCULAR; INTRAVENOUS
Status: COMPLETED | OUTPATIENT
Start: 2021-08-05 | End: 2021-08-05

## 2021-08-05 RX ORDER — ALBUTEROL SULFATE 2.5 MG/3ML
2.5 SOLUTION RESPIRATORY (INHALATION) EVERY 6 HOURS PRN
Status: DISCONTINUED | OUTPATIENT
Start: 2021-08-05 | End: 2021-08-05

## 2021-08-05 RX ORDER — ONDANSETRON 2 MG/ML
4 INJECTION INTRAMUSCULAR; INTRAVENOUS EVERY 6 HOURS PRN
Status: DISCONTINUED | OUTPATIENT
Start: 2021-08-05 | End: 2021-08-19 | Stop reason: HOSPADM

## 2021-08-05 RX ORDER — DEXTROSE MONOHYDRATE 25 G/50ML
12.5 INJECTION, SOLUTION INTRAVENOUS PRN
Status: DISCONTINUED | OUTPATIENT
Start: 2021-08-05 | End: 2021-08-19 | Stop reason: HOSPADM

## 2021-08-05 RX ORDER — POLYETHYLENE GLYCOL 3350 17 G/17G
17 POWDER, FOR SOLUTION ORAL DAILY PRN
Status: DISCONTINUED | OUTPATIENT
Start: 2021-08-05 | End: 2021-08-18

## 2021-08-05 RX ORDER — DEXTROSE MONOHYDRATE 50 MG/ML
100 INJECTION, SOLUTION INTRAVENOUS PRN
Status: DISCONTINUED | OUTPATIENT
Start: 2021-08-05 | End: 2021-08-19 | Stop reason: HOSPADM

## 2021-08-05 RX ORDER — MIDAZOLAM HYDROCHLORIDE 1 MG/ML
2 INJECTION INTRAMUSCULAR; INTRAVENOUS
Status: DISCONTINUED | OUTPATIENT
Start: 2021-08-05 | End: 2021-08-13

## 2021-08-05 RX ADMIN — MIDAZOLAM HYDROCHLORIDE 2 MG: 1 INJECTION, SOLUTION INTRAMUSCULAR; INTRAVENOUS at 07:22

## 2021-08-05 RX ADMIN — PROPOFOL 10 MCG/KG/MIN: 10 INJECTION, EMULSION INTRAVENOUS at 04:56

## 2021-08-05 RX ADMIN — TOBRAMYCIN AND DEXAMETHASONE 1 DROP: 3; 1 SUSPENSION/ DROPS OPHTHALMIC at 06:12

## 2021-08-05 RX ADMIN — IPRATROPIUM BROMIDE AND ALBUTEROL SULFATE 3 ML: .5; 3 SOLUTION RESPIRATORY (INHALATION) at 19:48

## 2021-08-05 RX ADMIN — KETAMINE HYDROCHLORIDE 99.8 MG: 50 INJECTION INTRAMUSCULAR; INTRAVENOUS at 04:31

## 2021-08-05 RX ADMIN — MIDAZOLAM HYDROCHLORIDE 2 MG: 1 INJECTION, SOLUTION INTRAMUSCULAR; INTRAVENOUS at 08:44

## 2021-08-05 RX ADMIN — PROPOFOL 50 MCG/KG/MIN: 10 INJECTION, EMULSION INTRAVENOUS at 11:50

## 2021-08-05 RX ADMIN — MIDAZOLAM HYDROCHLORIDE 2 MG: 1 INJECTION, SOLUTION INTRAMUSCULAR; INTRAVENOUS at 19:26

## 2021-08-05 RX ADMIN — Medication 25 MCG/HR: at 10:28

## 2021-08-05 RX ADMIN — FENTANYL CITRATE 25 MCG: 0.05 INJECTION, SOLUTION INTRAMUSCULAR; INTRAVENOUS at 06:25

## 2021-08-05 RX ADMIN — MUPIROCIN: 20 OINTMENT TOPICAL at 08:44

## 2021-08-05 RX ADMIN — FAMOTIDINE 20 MG: 10 INJECTION, SOLUTION INTRAVENOUS at 20:38

## 2021-08-05 RX ADMIN — IPRATROPIUM BROMIDE AND ALBUTEROL SULFATE 3 ML: .5; 3 SOLUTION RESPIRATORY (INHALATION) at 15:27

## 2021-08-05 RX ADMIN — FAMOTIDINE 20 MG: 10 INJECTION, SOLUTION INTRAVENOUS at 08:11

## 2021-08-05 RX ADMIN — MIDAZOLAM HYDROCHLORIDE 2 MG: 1 INJECTION, SOLUTION INTRAMUSCULAR; INTRAVENOUS at 10:46

## 2021-08-05 RX ADMIN — TOBRAMYCIN AND DEXAMETHASONE 1 DROP: 3; 1 SUSPENSION/ DROPS OPHTHALMIC at 16:09

## 2021-08-05 RX ADMIN — IOPAMIDOL 85 ML: 755 INJECTION, SOLUTION INTRAVENOUS at 00:18

## 2021-08-05 RX ADMIN — IPRATROPIUM BROMIDE AND ALBUTEROL SULFATE 3 ML: .5; 3 SOLUTION RESPIRATORY (INHALATION) at 23:12

## 2021-08-05 RX ADMIN — TOBRAMYCIN AND DEXAMETHASONE 1 DROP: 3; 1 SUSPENSION/ DROPS OPHTHALMIC at 20:38

## 2021-08-05 RX ADMIN — PROPOFOL 50 MCG/KG/MIN: 10 INJECTION, EMULSION INTRAVENOUS at 15:18

## 2021-08-05 RX ADMIN — MIDAZOLAM HYDROCHLORIDE 2 MG: 1 INJECTION, SOLUTION INTRAMUSCULAR; INTRAVENOUS at 15:34

## 2021-08-05 RX ADMIN — CHLORHEXIDINE GLUCONATE 0.12% ORAL RINSE 15 ML: 1.2 LIQUID ORAL at 20:38

## 2021-08-05 RX ADMIN — MIDAZOLAM HYDROCHLORIDE 2 MG: 1 INJECTION, SOLUTION INTRAMUSCULAR; INTRAVENOUS at 05:59

## 2021-08-05 RX ADMIN — PROPOFOL 50 MCG/KG/MIN: 10 INJECTION, EMULSION INTRAVENOUS at 08:13

## 2021-08-05 RX ADMIN — INSULIN LISPRO 4 UNITS: 100 INJECTION, SOLUTION INTRAVENOUS; SUBCUTANEOUS at 08:40

## 2021-08-05 RX ADMIN — CEFEPIME 2000 MG: 2 INJECTION, POWDER, FOR SOLUTION INTRAVENOUS at 05:48

## 2021-08-05 RX ADMIN — DEXTROSE MONOHYDRATE 500 MG: 50 INJECTION, SOLUTION INTRAVENOUS at 21:02

## 2021-08-05 RX ADMIN — SODIUM CHLORIDE: 9 INJECTION, SOLUTION INTRAVENOUS at 11:49

## 2021-08-05 RX ADMIN — METHYLPREDNISOLONE SODIUM SUCCINATE 125 MG: 125 INJECTION, POWDER, FOR SOLUTION INTRAMUSCULAR; INTRAVENOUS at 06:11

## 2021-08-05 RX ADMIN — SODIUM CHLORIDE: 9 INJECTION, SOLUTION INTRAVENOUS at 05:22

## 2021-08-05 RX ADMIN — PROPOFOL 50 MCG/KG/MIN: 10 INJECTION, EMULSION INTRAVENOUS at 18:49

## 2021-08-05 RX ADMIN — MIDAZOLAM HYDROCHLORIDE 2 MG: 1 INJECTION, SOLUTION INTRAMUSCULAR; INTRAVENOUS at 17:26

## 2021-08-05 RX ADMIN — DEXTROSE MONOHYDRATE 500 MG: 50 INJECTION, SOLUTION INTRAVENOUS at 02:32

## 2021-08-05 RX ADMIN — INSULIN LISPRO 4 UNITS: 100 INJECTION, SOLUTION INTRAVENOUS; SUBCUTANEOUS at 05:53

## 2021-08-05 RX ADMIN — FENTANYL CITRATE 25 MCG: 0.05 INJECTION, SOLUTION INTRAMUSCULAR; INTRAVENOUS at 08:05

## 2021-08-05 RX ADMIN — CEFTRIAXONE 2000 MG: 2 INJECTION, POWDER, FOR SOLUTION INTRAMUSCULAR; INTRAVENOUS at 10:32

## 2021-08-05 RX ADMIN — IPRATROPIUM BROMIDE AND ALBUTEROL SULFATE 3 ML: .5; 3 SOLUTION RESPIRATORY (INHALATION) at 11:41

## 2021-08-05 RX ADMIN — CHLORHEXIDINE GLUCONATE 0.12% ORAL RINSE 15 ML: 1.2 LIQUID ORAL at 08:11

## 2021-08-05 RX ADMIN — METHYLPREDNISOLONE SODIUM SUCCINATE 40 MG: 40 INJECTION, POWDER, FOR SOLUTION INTRAMUSCULAR; INTRAVENOUS at 18:44

## 2021-08-05 RX ADMIN — ROCURONIUM BROMIDE 59.88 MG: 10 INJECTION INTRAVENOUS at 04:31

## 2021-08-05 RX ADMIN — ENOXAPARIN SODIUM 40 MG: 40 INJECTION SUBCUTANEOUS at 08:11

## 2021-08-05 RX ADMIN — TOBRAMYCIN AND DEXAMETHASONE 1 DROP: 3; 1 SUSPENSION/ DROPS OPHTHALMIC at 11:51

## 2021-08-05 RX ADMIN — PROPOFOL 50 MCG/KG/MIN: 10 INJECTION, EMULSION INTRAVENOUS at 22:40

## 2021-08-05 RX ADMIN — TOBRAMYCIN AND DEXAMETHASONE 1 DROP: 3; 1 SUSPENSION/ DROPS OPHTHALMIC at 08:11

## 2021-08-05 RX ADMIN — MUPIROCIN: 20 OINTMENT TOPICAL at 21:02

## 2021-08-05 RX ADMIN — SODIUM CHLORIDE 1641 ML: 9 INJECTION, SOLUTION INTRAVENOUS at 03:06

## 2021-08-05 RX ADMIN — IPRATROPIUM BROMIDE AND ALBUTEROL SULFATE 3 ML: .5; 3 SOLUTION RESPIRATORY (INHALATION) at 08:03

## 2021-08-05 ASSESSMENT — PULMONARY FUNCTION TESTS
PIF_VALUE: 28
PIF_VALUE: 25
PIF_VALUE: 25
PIF_VALUE: 29
PIF_VALUE: 25

## 2021-08-05 ASSESSMENT — PAIN SCALES - GENERAL
PAINLEVEL_OUTOF10: 8
PAINLEVEL_OUTOF10: 8
PAINLEVEL_OUTOF10: 3

## 2021-08-05 NOTE — PROGRESS NOTES
Brief note. Chart reviewed. Patient seen by Dr. Debbe Duane early this morning. I spoke to patient's ICU nurse. Patient admitted with acute respiratory failure, COPD exacerbation. Failed BiPAP, intubated.    Seen by intensivist

## 2021-08-05 NOTE — CONSULTS
Patient is being seen at the request of Keith Pringle MD for a consultation for respiratory failure     HISTORY OF PRESENT ILLNESS:   61 y.o. with COPD presented with altered mental status. Patient with underlying history of dementia. Progressive shortness of breath over the past week. Productive cough thick/clear and at times yellow. ABG showed hypercapnia. Patient failed BiPAP and subsequently intubated for worsening mentation. Intubated and sedated not able to obtain. PAST MEDICAL HISTORY:  Past Medical History:   Diagnosis Date    [facility]     Dementia ([facility]     Diabetes mellitus ([facility]      PAST SURGICAL HISTORY:  Past Surgical History:   Procedure Laterality Date    ANKLE FRACTURE SURGERY Right 9/23/2020    OPEN REDUCTION INTERNAL FIXATION RIGHT ANKLE FRACTURE performed by Micki Diane DO at [facility]  Right 09/23/2020    ORIF    BRONCHOSCOPY N/A 2/7/2020    BRONCHOSCOPY performed by Kiki Diaz MD at [address]  2/7/2020    BRONCHOSCOPY THERAPUTIC ASPIRATION INITIAL performed by Kiki Diaz MD at [address] N/A 2/10/2020    BRONCHOSCOPY ALVEOLAR LAVAGE performed by Regan Ring MD at [address]  2/10/2020    BRONCHOSCOPY THERAPUTIC ASPIRATION SUBSEQUENT performed by Regan Ring MD at [address] [place] [address] Rd:  family history includes Cancer in her father. SOCIAL HISTORY:   reports that she has been smoking cigarettes. She has been smoking about 1.00 pack per day.  She has never used smokeless tobacco.    Scheduled Meds:   cefepime  2,000 mg Intravenous Once    cefepime  2,000 mg Intravenous Q12H    nicotine  1 patch Transdermal Daily    ipratropium-albuterol  3 mL Inhalation Q4H WA    enoxaparin  40 mg Subcutaneous Daily    methylPREDNISolone  40 mg Intravenous Q12H    Followed by   Marlene Ralph ON 8/8/2021] predniSONE  40 mg Oral Daily with breakfast    insulin lispro  0-6 Units Subcutaneous Q4H    tobramycin-dexamethasone  1 drop Both Eyes 6 times per day    chlorhexidine  15 mL Mouth/Throat BID    famotidine (PEPCID) injection  20 mg Intravenous BID     Continuous Infusions:   dextrose      sodium chloride 75 mL/hr at 08/05/21 0522    propofol 50 mcg/kg/min (08/05/21 0549)     PRN Meds:  glucose, dextrose, glucagon (rDNA), dextrose, polyethylene glycol, acetaminophen **OR** acetaminophen, ondansetron, fentanNYL, midazolam, albuterol    ALLERGIES:  Patient has No Known Allergies. REVIEW OF SYSTEMS: intubated and sedated not able to obtain     PHYSICAL EXAM:  Blood pressure (!) 157/95, pulse 100, temperature 97.6 °F (36.4 °C), temperature source Axillary, resp. rate 18, height 5' 4\" (1.626 m), weight 220 lb 1.6 oz (99.8 kg), SpO2 97 %.' on AC 18/340/+5 45%   General: ill appearing. Intubated sedated. Eyes: PERRL. No sclera icterus. No conjunctival injection. ENT: No discharge. Pharynx clear. ET tube in place  Neck: Trachea midline. Normal thyroid. Resp: No accessory muscle use. No crackles. Bilateral wheezing. No rhonchi. No dullness on percussion. CV: Regular rate. Regular rhythm. No mumur or rub. No edema. GI: Non-tender. Non-distended. No masses. No organomegaly. Normal bowel sounds. No hernia. Skin: Warm and dry. No nodule on exposed extremities. No rash on exposed extremities. Lymph: No cervical LAD. No supraclavicular LAD. M/S: No cyanosis. No joint deformity. No clubbing. Neuro: Intubated sedated. Responsive to painful stimuli. Followed commands. .  Psych: Noncommunicative unable to obtain      LABS:  CBC:   Recent Labs     08/04/21 2133 08/05/21  0406   WBC 6.4 6.2   HGB 15.2 14.3   HCT 46.6 43.5   MCV 91.1 90.9    158     BMP:   Recent Labs     08/04/21 2133 08/05/21  0406   * 130*   K 5.1 4.9   CL 89* 93*   CO2 32 31   BUN 6* 6*   CREATININE <0.5* <0.5*     LIVER PROFILE:   Recent Labs 08/04/21  2133 08/05/21  0406   AST 90* 79*   ALT 48* 37   BILITOT 1.0 1.0   ALKPHOS 215* 183*     PT/INR: No results for input(s): PROTIME, INR in the last 72 hours. APTT: No results for input(s): APTT in the last 72 hours. UA:  Recent Labs     08/05/21  0155   COLORU Yellow   PHUR 6.5  6.5   WBCUA 10-20*   RBCUA 3-4   BACTERIA Rare*   CLARITYU Clear   SPECGRAV <=1.005   LEUKOCYTESUR MODERATE*   UROBILINOGEN 0.2   BILIRUBINUR Negative   BLOODU TRACE-INTACT*   GLUCOSEU >=1000*     Recent Labs     08/05/21  0409 08/05/21  0605   PHART 7.220* 7.234*   XMW1SWT 85.7* 76.5*   PO2ART 76.2 73.7*       Chest x-ray 8/5 imaging was reviewed by me and showed   High ETT position- 23 cm  No acute infiltrates     CTPA 8/5/21  images reviewed by me and showed:   No evidence of pulmonary embolism or acute pulmonary abnormality    ASSESSMENT:  · Acute hypoxemic hypercapnic respiratory failure  · COPD with acute exacerbation  · Acute encephalopathy/metabolic encephalopathy  · Hyperglycemia   · UTI  · Tobacco abuse      PLAN:  Mechanical ventilation as per my orders.  The ventilator was adjusted by me at the bedside for unstable, life threatening respiratory failure  Advance ETT 2 cm donw   Increase RR 22   ABG in 1 hr   Follow ABG and chest x-ray while on the ventilator  Supplemental oxygen to maintain SaO2 >92%; wean as tolerated  IV Propofol for sedation, target RASS -2, with daily spontaneous awakening trial   Fentanyl and Versed PRN, gtt as needed  Head of bed 30 degrees or higher at all times  Daily spontaneous breathing trial once PEEP less than 8, FiO2 less than 55%  Closely monitory airways, clinical status, cardiac rhythm, vital signs, and urine output   Follow TG   Inhaled bronchodilators  Solumedrol 40 IV Q12 hrs   Ceftriaxone and Zithromax   Nutrition: Tube feeding  Blood sugar control, with goal 150-180  GI prophylaxis: Pepcid  DVT prophylaxis: Lovenox  MRSA prophylaxis: Bactroban   Code status: Full code Total critical care time caring for this patient with life threatening, unstable organ failure, including direct patient contact, management of life support systems, review of data including imaging and labs, discussions with other team members and physicians is 33 minutes, excluding procedures.

## 2021-08-05 NOTE — PROGRESS NOTES
RESPIRATORY THERAPY ASSESSMENT    Name:  Claire Caal Record Number:  [id number]  Age: 61 y.o. Gender: female  : 1965  Today's Date:  2021  Room:  [facility]3009-    Assessment     Is the patient being admitted for a COPD or Asthma exacerbation? No   (If yes the patient will be seen every 4 hours for the first 24 hours and then reassessed)    Patient Admission Diagnosis      Allergies  No Known Allergies    Minimum Predicted Vital Capacity:               Actual Vital Capacity:                    Pulmonary History:COPD  Home Oxygen Therapy:  room air  Home Respiratory Therapy:Albuterol/Ipratropium Bromide HHN   Current Respiratory Therapy:  duoneb q4wa          Respiratory Severity Index(RSI)   Patients with orders for inhalation medications, oxygen, or any therapeutic treatment modality will be placed on Respiratory Protocol. They will be assessed with the first treatment and at least every 72 hours thereafter. The following severity scale will be used to determine frequency of treatment intervention.     Smoking History: Pulmonary Disease or Smoking History, Greater than 15 pack year = 2    Social History  Social History     Tobacco Use    Smoking status: Current Every Day Smoker     Packs/day: 1.00     Types: Cigarettes    Smokeless tobacco: Never Used   Vaping Use    Vaping Use: Never used   Substance Use Topics    Alcohol use: Never    Drug use: Not Currently     Types: IV     Comment: Fentanyl and heroin       Recent Surgical History: None = 0  Past Surgical History  Past Surgical History:   Procedure Laterality Date    ANKLE FRACTURE SURGERY Right 2020    OPEN REDUCTION INTERNAL FIXATION RIGHT ANKLE FRACTURE performed by Kevyn Goldman DO at [address] [facility] Right 2020    ORIF    BRONCHOSCOPY N/A 2020    BRONCHOSCOPY performed by Shekhar Whalen MD at [address] [place] [address]  2020    BRONCHOSCOPY THERAPUTIC ASPIRATION INITIAL performed by Loraine Cohn MD at [address] N/A 2/10/2020    BRONCHOSCOPY ALVEOLAR LAVAGE performed by Alec Robb MD at [address]  2/10/2020    BRONCHOSCOPY THERAPUTIC ASPIRATION SUBSEQUENT performed by Alec Robb MD at [facility]       Level of Consciousness: Lethargic = 3    Level of Activity: Walking with assistance = 1    Respiratory Pattern: Regular Pattern; RR 8-20 = 0    Breath Sounds: Diminshed bilaterally and/or crackles = 2    Sputum   ,  ,    Cough: Strong, spontaneous, non-productive = 0    Vital Signs   /70   Pulse 91   Temp 97.3 °F (36.3 °C) (Axillary)   Resp 28   Ht 5' 4\" (1.626 m)   Wt 220 lb 1.6 oz (99.8 kg)   SpO2 95%   BMI 37.78 kg/m²   SPO2 (COPD values may differ): 86-87% on room air or greater than 92% on FiO2 35- 50% = 3    Peak Flow (asthma only): not applicable = 0    RSI: 26-44 = Q6H or QID and Q4HPRN for dyspnea        Plan       Goals: mobilize retained secretions, volume expansion and improve oxygenation    Patient/caregiver was educated on the proper method of use for Respiratory Care Devices:  Yes      Level of patient/caregiver understanding able to:   ? Verbalize understanding   ? Demonstrate understanding       ? Teach back        ? Needs reinforcement       ? No available caregiver               ? Other:     Response to education:  pt on vent     Is patient being placed on Home Treatment Regimen? Yes     Does the patient have everything they need prior to discharge? NA     Comments: pt assessed and chart reviewed    Plan of Care: duoneb q4wa, albuterol q2 prn    Electronically signed by Flaco Nguyễn RCP on 8/5/2021 at 4:57 AM    Respiratory Protocol Guidelines     1. Assessment and treatment by Respiratory Therapy will be initiated for medication and therapeutic interventions upon initiation of aerosolized medication.   2. Physician will be contacted for respiratory rate (RR) greater than 35 breaths per minute. Therapy will be held for heart rate (HR) greater than 140 beats per minute, pending direction from physician. 3. Bronchodilators will be administered via Metered Dose Inhaler (MDI) with spacer when the following criteria are met:  a. Alert and cooperative     b. HR < 140 bpm  c. RR < 30 bpm                d. Can demonstrate a 2-3 second inspiratory hold  4. Bronchodilators will be administered via Hand Held Nebulizer [facility]) to patients when ANY of the following criteria are met  a. Incognizant or uncooperative          b. Patients treated with HHN at Home        c. Unable to demonstrate proper use of MDI with spacer     d. RR > 30 bpm   5. Bronchodilators will be delivered via Metered Dose Inhaler (MDI), HHN, Aer[employer] to intubated patients on mechanical ventilation. 6. Inhalation medication orders will be delivered and/or substituted as outlined below. Aerosolized Medications Ordering and Administration Guidelines:    1. All Medications will be ordered by a physician, and their frequency and/or modality will be adjusted as defined by the patients Respiratory Severity Index (RSI) score. 2. If the patient does not have documented COPD, consider discontinuing anticholinergics when RSI is less than 9.  3. If the bronchospasm worsens (increased RSI), then the bronchodilator frequency can be increased to a maximum of every 4 hours. If greater than every 4 hours is required, the physician will be contacted. 4. If the bronchospasm improves, the frequency of the bronchodilator can be decreased, based on the patient's RSI, but not less than home treatment regimen frequency. 5. Bronchodilator(s) will be discontinued if patient has a RSI less than 9 and has received no scheduled or as needed treatment for 72  Hrs. Patients Ordered on a Mucolytic Agent:    1. Must always be administered with a bronchodilator.     2. Discontinue if patient experiences worsened bronchospasm, or secretions have lessened to the point that the patient is able to clear them with a cough. Anti-inflammatory and Combination Medications:    1. If the patient lacks prior history of lung disease, is not using inhaled anti-inflammatory medication at home, and lacks wheezing by examination or by history for at least 24 hours, contact physician for possible discontinuation.

## 2021-08-05 NOTE — PROCEDURES
Pt was emergently intubated secondary worsening MS. Repeat ABG pCO2 actually looked a little better on last ABG but pH got slightly worse, however, her MS has declined since I saw her in ER. Bety Toney Pt was sedated with 100 mg Propofol and  mg succinylcholine. Pt was intubated in a single attempt using a Glide scope with a size 3 MAC in place. The tip of a size 8 ETT was inserted between the cords and then advanced off of the stylette into the air way to 22 cm from the lip. Cuff was inflated and secured. Good CO2 color change and bilateral breath sounds auscultated. Vent settings provided to respiratory staff. OG was passed to 60 cm from the lip. CXR ordered and pending to verify tube placement.

## 2021-08-05 NOTE — PROGRESS NOTES
08/05/21 0320   NIV Type   Equipment Type V60   Mode Bilevel   Mask Type Full face mask   Mask Size Medium   Settings/Measurements   IPAP 23 cmH20   CPAP/EPAP 5 cmH2O   Rate Ordered 16   Resp 19   Insp Rise Time (%) 2 %   FiO2  35 %   I Time/ I Time % 0.9 s   Vt Exhaled 424 mL   Minute Volume 9 Liters   Mask Leak (lpm) 4 lpm   Comfort Level Good   Using Accessory Muscles No   SpO2 100   Patient Transport   Time spent transporting 16-30   Transport ventillation type Noninvasive   Transport from ER   Transport destination ICU   Emergency equipment included Yes

## 2021-08-05 NOTE — CARE COORDINATION
Case Management Assessment  Initial Evaluation      Patient Name: Ana Alva  YOB: 1965  Diagnosis: Septicemia ([facility] [A41.9]  Acute cystitis without hematuria [N30.00]  Acute respiratory failure with hypoxia and hypercapnia ([facility] [J96.01, J96.02]  Date / Time: 8/4/2021  9:29 PM    Admission status/Date:inpt  Chart Reviewed: Yes      Patient Interviewed: No   Family Interviewed:  Yes - Clay Paz, friend      Hospitalization in the last 30 days:  No      Health Care Decision Maker :   Primary Decision Maker: sergeyjaimerina, crystal - Child - [phone]    (CM - must 1st enter selection under Navigator - emergency contact- Health Care Decision Maker Relationship and pick relationship)   Who do you trust or have selected to make healthcare decisions for you      Met with: pt's friend,Dawson, via TC  Interview conducted  (bedside/phone):    Current PCP:   No primary care provider on file.       Financial  Medicare  Precert required for SNF : Y, N          3 night stay required - Y, N    ADLS  Support Systems/Care Needs:    Transportation: family    Meal Preparation: family    Housing  Living Arrangements: home with daughter  Steps:   Intent for return to present living arrangements: tbd  Identified Issues: hx of dementia, drug abuse    Home Care Information  Active with [address] : No Agency:(Services)     Passport/Waiver : No  :                      Phone Number:    Passport/Waiver Services: no          Durable Medical Equiptment   DME Provider: [employer]  Equipment:   Walker___Cane___RTS___ BSC___Shower Chair___Hospital Bed___W/C_x__Other________  02 at ____Liter(s)---wears(frequency)_______ HHN ___ CPAP___ BiPap___   N/A____      Home O2 Use :  Yes    If No for home O2---if presently on O2 during hospitalization:  Yes  if yes CM to follow for potential DC O2 need  Informed of need for care provider to bring portable home O2 tank on day of discharge for nursing to connect prior to leaving:   Yes  Verbalized agreement/Understanding:   Yes    Community Service Affiliation  Dialysis:  No    · Agency:  · Location:  · Dialysis Schedule:  · Phone:   · Fax: Other Community Services: (ex:PT/OT,Mental Health,Wound Clinic, Cardio/Pul [address])    DISCHARGE PLAN: Explained Case Management role/services. Reviewed chart. ICU on Ventilator, hx of dementia, drug abuse. Pt from home with daughterKaroline per pt's friend,Dawson. Unable to reach Karoline via TC. Pt active with [facility] for home O2. Pt has used [employer] in the past. Following for d/c needs.

## 2021-08-05 NOTE — PROGRESS NOTES
Needs:  Energy (kcal):  0824-8665 kcals based on 11-14 kcals/kg/CBW; Weight Used for Energy Requirements:  Current     Protein (g):  109-120 g protein based on 2-2.2 g/kg/IBW; Weight Used for Protein Requirements:  Ideal        Fluid (ml/day):  9760-6018 ml; Method Used for Fluid Requirements:  1 ml/kcal      Nutrition Related Findings:  visited pt at bedside; skin warm, dry; face appears edematous/swollen; pt is currently intubated and sedated, Propofol @ 50 mcg x 24 hours = 790 kcals from lipids; noted pt was experiencing chest pain and SOB x 1 week PTA + altered mental status- worsening during admission; noted pt was on BiPAP, however pt kept self removing bipap and d/t critical ABG results pt was emergently intubated this am 8/5; noted pt lives at home with daughter; hx of COPD (on O2 at home), DM, tobacco abuse, polysubstance abuse, and dementia. noted per EMR review- pt has had dementia for ~ 10 years d/t drug OD with hypoxia brain injury;  Abdomen rounded, rotunded, soft, BS active; no edema; skin sukhwinder, warm; CO2, BG (in the 300's) Alk Phos, and AST are elevated; O2, Na, Cl, BUN, and Creat are low; >1000 mg glucose in urine; pt has had weight gain x 1 year PTA;      Wounds:  None       Current Nutrition Therapies:    Diet NPO Exceptions are: Ice Chips, Sips of Water with Meds    Anthropometric Measures:  · Height: 5' 4\" (162.6 cm)  · Current Body Weight: 220 lb 1.6 oz (99.8 kg) (obtained 8/5/21; actual)   · Admission Body Weight: 220 lb 1.6 oz (99.8 kg) (obtained 8/5/21; actual)    · Usual Body Weight: 183 lb 1.6 oz (83.1 kg) (obtained 9/22/20 per past encounters)     · Ideal Body Weight: 120 lbs; % Ideal Body Weight 183.4 %   · BMI: 37.8  · BMI Categories: Obese Class 2 (BMI 35.0 -39.9)       Nutrition Diagnosis:   · Inadequate oral intake related to inadequate protein-energy intake, impaired respiratory function, cognitive or neurological impairment, endocrine dysfuntion as evidenced by NPO or clear liquid status due to medical condition, intubation, nutrition support - enteral nutrition, lab values    Nutrition Interventions:   Food and/or Nutrient Delivery:  Continue NPO, Start Tube Feeding  Nutrition Education/Counseling:  No recommendation at this time   Coordination of Nutrition Care:  Continue to monitor while inpatient, Interdisciplinary Rounds    Goals:  Vital HP @ goal rate of 25 ml/hr x 20 hours with 30 ml water flushes every 4 hours or per MD guidance + one Proteinx P2Go ONCE daily (604 kcals, 70 g protein) (Propofol @ 50 mcg = 790 kcals from lipids)       Nutrition Monitoring and Evaluation:   Behavioral-Environmental Outcomes:  None Identified   Food/Nutrient Intake Outcomes:  Enteral Nutrition Intake/Tolerance  Physical Signs/Symptoms Outcomes:  Biochemical Data, GI Status, Hemodynamic Status, Nutrition Focused Physical Findings, Weight, Skin     Discharge Planning:     Too soon to determine     Electronically signed by Gonzalo Sanders RD, LD on 8/5/21 at 1:16 PM EDT    Contact: [phone]

## 2021-08-05 NOTE — ED NOTES
Resting quietly in bed with eyes closed. Daughter at bedside. Tolerating bipap. No noted distress.       Marciano Ugarte RN  08/05/21 1684

## 2021-08-05 NOTE — ED NOTES
Bedside report given. RT and Amarjit Reed RN at bedside to transport to ICU. No noted distress.       Shravan Aguirre RN  08/05/21 7296

## 2021-08-05 NOTE — PROGRESS NOTES
Patient admitted from ED to ICU 9. Patient oriented x4, follows commands appropriately. Slow responses to questions, then falls back to sleep. Patient currently on BiPap. Oxygen saturation in the 90's. Oriented to room and call light, bed locked, placed in lowest position and bed alarm set.

## 2021-08-05 NOTE — ED PROVIDER NOTES
I independently performed a history and physical on Indu [address]. All diagnostic, treatment, and disposition decisions were made by myself in conjunction with the advanced practice provider. For further details of [facility] emergency department encounter, please see the MART/resident's documentation. Briefly, this is a 48-year-old female who presents emergency department for evaluation of shortness of breath, altered mental status. Patient has history of dementia, COPD, tobacco use. According to the patient's daughter, patient has had chest pain, shortness of breath for the past several days. She has had worsening confusion today. Daughter states that she does not want to be intubated however if it came to life or death situation, she has full code and would proceed with intubation. On exam, patient is currently on BiPAP. She has no significant work of breathing. She has diminished breath sounds bilaterally. Labs reveal acute respiratory acidosis with PCO2 79, pH 7.26. We will obtain CT pulmonary angiogram to rule out pulmonary embolism because of her tachycardia, hypoxia. She will require admission for continued management of acute hypercarbic, hypoxic respiratory failure. EKG  The Ekg interpreted by myself in the emergency department in the absence of a cardiologist.  normal sinus rhythm with a rate of 99  Axis is   Normal  QTc is  within an acceptable range  Intervals and Durations are unremarkable. No specific ST-T wave changes appreciated. No evidence of acute ischemia. No significant change from prior EKG dated 9/22/20    I personally spent a total of 35 minutes of critical care time in obtaining history, performing a physical exam, bedside monitoring of interventions, collecting and interpreting tests and discussion with consultants but excluding time spent performing procedures, treating other patients and teaching time. Clinical Concern hypercarbic, hypoxic respiratory failure, altered mental status                                                                                                                                                           Intervention BiPAP initiation, titration, monitoring labs, frequent reassessment         Camilo Gallagher MD  08/04/21 0922

## 2021-08-05 NOTE — PLAN OF CARE
Nutrition Problem #1: Inadequate oral intake  Intervention: Food and/or Nutrient Delivery: Continue NPO, Start Tube Feeding  Nutritional Goals: Vital HP @ goal rate of 25 ml/hr x 20 hours with 30 ml water flushes every 4 hours or per MD guidance + one Proteinx P2Go ONCE daily (604 kcals, 70 g protein) (Propofol @ 50 mcg = 790 kcals from lipids)

## 2021-08-05 NOTE — ED PROVIDER NOTES
[address] ED  EMERGENCY DEPARTMENT ENCOUNTER        Pt Name: Lucas Benitez  MRN: [id number]  Armstrong[place]ita 1965  Date of evaluation: 8/4/2021  Provider: ITZEL Betancourt  PCP: No primary care provider on file. This patient was seen and evaluated by the attending physician Keegan Stephen MD.        [facility]       Chief Complaint   Patient presents with    Shortness of Breath     pt states SOB and cough for one week. family states pt has been sleeping alot more. HISTORY OF PRESENT ILLNESS   (Location/Symptom, Timing/Onset, Context/Setting, Quality, Duration, Modifying Factors, Severity)  Note limiting factors. Lucas Benitez is a 61 y.o. female with his history of dementia, COPD, type 2 diabetes who presents via private vehicle from her home with her daughter for evaluation of shortness of breath. ED Course as of Aug 04 2253   Wed Aug 04, 2021   2135 Hx dementia   Last week she was complaining of some chest pains but daughter was not able to get her to go to the DR. She was complaining of chest pressure. One week ago today. She had that for 3-4 days in a row but it was intermttient pain. She refused to go the hostital. She is not having any chest pain currently. Daughter notes that she has no hx of CHF but she does have COPD and has needed O2 at home but does not have any at home. She has been using her inhalers more at home. No Pulse ox at home. She has been coughing, it is rattling and productive. No hemoptysis. She has not been complaining of congestion, headacehs or sore throat. Daughter notes her face seems swollen. She has not had any fevers. NO syncope. She was complaing of abdominal pain a cople of days ago the whole house had a 24 hour stomach flu. She has not been vaccinated. [CS]      ED Course User Index  [CS] Irena Betancourt       Nursing Notes were all reviewed and agreed with or any disagreements were addressed  in the HPI.     Pt was seen during the [place] 19 pandemic. Appropriate PPE worn by ME during patient encounters. Pt seen during a time with constrained hospital bed capacity and other potential inpatient and outpatient resources were constrained due to the viral pandemic. REVIEW OF SYSTEMS    (2-9 systems for level 4, 10 or more for level 5)     Review of Systems    Positives and Pertinent negatives as per HPI. Except as noted abovein the ROS, all other systems were reviewed and negative. PAST MEDICAL HISTORY     Past Medical History:   Diagnosis Date    COPD [facility]chronic obstructive [facility] disease) [facility]     Dementia ([facility]     Diabetes mellitus ([facility] [facility]          SURGICAL HISTORY     Past Surgical History:   Procedure Laterality Date    ANKLE FRACTURE SURGERY Right 9/23/2020    OPEN REDUCTION INTERNAL FIXATION RIGHT ANKLE FRACTURE performed by Suni Blanton DO at [address] [place] [address] Right 09/23/2020    ORIF    BRONCHOSCOPY N/A 2/7/2020    BRONCHOSCOPY performed by Sharon Prajapati MD at [address] [place] [address]  2/7/2020    BRONCHOSCOPY THERAPUTIC ASPIRATION INITIAL performed by Sharon Prajapati MD at [address] [place] [address] N/A 2/10/2020    BRONCHOSCOPY ALVEOLAR LAVAGE performed by Severa Blue, MD at [address] [place] [address]  2/10/2020    BRONCHOSCOPY THERAPUTIC ASPIRATION SUBSEQUENT performed by Severa Blue, MD at [address] [place] [address]       Previous Medications    ALBUTEROL SULFATE HFA (PROVENTIL HFA) 108 (90 BASE) MCG/ACT INHALER    Inhale 2 puffs into the lungs every 6 hours as needed for Wheezing    IPRATROPIUM-ALBUTEROL (DUONEB) 0.5-2.5 (3) MG/3ML SOLN NEBULIZER SOLUTION    Inhale 3 mLs into the lungs every 4 hours (while awake)    METFORMIN (GLUCOPHAGE) 500 MG TABLET    Take 500 mg by mouth 2 times daily (with meals)         ALLERGIES     Patient has no known allergies.     FAMILYHISTORY       Family History   Problem Relation Age of Onset    Cancer Father SOCIAL HISTORY       Social History     Socioeconomic History    Marital status:      Spouse name: None    Number of children: None    Years of education: None    Highest education level: None   Occupational History    None   Tobacco Use    Smoking status: Current Every Day Smoker     Packs/day: 1.00     Types: Cigarettes    Smokeless tobacco: Never Used   Vaping Use    Vaping Use: Never used   Substance and Sexual Activity    Alcohol use: Never    Drug use: Not Currently     Types: IV     Comment: Fentanyl and heroin    Sexual activity: Not Currently   Other Topics Concern    None   Social History Narrative    None     Social Determinants of Health     Financial Resource Strain:     Difficulty of Paying Living Expenses:    Food Insecurity:     Worried About Running Out of Food in the Last Year:     Ran Out of Food in the Last Year:    Transportation Needs:     Lack of Transportation (Medical):  Lack of Transportation (Non-Medical):    Physical Activity:     Days of Exercise per Week:     Minutes of Exercise per Session:    Stress:     Feeling of Stress :    Social Connections:     Frequency of Communication with Friends and Family:     Frequency of Social Gatherings with Friends and Family:     Attends Judaism Services:     Active Member of Clubs or Organizations:     Attends Club or Organization Meetings:     Marital Status:    Intimate Partner Violence:     Fear of Current or Ex-Partner:     Emotionally Abused:     Physically Abused:     Sexually Abused:        SCREENINGS    [place] Coma Scale  Eye Opening: Spontaneous  Best Verbal Response: None        PHYSICAL EXAM    (up to 7 for level 4, 8 or more for level 5)     ED Triage Vitals [08/04/21 2126]   BP Temp Temp Source Pulse Resp SpO2 Height Weight   (!) 165/84 98.3 °F (36.8 °C) Oral 98 26 (!) 84 % 5' 4\" (1.626 m) 180 lb (81.6 kg)       Physical Exam  Vitals and nursing note reviewed.    Constitutional: General: She is awake. She is in acute distress. Appearance: She is well-developed. She is ill-appearing. She is not toxic-appearing or diaphoretic. Interventions: Nasal cannula in place. Comments: On 2L    HENT:      Head: Normocephalic and atraumatic. Right Ear: External ear normal.      Left Ear: External ear normal.      Nose: Nose normal.      Mouth/Throat:      Mouth: Mucous membranes are dry. Pharynx: Oropharynx is clear. Eyes:      General:         Right eye: No discharge. Left eye: No discharge. Extraocular Movements: Extraocular movements intact. Conjunctiva/sclera: Conjunctivae normal.      Pupils: Pupils are equal, round, and reactive to light. Neck:      Comments: Achy midline, no gross JVD. Cardiovascular:      Rate and Rhythm: Regular rhythm. Tachycardia present. Pulses:           Radial pulses are 2+ on the right side and 2+ on the left side. Posterior tibial pulses are 2+ on the right side and 2+ on the left side. Heart sounds: Normal heart sounds. No murmur heard. No friction rub. No gallop. Comments: No LE redness, swelling, warmth, tenderness, asymmetry or superficial venous changes. Pulmonary:      Effort: Pulmonary effort is normal. Tachypnea and prolonged expiration present. No bradypnea, accessory muscle usage, respiratory distress or retractions. Breath sounds: Wheezing and rhonchi present. No rales. Abdominal:      General: Abdomen is protuberant. There is no distension. Palpations: Abdomen is soft. Tenderness: There is no abdominal tenderness. Musculoskeletal:      Cervical back: Normal range of motion and neck supple. No rigidity. Right lower leg: No edema. Left lower leg: No edema. Skin:     General: Skin is warm and dry. Neurological:      Mental Status: She is alert, oriented to person, place, and time and easily aroused. GCS: GCS eye subscore is 4.  GCS verbal subscore is 5. GCS motor subscore is 6. Cranial Nerves: No dysarthria or facial asymmetry. Psychiatric:         Behavior: Behavior normal. Behavior is cooperative. DIAGNOSTIC RESULTS   LABS:    Labs Reviewed   CBC WITH AUTO DIFFERENTIAL - Abnormal; Notable for the following components:       Result Value    RDW 16.4 (*)     Lymphocytes Absolute 0.9 (*)     All other components within normal limits    Narrative:     Performed at:  [facility],  [employer]   Phone [phone]   COMPREHENSIVE METABOLIC PANEL W/ REFLEX TO MG FOR LOW K - Abnormal; Notable for the following components:    Sodium 130 (*)     Chloride 89 (*)     Glucose 439 (*)     BUN 6 (*)     CREATININE <0.5 (*)     Albumin/Globulin Ratio 0.9 (*)     Alkaline Phosphatase 215 (*)     ALT 48 (*)     AST 90 (*)     All other components within normal limits    Narrative:     Performed at:  [place] [address],  [employer]   Phone [phone]   BRAIN NATRIURETIC PEPTIDE - Abnormal; Notable for the following components:    Pro- (*)     All other components within normal limits    Narrative:     Performed at:  [place] [address],  [facility]   Phone [phone]   LACTATE, SEPSIS - Abnormal; Notable for the following components:    Lactic Acid, Sepsis 2.2 (*)     All other components within normal limits    Narrative:     Performed at:  [place] [address],  [facility]   Phone [phone]   BLOOD GAS, ARTERIAL - Abnormal; Notable for the following components:    pH, Arterial 7.266 (*)     pCO2, Arterial 79.4 (*)     pO2, Arterial 130.1 (*)     HCO3, Arterial 35.3 (*)     Base Excess, Arterial 5.1 (*)     Carboxyhgb, Arterial 8.9 (*)     All other components within normal limits    Narrative:     CALL  [place]  [facility] tel. PROCEDURES   Unless otherwise noted below, none     Procedures    CRITICAL CARE TIME   The total critical care time spent while evaluating and treating this patient was 60 minutes. This excludes time spent doing separately billable procedures. This includes time at the bedside, data interpretation, medication management, obtaining critical history from collateral sources if the patient is unable to provide it directly, and physician consultation. Specifics of interventions taken and potentially life-threatening diagnostic considerations are listed above in the medical decision making. CONSULTS:  None      EMERGENCY DEPARTMENT COURSE and DIFFERENTIALDIAGNOSIS/MDM:   Vitals:    Vitals:    08/04/21 2200 08/04/21 2218 08/04/21 2219 08/04/21 2237   BP: 132/77      Pulse: 104  97    Resp: 19   22   Temp:       TempSrc:       SpO2: 95% 98% 99%    Weight:       Height:           Patient was given thefollowing medications:  Medications   ipratropium-albuterol (DUONEB) nebulizer solution 1 ampule (1 ampule Inhalation Given 8/4/21 2158)   levalbuterol (XOPENEX) nebulizer solution 1.26 mg (1.25 mg Nebulization Given 8/4/21 2218)       PDMP Monitoring:    Last PDMP Tabby Franklin as Reviewed formerly [facility]):  Review User Review Instant Review Result          Last Controlled Substance Monitoring Documentation      ED from 1/19/2020 in [facility] ED   Comments  Discharge instructions reviewed with patient. No prescriptions given. Denies questions at this time. No distress noted. Pt discharged ambulatory with family. filed at 01/19/2020 1647        Urine Drug Screenings (1 yr)    No resulted procedures found. Medication Contract and Consent for Opioid Use Documents Filed      No documents found                MDM:   Patient seen and evaluated. Old records reviewed. Diagnostic testing reviewed and results discussed.       Patient is a 48-year-old female who presents with her daughter for evaluation of shortness of breath. On exam she  she is afebrile she is with adequate perfusion, she is mildly hypertensive she is acutely hypoxic, promptly started started on nasal cannula. She has wheezes and rhonchi throughout. Her abdomen is protuberant but soft. No lower extremity edema. The remainder of her physical exam is without focal abnormality. Work-up in the emergency department included labs and imaging, frontals at this time include Covid, pneumonia, CHF, PE, sepsis, UTI. CBC is negative for acute leukocytosis. CMP reveals mild hyponatremia mild hypochloremia. No significant renal dysfunction. Transaminitis appreciated. Hyperglycemia appreciated. She does not have an anion gap I have low concern for DKA. Troponin is negative. BNP slightly elevated. She does have a mild slight lactic acidosis, will trend. Ethanol and drugs of abuse are negative. Chest x-ray reveals vascular cephalization consistent with mild central venous congestion. Patient had ABG which reveals significant acidosis with hypercapnia. Patient was started on BiPAP. Patient was off BiPAP briefly for CAT scan and then was placed back on BiPAP however was found to be sitting in the room with BiPAP off her face, this was estimated to be for potentially up to an hour. BiPAP was replaced however not time repeat ABG was obtained and was worsening. Patient with a sitter and has BiPAP on and we will repeat ABG done. She continues to have no significant neuro deficit, she continues to be mentating appropriately however she is being closely monitored at this time. CT is negative for sign of pneumonia no sign of PE. Patient was started on azithromycin for COPD exacerbation. She was given a breathing treatment on initial arrival, no significant improvement. Patient did have urinalysis which is positive for pyuria. She is meeting SIRS criteria, blood cultures obtained, patient will be started on broad-spectrum antibiotics for UTI.   Repeat lactic is improving. Patient does have slightly elevated procalcitonin however I have low concern for pneumonia based off of CT. SEP-1 CORE MEASURE DATA    Classification: sepsis    Amount of fluids ordered: at least 30mL/kg based on ideal body weight due to obesity defined as BMI >30 (patient's BMI is Body mass index is 30.9 kg/m². and IBW is Ideal body weight: 54.7 kg (120 lb 9.5 oz)Adjusted ideal body weight: 65.5 kg (144 lb 5.7 oz))    Time at which sepsis was identified: 0155    Broad-spectrum antibiotics chosen:  based on suspected source of: UTI    Repeat lactate level: improving    On reassessment after fluid resuscitation:   pending, to be completed by inpatient team    Repeat ABG and transfer up to the floors pending at time of signout. 2:52 AM: I discussed the history, physical, and treatment plan with Dr. Isa Bojorquez MD. Falguni Chun was signed out in stable condition. Please see Dr. Isa Bojorquez MD's note for further details, including diagnosis and disposition. FINAL IMPRESSION      1. Acute respiratory failure with hypoxia and hypercapnia (HCC)    2. Acute cystitis without hematuria    3. Septicemia ([facility] [address]          DISPOSITION/PLAN   DISPOSITION        PATIENT REFERREDTO:  No follow-up provider specified.     DISCHARGE MEDICATIONS:  New Prescriptions    No medications on file       DISCONTINUED MEDICATIONS:  Discontinued Medications    IBUPROFEN (ADVIL;MOTRIN) 200 MG TABLET    Take 600 mg by mouth every 6 hours as needed for Pain    NICOTINE (NICODERM CQ) 14 MG/24HR    Place 1 patch onto the skin daily              (Please note that portions ofthis note were completed with a voice recognition program.  Efforts were made to edit the dictations but occasionally words are mis-transcribed.)    ITZEL Chavez (electronically signed)       ITZEL Chavez  08/05/21 8850

## 2021-08-05 NOTE — PROGRESS NOTES
08/05/21 0138   NIV Type   Equipment Type V60   Mode Bilevel   Mask Type Full face mask   Mask Size Medium   Settings/Measurements   IPAP 23 cmH20   CPAP/EPAP 5 cmH2O   Rate Ordered 16   Resp (!) 31   Insp Rise Time (%) 2 %   FiO2  35 %   I Time/ I Time % 0.9 s   Vt Exhaled 950 mL   Minute Volume 19.4 Liters   Mask Leak (lpm) 25 lpm   Comfort Level Good   Using Accessory Muscles No   SpO2 96

## 2021-08-05 NOTE — PROGRESS NOTES
4 Eyes Skin Assessment     The patient is being assess for   Admission    I agree that 2 RN's have performed a thorough Head to Toe Skin Assessment on the patient. ALL assessment sites listed below have been assessed. Areas assessed for pressure by both nurses:   [x]   Head, Face, and Ears   [x]   Shoulders, Back, and Chest, Abdomen  [x]   Arms, Elbows, and Hands   [x]   Coccyx, Sacrum, and Ischium  [x]   Legs, Feet, and Heels        Skin Assessed Under all Medical Devices by both nurses:  Bipap mask              All Mepilex Borders were peeled back and area peeked at by both nurses:  Yes  Please list where Mepilex Borders are located:  nose             **SHARE this note so that the co-signing nurse is able to place an eSignature**    Co-signer eSignature: Electronically signed by Karen Arriaga RN on 8/5/21 at 6:56 AM EDT    Does the Patient have Skin Breakdown related to pressure?   No       Tony Prevention initiated:  No   Wound Care Orders initiated:  No      WOC nurse consulted for Pressure Injury (Stage 3,4, Unstageable, DTI, NWPT, Complex wounds)and New or Established Ostomies:  No      Primary Nurse eSignature: Electronically signed by Salma Green RN on 8/5/21 at 6:46 AM EDT

## 2021-08-05 NOTE — PROGRESS NOTES
08/04/21 0584   NIV Type   $NIV $Daily Charge   Equipment Type V60   Mode Bilevel   Mask Type Full face mask   Mask Size Medium   Settings/Measurements   IPAP 16 cmH20   CPAP/EPAP 5 cmH2O   Rate Ordered 16   Resp 22   Insp Rise Time (%) 2 %   FiO2  30 %   I Time/ I Time % 0.9 s   Vt Exhaled 511 mL   Minute Volume 9.5 Liters   Mask Leak (lpm) 11 lpm   Comfort Level Good   Using Accessory Muscles No   SpO2 99   Alarm Settings   Alarms On Y   Press Low Alarm 5 cmH2O   High Pressure Alarm 35 cmH2O   Delay Alarm 20 sec(s)   Resp Rate Low Alarm 16   High Respiratory Rate 40 br/min

## 2021-08-05 NOTE — H&P
Hospital Medicine History & Physical      PCP: none    Date of Service: Pt seen/examined on 8/5/21 and admitted on 8/5/21 to Inpatient    Chief Complaint   Patient presents with    Shortness of Breath     pt states SOB and cough for one week. family states pt has been sleeping alot more. History Of Present Illness: The patient is a 61 y.o. female with PMH below, presents with CP, SOB, MS change, abd/facial edema. Pt has hx of dementia and is not able to give much hx. She denies CP currently. Hx obtained mainly from pt daughter at bedside. She reports that her mother was c/o CP last week. For the last week, she has been c/o increasing SOB. She has had increasingly productive cough of mostly thick/clear but sometimes yellow mucous. Daughter also reports that she thinks her mom's face and abd seems more swollen than usual.    Past Medical History:        Diagnosis Date    [facility]chronic [facility] disease) ([facility] [address] [facility]     Dementia ([facility] [address] [facility]     Diabetes mellitus ([facility] [address] [facility]        Past Surgical History:        Procedure Laterality Date    ANKLE FRACTURE SURGERY Right 9/23/2020    OPEN REDUCTION INTERNAL FIXATION RIGHT ANKLE FRACTURE performed by Caroline Rivera DO at [address] [place] [address] Right 09/23/2020    ORIF    BRONCHOSCOPY N/A 2/7/2020    BRONCHOSCOPY performed by Stuart Manjarrez MD [address]  2/7/2020    [address] [place] [address] performed by Stuart Manjarrez MD at [address] N/A 2/10/2020    BRONCHOSCOPY ALVEOLAR LAVAGE performed by Lacey Celestin MD at [address]  2/10/2020    BRONCHOSCOPY THERAPUTIC ASPIRATION SUBSEQUENT performed by Lacey Celestin MD at [address] [place] [address]       Medications Prior to Admission:    Prior to Admission medications    Medication Sig Start Date End Date Taking?  Authorizing Provider   metFORMIN (GLUCOPHAGE) 500 MG tablet Take 500 mg by mouth 2 times daily (with HISTORY: ORDERING SYSTEM PROVIDED HISTORY: SOB TECHNOLOGIST PROVIDED HISTORY: Reason for exam:->SOB Reason for Exam: shortness of breath Acuity: Acute Type of Exam: Initial FINDINGS: Adequate inspiration is present. No focal area of consolidation or pneumothorax is present. Vascular cephalization is noted. Borderline cardiomegaly is present. Osseous structures are stable. 1. Vascular cephalization, consistent with mild central venous congestion     CT CHEST PULMONARY EMBOLISM W CONTRAST    Result Date: 8/5/2021  EXAMINATION: CTA OF THE CHEST 8/5/2021 12:19 am TECHNIQUE: CTA of the chest was performed after the administration of intravenous contrast.  Multiplanar reformatted images are provided for review. MIP images are provided for review. Dose modulation, iterative reconstruction, and/or weight based adjustment of the mA/kV was utilized to reduce the radiation dose to as low as reasonably achievable. COMPARISON: None. HISTORY: ORDERING SYSTEM PROVIDED HISTORY: hypoxia, sob TECHNOLOGIST PROVIDED HISTORY: Reason for exam:->hypoxia, sob Decision Support Exception - unselect if not a suspected or confirmed emergency medical condition->Emergency Medical Condition (MA) Reason for Exam: sob Acuity: Acute Type of Exam: Initial FINDINGS: Pulmonary Arteries: Pulmonary arteries are adequately opacified for evaluation. No evidence of intraluminal filling defect to suggest pulmonary embolism. Main pulmonary artery is normal in caliber. Mediastinum: No evidence of mediastinal lymphadenopathy. The heart and pericardium demonstrate no acute abnormality. There is no acute abnormality of the thoracic aorta. Coronary artery calcifications. Atherosclerosis in the thoracic aorta. Lungs/pleura: Respiratory motion. Mild biapical paraseptal emphysema. Remote granulomatous disease. The lungs are without acute process. No focal consolidation or pulmonary edema. No evidence of pleural effusion or pneumothorax.  Upper Darci Mak is expected to be hospitalized for 2 midnights based on the following assessment and plan:    ASSESSMENT/PLAN:  1. Acute respiratory failure with hypoxia and hypercapnia, likely related to aeCOPD +/- , supplemental O2 to maintain SPO2 ? 92%, continuous pulse ox. Wean as tolerated. Continue BiPAP per my orders until seen by Pulm Service. Pt was off of BIPAP unbeknownst to ER staff for up to an hour between the 2 ABG's above. Intensivist c/s.   2. Sepsis (HR, RR, resp fail, + PCT, + lactic). Likely related to UTI vs pulm source. No need for pressors. 3. aeCOPD, IV solumedrol, IV ABX as above. PRN/MAGALIS intensive NEB therapy. Check respiratory culture, procalcitonin +0.32.  Pulm consult. Hold home regimen for now. 4. UTI, f/u Cx.    5. Bilat conjunctivitis, Tobradex. 6. Elevated LFT's, monitor. 7. Acute encephalopathy on chronic dementia. Mild, likely related to hypercarbia vs UTI. Supportive measures. 8. Lactic acidosis, 2.2, 1.0, resolved. 9. Tobacco Abuse, counseled cessation, 14 mg nicotine patch. 10. DM2, hold oral Rx, chk A1c, add Low SSI q4h. DVT Prophylaxis: Lx  Diet: NPO  Code Status: Full Code  PT/OT Eval Status: Will order if needed and as patient condition allows  Dispo - Admit to inpatient ICU    Total critical care time caring for this patient with life threatening, unstable organ failure, including direct patient contact, management of life support systems, review of data including imaging and labs, discussions with other team members and physicians is 33 minutes so far today, excluding procedures. Diamantina Osgood, MD    This chart was generated using the [employer] dictation system. I created this record but it may contain dictation errors given the limitations of this technology.

## 2021-08-05 NOTE — PROGRESS NOTES
Attempted to call Karoline (child, primary decision maker) for an update post-intubation.     Electronically signed by Gigi Ann RN on 8/5/2021 at 5:07 AM

## 2021-08-05 NOTE — PROGRESS NOTES
Shift assessment completed. Wheezes present bilaterally, breathing is deep and the patient is exhibiting dyspnea at rest. BiPap is currently in place. Oxygen saturation in the 90's. Abdomen is rounded and rotund. Bowel sounds active x4. NSR to ST on monitor to the 110's. VSS. See flowsheet for additional documentation.

## 2021-08-05 NOTE — ED NOTES
Patient self-removing bipap mask despite education not to remove.       Rohan Velez RN  08/05/21 2634

## 2021-08-05 NOTE — PROGRESS NOTES
Karoline called unit, provided update on patient status. No questions at this time.     Electronically signed by Salma Green RN on 8/5/2021 at 6:35 AM

## 2021-08-05 NOTE — ED NOTES
Resting quietly in bed. Tolerating Bipap. No noted distress.       Osmany Devlin, JODY  08/04/21 4020

## 2021-08-06 ENCOUNTER — APPOINTMENT (OUTPATIENT)
Dept: GENERAL RADIOLOGY | Age: 56
DRG: 207 | End: 2021-08-06
Payer: MEDICARE

## 2021-08-06 ENCOUNTER — APPOINTMENT (OUTPATIENT)
Dept: INTERVENTIONAL RADIOLOGY/VASCULAR | Age: 56
DRG: 207 | End: 2021-08-06
Payer: MEDICARE

## 2021-08-06 LAB
ANION GAP SERPL CALCULATED.3IONS-SCNC: 7 MMOL/L (ref 3–16)
BASE EXCESS ARTERIAL: 4.7 MMOL/L (ref -3–3)
BASOPHILS ABSOLUTE: 0 K/UL (ref 0–0.2)
BASOPHILS RELATIVE PERCENT: 0.3 %
BUN BLDV-MCNC: 15 MG/DL (ref 7–20)
CALCIUM SERPL-MCNC: 8.7 MG/DL (ref 8.3–10.6)
CARBOXYHEMOGLOBIN ARTERIAL: 1 % (ref 0–1.5)
CHLORIDE BLD-SCNC: 95 MMOL/L (ref 99–110)
CO2: 28 MMOL/L (ref 21–32)
CREAT SERPL-MCNC: 0.6 MG/DL (ref 0.6–1.1)
EOSINOPHILS ABSOLUTE: 0 K/UL (ref 0–0.6)
EOSINOPHILS RELATIVE PERCENT: 0 %
ESTIMATED AVERAGE GLUCOSE: 283.4 MG/DL
GFR AFRICAN AMERICAN: >60
GFR NON-AFRICAN AMERICAN: >60
GLUCOSE BLD-MCNC: 256 MG/DL (ref 70–99)
GLUCOSE BLD-MCNC: 266 MG/DL (ref 70–99)
GLUCOSE BLD-MCNC: 276 MG/DL (ref 70–99)
GLUCOSE BLD-MCNC: 281 MG/DL (ref 70–99)
GLUCOSE BLD-MCNC: 292 MG/DL (ref 70–99)
GLUCOSE BLD-MCNC: 305 MG/DL (ref 70–99)
GLUCOSE BLD-MCNC: 318 MG/DL (ref 70–99)
HBA1C MFR BLD: 11.5 %
HCO3 ARTERIAL: 28.7 MMOL/L (ref 21–29)
HCT VFR BLD CALC: 43 % (ref 36–48)
HEMOGLOBIN, ART, EXTENDED: 14.5 G/DL (ref 12–16)
HEMOGLOBIN: 13.7 G/DL (ref 12–16)
INR BLD: 1.28 (ref 0.88–1.12)
LYMPHOCYTES ABSOLUTE: 0.3 K/UL (ref 1–5.1)
LYMPHOCYTES RELATIVE PERCENT: 3.8 %
MCH RBC QN AUTO: 28.6 PG (ref 26–34)
MCHC RBC AUTO-ENTMCNC: 31.9 G/DL (ref 31–36)
MCV RBC AUTO: 89.8 FL (ref 80–100)
METHEMOGLOBIN ARTERIAL: 0.3 %
MONOCYTES ABSOLUTE: 0.3 K/UL (ref 0–1.3)
MONOCYTES RELATIVE PERCENT: 4.2 %
NEUTROPHILS ABSOLUTE: 6.6 K/UL (ref 1.7–7.7)
NEUTROPHILS RELATIVE PERCENT: 91.7 %
O2 SAT, ARTERIAL: 94.6 %
O2 THERAPY: ABNORMAL
PCO2 ARTERIAL: 40.2 MMHG (ref 35–45)
PDW BLD-RTO: 16 % (ref 12.4–15.4)
PERFORMED ON: ABNORMAL
PH ARTERIAL: 7.47 (ref 7.35–7.45)
PLATELET # BLD: 166 K/UL (ref 135–450)
PMV BLD AUTO: 8 FL (ref 5–10.5)
PO2 ARTERIAL: 68.1 MMHG (ref 75–108)
POTASSIUM REFLEX MAGNESIUM: 5 MMOL/L (ref 3.5–5.1)
POTASSIUM SERPL-SCNC: 5 MMOL/L (ref 3.5–5.1)
PROTHROMBIN TIME: 14.6 SEC (ref 9.9–12.7)
RBC # BLD: 4.79 M/UL (ref 4–5.2)
SODIUM BLD-SCNC: 130 MMOL/L (ref 136–145)
TCO2 ARTERIAL: 29.9 MMOL/L
URINE CULTURE, ROUTINE: NORMAL
WBC # BLD: 7.2 K/UL (ref 4–11)

## 2021-08-06 PROCEDURE — 2580000003 HC RX 258: Performed by: INTERNAL MEDICINE

## 2021-08-06 PROCEDURE — 85025 COMPLETE CBC W/AUTO DIFF WBC: CPT

## 2021-08-06 PROCEDURE — 2700000000 HC OXYGEN THERAPY PER DAY

## 2021-08-06 PROCEDURE — 2500000003 HC RX 250 WO HCPCS: Performed by: INTERNAL MEDICINE

## 2021-08-06 PROCEDURE — 6370000000 HC RX 637 (ALT 250 FOR IP): Performed by: INTERNAL MEDICINE

## 2021-08-06 PROCEDURE — C1751 CATH, INF, PER/CENT/MIDLINE: HCPCS

## 2021-08-06 PROCEDURE — 6360000002 HC RX W HCPCS: Performed by: INTERNAL MEDICINE

## 2021-08-06 PROCEDURE — 99291 CRITICAL CARE FIRST HOUR: CPT | Performed by: INTERNAL MEDICINE

## 2021-08-06 PROCEDURE — 82803 BLOOD GASES ANY COMBINATION: CPT

## 2021-08-06 PROCEDURE — 36573 INSJ PICC RS&I 5 YR+: CPT

## 2021-08-06 PROCEDURE — 2000000000 HC ICU R&B

## 2021-08-06 PROCEDURE — 05HY33Z INSERTION OF INFUSION DEVICE INTO UPPER VEIN, PERCUTANEOUS APPROACH: ICD-10-PCS | Performed by: INTERNAL MEDICINE

## 2021-08-06 PROCEDURE — 6360000002 HC RX W HCPCS

## 2021-08-06 PROCEDURE — 80048 BASIC METABOLIC PNL TOTAL CA: CPT

## 2021-08-06 PROCEDURE — 36415 COLL VENOUS BLD VENIPUNCTURE: CPT

## 2021-08-06 PROCEDURE — 94640 AIRWAY INHALATION TREATMENT: CPT

## 2021-08-06 PROCEDURE — 94003 VENT MGMT INPAT SUBQ DAY: CPT

## 2021-08-06 PROCEDURE — 71045 X-RAY EXAM CHEST 1 VIEW: CPT

## 2021-08-06 PROCEDURE — 94761 N-INVAS EAR/PLS OXIMETRY MLT: CPT

## 2021-08-06 PROCEDURE — 85610 PROTHROMBIN TIME: CPT

## 2021-08-06 PROCEDURE — 99233 SBSQ HOSP IP/OBS HIGH 50: CPT | Performed by: INTERNAL MEDICINE

## 2021-08-06 RX ORDER — FUROSEMIDE 10 MG/ML
INJECTION INTRAMUSCULAR; INTRAVENOUS
Status: COMPLETED
Start: 2021-08-06 | End: 2021-08-06

## 2021-08-06 RX ORDER — SODIUM CHLORIDE 9 MG/ML
25 INJECTION, SOLUTION INTRAVENOUS PRN
Status: DISCONTINUED | OUTPATIENT
Start: 2021-08-06 | End: 2021-08-19 | Stop reason: HOSPADM

## 2021-08-06 RX ORDER — INSULIN GLARGINE 100 [IU]/ML
20 INJECTION, SOLUTION SUBCUTANEOUS EVERY MORNING
Status: DISCONTINUED | OUTPATIENT
Start: 2021-08-06 | End: 2021-08-07

## 2021-08-06 RX ORDER — SODIUM CHLORIDE 0.9 % (FLUSH) 0.9 %
5-40 SYRINGE (ML) INJECTION EVERY 12 HOURS SCHEDULED
Status: DISCONTINUED | OUTPATIENT
Start: 2021-08-06 | End: 2021-08-19 | Stop reason: HOSPADM

## 2021-08-06 RX ORDER — DEXMEDETOMIDINE HYDROCHLORIDE 4 UG/ML
.2-1.4 INJECTION, SOLUTION INTRAVENOUS CONTINUOUS
Status: DISCONTINUED | OUTPATIENT
Start: 2021-08-06 | End: 2021-08-14

## 2021-08-06 RX ORDER — SODIUM CHLORIDE 0.9 % (FLUSH) 0.9 %
5-40 SYRINGE (ML) INJECTION PRN
Status: DISCONTINUED | OUTPATIENT
Start: 2021-08-06 | End: 2021-08-19 | Stop reason: HOSPADM

## 2021-08-06 RX ORDER — LIDOCAINE HYDROCHLORIDE 10 MG/ML
5 INJECTION, SOLUTION EPIDURAL; INFILTRATION; INTRACAUDAL; PERINEURAL ONCE
Status: DISCONTINUED | OUTPATIENT
Start: 2021-08-06 | End: 2021-08-19 | Stop reason: HOSPADM

## 2021-08-06 RX ORDER — FUROSEMIDE 10 MG/ML
20 INJECTION INTRAMUSCULAR; INTRAVENOUS ONCE
Status: COMPLETED | OUTPATIENT
Start: 2021-08-06 | End: 2021-08-06

## 2021-08-06 RX ADMIN — MIDAZOLAM HYDROCHLORIDE 2 MG: 1 INJECTION, SOLUTION INTRAMUSCULAR; INTRAVENOUS at 07:02

## 2021-08-06 RX ADMIN — PROPOFOL 25 MCG/KG/MIN: 10 INJECTION, EMULSION INTRAVENOUS at 08:31

## 2021-08-06 RX ADMIN — TOBRAMYCIN AND DEXAMETHASONE 1 DROP: 3; 1 SUSPENSION/ DROPS OPHTHALMIC at 13:07

## 2021-08-06 RX ADMIN — MIDAZOLAM HYDROCHLORIDE 2 MG: 1 INJECTION, SOLUTION INTRAMUSCULAR; INTRAVENOUS at 00:17

## 2021-08-06 RX ADMIN — FUROSEMIDE 20 MG: 10 INJECTION INTRAMUSCULAR; INTRAVENOUS at 11:50

## 2021-08-06 RX ADMIN — MIDAZOLAM HYDROCHLORIDE 2 MG: 1 INJECTION, SOLUTION INTRAMUSCULAR; INTRAVENOUS at 15:02

## 2021-08-06 RX ADMIN — DEXTROSE MONOHYDRATE 500 MG: 50 INJECTION, SOLUTION INTRAVENOUS at 20:27

## 2021-08-06 RX ADMIN — Medication 0.2 MCG/KG/HR: at 11:10

## 2021-08-06 RX ADMIN — MUPIROCIN: 20 OINTMENT TOPICAL at 07:58

## 2021-08-06 RX ADMIN — PROPOFOL 30 MCG/KG/MIN: 10 INJECTION, EMULSION INTRAVENOUS at 16:42

## 2021-08-06 RX ADMIN — MIDAZOLAM HYDROCHLORIDE 2 MG: 1 INJECTION, SOLUTION INTRAMUSCULAR; INTRAVENOUS at 08:30

## 2021-08-06 RX ADMIN — Medication 1.2 MCG/KG/HR: at 21:14

## 2021-08-06 RX ADMIN — CEFTRIAXONE 2000 MG: 2 INJECTION, POWDER, FOR SOLUTION INTRAMUSCULAR; INTRAVENOUS at 16:40

## 2021-08-06 RX ADMIN — PROPOFOL 25 MCG/KG/MIN: 10 INJECTION, EMULSION INTRAVENOUS at 22:27

## 2021-08-06 RX ADMIN — IPRATROPIUM BROMIDE AND ALBUTEROL SULFATE 3 ML: .5; 3 SOLUTION RESPIRATORY (INHALATION) at 10:48

## 2021-08-06 RX ADMIN — PROPOFOL 50 MCG/KG/MIN: 10 INJECTION, EMULSION INTRAVENOUS at 04:27

## 2021-08-06 RX ADMIN — TOBRAMYCIN AND DEXAMETHASONE 1 DROP: 3; 1 SUSPENSION/ DROPS OPHTHALMIC at 07:59

## 2021-08-06 RX ADMIN — IPRATROPIUM BROMIDE AND ALBUTEROL SULFATE 3 ML: .5; 3 SOLUTION RESPIRATORY (INHALATION) at 23:08

## 2021-08-06 RX ADMIN — FENTANYL CITRATE 25 MCG: 0.05 INJECTION, SOLUTION INTRAMUSCULAR; INTRAVENOUS at 08:30

## 2021-08-06 RX ADMIN — IPRATROPIUM BROMIDE AND ALBUTEROL SULFATE 3 ML: .5; 3 SOLUTION RESPIRATORY (INHALATION) at 14:57

## 2021-08-06 RX ADMIN — PROPOFOL 45 MCG/KG/MIN: 10 INJECTION, EMULSION INTRAVENOUS at 12:40

## 2021-08-06 RX ADMIN — INSULIN GLARGINE 20 UNITS: 100 INJECTION, SOLUTION SUBCUTANEOUS at 11:12

## 2021-08-06 RX ADMIN — CHLORHEXIDINE GLUCONATE 0.12% ORAL RINSE 15 ML: 1.2 LIQUID ORAL at 07:59

## 2021-08-06 RX ADMIN — TOBRAMYCIN AND DEXAMETHASONE 1 DROP: 3; 1 SUSPENSION/ DROPS OPHTHALMIC at 16:35

## 2021-08-06 RX ADMIN — MUPIROCIN: 20 OINTMENT TOPICAL at 20:55

## 2021-08-06 RX ADMIN — PROPOFOL 50 MCG/KG/MIN: 10 INJECTION, EMULSION INTRAVENOUS at 01:10

## 2021-08-06 RX ADMIN — SODIUM CHLORIDE: 9 INJECTION, SOLUTION INTRAVENOUS at 15:03

## 2021-08-06 RX ADMIN — ENOXAPARIN SODIUM 40 MG: 40 INJECTION SUBCUTANEOUS at 07:58

## 2021-08-06 RX ADMIN — Medication 75 MCG/HR: at 14:53

## 2021-08-06 RX ADMIN — METHYLPREDNISOLONE SODIUM SUCCINATE 40 MG: 40 INJECTION, POWDER, FOR SOLUTION INTRAMUSCULAR; INTRAVENOUS at 16:35

## 2021-08-06 RX ADMIN — FAMOTIDINE 20 MG: 10 INJECTION, SOLUTION INTRAVENOUS at 20:27

## 2021-08-06 RX ADMIN — METHYLPREDNISOLONE SODIUM SUCCINATE 40 MG: 40 INJECTION, POWDER, FOR SOLUTION INTRAMUSCULAR; INTRAVENOUS at 05:26

## 2021-08-06 RX ADMIN — Medication 75 MCG/HR: at 00:39

## 2021-08-06 RX ADMIN — TOBRAMYCIN AND DEXAMETHASONE 1 DROP: 3; 1 SUSPENSION/ DROPS OPHTHALMIC at 00:44

## 2021-08-06 RX ADMIN — IPRATROPIUM BROMIDE AND ALBUTEROL SULFATE 3 ML: .5; 3 SOLUTION RESPIRATORY (INHALATION) at 07:12

## 2021-08-06 RX ADMIN — TOBRAMYCIN AND DEXAMETHASONE 1 DROP: 3; 1 SUSPENSION/ DROPS OPHTHALMIC at 05:26

## 2021-08-06 RX ADMIN — TOBRAMYCIN AND DEXAMETHASONE 1 DROP: 3; 1 SUSPENSION/ DROPS OPHTHALMIC at 20:28

## 2021-08-06 RX ADMIN — CHLORHEXIDINE GLUCONATE 0.12% ORAL RINSE 15 ML: 1.2 LIQUID ORAL at 20:27

## 2021-08-06 RX ADMIN — Medication 1.2 MCG/KG/HR: at 17:36

## 2021-08-06 RX ADMIN — IPRATROPIUM BROMIDE AND ALBUTEROL SULFATE 3 ML: .5; 3 SOLUTION RESPIRATORY (INHALATION) at 19:27

## 2021-08-06 RX ADMIN — IPRATROPIUM BROMIDE AND ALBUTEROL SULFATE 3 ML: .5; 3 SOLUTION RESPIRATORY (INHALATION) at 02:58

## 2021-08-06 RX ADMIN — FAMOTIDINE 20 MG: 10 INJECTION, SOLUTION INTRAVENOUS at 07:59

## 2021-08-06 RX ADMIN — FUROSEMIDE 20 MG: 10 INJECTION, SOLUTION INTRAVENOUS at 11:50

## 2021-08-06 ASSESSMENT — PULMONARY FUNCTION TESTS
PIF_VALUE: 31
PIF_VALUE: 24
PIF_VALUE: 28
PIF_VALUE: 26
PIF_VALUE: 31
PIF_VALUE: 30
PIF_VALUE: 26
PIF_VALUE: 27
PIF_VALUE: 27
PIF_VALUE: 24
PIF_VALUE: 25
PIF_VALUE: 12
PIF_VALUE: 25
PIF_VALUE: 31
PIF_VALUE: 23
PIF_VALUE: 11
PIF_VALUE: 28
PIF_VALUE: 32
PIF_VALUE: 27
PIF_VALUE: 26
PIF_VALUE: 24
PIF_VALUE: 26

## 2021-08-06 ASSESSMENT — PAIN SCALES - GENERAL
PAINLEVEL_OUTOF10: 3
PAINLEVEL_OUTOF10: 8

## 2021-08-06 NOTE — PROGRESS NOTES
Patient was on her SBT for 30 minutes. At the end her heart rate was 130, /83, RR 30 Tidal volume 230. Her face was red and she struggling with the ventilator. SBT stopped and the patient was returned to [facility]. PRN versed and fentanyl administered. Sedation turned down per protocol.  Electronically signed by Scott Adler RN on 8/6/2021 at 8:34 AM

## 2021-08-06 NOTE — PROGRESS NOTES
ABG sent to lab with no patient label, will need to be recollected and sent with label affixed to specimen. Primary nurse notified.  Felix Cox Clinical

## 2021-08-06 NOTE — PROGRESS NOTES
IM Progress Note    Admit Date:  8/4/2021  1    Interval history:  Copd exacerbation on vent     Subjective:  Ms. Saba nIgram seen on vent support , no distress      Objective:   /60   Pulse 89   Temp 99 °F (37.2 °C) (Axillary)   Resp 22   Ht 5' 4\" (1.626 m)   Wt 213 lb 4.8 oz (96.8 kg)   SpO2 91%   BMI 36.61 kg/m²       Intake/Output Summary (Last 24 hours) at 8/6/2021 6181  Last data filed at 8/6/2021 2311  Gross per 24 hour   Intake 2082.7 ml   Output 1900 ml   Net 182.7 ml       Physical Exam:        General: middle aged female on vent support sedated  Oral ETT and OG noted  Appears to be not in any distress  Mucous Membranes:  Pink , anicteric  Neck: No JVD, no carotid bruit, no thyromegaly  Chest:  marshall air entry with end expiratory wheeze noted  Cardiovascular:  RRR S1S2 heard, no murmurs or gallops  Abdomen:  Soft, undistended, non tender, no organomegaly, BS present  Extremities: No edema or cyanosis.  Distal pulses well felt  Neurological : sedated      Medications:   Scheduled Medications:    cefepime  2,000 mg Intravenous Once    nicotine  1 patch Transdermal Daily    ipratropium-albuterol  3 mL Inhalation Q4H WA    enoxaparin  40 mg Subcutaneous Daily    methylPREDNISolone  40 mg Intravenous Q12H    Followed by   Derik López ON 8/8/2021] predniSONE  40 mg Oral Daily with breakfast    tobramycin-dexamethasone  1 drop Both Eyes 6 times per day    chlorhexidine  15 mL Mouth/Throat BID    famotidine (PEPCID) injection  20 mg Intravenous BID    mupirocin   Nasal BID    azithromycin  500 mg Intravenous Q24H    cefTRIAXone (ROCEPHIN) IV  2,000 mg Intravenous Q24H    insulin lispro  0-18 Units Subcutaneous Q4H     I   dextrose      sodium chloride 75 mL/hr at 08/05/21 1729    propofol 50 mcg/kg/min (08/06/21 0427)    fentaNYL 75 mcg/hr (08/06/21 0039)     glucose, dextrose, glucagon (rDNA), dextrose, polyethylene glycol, acetaminophen **OR** acetaminophen, ondansetron, fentanNYL, midazolam, albuterol    Lab Data:  Recent Labs     08/04/21 2133 08/05/21  0406   WBC 6.4 6.2   HGB 15.2 14.3   HCT 46.6 43.5   MCV 91.1 90.9    158     Recent Labs     08/04/21 2133 08/05/21  0406   * 130*   K 5.1 4.9   CL 89* 93*   CO2 32 31   BUN 6* 6*   CREATININE <0.5* <0.5*     Recent Labs     08/04/21 2133   TROPONINI <0.01       Coagulation: No results found for: INR, APTT  Cardiac markers:   Lab Results   Component Value Date    TROPONINI <0.01 08/04/2021         Lab Results   Component Value Date    ALT 37 08/05/2021    AST 79 (H) 08/05/2021    ALKPHOS 183 (H) 08/05/2021    BILITOT 1.0 08/05/2021       No results found for: INR, PROTIME    Radiology    Chest xray   1. Endotracheal tube tip is 6-7 cm above the antoine. 2. The enteric tube courses into the stomach with the tip not included on   this examination of the chest.       Ct chest    No evidence of pulmonary embolism or acute pulmonary abnormality    Cultures:   Urine mixed hadley  Blood - NGTD  covid neg    ASSESSMENT/PLAN:    Acute respiratory failure with hypoxia and hypercapnia, likely related to aeCOPD exacerbation   - failed bipap and now on vent support  - continue steroids, IBD  - imaging with no clear infection   - ongoing weaning trial , failed SBT today     UTi- on ceftriazone   No sepsis     Bilat conjunctivitis, Tobradex. levated LFT's, monitor. Acute encephalopathy. likely related to hypercarbia vs UTI. Supportive measures. Hx of drug abuse with related admissions in the past - snorts fentanyl    Lactic acidosis, 2.2, 1.0, resolved. Tobacco Abuse-need cessation .      DM2, hold oral Rx, chk A1c, add Low SSI q4h.       DVT Prophylaxis: Lx  Diet: NPO- can start TF  Code Status: Full Code    Trice Rainey MD, 8/6/2021 7:38 AM

## 2021-08-06 NOTE — PROGRESS NOTES
08/06/21 1927   Vent Information   $Ventilation $Subsequent Day   Skin Assessment Clean, dry, & intact   Vent Type 980   Vent Mode AC/VC   Vt Ordered 340 mL   Rate Set 22 bmp   Peak Flow 45 L/min   Pressure Support 0 cmH20   FiO2  45 %   SpO2 95 %   SpO2/FiO2 ratio 211.11   Sensitivity 3   PEEP/CPAP 5   I Time/ I Time % 0 s   Humidification Source Heated wire   Humidification Temp 37   Humidification Temp Measured 36.8   Circuit Condensation Drained   Vent Patient Data   High Peep/I Pressure 0   Peak Inspiratory Pressure 26 cmH2O   Mean Airway Pressure 11 cmH20   Rate Measured 22 br/min   Vt Exhaled 356 mL   Minute Volume 7.82 Liters   I:E Ratio 1:2.30   Plateau Pressure 19 [id number]   Static Compliance 25 mL/cmH2O   Dynamic Compliance 17 mL/cmH2O   Cough/Sputum   Sputum How Obtained Tracheal;Suctioned   Cough Productive   Sputum Amount Moderate   Sputum Color Creamy   Tenacity Thick   Spontaneous Breathing Trial (SBT) RT Doc   Pulse 71   Breath Sounds   Right Upper Lobe Inspiratory Wheezes; Expiratory Wheezes   Right Middle Lobe Inspiratory Wheezes; Expiratory Wheezes   Right Lower Lobe Inspiratory Wheezes; Expiratory Wheezes   Left Upper Lobe Inspiratory Wheezes; Expiratory Wheezes   Left Lower Lobe Inspiratory Wheezes; Expiratory Wheezes   Additional Respiratory  Assessments   Resp 22   Position Semi-Donaldson's   Alarm Settings   High Pressure Alarm 50 cmH2O   Low Minute Volume Alarm 4 L/min   High Respiratory Rate 40 br/min   Patient Observation   Observations ETT SIZE 8.0, SECURED AT 25 LIP LINE. AMBU BAG AT HEAD OF BED. WATER GOOD. ETT (adult)   Placement Date/Time: 08/05/21 0445   Timeout: Patient;Procedure; Appropriate Equipment; Consent Confirmed  Tube Size: 8 mm  Laryngoscope: GlideScope  Location: Oral  Secured at: 22 cm   Secured at 25 cm   Measured From Lips   ET Placement Right   Secured By Commercial tube kearns   Site Condition Dry        08/06/21 1927   Vent Information   $Ventilation $Subsequent Day Skin Assessment Clean, dry, & intact   Vent Type 980   Vent Mode AC/VC   Vt Ordered 340 mL   Rate Set 22 bmp   Peak Flow 45 L/min   Pressure Support 0 cmH20   FiO2  45 %   SpO2 95 %   SpO2/FiO2 ratio 211.11   Sensitivity 3   PEEP/CPAP 5   I Time/ I Time % 0 s   Humidification Source Heated wire   Humidification Temp 37   Humidification Temp Measured 36.8   Circuit Condensation Drained   Vent Patient Data   High Peep/I Pressure 0   Peak Inspiratory Pressure 26 cmH2O   Mean Airway Pressure 11 cmH20   Rate Measured 22 br/min   Vt Exhaled 356 mL   Minute Volume 7.82 Liters   I:E Ratio 1:2.30   Plateau Pressure 19 [id number]   Static Compliance 25 mL/cmH2O   Dynamic Compliance 17 mL/cmH2O   Cough/Sputum   Sputum How Obtained Tracheal;Suctioned   Cough Productive   Sputum Amount Moderate   Sputum Color Creamy   Tenacity Thick   Spontaneous Breathing Trial (SBT) RT Doc   Pulse 71   Breath Sounds   Right Upper Lobe Inspiratory Wheezes; Expiratory Wheezes   Right Middle Lobe Inspiratory Wheezes; Expiratory Wheezes   Right Lower Lobe Inspiratory Wheezes; Expiratory Wheezes   Left Upper Lobe Inspiratory Wheezes; Expiratory Wheezes   Left Lower Lobe Inspiratory Wheezes; Expiratory Wheezes   Additional Respiratory  Assessments   Resp 22   Position Semi-Donaldson's   Alarm Settings   High Pressure Alarm 50 cmH2O   Low Minute Volume Alarm 4 L/min   High Respiratory Rate 40 br/min   Patient Observation   Observations ETT SIZE 8.0, SECURED AT 25 LIP LINE. AMBU BAG AT HEAD OF BED. WATER GOOD. ETT (adult)   Placement Date/Time: 08/05/21 2085   Timeout: Patient;Procedure; Appropriate Equipment; Consent Confirmed  Tube Size: 8 mm  Laryngoscope: GlideScope  Location: Oral  Secured at: 22 cm   Secured at 25 cm   Measured From Lips   ET Placement Right   Secured By Commercial tube kearns   Site Condition Dry

## 2021-08-06 NOTE — CARE COORDINATION
INTERDISCIPLINARY PLAN OF CARE CONFERENCE    Date/Time: 8/6/2021 10:50 AM  Completed by:  Orlando Tam MSW, PREMW> Case Management      Patient Name:  Roberto Ferrari  YOB: 1965  Admitting Diagnosis: Septicemia ([facility] [address] [A41.9]  Acute cystitis without hematuria [N30.00]  Acute respiratory failure with hypoxia and hypercapnia ([address] [J96.01, J96.02]     Admit Date/Time:  8/4/2021  9:29 PM    Chart reviewed. Interdisciplinary team contacted or reviewed plan related to patient progress and discharge plans. Disciplines included Case Management, Nursing, and Dietitian. Current Status:Ongoing. Vent. Restraints. PT/OT recommendation for discharge plan of care: TBD    Expected D/C Disposition:  TBD    Discharge Plan Comments: Chart review completed. Pt remains in the ICU and is on a Vent. Per note from [facility] on this date, pt was placed on SBT but was stopped (see his note)     Pt is from home with her daughter    CM will continue to follow and assist. Please notify CM if needs or concerns arise.      Home O2 in place on admit: Yes

## 2021-08-06 NOTE — PROGRESS NOTES
08/05/21 2312   Vent Information   Vent Type 980   Vent Mode AC/VC   Vt Ordered 340 mL   Rate Set 22 bmp   Peak Flow 45 L/min   FiO2  45 %   SpO2 96 %   SpO2/FiO2 ratio 213.33   Sensitivity 3   PEEP/CPAP 5   Humidification Source Heated wire   Humidification Temp 37   Humidification Temp Measured 37   Circuit Condensation Drained   Vent Patient Data   Peak Inspiratory Pressure 25 cmH2O   Mean Airway Pressure 11 cmH20   Rate Measured 22 br/min   Vt Exhaled 352 mL   Minute Volume 7.73 Liters   I:E Ratio 1:2.3   Cough/Sputum   Sputum How Obtained Endotracheal;Suctioned   Cough Productive   Sputum Amount Moderate   Sputum Color Creamy   Tenacity Thick   Spontaneous Breathing Trial (SBT) RT Doc   Pulse 94   Breath Sounds   Right Upper Lobe Diminished   Right Middle Lobe Diminished   Right Lower Lobe Diminished   Left Upper Lobe Diminished   Left Lower Lobe Diminished   Additional Respiratory  Assessments   Resp 22   Position Semi-Donaldson's   Alarm Settings   High Pressure Alarm 45 cmH2O   Low Minute Volume Alarm 2.1 L/min   High Respiratory Rate 40 br/min   Low Exhaled Vt  215 mL   Patient Observation   Observations ETT SIZE 8.0, SECURED AT 25 LIP LINE. AMBU MBAG AT HEAD OF BED. WATER GOOD. ETT (adult)   Placement Date/Time: 08/05/21 8983   Timeout: Patient;Procedure; Appropriate Equipment; Consent Confirmed  Tube Size: 8 mm  Laryngoscope: GlideScope  Location: Oral  Secured at: 22 cm   Secured at 25 cm   Measured From Lips   ET Placement Right   Secured By Commercial tube kearns   Site Condition Dry

## 2021-08-06 NOTE — PROGRESS NOTES
Spoke with Dr. Juana Hernandez because the patient's urine output has decreased. Orders for a one time dose of lasix 20 mg.  Electronically signed by Jose A Salazar RN on 8/6/2021 at 11:20 AM

## 2021-08-06 NOTE — PROGRESS NOTES
08/06/21 0713   Vent Information   Vent Mode AC/VC   Vt Ordered 340 mL   Rate Set 22 bmp   Peak Flow 45 L/min   FiO2  45 %   SpO2 91 %   Sensitivity 3   PEEP/CPAP 5   Vent Patient Data   Peak Inspiratory Pressure 27 cmH2O   Mean Airway Pressure 11 cmH20   Rate Measured 22 br/min   Vt Exhaled 353 mL   Minute Volume 7.76 Liters   I:E Ratio 1:2.3   Plateau Pressure 20 [id number]   Static Compliance 24 mL/cmH2O   Dynamic Compliance 16 mL/cmH2O   Cough/Sputum   Cough Productive   Sputum Amount Small   Sputum Color Creamy   Tenacity Thick

## 2021-08-06 NOTE — PROGRESS NOTES
Pulmonary Progress Note    CC: Respiratory failure    Subjective: Failed SBT due to increase RR/HR and low TV   Propofol 45 mcg/kg/min   Fentanyl 75 mcg/hr   PEEP 5  FiO2 45%      IV line: PIVs    MV: 8/5      Intake/Output Summary (Last 24 hours) at 8/6/2021 0737  Last data filed at 8/6/2021 6322  Gross per 24 hour   Intake 2082.7 ml   Output 1900 ml   Net 182.7 ml       Exam:   /60   Pulse 89   Temp 99 °F (37.2 °C) (Axillary)   Resp 22   Ht 5' 4\" (1.626 m)   Wt 213 lb 4.8 oz (96.8 kg)   SpO2 91%   BMI 36.61 kg/m²  on 22/340  General: ill appearing. Intubated sedated. Eyes: PERRL. No sclera icterus. No conjunctival injection. ENT: No discharge. Pharynx clear. ET tube in place  Neck: Trachea midline. Normal thyroid. Resp: No accessory muscle use. No crackles. Bilateral wheezing. No rhonchi. No dullness on percussion. CV: Regular rate. Regular rhythm. No mumur or rub. No edema. GI: Non-tender. Non-distended. No masses. No organomegaly. Normal bowel sounds. No hernia. Skin: Warm and dry. No nodule on exposed extremities. No rash on exposed extremities. Lymph: No cervical LAD. No supraclavicular LAD. M/S: No cyanosis. No joint deformity. No clubbing. Neuro: Intubated sedated. Responsive to painful stimuli. Followed commands. .  Psych: Noncommunicative unable to obtain    Scheduled Meds:   cefepime  2,000 mg Intravenous Once    nicotine  1 patch Transdermal Daily    ipratropium-albuterol  3 mL Inhalation Q4H WA    enoxaparin  40 mg Subcutaneous Daily    methylPREDNISolone  40 mg Intravenous Q12H    Followed by   Salena Mcclure ON 8/8/2021] predniSONE  40 mg Oral Daily with breakfast    tobramycin-dexamethasone  1 drop Both Eyes 6 times per day    chlorhexidine  15 mL Mouth/Throat BID    famotidine (PEPCID) injection  20 mg Intravenous BID    mupirocin   Nasal BID    azithromycin  500 mg Intravenous Q24H    cefTRIAXone (ROCEPHIN) IV  2,000 mg Intravenous Q24H    insulin lispro  0-18 Units Subcutaneous Q4H     Continuous Infusions:   dextrose      sodium chloride 75 mL/hr at 08/05/21 1729    propofol 50 mcg/kg/min (08/06/21 0427)    fentaNYL 75 mcg/hr (08/06/21 0039)     PRN Meds:  glucose, dextrose, glucagon (rDNA), dextrose, polyethylene glycol, acetaminophen **OR** acetaminophen, ondansetron, fentanNYL, midazolam, albuterol    Labs:  CBC:   Recent Labs     08/04/21 2133 08/05/21  0406   WBC 6.4 6.2   HGB 15.2 14.3   HCT 46.6 43.5   MCV 91.1 90.9    158     BMP:   Recent Labs     08/04/21 2133 08/05/21  0406   * 130*   K 5.1 4.9   CL 89* 93*   CO2 32 31   BUN 6* 6*   CREATININE <0.5* <0.5*     LIVER PROFILE:   Recent Labs     08/04/21 2133 08/05/21  0406   AST 90* 79*   ALT 48* 37   BILITOT 1.0 1.0   ALKPHOS 215* 183*     PT/INR: No results for input(s): PROTIME, INR in the last 72 hours. APTT: No results for input(s): APTT in the last 72 hours. UA:  Recent Labs     08/05/21  0155   COLORU Yellow   PHUR 6.5  6.5   WBCUA 10-20*   RBCUA 3-4   BACTERIA Rare*   CLARITYU Clear   SPECGRAV <=1.005   LEUKOCYTESUR MODERATE*   UROBILINOGEN 0.2   BILIRUBINUR Negative   BLOODU TRACE-INTACT*   GLUCOSEU >=1000*     Recent Labs     08/05/21  1050 08/06/21  0510   PHART 7.347* 7.471*   TNL3HTN 60.3* 40.2   PO2ART 66.8* 68.1*       Cultures:   8/4 COVID-19 and influenza not detected  8/5 UC NGTD  8/5 BC NGTD    Films:  Chest x-ray 8/6 imaging was reviewed by me and showed   Satisfactory ETT position- 25 cm  No acute infiltrates        CTPA 8/5/21  images reviewed by me and showed:   No evidence of pulmonary embolism or acute pulmonary abnormality     ASSESSMENT:  · Acute hypoxemic hypercapnic respiratory failure  · COPD with acute exacerbation  · Acute encephalopathy/metabolic encephalopathy  · Hyperglycemia   · UTI  · Tobacco abuse        PLAN:  · Mechanical ventilation as per my orders.  The ventilator was adjusted by me at the bedside for unstable, life threatening respiratory failure  · Follow ABG and chest x-ray while on the ventilator  · Supplemental oxygen to maintain SaO2 >92%; wean as tolerated  · IV Propofol for sedation, target RASS -2, with daily spontaneous awakening trial   · Add Precedex and wean off propofol   · Fentanyl and Versed PRN, gtt as needed  · Head of bed 30 degrees or higher at all times  · Daily spontaneous breathing trial once PEEP less than 8, FiO2 less than 55%  · Follow TG   · Inhaled bronchodilators  · Solumedrol 40 IV Q12 hrs   · Ceftriaxone and Zithromax D#2  · Nutrition: Tube feeding today   · Lantus 20 units and continue ISS - goal 150-180  · PICC today for limited IV access   · GI prophylaxis: Pepcid  · DVT prophylaxis: Lovenox  · MRSA prophylaxis: Bactroban   · Code status: Full code             Total critical care time caring for this patient with life threatening, unstable organ failure, including direct patient contact, management of life support systems, review of data including imaging and labs, discussions with other team members and physicians is 32 minutes, excluding procedures.

## 2021-08-06 NOTE — PROGRESS NOTES
08/06/21 0755   Vent Information   Vent Mode PS   Pressure Support 5 cmH20   FiO2  45 %   SpO2 94 %   Sensitivity 3   PEEP/CPAP 5   Vent Patient Data   Peak Inspiratory Pressure 11 cmH2O   Mean Airway Pressure 6.4 cmH20   Rate Measured 21 br/min   Spontaneous  mL   Minute Volume 4.6 Liters   I:E Ratio 1:8.10   Spontaneous Breathing Trial (SBT) RT Doc   Pulse 109   RSBI Calculated 74.73

## 2021-08-06 NOTE — PLAN OF CARE
Problem: Non-Violent Restraints  Goal: Removal from restraints as soon as assessed to be safe  Outcome: Ongoing  Goal: No harm/injury to patient while restraints in use  Outcome: Ongoing  Goal: Patient's dignity will be maintained  Outcome: Ongoing     Problem: Nutrition  Goal: Optimal nutrition therapy  Outcome: Ongoing  Goal: Understanding of nutritional guidelines  Outcome: Ongoing

## 2021-08-07 ENCOUNTER — APPOINTMENT (OUTPATIENT)
Dept: GENERAL RADIOLOGY | Age: 56
DRG: 207 | End: 2021-08-07
Payer: MEDICARE

## 2021-08-07 LAB
ANION GAP SERPL CALCULATED.3IONS-SCNC: 9 MMOL/L (ref 3–16)
BASE EXCESS ARTERIAL: 2.5 MMOL/L (ref -3–3)
BASOPHILS ABSOLUTE: 0 K/UL (ref 0–0.2)
BASOPHILS RELATIVE PERCENT: 0.4 %
BUN BLDV-MCNC: 25 MG/DL (ref 7–20)
CALCIUM SERPL-MCNC: 8.6 MG/DL (ref 8.3–10.6)
CARBOXYHEMOGLOBIN ARTERIAL: 0.8 % (ref 0–1.5)
CHLORIDE BLD-SCNC: 94 MMOL/L (ref 99–110)
CO2: 28 MMOL/L (ref 21–32)
CREAT SERPL-MCNC: 0.7 MG/DL (ref 0.6–1.1)
EOSINOPHILS ABSOLUTE: 0 K/UL (ref 0–0.6)
EOSINOPHILS RELATIVE PERCENT: 0 %
GFR AFRICAN AMERICAN: >60
GFR NON-AFRICAN AMERICAN: >60
GLUCOSE BLD-MCNC: 148 MG/DL (ref 70–99)
GLUCOSE BLD-MCNC: 164 MG/DL (ref 70–99)
GLUCOSE BLD-MCNC: 251 MG/DL (ref 70–99)
GLUCOSE BLD-MCNC: 255 MG/DL (ref 70–99)
GLUCOSE BLD-MCNC: 349 MG/DL (ref 70–99)
GLUCOSE BLD-MCNC: 372 MG/DL (ref 70–99)
HCO3 ARTERIAL: 28.6 MMOL/L (ref 21–29)
HCT VFR BLD CALC: 44.2 % (ref 36–48)
HEMOGLOBIN, ART, EXTENDED: 14.8 G/DL (ref 12–16)
HEMOGLOBIN: 14.4 G/DL (ref 12–16)
LYMPHOCYTES ABSOLUTE: 0.3 K/UL (ref 1–5.1)
LYMPHOCYTES RELATIVE PERCENT: 4.4 %
MCH RBC QN AUTO: 29.3 PG (ref 26–34)
MCHC RBC AUTO-ENTMCNC: 32.6 G/DL (ref 31–36)
MCV RBC AUTO: 90.1 FL (ref 80–100)
METHEMOGLOBIN ARTERIAL: 0 %
MONOCYTES ABSOLUTE: 0.3 K/UL (ref 0–1.3)
MONOCYTES RELATIVE PERCENT: 4.6 %
NEUTROPHILS ABSOLUTE: 6.6 K/UL (ref 1.7–7.7)
NEUTROPHILS RELATIVE PERCENT: 90.6 %
O2 SAT, ARTERIAL: 96.3 %
O2 THERAPY: ABNORMAL
PCO2 ARTERIAL: 49.8 MMHG (ref 35–45)
PDW BLD-RTO: 15.9 % (ref 12.4–15.4)
PERFORMED ON: ABNORMAL
PH ARTERIAL: 7.38 (ref 7.35–7.45)
PLATELET # BLD: 147 K/UL (ref 135–450)
PMV BLD AUTO: 8.1 FL (ref 5–10.5)
PO2 ARTERIAL: 86.4 MMHG (ref 75–108)
POTASSIUM SERPL-SCNC: 4.7 MMOL/L (ref 3.5–5.1)
RBC # BLD: 4.91 M/UL (ref 4–5.2)
SODIUM BLD-SCNC: 131 MMOL/L (ref 136–145)
TCO2 ARTERIAL: 30.1 MMOL/L
TRIGL SERPL-MCNC: 139 MG/DL (ref 0–150)
WBC # BLD: 7.3 K/UL (ref 4–11)

## 2021-08-07 PROCEDURE — 94003 VENT MGMT INPAT SUBQ DAY: CPT

## 2021-08-07 PROCEDURE — 85025 COMPLETE CBC W/AUTO DIFF WBC: CPT

## 2021-08-07 PROCEDURE — 82803 BLOOD GASES ANY COMBINATION: CPT

## 2021-08-07 PROCEDURE — 80048 BASIC METABOLIC PNL TOTAL CA: CPT

## 2021-08-07 PROCEDURE — 6370000000 HC RX 637 (ALT 250 FOR IP): Performed by: INTERNAL MEDICINE

## 2021-08-07 PROCEDURE — 94761 N-INVAS EAR/PLS OXIMETRY MLT: CPT

## 2021-08-07 PROCEDURE — 94640 AIRWAY INHALATION TREATMENT: CPT

## 2021-08-07 PROCEDURE — 2700000000 HC OXYGEN THERAPY PER DAY

## 2021-08-07 PROCEDURE — 99233 SBSQ HOSP IP/OBS HIGH 50: CPT | Performed by: INTERNAL MEDICINE

## 2021-08-07 PROCEDURE — 36415 COLL VENOUS BLD VENIPUNCTURE: CPT

## 2021-08-07 PROCEDURE — 6360000002 HC RX W HCPCS: Performed by: INTERNAL MEDICINE

## 2021-08-07 PROCEDURE — 71045 X-RAY EXAM CHEST 1 VIEW: CPT

## 2021-08-07 PROCEDURE — 99291 CRITICAL CARE FIRST HOUR: CPT | Performed by: INTERNAL MEDICINE

## 2021-08-07 PROCEDURE — 2000000000 HC ICU R&B

## 2021-08-07 PROCEDURE — 2500000003 HC RX 250 WO HCPCS: Performed by: INTERNAL MEDICINE

## 2021-08-07 PROCEDURE — 2580000003 HC RX 258: Performed by: INTERNAL MEDICINE

## 2021-08-07 PROCEDURE — 84478 ASSAY OF TRIGLYCERIDES: CPT

## 2021-08-07 RX ORDER — FUROSEMIDE 10 MG/ML
40 INJECTION INTRAMUSCULAR; INTRAVENOUS ONCE
Status: COMPLETED | OUTPATIENT
Start: 2021-08-07 | End: 2021-08-07

## 2021-08-07 RX ORDER — INSULIN GLARGINE 100 [IU]/ML
20 INJECTION, SOLUTION SUBCUTANEOUS 2 TIMES DAILY
Status: DISCONTINUED | OUTPATIENT
Start: 2021-08-07 | End: 2021-08-11

## 2021-08-07 RX ADMIN — TOBRAMYCIN AND DEXAMETHASONE 1 DROP: 3; 1 SUSPENSION/ DROPS OPHTHALMIC at 23:11

## 2021-08-07 RX ADMIN — MIDAZOLAM HYDROCHLORIDE 2 MG: 1 INJECTION, SOLUTION INTRAMUSCULAR; INTRAVENOUS at 06:02

## 2021-08-07 RX ADMIN — CEFTRIAXONE 2000 MG: 2 INJECTION, POWDER, FOR SOLUTION INTRAMUSCULAR; INTRAVENOUS at 18:19

## 2021-08-07 RX ADMIN — Medication 2 MCG/KG/HR: at 11:57

## 2021-08-07 RX ADMIN — FAMOTIDINE 20 MG: 10 INJECTION, SOLUTION INTRAVENOUS at 20:00

## 2021-08-07 RX ADMIN — TOBRAMYCIN AND DEXAMETHASONE 1 DROP: 3; 1 SUSPENSION/ DROPS OPHTHALMIC at 00:14

## 2021-08-07 RX ADMIN — MIDAZOLAM HYDROCHLORIDE 2 MG: 1 INJECTION, SOLUTION INTRAMUSCULAR; INTRAVENOUS at 09:29

## 2021-08-07 RX ADMIN — IPRATROPIUM BROMIDE AND ALBUTEROL SULFATE 3 ML: .5; 3 SOLUTION RESPIRATORY (INHALATION) at 07:02

## 2021-08-07 RX ADMIN — SODIUM CHLORIDE: 9 INJECTION, SOLUTION INTRAVENOUS at 09:47

## 2021-08-07 RX ADMIN — TOBRAMYCIN AND DEXAMETHASONE 1 DROP: 3; 1 SUSPENSION/ DROPS OPHTHALMIC at 16:20

## 2021-08-07 RX ADMIN — CHLORHEXIDINE GLUCONATE 0.12% ORAL RINSE 15 ML: 1.2 LIQUID ORAL at 20:00

## 2021-08-07 RX ADMIN — TOBRAMYCIN AND DEXAMETHASONE 1 DROP: 3; 1 SUSPENSION/ DROPS OPHTHALMIC at 12:00

## 2021-08-07 RX ADMIN — FUROSEMIDE 40 MG: 10 INJECTION, SOLUTION INTRAMUSCULAR; INTRAVENOUS at 13:09

## 2021-08-07 RX ADMIN — Medication 2 MCG/KG/HR: at 23:51

## 2021-08-07 RX ADMIN — MUPIROCIN: 20 OINTMENT TOPICAL at 09:32

## 2021-08-07 RX ADMIN — Medication 100 MCG/HR: at 00:46

## 2021-08-07 RX ADMIN — MIDAZOLAM HYDROCHLORIDE 2 MG: 1 INJECTION, SOLUTION INTRAMUSCULAR; INTRAVENOUS at 00:14

## 2021-08-07 RX ADMIN — MIDAZOLAM HYDROCHLORIDE 2 MG: 1 INJECTION, SOLUTION INTRAMUSCULAR; INTRAVENOUS at 18:14

## 2021-08-07 RX ADMIN — TOBRAMYCIN AND DEXAMETHASONE 1 DROP: 3; 1 SUSPENSION/ DROPS OPHTHALMIC at 08:30

## 2021-08-07 RX ADMIN — IPRATROPIUM BROMIDE AND ALBUTEROL SULFATE 3 ML: .5; 3 SOLUTION RESPIRATORY (INHALATION) at 11:50

## 2021-08-07 RX ADMIN — MIDAZOLAM HYDROCHLORIDE 2 MG: 1 INJECTION, SOLUTION INTRAMUSCULAR; INTRAVENOUS at 19:54

## 2021-08-07 RX ADMIN — SODIUM CHLORIDE, PRESERVATIVE FREE 10 ML: 5 INJECTION INTRAVENOUS at 09:32

## 2021-08-07 RX ADMIN — Medication 1.2 MCG/KG/HR: at 08:02

## 2021-08-07 RX ADMIN — IPRATROPIUM BROMIDE AND ALBUTEROL SULFATE 3 ML: .5; 3 SOLUTION RESPIRATORY (INHALATION) at 19:36

## 2021-08-07 RX ADMIN — TOBRAMYCIN AND DEXAMETHASONE 1 DROP: 3; 1 SUSPENSION/ DROPS OPHTHALMIC at 04:23

## 2021-08-07 RX ADMIN — Medication 150 MCG/HR: at 21:20

## 2021-08-07 RX ADMIN — Medication 2 MCG/KG/HR: at 19:17

## 2021-08-07 RX ADMIN — Medication 150 MCG/HR: at 07:15

## 2021-08-07 RX ADMIN — MIDAZOLAM HYDROCHLORIDE 2 MG: 1 INJECTION, SOLUTION INTRAMUSCULAR; INTRAVENOUS at 02:15

## 2021-08-07 RX ADMIN — CHLORHEXIDINE GLUCONATE 0.12% ORAL RINSE 15 ML: 1.2 LIQUID ORAL at 09:10

## 2021-08-07 RX ADMIN — FAMOTIDINE 20 MG: 10 INJECTION, SOLUTION INTRAVENOUS at 09:11

## 2021-08-07 RX ADMIN — ALBUTEROL SULFATE 2.5 MG: 2.5 SOLUTION RESPIRATORY (INHALATION) at 09:28

## 2021-08-07 RX ADMIN — PROPOFOL 15 MCG/KG/MIN: 10 INJECTION, EMULSION INTRAVENOUS at 04:37

## 2021-08-07 RX ADMIN — IPRATROPIUM BROMIDE AND ALBUTEROL SULFATE 3 ML: .5; 3 SOLUTION RESPIRATORY (INHALATION) at 23:07

## 2021-08-07 RX ADMIN — PROPOFOL 20 MCG/KG/MIN: 10 INJECTION, EMULSION INTRAVENOUS at 23:11

## 2021-08-07 RX ADMIN — Medication 2 MCG/KG/HR: at 16:51

## 2021-08-07 RX ADMIN — Medication 1.2 MCG/KG/HR: at 04:20

## 2021-08-07 RX ADMIN — INSULIN GLARGINE 20 UNITS: 100 INJECTION, SOLUTION SUBCUTANEOUS at 20:39

## 2021-08-07 RX ADMIN — IPRATROPIUM BROMIDE AND ALBUTEROL SULFATE 3 ML: .5; 3 SOLUTION RESPIRATORY (INHALATION) at 15:08

## 2021-08-07 RX ADMIN — Medication 2 MCG/KG/HR: at 14:34

## 2021-08-07 RX ADMIN — TOBRAMYCIN AND DEXAMETHASONE 1 DROP: 3; 1 SUSPENSION/ DROPS OPHTHALMIC at 19:14

## 2021-08-07 RX ADMIN — INSULIN GLARGINE 20 UNITS: 100 INJECTION, SOLUTION SUBCUTANEOUS at 09:11

## 2021-08-07 RX ADMIN — Medication 150 MCG/HR: at 14:42

## 2021-08-07 RX ADMIN — MIDAZOLAM HYDROCHLORIDE 2 MG: 1 INJECTION, SOLUTION INTRAMUSCULAR; INTRAVENOUS at 16:12

## 2021-08-07 RX ADMIN — MUPIROCIN: 20 OINTMENT TOPICAL at 20:00

## 2021-08-07 RX ADMIN — PROPOFOL 15 MCG/KG/MIN: 10 INJECTION, EMULSION INTRAVENOUS at 14:38

## 2021-08-07 RX ADMIN — METHYLPREDNISOLONE SODIUM SUCCINATE 40 MG: 40 INJECTION, POWDER, FOR SOLUTION INTRAMUSCULAR; INTRAVENOUS at 04:23

## 2021-08-07 RX ADMIN — ENOXAPARIN SODIUM 40 MG: 40 INJECTION SUBCUTANEOUS at 09:11

## 2021-08-07 RX ADMIN — Medication 1.2 MCG/KG/HR: at 00:45

## 2021-08-07 RX ADMIN — MIDAZOLAM HYDROCHLORIDE 2 MG: 1 INJECTION, SOLUTION INTRAMUSCULAR; INTRAVENOUS at 11:50

## 2021-08-07 RX ADMIN — DEXTROSE MONOHYDRATE 500 MG: 50 INJECTION, SOLUTION INTRAVENOUS at 20:02

## 2021-08-07 ASSESSMENT — PULMONARY FUNCTION TESTS
PIF_VALUE: 33
PIF_VALUE: 32
PIF_VALUE: 29
PIF_VALUE: 33
PIF_VALUE: 32
PIF_VALUE: 31
PIF_VALUE: 29
PIF_VALUE: 34
PIF_VALUE: 34
PIF_VALUE: 29
PIF_VALUE: 27
PIF_VALUE: 33
PIF_VALUE: 29
PIF_VALUE: 33
PIF_VALUE: 33
PIF_VALUE: 27
PIF_VALUE: 32
PIF_VALUE: 32
PIF_VALUE: 27
PIF_VALUE: 27
PIF_VALUE: 33
PIF_VALUE: 33
PIF_VALUE: 27
PIF_VALUE: 28
PIF_VALUE: 32

## 2021-08-07 ASSESSMENT — PAIN SCALES - GENERAL
PAINLEVEL_OUTOF10: 0
PAINLEVEL_OUTOF10: 2
PAINLEVEL_OUTOF10: 0

## 2021-08-07 NOTE — PROGRESS NOTES
Dr. Sukumar Martínez rounded on patient with multidisciplinary team.      -Increase Precedex to 2 mcg/kg/hour  -Goal to remove Propofol and reduce Fentanyl as patient tolerates  -Increase Lantus to 20 units BID  -Discontinue IV fluids

## 2021-08-07 NOTE — PROGRESS NOTES
08/06/21 2309   Vent Information   Vent Type 980   Vent Mode AC/VC   Vt Ordered 340 mL   Rate Set 22 bmp   Peak Flow 45 L/min   Pressure Support 0 cmH20   FiO2  40 %   SpO2 95 %   SpO2/FiO2 ratio 237.5   Sensitivity 3   PEEP/CPAP 5   I Time/ I Time % 0 s   Humidification Source Heated wire   Humidification Temp 37   Humidification Temp Measured 36.7   Circuit Condensation Drained   Vent Patient Data   High Peep/I Pressure 0   Peak Inspiratory Pressure 23 cmH2O   Mean Airway Pressure 11 cmH20   Rate Measured 22 br/min   Vt Exhaled 356 mL   Minute Volume 7.84 Liters   I:E Ratio 1:2.30   Cough/Sputum   Sputum How Obtained Endotracheal;Suctioned   Cough Productive   Sputum Amount Moderate   Sputum Color Creamy   Tenacity Thick   Spontaneous Breathing Trial (SBT) RT Doc   Pulse 69   Breath Sounds   Right Upper Lobe Expiratory Wheezes   Right Middle Lobe Expiratory Wheezes   Right Lower Lobe Expiratory Wheezes   Left Upper Lobe Expiratory Wheezes   Left Lower Lobe Expiratory Wheezes   Additional Respiratory  Assessments   Resp 22   Position Semi-Donaldson's   Alarm Settings   High Pressure Alarm 50 cmH2O   Low Minute Volume Alarm 4 L/min   High Respiratory Rate 40 br/min   Patient Observation   Observations ETT SIZE 8.0, SECURED AT 25 LIP LINE. AMBU BAG AT HEAD OF BED. WATER GOOD. ETT (adult)   Placement Date/Time: 08/05/21 7557   Timeout: Patient;Procedure; Appropriate Equipment; Consent Confirmed  Tube Size: 8 mm  Laryngoscope: GlideScope  Location: Oral  Secured at: 22 cm   Secured at 25 cm   Measured From [address] By [employer] tube kearns   Site Condition Dry

## 2021-08-07 NOTE — PROGRESS NOTES
08/07/21 1152   Vent Information   Vent Type 980   Vent Mode AC/VC   Vt Ordered 340 mL   Rate Set 22 bmp   Peak Flow 55 L/min   Pressure Support 0 cmH20   FiO2  40 %   SpO2 95 %   SpO2/FiO2 ratio 237.5   Sensitivity 2   PEEP/CPAP 5   I Time/ I Time % 0 s   Humidification Source Heated wire   Humidification Temp 37.4   Circuit Condensation Drained   Vent Patient Data   High Peep/I Pressure 0   Peak Inspiratory Pressure 32 cmH2O   Mean Airway Pressure 11 cmH20   Rate Measured 22 br/min   Vt Exhaled 357 mL   Minute Volume 7.86 Liters   I:E Ratio 1:3.00   Spontaneous Breathing Trial (SBT) RT Doc   Pulse 79   Breath Sounds   Right Upper Lobe Expiratory Wheezes   Right Middle Lobe Expiratory Wheezes   Right Lower Lobe Expiratory Wheezes   Left Upper Lobe Expiratory Wheezes   Left Lower Lobe Expiratory Wheezes   Additional Respiratory  Assessments   Resp 20   Alarm Settings   High Pressure Alarm 50 cmH2O   Low Minute Volume Alarm 4 L/min   Apnea (secs) 20 secs   High Respiratory Rate 40 br/min   Low Exhaled Vt  240 mL

## 2021-08-07 NOTE — PROGRESS NOTES
Pulmonary Progress Note    CC: Respiratory failure    Subjective: Failed SBT due to cough, wheezes, increase RR/HR and low TV   Precedex 1.2 mcg/kg/hr   Propofol 15 mcg/kg/min   Fentanyl 150 mcg/hr   PEEP 5  FiO2 40%      IV line: PICC 8/6     MV: 8/5      Intake/Output Summary (Last 24 hours) at 8/7/2021 0746  Last data filed at 8/7/2021 0606  Gross per 24 hour   Intake 4446.82 ml   Output 1920 ml   Net 2526.82 ml       Exam:   /79   Pulse 69   Temp 96.8 °F (36 °C) (Bladder)   Resp 22   Ht 5' 4\" (1.626 m)   Wt 225 lb 9.6 oz (102.3 kg)   SpO2 96%   BMI 38.72 kg/m²  on 22/340  General: ill appearing. Intubated sedated. Eyes: PERRL. No sclera icterus. No conjunctival injection. ENT: No discharge. Pharynx clear. ET tube in place  Neck: Trachea midline. Normal thyroid. Resp: No accessory muscle use. No crackles. Bilateral wheezing. No rhonchi. No dullness on percussion. CV: Regular rate. Regular rhythm. No mumur or rub. No edema. GI: Non-tender. Non-distended. No masses. No organomegaly. Normal bowel sounds. No hernia. Skin: Warm and dry. No nodule on exposed extremities. No rash on exposed extremities. Lymph: No cervical LAD. No supraclavicular LAD. M/S: No cyanosis. No joint deformity. No clubbing. Neuro: Intubated sedated. Responsive to painful stimuli. Followed commands.    Psych: Noncommunicative unable to obtain    Scheduled Meds:   insulin glargine  20 Units Subcutaneous QAM    lidocaine 1 % injection  5 mL Intradermal Once    sodium chloride flush  5-40 mL Intravenous 2 times per day    cefTRIAXone (ROCEPHIN) IV  2,000 mg Intravenous Q24H    cefepime  2,000 mg Intravenous Once    nicotine  1 patch Transdermal Daily    ipratropium-albuterol  3 mL Inhalation Q4H WA    enoxaparin  40 mg Subcutaneous Daily    [START ON 8/8/2021] predniSONE  40 mg Oral Daily with breakfast    tobramycin-dexamethasone  1 drop Both Eyes 6 times per day    chlorhexidine  15 mL Mouth/Throat BID  famotidine (PEPCID) injection  20 mg Intravenous BID    mupirocin   Nasal BID    azithromycin  500 mg Intravenous Q24H    insulin lispro  0-18 Units Subcutaneous Q4H     Continuous Infusions:   sodium chloride      dexmedetomidine HCl in NaCl 1.2 mcg/kg/hr (08/07/21 0420)    dextrose      sodium chloride 75 mL/hr at 08/06/21 1800    propofol 15 mcg/kg/min (08/07/21 0437)    fentaNYL 150 mcg/hr (08/07/21 0715)     PRN Meds:  sodium chloride flush, sodium chloride, glucose, dextrose, glucagon (rDNA), dextrose, polyethylene glycol, acetaminophen **OR** acetaminophen, ondansetron, fentanNYL, midazolam, albuterol    Labs:  CBC:   Recent Labs     08/05/21  0406 08/06/21  0738 08/07/21  0335   WBC 6.2 7.2 7.3   HGB 14.3 13.7 14.4   HCT 43.5 43.0 44.2   MCV 90.9 89.8 90.1    166 147     BMP:   Recent Labs     08/05/21  0406 08/05/21  0406 08/06/21  0738 08/07/21  0335   *  --  130* 131*   K 4.9   < > 5.0  5.0 4.7   CL 93*  --  95* 94*   CO2 31  --  28 28   BUN 6*  --  15 25*   CREATININE <0.5*  --  0.6 0.7    < > = values in this interval not displayed. LIVER PROFILE:   Recent Labs     08/04/21  2133 08/05/21  0406   AST 90* 79*   ALT 48* 37   BILITOT 1.0 1.0   ALKPHOS 215* 183*     PT/INR:   Recent Labs     08/06/21  0739   PROTIME 14.6*   INR 1.28*     APTT: No results for input(s): APTT in the last 72 hours.   UA:  Recent Labs     08/05/21  0155   COLORU Yellow   PHUR 6.5  6.5   WBCUA 10-20*   RBCUA 3-4   BACTERIA Rare*   CLARITYU Clear   SPECGRAV <=1.005   LEUKOCYTESUR MODERATE*   UROBILINOGEN 0.2   BILIRUBINUR Negative   BLOODU TRACE-INTACT*   GLUCOSEU >=1000*     Recent Labs     08/06/21  0510 08/07/21  0340   PHART 7.471* 7.377   TYH0VPY 40.2 49.8*   PO2ART 68.1* 86.4       Cultures:   8/4 COVID-19 and influenza not detected  8/5  NGTD  8/5 BC NGTD    Films:  Chest x-ray 8/7 imaging was reviewed by me and showed   Satisfactory ETT position- 25 cm  No acute infiltrates        CTPA 8/5/21  images reviewed by me and showed:   No evidence of pulmonary embolism or acute pulmonary abnormality     ASSESSMENT:  · Acute hypoxemic hypercapnic respiratory failure  · COPD with acute exacerbation  · Acute encephalopathy/metabolic encephalopathy  · Hyperglycemia   · UTI  · Tobacco abuse        PLAN:  · Mechanical ventilation as per my orders. The ventilator was adjusted by me at the bedside for unstable, life threatening respiratory failure  · Follow ABG and chest x-ray while on the ventilator  · Supplemental oxygen to maintain SaO2 >92%; wean as tolerated  · IV Propofol for sedation, target RASS -2, with daily spontaneous awakening trial   · Advance Precedex and wean off propofol   · Fentanyl and Versed PRN, gtt as needed  · Head of bed 30 degrees or higher at all times  · Daily spontaneous breathing trial once PEEP less than 8, FiO2 less than 55%  · Follow TG   · Inhaled bronchodilators  · Solumedrol 40 IV Q12 hrs   · Ceftriaxone and Zithromax D#3  · Lasix 40 mg IV x 1   · D/C IVF  · Nutrition: Tube feeding today   · Increase Lantus 20 units BID and continue ISS - goal 150-180  · GI prophylaxis: Pepcid  · DVT prophylaxis: Lovenox  · MRSA prophylaxis: Bactroban   · Code status: Full code             Total critical care time caring for this patient with life threatening, unstable organ failure, including direct patient contact, management of life support systems, review of data including imaging and labs, discussions with other team members and physicians is 33 minutes, excluding procedures.

## 2021-08-07 NOTE — PROGRESS NOTES
08/06/21 2309   Vent Information   Vent Type 980   Vent Mode AC/VC   Vt Ordered 340 mL   Rate Set 22 bmp   Peak Flow 45 L/min   Pressure Support 0 cmH20   FiO2  40 %   SpO2 95 %   SpO2/FiO2 ratio 237.5   Sensitivity 3   PEEP/CPAP 5   I Time/ I Time % 0 s   Humidification Source Heated wire   Humidification Temp 37   Humidification Temp Measured 36.7   Circuit Condensation Drained   Vent Patient Data   High Peep/I Pressure 0   Peak Inspiratory Pressure 23 cmH2O   Mean Airway Pressure 11 cmH20   Rate Measured 22 br/min   Vt Exhaled 356 mL   Minute Volume 7.84 Liters   I:E Ratio 1:2.30   Spontaneous Breathing Trial (SBT) RT Doc   Pulse 69   Additional Respiratory  Assessments   Resp 22   Alarm Settings   High Pressure Alarm 50 cmH2O   Low Minute Volume Alarm 4 L/min   High Respiratory Rate 40 br/min

## 2021-08-07 NOTE — PROGRESS NOTES
5cwp auto peep noted  Total peep =10cwp       08/07/21 0704   Vent Information   Vt Ordered 340 mL   Rate Set 22 bmp   Peak Flow 45 L/min   Pressure Support 0 cmH20   FiO2  40 %   SpO2 96 %   SpO2/FiO2 ratio 240   Sensitivity 3   PEEP/CPAP 5   I Time/ I Time % 0 s   Humidification Source Heated wire   Humidification Temp Measured 37   Vent Patient Data   High Peep/I Pressure 0   Peak Inspiratory Pressure 27 cmH2O   Mean Airway Pressure 11 cmH20   Rate Measured 22 br/min   Vt Exhaled 358 mL   Minute Volume 7.85 Liters   I:E Ratio 1:2.30   Plateau Pressure 20 [id number]   Static Compliance 24 mL/cmH2O   Total PEEP 10 cmH20   Auto PEEP 5 cmH20   Cough/Sputum   Sputum How Obtained Endotracheal;Suctioned   Cough Productive   Sputum Amount Small   Sputum Color Creamy   Tenacity Thick   Spontaneous Breathing Trial (SBT) RT Doc   Pulse 69   Breath Sounds   Right Upper Lobe Expiratory Wheezes   Right Middle Lobe Expiratory Wheezes   Right Lower Lobe Expiratory Wheezes   Left Upper Lobe Expiratory Wheezes   Left Lower Lobe Expiratory Wheezes   Additional Respiratory  Assessments   Resp 22   Position Reverse Trendelenburg   Oral Care Completed? Yes   Oral Care Mouth suctioned   Subglottic Suction Done? Yes   Cuff Pressure (cm H2O) 27 cm H2O   Alarm Settings   High Pressure Alarm 50 cmH2O   Low Minute Volume Alarm 4 L/min   High Respiratory Rate 40 br/min   ETT (adult)   Placement Date/Time: 08/05/21 5372   Timeout: Patient;Procedure; Appropriate Equipment; Consent Confirmed  Tube Size: 8 mm  Laryngoscope: GlideScope  Location: Oral  Secured at: 22 cm   Secured at 25 cm

## 2021-08-07 NOTE — PROGRESS NOTES
08/06/21 1927   Vent Information   $Ventilation $Subsequent Day   Skin Assessment Clean, dry, & intact   Vent Type 980   Vent Mode AC/VC   Vt Ordered 340 mL   Rate Set 22 bmp   Peak Flow 45 L/min   Pressure Support 0 cmH20   FiO2  45 %   SpO2 95 %   SpO2/FiO2 ratio 211.11   Sensitivity 3   PEEP/CPAP 5   I Time/ I Time % 0 s   Humidification Source Heated wire   Humidification Temp 37   Humidification Temp Measured 36.8   Circuit Condensation Drained   Vent Patient Data   High Peep/I Pressure 0   Peak Inspiratory Pressure 26 cmH2O   Mean Airway Pressure 11 cmH20   Rate Measured 22 br/min   Vt Exhaled 356 mL   Minute Volume 7.82 Liters   I:E Ratio 1:2.30   Plateau Pressure 19 [id number]   Static Compliance 25 mL/cmH2O   Dynamic Compliance 17 mL/cmH2O   Cough/Sputum   Sputum How Obtained Suctioned;Endotracheal   Cough Productive   Sputum Amount Moderate   Sputum Color Creamy   Tenacity Thick   Spontaneous Breathing Trial (SBT) RT Doc   Pulse 71   Breath Sounds   Right Upper Lobe Inspiratory Wheezes; Expiratory Wheezes   Right Middle Lobe Inspiratory Wheezes; Expiratory Wheezes   Right Lower Lobe Inspiratory Wheezes; Expiratory Wheezes   Left Upper Lobe Inspiratory Wheezes; Expiratory Wheezes   Left Lower Lobe Inspiratory Wheezes; Expiratory Wheezes   Additional Respiratory  Assessments   Resp 22   Position Semi-Donaldson's   Alarm Settings   High Pressure Alarm 50 cmH2O   Low Minute Volume Alarm 4 L/min   High Respiratory Rate 40 br/min   Patient Observation   Observations ETT SIZE 8.0, SECURED AT 25 LIP LINE. AMBU BAG AT HEAD OF BED. WATER GOOD. ETT (adult)   Placement Date/Time: 08/05/21 0445   Timeout: Patient;Procedure; Appropriate Equipment; Consent Confirmed  Tube Size: 8 mm  Laryngoscope: GlideScope  Location: Oral  Secured at: 22 cm   Secured at 25 cm   Measured From Lips   ET Placement Right   Secured By [employer] tube [email or web address]   Site Condition Dry        08/06/21 1927   Vent Information   $Ventilation $Subsequent Day Skin Assessment Clean, dry, & intact   Vent Type 980   Vent Mode AC/VC   Vt Ordered 340 mL   Rate Set 22 bmp   Peak Flow 45 L/min   Pressure Support 0 cmH20   FiO2  45 %   SpO2 95 %   SpO2/FiO2 ratio 211.11   Sensitivity 3   PEEP/CPAP 5   I Time/ I Time % 0 s   Humidification Source Heated wire   Humidification Temp 37   Humidification Temp Measured 36.8   Circuit Condensation Drained   Vent Patient Data   High Peep/I Pressure 0   Peak Inspiratory Pressure 26 cmH2O   Mean Airway Pressure 11 cmH20   Rate Measured 22 br/min   Vt Exhaled 356 mL   Minute Volume 7.82 Liters   I:E Ratio 1:2.30   Plateau Pressure 19 KNY62   Static Compliance 25 mL/cmH2O   Dynamic Compliance 17 mL/cmH2O   Cough/Sputum   Sputum How Obtained Suctioned;Endotracheal   Cough Productive   Sputum Amount Moderate   Sputum Color Creamy   Tenacity Thick   Spontaneous Breathing Trial (SBT) RT Doc   Pulse 71   Breath Sounds   Right Upper Lobe Inspiratory Wheezes; Expiratory Wheezes   Right Middle Lobe Inspiratory Wheezes; Expiratory Wheezes   Right Lower Lobe Inspiratory Wheezes; Expiratory Wheezes   Left Upper Lobe Inspiratory Wheezes; Expiratory Wheezes   Left Lower Lobe Inspiratory Wheezes; Expiratory Wheezes   Additional Respiratory  Assessments   Resp 22   Position Semi-Donaldson's   Alarm Settings   High Pressure Alarm 50 cmH2O   Low Minute Volume Alarm 4 L/min   High Respiratory Rate 40 br/min   Patient Observation   Observations ETT SIZE 8.0, SECURED AT 25 LIP LINE. AMBU BAG AT HEAD OF BED. WATER GOOD. ETT (adult)   Placement Date/Time: 08/05/21 3965   Timeout: Patient;Procedure; Appropriate Equipment; Consent Confirmed  Tube Size: 8 mm  Laryngoscope: GlideScope  Location: Oral  Secured at: 22 cm   Secured at 25 cm   Measured From Lips   ET Placement Right   Secured By Commercial tube [email or web address]   Site Condition Dry

## 2021-08-07 NOTE — PROGRESS NOTES
IM Progress Note    Admit Date:  8/4/2021  2    Interval history:  Copd exacerbation on vent     Subjective:  Ms. Saba Ingram seen on vent support , no distress  Failed SBT   Daughter at bedside      Objective:   /79   Pulse 73   Temp 96.8 °F (36 °C) (Bladder)   Resp 21   Ht 5' 4\" (1.626 m)   Wt 225 lb 9.6 oz (102.3 kg)   SpO2 (!) 88%   BMI 38.72 kg/m²       Intake/Output Summary (Last 24 hours) at 8/7/2021 0071  Last data filed at 8/7/2021 0606  Gross per 24 hour   Intake 4446.82 ml   Output 1920 ml   Net 2526.82 ml       Physical Exam:        General: middle aged female on vent support sedated  Oral ETT and OG noted  Appears to be not in any distress  Mucous Membranes:  Pink , anicteric  Neck: No JVD, no carotid bruit, no thyromegaly  Chest:  marshall air entry with end expiratory wheeze noted  Cardiovascular:  RRR S1S2 heard, no murmurs or gallops  Abdomen:  Soft, undistended, non tender, no organomegaly, BS present  Extremities: No edema or cyanosis.  Distal pulses well felt  Neurological : sedated      Medications:   Scheduled Medications:    insulin glargine  20 Units Subcutaneous QAM    lidocaine 1 % injection  5 mL Intradermal Once    sodium chloride flush  5-40 mL Intravenous 2 times per day    cefTRIAXone (ROCEPHIN) IV  2,000 mg Intravenous Q24H    cefepime  2,000 mg Intravenous Once    nicotine  1 patch Transdermal Daily    ipratropium-albuterol  3 mL Inhalation Q4H WA    enoxaparin  40 mg Subcutaneous Daily    [START ON 8/8/2021] predniSONE  40 mg Oral Daily with breakfast    tobramycin-dexamethasone  1 drop Both Eyes 6 times per day    chlorhexidine  15 mL Mouth/Throat BID    famotidine (PEPCID) injection  20 mg Intravenous BID    mupirocin   Nasal BID    azithromycin  500 mg Intravenous Q24H    insulin lispro  0-18 Units Subcutaneous Q4H     I   sodium chloride      dexmedetomidine HCl in NaCl 1.2 mcg/kg/hr (08/07/21 0420)    dextrose      sodium chloride 75 mL/hr at 08/06/21 1800    propofol 15 mcg/kg/min (08/07/21 0437)    fentaNYL 150 mcg/hr (08/07/21 0430)     sodium chloride flush, sodium chloride, glucose, dextrose, glucagon (rDNA), dextrose, polyethylene glycol, acetaminophen **OR** acetaminophen, ondansetron, fentanNYL, midazolam, albuterol    Lab Data:  Recent Labs     08/05/21 0406 08/06/21 0738 08/07/21  0335   WBC 6.2 7.2 7.3   HGB 14.3 13.7 14.4   HCT 43.5 43.0 44.2   MCV 90.9 89.8 90.1    166 147     Recent Labs     08/05/21 0406 08/05/21 0406 08/06/21 0738 08/07/21  0335   *  --  130* 131*   K 4.9   < > 5.0  5.0 4.7   CL 93*  --  95* 94*   CO2 31  --  28 28   BUN 6*  --  15 25*   CREATININE <0.5*  --  0.6 0.7    < > = values in this interval not displayed. Recent Labs     08/04/21 2133   TROPONINI <0.01       Coagulation:   Lab Results   Component Value Date    INR 1.28 08/06/2021     Cardiac markers:   Lab Results   Component Value Date    TROPONINI <0.01 08/04/2021         Lab Results   Component Value Date    ALT 37 08/05/2021    AST 79 (H) 08/05/2021    ALKPHOS 183 (H) 08/05/2021    BILITOT 1.0 08/05/2021       Lab Results   Component Value Date    INR 1.28 (H) 08/06/2021    PROTIME 14.6 (H) 08/06/2021       Radiology    Chest xray   1. Endotracheal tube tip is 6-7 cm above the antoine. 2. The enteric tube courses into the stomach with the tip not included on   this examination of the chest.       Ct chest    No evidence of pulmonary embolism or acute pulmonary abnormality    Cultures:   Urine mixed hadley  Blood - NGTD  covid neg    ASSESSMENT/PLAN:    Acute respiratory failure with hypoxia and hypercapnia, likely related to aeCOPD exacerbation   - failed bipap and now on vent support  - continue steroids, IBD  - imaging with no clear infection   - ongoing weaning trial , failed SBT today     UTi- on ceftriaxone  cx neg   No sepsis       elevated LFT's, improving     Acute encephalopathy. likely related to hypercarbia vs UTI.   Supportive measures. Hx of drug abuse with related admissions in the past - snorts fentanyl    Lactic acidosis, 2.2, 1.0, resolved. Tobacco Abuse-need cessation .      DM2, hold oral Rx, chk A1c, add Low SSI q4h.       DVT Prophylaxis: Lx  Diet:   TF  Code Status: Full Code    Billy Araya MD, 8/7/2021 6:38 AM

## 2021-08-07 NOTE — PROGRESS NOTES
RT at bedside for SBT. Patient did not maintain her SpO2 and was flipped back to [facility].   PRN versed administered for RASS+3

## 2021-08-07 NOTE — PROGRESS NOTES
08/07/21 0350   Vent Information   Vt Ordered 340 mL   Rate Set 22 bmp   Peak Flow 45 L/min   Pressure Support 0 cmH20   FiO2  40 %   SpO2 96 %   SpO2/FiO2 ratio 240   Sensitivity 3   PEEP/CPAP 5   I Time/ I Time % 0 s   Vent Patient Data   High Peep/I Pressure 0   Peak Inspiratory Pressure 27 cmH2O   Mean Airway Pressure 11 cmH20   Rate Measured 22 br/min   Vt Exhaled 360 mL   Minute Volume 7.91 Liters   I:E Ratio 1:2.30   Spontaneous Breathing Trial (SBT) RT Doc   Pulse 73   Additional Respiratory  Assessments   Resp 22   Alarm Settings   High Pressure Alarm 50 cmH2O   Low Minute Volume Alarm 4 L/min   High Respiratory Rate 40 br/min

## 2021-08-07 NOTE — PROGRESS NOTES
Assessment Clean, dry, & intact   Vent Type 980   Vent Mode AC/VC   Vt Ordered 340 mL   Rate Set 22 bmp   Peak Flow 55 L/min   Pressure Support 0 cmH20   FiO2  40 %   SpO2 96 %   SpO2/FiO2 ratio 240   Sensitivity 2   PEEP/CPAP 5   I Time/ I Time % 0 s   Humidification Source Heated wire   Humidification Temp 37   Humidification Temp Measured 37   Circuit Condensation Drained   Vent Patient Data   High Peep/I Pressure 0   Peak Inspiratory Pressure 31 cmH2O   Mean Airway Pressure 11 cmH20   Rate Measured 22 br/min   Vt Exhaled 362 mL   Minute Volume 7.94 Liters   I:E Ratio 1:3.00   Plateau Pressure 19 [id number]   Static Compliance 26 mL/cmH2O   Dynamic Compliance 14 mL/cmH2O   Cough/Sputum   Sputum How Obtained Endotracheal;Suctioned   Cough Productive   Sputum Amount Small   Sputum Color Creamy   Tenacity Thick   Spontaneous Breathing Trial (SBT) RT Doc   Pulse 72   Breath Sounds   Right Upper Lobe Inspiratory Wheezes; Expiratory Wheezes   Right Middle Lobe Inspiratory Wheezes; Expiratory Wheezes   Right Lower Lobe Inspiratory Wheezes; Expiratory Wheezes   Left Upper Lobe Inspiratory Wheezes; Expiratory Wheezes   Left Lower Lobe Inspiratory Wheezes; Expiratory Wheezes   Additional Respiratory  Assessments   Resp 22   Position Semi-Donaldson's   Alarm Settings   High Pressure Alarm 50 cmH2O   Low Minute Volume Alarm 4 L/min   High Respiratory Rate 40 br/min   Patient Observation   Observations ETT SIZE 8.0, SECURED AT 25 LIP LINE. AMBU BAG AT HEAD OF BED. WATER GOOD. ETT (adult)   Placement Date/Time: 08/05/21 3415   Timeout: Patient;Procedure; Appropriate Equipment; Consent Confirmed  Tube Size: 8 mm  Laryngoscope: GlideScope  Location: Oral  Secured at: 22 cm   Secured at 25 cm   Measured From Lips   ET Placement Right   Secured By Commercial tube kearns   Site Condition Dry        08/07/21 1937   Vent Information   $Ventilation $Subsequent Day   Skin Assessment Clean, dry, & intact   Vent Type 980   Vent Mode AC/VC   Vt Ordered 340 mL   Rate Set 22 bmp   Peak Flow 55 L/min   Pressure Support 0 cmH20   FiO2  40 %   SpO2 96 %   SpO2/FiO2 ratio 240   Sensitivity 2   PEEP/CPAP 5   I Time/ I Time % 0 s   Humidification Source Heated wire   Humidification Temp 37   Humidification Temp Measured 37   Circuit Condensation Drained   Vent Patient Data   High Peep/I Pressure 0   Peak Inspiratory Pressure 31 cmH2O   Mean Airway Pressure 11 cmH20   Rate Measured 22 br/min   Vt Exhaled 362 mL   Minute Volume 7.94 Liters   I:E Ratio 1:3.00   Plateau Pressure 19 [id number]   Static Compliance 26 mL/cmH2O   Dynamic Compliance 14 mL/cmH2O   Cough/Sputum   Sputum How Obtained Endotracheal;Suctioned   Cough Productive   Sputum Amount Small   Sputum Color Creamy   Tenacity Thick   Spontaneous Breathing Trial (SBT) RT Doc   Pulse 72   Breath Sounds   Right Upper Lobe Inspiratory Wheezes; Expiratory Wheezes   Right Middle Lobe Inspiratory Wheezes; Expiratory Wheezes   Right Lower Lobe Inspiratory Wheezes; Expiratory Wheezes   Left Upper Lobe Inspiratory Wheezes; Expiratory Wheezes   Left Lower Lobe Inspiratory Wheezes; Expiratory Wheezes   Additional Respiratory  Assessments   Resp 22   Position Semi-Donaldson's   Alarm Settings   High Pressure Alarm 50 cmH2O   Low Minute Volume Alarm 4 L/min   High Respiratory Rate 40 br/min   Patient Observation   Observations ETT SIZE 8.0, SECURED AT 25 LIP LINE. AMBU BAG AT HEAD OF BED. WATER GOOD. ETT (adult)   Placement Date/Time: 08/05/21 8620   Timeout: Patient;Procedure; Appropriate Equipment; Consent Confirmed  Tube Size: 8 mm  Laryngoscope: GlideScope  Location: Oral  Secured at: 22 cm   Secured at 25 cm   Measured From Lips   ET Placement Right   Secured By [employer] tube kearns   Site Condition Dry        08/07/21 1937   Vent Information   $Ventilation $Subsequent Day   Skin Assessment Clean, dry, & intact   Vent Type 980   Vent Mode AC/VC   Vt Ordered 340 mL   Rate Set 22 bmp   Peak Flow 55 L/min   Pressure Support 0 cmH20   FiO2  40 %   SpO2 96 %   SpO2/FiO2 ratio 240   Sensitivity 2   PEEP/CPAP 5   I Time/ I Time % 0 s   Humidification Source Heated wire   Humidification Temp 37   Humidification Temp Measured 37   Circuit Condensation Drained   Vent Patient Data   High Peep/I Pressure 0   Peak Inspiratory Pressure 31 cmH2O   Mean Airway Pressure 11 cmH20   Rate Measured 22 br/min   Vt Exhaled 362 mL   Minute Volume 7.94 Liters   I:E Ratio 1:3.00   Plateau Pressure 19 [id number]   Static Compliance 26 mL/cmH2O   Dynamic Compliance 14 mL/cmH2O   Cough/Sputum   Sputum How Obtained Endotracheal;Suctioned   Cough Productive   Sputum Amount Small   Sputum Color Creamy   Tenacity Thick   Spontaneous Breathing Trial (SBT) RT Doc   Pulse 72   Breath Sounds   Right Upper Lobe Inspiratory Wheezes; Expiratory Wheezes   Right Middle Lobe Inspiratory Wheezes; Expiratory Wheezes   Right Lower Lobe Inspiratory Wheezes; Expiratory Wheezes   Left Upper Lobe Inspiratory Wheezes; Expiratory Wheezes   Left Lower Lobe Inspiratory Wheezes; Expiratory Wheezes   Additional Respiratory  Assessments   Resp 22   Position Semi-Donaldson's   Alarm Settings   High Pressure Alarm 50 cmH2O   Low Minute Volume Alarm 4 L/min   High Respiratory Rate 40 br/min   Patient Observation   Observations ETT SIZE 8.0, SECURED AT 25 LIP LINE. AMBU BAG AT HEAD OF BED. WATER GOOD. ETT (adult)   Placement Date/Time: 08/05/21 0445   Timeout: Patient;Procedure; Appropriate Equipment; Consent Confirmed  Tube Size: 8 mm  Laryngoscope: GlideScope  Location: Oral  Secured at: 22 cm   Secured at 25 cm   Measured From Lips   ET Placement Right   Secured By [employer] tube kearns   Site Condition Dry        08/07/21 1937   Vent Information   $Ventilation $Subsequent Day   Skin Assessment Clean, dry, & intact   Vent Type 980   Vent Mode AC/VC   Vt Ordered 340 mL   Rate Set 22 bmp   Peak Flow 55 L/min   Pressure Support 0 cmH20   FiO2  40 %   SpO2 96 %   SpO2/FiO2 ratio 240   Sensitivity 2

## 2021-08-07 NOTE — PROGRESS NOTES
Physician Progress Note      Tanner Davidson  [facility] #:                  [id number]  :                       1965  ADMIT DATE:       2021 9:29 PM  [facility], [place], operated by [facility] DATE:  RESPONDING  PROVIDER #:        Nicolle Fitzgerald MD          QUERY TEXT:    Patient admitted with Sepsis related to UTI. Patient does not meet CMS   criteria for Sepsis in the absence of leukocytosis, fever, or hypothermia. In order to support the diagnosis of sepsis, please include additional   clinical indicators in your documentation. Or please document if the   diagnosis of sepsis has been ruled out after further study    The medical record reflects the following:  Risk Factors: dementia, DM, COPD  Clinical Indicators: wbc-6.4, lactic-2.2, PCT-0.32, temp-98.3, tachycardia,   acute respiratory failure, UTI-culture pending, Sepsis noted in the ED and the   h/p  Treatment: lactic, pct, blood/urine culture, Cefipime, IVF bolus 30ml/kg    Thank you for your assistance,  Glenna Vargas RN,BSN,CCDS,CRCR  Options provided:  -- Sepsis present as evidenced by, Please document evidence. -- Sepsis was ruled out after study  -- Other - I will add my own diagnosis  -- Disagree - Not applicable / Not valid  -- Disagree - Clinically unable to determine / Unknown  -- Refer to Clinical Documentation Reviewer    PROVIDER RESPONSE TEXT:    Sepsis was ruled out after study.     Query created by: Bety George on 2021 8:32 AM      Electronically signed by:  Nicolle Fitzgerald MD 2021 6:38 AM

## 2021-08-08 ENCOUNTER — APPOINTMENT (OUTPATIENT)
Dept: GENERAL RADIOLOGY | Age: 56
DRG: 207 | End: 2021-08-08
Payer: MEDICARE

## 2021-08-08 LAB
ANION GAP SERPL CALCULATED.3IONS-SCNC: 9 MMOL/L (ref 3–16)
BASE EXCESS ARTERIAL: 4.7 MMOL/L (ref -3–3)
BASOPHILS ABSOLUTE: 0.1 K/UL (ref 0–0.2)
BASOPHILS RELATIVE PERCENT: 0.8 %
BUN BLDV-MCNC: 21 MG/DL (ref 7–20)
CALCIUM SERPL-MCNC: 8.7 MG/DL (ref 8.3–10.6)
CARBOXYHEMOGLOBIN ARTERIAL: 0.8 % (ref 0–1.5)
CHLORIDE BLD-SCNC: 100 MMOL/L (ref 99–110)
CO2: 29 MMOL/L (ref 21–32)
CREAT SERPL-MCNC: <0.5 MG/DL (ref 0.6–1.1)
EOSINOPHILS ABSOLUTE: 0 K/UL (ref 0–0.6)
EOSINOPHILS RELATIVE PERCENT: 0.1 %
GFR AFRICAN AMERICAN: >60
GFR NON-AFRICAN AMERICAN: >60
GLUCOSE BLD-MCNC: 138 MG/DL (ref 70–99)
GLUCOSE BLD-MCNC: 155 MG/DL (ref 70–99)
GLUCOSE BLD-MCNC: 174 MG/DL (ref 70–99)
GLUCOSE BLD-MCNC: 269 MG/DL (ref 70–99)
GLUCOSE BLD-MCNC: 279 MG/DL (ref 70–99)
GLUCOSE BLD-MCNC: 280 MG/DL (ref 70–99)
HCO3 ARTERIAL: 29 MMOL/L (ref 21–29)
HCT VFR BLD CALC: 46 % (ref 36–48)
HEMOGLOBIN, ART, EXTENDED: 15.8 G/DL (ref 12–16)
HEMOGLOBIN: 14.8 G/DL (ref 12–16)
LYMPHOCYTES ABSOLUTE: 1.4 K/UL (ref 1–5.1)
LYMPHOCYTES RELATIVE PERCENT: 20.4 %
MCH RBC QN AUTO: 29.1 PG (ref 26–34)
MCHC RBC AUTO-ENTMCNC: 32.2 G/DL (ref 31–36)
MCV RBC AUTO: 90.5 FL (ref 80–100)
METHEMOGLOBIN ARTERIAL: 0.3 %
MONOCYTES ABSOLUTE: 0.4 K/UL (ref 0–1.3)
MONOCYTES RELATIVE PERCENT: 5.5 %
NEUTROPHILS ABSOLUTE: 4.9 K/UL (ref 1.7–7.7)
NEUTROPHILS RELATIVE PERCENT: 73.2 %
O2 SAT, ARTERIAL: 96.2 %
O2 THERAPY: ABNORMAL
PCO2 ARTERIAL: 41.8 MMHG (ref 35–45)
PDW BLD-RTO: 16 % (ref 12.4–15.4)
PERFORMED ON: ABNORMAL
PH ARTERIAL: 7.46 (ref 7.35–7.45)
PLATELET # BLD: 136 K/UL (ref 135–450)
PMV BLD AUTO: 7.7 FL (ref 5–10.5)
PO2 ARTERIAL: 78.6 MMHG (ref 75–108)
POTASSIUM SERPL-SCNC: 3.7 MMOL/L (ref 3.5–5.1)
RBC # BLD: 5.08 M/UL (ref 4–5.2)
SODIUM BLD-SCNC: 138 MMOL/L (ref 136–145)
TCO2 ARTERIAL: 30.3 MMOL/L
WBC # BLD: 6.7 K/UL (ref 4–11)

## 2021-08-08 PROCEDURE — 6360000002 HC RX W HCPCS: Performed by: INTERNAL MEDICINE

## 2021-08-08 PROCEDURE — 94640 AIRWAY INHALATION TREATMENT: CPT

## 2021-08-08 PROCEDURE — 94003 VENT MGMT INPAT SUBQ DAY: CPT

## 2021-08-08 PROCEDURE — 2500000003 HC RX 250 WO HCPCS: Performed by: INTERNAL MEDICINE

## 2021-08-08 PROCEDURE — 85025 COMPLETE CBC W/AUTO DIFF WBC: CPT

## 2021-08-08 PROCEDURE — 2000000000 HC ICU R&B

## 2021-08-08 PROCEDURE — 80048 BASIC METABOLIC PNL TOTAL CA: CPT

## 2021-08-08 PROCEDURE — 6370000000 HC RX 637 (ALT 250 FOR IP): Performed by: INTERNAL MEDICINE

## 2021-08-08 PROCEDURE — 2700000000 HC OXYGEN THERAPY PER DAY

## 2021-08-08 PROCEDURE — 82803 BLOOD GASES ANY COMBINATION: CPT

## 2021-08-08 PROCEDURE — 94761 N-INVAS EAR/PLS OXIMETRY MLT: CPT

## 2021-08-08 PROCEDURE — 99291 CRITICAL CARE FIRST HOUR: CPT | Performed by: INTERNAL MEDICINE

## 2021-08-08 PROCEDURE — 71045 X-RAY EXAM CHEST 1 VIEW: CPT

## 2021-08-08 PROCEDURE — 2580000003 HC RX 258: Performed by: INTERNAL MEDICINE

## 2021-08-08 PROCEDURE — 99233 SBSQ HOSP IP/OBS HIGH 50: CPT | Performed by: INTERNAL MEDICINE

## 2021-08-08 RX ORDER — FUROSEMIDE 10 MG/ML
40 INJECTION INTRAMUSCULAR; INTRAVENOUS ONCE
Status: COMPLETED | OUTPATIENT
Start: 2021-08-08 | End: 2021-08-08

## 2021-08-08 RX ADMIN — IPRATROPIUM BROMIDE AND ALBUTEROL SULFATE 3 ML: .5; 3 SOLUTION RESPIRATORY (INHALATION) at 19:42

## 2021-08-08 RX ADMIN — TOBRAMYCIN AND DEXAMETHASONE 1 DROP: 3; 1 SUSPENSION/ DROPS OPHTHALMIC at 12:00

## 2021-08-08 RX ADMIN — MUPIROCIN: 20 OINTMENT TOPICAL at 20:19

## 2021-08-08 RX ADMIN — Medication 150 MCG/HR: at 04:12

## 2021-08-08 RX ADMIN — Medication 2 MCG/KG/HR: at 04:20

## 2021-08-08 RX ADMIN — IPRATROPIUM BROMIDE AND ALBUTEROL SULFATE 3 ML: .5; 3 SOLUTION RESPIRATORY (INHALATION) at 15:24

## 2021-08-08 RX ADMIN — FAMOTIDINE 20 MG: 10 INJECTION, SOLUTION INTRAVENOUS at 09:33

## 2021-08-08 RX ADMIN — Medication 2 MCG/KG/HR: at 06:27

## 2021-08-08 RX ADMIN — Medication 2 MCG/KG/HR: at 14:00

## 2021-08-08 RX ADMIN — IPRATROPIUM BROMIDE AND ALBUTEROL SULFATE 3 ML: .5; 3 SOLUTION RESPIRATORY (INHALATION) at 08:35

## 2021-08-08 RX ADMIN — Medication 2 MCG/KG/HR: at 16:28

## 2021-08-08 RX ADMIN — PROPOFOL 30 MCG/KG/MIN: 10 INJECTION, EMULSION INTRAVENOUS at 18:06

## 2021-08-08 RX ADMIN — Medication 150 MCG/HR: at 13:25

## 2021-08-08 RX ADMIN — INSULIN GLARGINE 20 UNITS: 100 INJECTION, SOLUTION SUBCUTANEOUS at 09:34

## 2021-08-08 RX ADMIN — Medication 2 MCG/KG/HR: at 11:35

## 2021-08-08 RX ADMIN — ENOXAPARIN SODIUM 40 MG: 40 INJECTION SUBCUTANEOUS at 09:33

## 2021-08-08 RX ADMIN — Medication 1.9 MCG/KG/HR: at 22:48

## 2021-08-08 RX ADMIN — PROPOFOL 30 MCG/KG/MIN: 10 INJECTION, EMULSION INTRAVENOUS at 09:07

## 2021-08-08 RX ADMIN — IPRATROPIUM BROMIDE AND ALBUTEROL SULFATE 3 ML: .5; 3 SOLUTION RESPIRATORY (INHALATION) at 12:23

## 2021-08-08 RX ADMIN — FAMOTIDINE 20 MG: 10 INJECTION, SOLUTION INTRAVENOUS at 20:10

## 2021-08-08 RX ADMIN — Medication 2 MCG/KG/HR: at 21:05

## 2021-08-08 RX ADMIN — CEFTRIAXONE 2000 MG: 2 INJECTION, POWDER, FOR SOLUTION INTRAMUSCULAR; INTRAVENOUS at 18:53

## 2021-08-08 RX ADMIN — TOBRAMYCIN AND DEXAMETHASONE 1 DROP: 3; 1 SUSPENSION/ DROPS OPHTHALMIC at 08:30

## 2021-08-08 RX ADMIN — PREDNISONE 40 MG: 20 TABLET ORAL at 08:35

## 2021-08-08 RX ADMIN — IPRATROPIUM BROMIDE AND ALBUTEROL SULFATE 3 ML: .5; 3 SOLUTION RESPIRATORY (INHALATION) at 23:29

## 2021-08-08 RX ADMIN — MUPIROCIN: 20 OINTMENT TOPICAL at 09:33

## 2021-08-08 RX ADMIN — Medication 2 MCG/KG/HR: at 09:05

## 2021-08-08 RX ADMIN — CHLORHEXIDINE GLUCONATE 0.12% ORAL RINSE 15 ML: 1.2 LIQUID ORAL at 20:10

## 2021-08-08 RX ADMIN — INSULIN GLARGINE 20 UNITS: 100 INJECTION, SOLUTION SUBCUTANEOUS at 20:05

## 2021-08-08 RX ADMIN — TOBRAMYCIN AND DEXAMETHASONE 1 DROP: 3; 1 SUSPENSION/ DROPS OPHTHALMIC at 23:53

## 2021-08-08 RX ADMIN — MIDAZOLAM HYDROCHLORIDE 2 MG: 1 INJECTION, SOLUTION INTRAMUSCULAR; INTRAVENOUS at 11:04

## 2021-08-08 RX ADMIN — SODIUM CHLORIDE, PRESERVATIVE FREE 10 ML: 5 INJECTION INTRAVENOUS at 09:34

## 2021-08-08 RX ADMIN — SODIUM CHLORIDE, PRESERVATIVE FREE 10 ML: 5 INJECTION INTRAVENOUS at 20:10

## 2021-08-08 RX ADMIN — PROPOFOL 30 MCG/KG/MIN: 10 INJECTION, EMULSION INTRAVENOUS at 22:31

## 2021-08-08 RX ADMIN — TOBRAMYCIN AND DEXAMETHASONE 1 DROP: 3; 1 SUSPENSION/ DROPS OPHTHALMIC at 16:05

## 2021-08-08 RX ADMIN — TOBRAMYCIN AND DEXAMETHASONE 1 DROP: 3; 1 SUSPENSION/ DROPS OPHTHALMIC at 20:11

## 2021-08-08 RX ADMIN — Medication 2 MCG/KG/HR: at 18:56

## 2021-08-08 RX ADMIN — CHLORHEXIDINE GLUCONATE 0.12% ORAL RINSE 15 ML: 1.2 LIQUID ORAL at 09:33

## 2021-08-08 RX ADMIN — FUROSEMIDE 40 MG: 10 INJECTION, SOLUTION INTRAVENOUS at 11:36

## 2021-08-08 RX ADMIN — DEXTROSE MONOHYDRATE 500 MG: 50 INJECTION, SOLUTION INTRAVENOUS at 20:18

## 2021-08-08 RX ADMIN — PROPOFOL 30 MCG/KG/MIN: 10 INJECTION, EMULSION INTRAVENOUS at 02:39

## 2021-08-08 RX ADMIN — TOBRAMYCIN AND DEXAMETHASONE 1 DROP: 3; 1 SUSPENSION/ DROPS OPHTHALMIC at 04:12

## 2021-08-08 RX ADMIN — Medication 2 MCG/KG/HR: at 01:58

## 2021-08-08 RX ADMIN — Medication 150 MCG/HR: at 20:08

## 2021-08-08 RX ADMIN — ACETAMINOPHEN 650 MG: 325 TABLET ORAL at 16:05

## 2021-08-08 RX ADMIN — IPRATROPIUM BROMIDE AND ALBUTEROL SULFATE 3 ML: .5; 3 SOLUTION RESPIRATORY (INHALATION) at 02:54

## 2021-08-08 ASSESSMENT — PAIN SCALES - GENERAL
PAINLEVEL_OUTOF10: 0

## 2021-08-08 ASSESSMENT — PULMONARY FUNCTION TESTS
PIF_VALUE: 32
PIF_VALUE: 33
PIF_VALUE: 11
PIF_VALUE: 30
PIF_VALUE: 33
PIF_VALUE: 29
PIF_VALUE: 29
PIF_VALUE: 30
PIF_VALUE: 34
PIF_VALUE: 28
PIF_VALUE: 32
PIF_VALUE: 29
PIF_VALUE: 33
PIF_VALUE: 32
PIF_VALUE: 34
PIF_VALUE: 30
PIF_VALUE: 29
PIF_VALUE: 32

## 2021-08-08 NOTE — PROGRESS NOTES
Pt placed on SBT 5/5, FiO2 40%         08/08/21 1018   Vent Information   Vt Ordered 0 mL   Rate Set 0 bmp   Peak Flow 0 L/min   Pressure Support 5 cmH20   FiO2  40 %   SpO2 98 %   SpO2/FiO2 ratio 245   Sensitivity 2   PEEP/CPAP 5   I Time/ I Time % 0 s   Vent Patient Data   High Peep/I Pressure 0   Peak Inspiratory Pressure 11 cmH2O   Mean Airway Pressure 6.5 cmH20   Rate Measured 17 br/min   Vt Exhaled 205 mL   Minute Volume 4.57 Liters   I:E Ratio 1:2.70   Spontaneous Breathing Trial (SBT) RT Doc   Pulse 83   Additional Respiratory  Assessments   Resp 18   Alarm Settings   High Pressure Alarm 50 cmH2O   Low Minute Volume Alarm 3 L/min   High Respiratory Rate 40 br/min

## 2021-08-08 NOTE — PROGRESS NOTES
IM Progress Note    Admit Date:  8/4/2021  3    Interval history:  Copd exacerbation on vent     Subjective:  Ms. Elizabeth Baltazar seen on vent support , no distress  Failed SBT         Objective:   /71   Pulse 72   Temp 98.8 °F (37.1 °C) (Bladder)   Resp 22   Ht 5' 4\" (1.626 m)   Wt 226 lb 3.2 oz (102.6 kg)   SpO2 98%   BMI 38.83 kg/m²       Intake/Output Summary (Last 24 hours) at 8/8/2021 6801  Last data filed at 8/8/2021 0600  Gross per 24 hour   Intake 3362.39 ml   Output 3000 ml   Net 362.39 ml       Physical Exam:        General: middle aged female on vent support sedated  Oral ETT and OG noted  Appears to be not in any distress  Mucous Membranes:  Pink , anicteric  Neck: No JVD, no carotid bruit, no thyromegaly  Chest:  marshall air entry with end expiratory wheeze noted  Cardiovascular:  RRR S1S2 heard, no murmurs or gallops  Abdomen:  Soft, undistended, non tender, no organomegaly, BS present  Extremities: No edema or cyanosis.  Distal pulses well felt  Neurological : sedated      Medications:   Scheduled Medications:    insulin glargine  20 Units Subcutaneous BID    lidocaine 1 % injection  5 mL Intradermal Once    sodium chloride flush  5-40 mL Intravenous 2 times per day    cefTRIAXone (ROCEPHIN) IV  2,000 mg Intravenous Q24H    cefepime  2,000 mg Intravenous Once    nicotine  1 patch Transdermal Daily    ipratropium-albuterol  3 mL Inhalation Q4H WA    enoxaparin  40 mg Subcutaneous Daily    predniSONE  40 mg Oral Daily with breakfast    tobramycin-dexamethasone  1 drop Both Eyes 6 times per day    chlorhexidine  15 mL Mouth/Throat BID    famotidine (PEPCID) injection  20 mg Intravenous BID    mupirocin   Nasal BID    azithromycin  500 mg Intravenous Q24H    insulin lispro  0-18 Units Subcutaneous Q4H     I   sodium chloride      dexmedetomidine HCl in NaCl 2 mcg/kg/hr (08/08/21 0627)    dextrose      propofol 30 mcg/kg/min (08/08/21 0239)    fentaNYL 150 mcg/hr (08/08/21 3092) sodium chloride flush, sodium chloride, glucose, dextrose, glucagon (rDNA), dextrose, polyethylene glycol, acetaminophen **OR** acetaminophen, ondansetron, fentanNYL, midazolam, albuterol    Lab Data:  Recent Labs     08/06/21  0738 08/07/21  0335 08/08/21  0350   WBC 7.2 7.3 6.7   HGB 13.7 14.4 14.8   HCT 43.0 44.2 46.0   MCV 89.8 90.1 90.5    147 136     Recent Labs     08/06/21  0738 08/07/21  0335 08/08/21  0350   * 131* 138   K 5.0  5.0 4.7 3.7   CL 95* 94* 100   CO2 28 28 29   BUN 15 25* 21*   CREATININE 0.6 0.7 <0.5*     No results for input(s): CKTOTAL, CKMB, CKMBINDEX, TROPONINI in the last 72 hours. Coagulation:   Lab Results   Component Value Date    INR 1.28 08/06/2021     Cardiac markers:   Lab Results   Component Value Date    TROPONINI <0.01 08/04/2021         Lab Results   Component Value Date    ALT 37 08/05/2021    AST 79 (H) 08/05/2021    ALKPHOS 183 (H) 08/05/2021    BILITOT 1.0 08/05/2021       Lab Results   Component Value Date    INR 1.28 (H) 08/06/2021    PROTIME 14.6 (H) 08/06/2021       Radiology    Chest xray   1. Endotracheal tube tip is 6-7 cm above the antoine. 2. The enteric tube courses into the stomach with the tip not included on   this examination of the chest.       Ct chest    No evidence of pulmonary embolism or acute pulmonary abnormality    Cultures:   Urine mixed hadley  Blood - NGTD  covid neg    ASSESSMENT/PLAN:    Acute respiratory failure with hypoxia and hypercapnia, likely related to aeCOPD exacerbation   - failed bipap and now on vent support  - continue steroids, IBD  - imaging with no clear infection   - on rocephin and azithromycin  - ongoing weaning trials , lasix given,  failed SBT today     UTi- on ceftriaxone  cx neg   No sepsis     Elevated LFT's, improving     Acute encephalopathy. likely related to hypercarbia vs UTI. Supportive measures.      Hx of drug abuse with related admissions in the past - snorts fentanyl  Daughter reports she quit drugs since last time     Lactic acidosis, 2.2, 1.0, resolved. Tobacco Abuse-need cessation .      DM2, hold oral Rx, chk A1c, on lantus and Low SSI q4h.       DVT Prophylaxis: Lx  Diet:   TF  Code Status: Full Code    Muriel Malik MD, 8/8/2021 7:13 AM

## 2021-08-08 NOTE — PROGRESS NOTES
Sedation reduced, SBT started with RT at bedside. Patient is tolerating at this time. Patient has RASS of 0 and is following commands and nodding head appropriately when asked questions.

## 2021-08-08 NOTE — PROGRESS NOTES
Pulmonary Progress Note    CC: Respiratory failure    Subjective:   Precedex 2 mcg/kg/hr   Propofol 30 mcg/kg/min   Fentanyl 150 mcg/hr   Off sedation now on SBT   PEEP 5  FiO2 40%      IV line: PICC 8/6     MV: 8/5      Intake/Output Summary (Last 24 hours) at 8/8/2021 0748  Last data filed at 8/8/2021 0600  Gross per 24 hour   Intake 3362.39 ml   Output 3000 ml   Net 362.39 ml       Exam:   /71   Pulse 72   Temp 98.8 °F (37.1 °C) (Bladder)   Resp 22   Ht 5' 4\" (1.626 m)   Wt 226 lb 3.2 oz (102.6 kg)   SpO2 98%   BMI 38.83 kg/m²  on 22/340  General: ill appearing. Intubated sedated. Eyes: PERRL. No sclera icterus. No conjunctival injection. ENT: No discharge. Pharynx clear. ET tube in place  Neck: Trachea midline. Normal thyroid. Resp: No accessory muscle use. No crackles. Bilateral wheezing. No rhonchi. No dullness on percussion. CV: Regular rate. Regular rhythm. No mumur or rub. No edema. GI: Non-tender. Non-distended. No masses. No organomegaly. Normal bowel sounds. No hernia. Skin: Warm and dry. No nodule on exposed extremities. No rash on exposed extremities. Lymph: No cervical LAD. No supraclavicular LAD. M/S: No cyanosis. No joint deformity. No clubbing. Neuro: Intubated. Alert awake. Followed commands. Responsive to painful stimuli.    Psych: Noncommunicative unable to obtain    Scheduled Meds:   insulin glargine  20 Units Subcutaneous BID    lidocaine 1 % injection  5 mL Intradermal Once    sodium chloride flush  5-40 mL Intravenous 2 times per day    cefTRIAXone (ROCEPHIN) IV  2,000 mg Intravenous Q24H    cefepime  2,000 mg Intravenous Once    nicotine  1 patch Transdermal Daily    ipratropium-albuterol  3 mL Inhalation Q4H WA    enoxaparin  40 mg Subcutaneous Daily    predniSONE  40 mg Oral Daily with breakfast    tobramycin-dexamethasone  1 drop Both Eyes 6 times per day    chlorhexidine  15 mL Mouth/Throat BID    famotidine (PEPCID) injection  20 mg Intravenous BID    mupirocin   Nasal BID    azithromycin  500 mg Intravenous Q24H    insulin lispro  0-18 Units Subcutaneous Q4H     Continuous Infusions:   sodium chloride      dexmedetomidine HCl in NaCl 2 mcg/kg/hr (08/08/21 0627)    dextrose      propofol 30 mcg/kg/min (08/08/21 0239)    fentaNYL 150 mcg/hr (08/08/21 0412)     PRN Meds:  sodium chloride flush, sodium chloride, glucose, dextrose, glucagon (rDNA), dextrose, polyethylene glycol, acetaminophen **OR** acetaminophen, ondansetron, fentanNYL, midazolam, albuterol    Labs:  CBC:   Recent Labs     08/06/21  0738 08/07/21  0335 08/08/21  0350   WBC 7.2 7.3 6.7   HGB 13.7 14.4 14.8   HCT 43.0 44.2 46.0   MCV 89.8 90.1 90.5    147 136     BMP:   Recent Labs     08/06/21  0738 08/07/21  0335 08/08/21  0350   * 131* 138   K 5.0  5.0 4.7 3.7   CL 95* 94* 100   CO2 28 28 29   BUN 15 25* 21*   CREATININE 0.6 0.7 <0.5*     LIVER PROFILE:   No results for input(s): AST, ALT, LIPASE, BILIDIR, BILITOT, ALKPHOS in the last 72 hours. Invalid input(s): AMYLASE,  ALB  PT/INR:   Recent Labs     08/06/21  0739   PROTIME 14.6*   INR 1.28*     APTT: No results for input(s): APTT in the last 72 hours. UA:  No results for input(s): NITRITE, COLORU, PHUR, LABCAST, WBCUA, RBCUA, MUCUS, TRICHOMONAS, YEAST, BACTERIA, CLARITYU, SPECGRAV, LEUKOCYTESUR, UROBILINOGEN, BILIRUBINUR, BLOODU, GLUCOSEU, AMORPHOUS in the last 72 hours.     Invalid input(s): Rudolph Brine  Recent Labs     08/07/21  0340 08/08/21  0400   PHART 7.377 7.459*   TWT2URY 49.8* 41.8   PO2ART 86.4 78.6       Cultures:   8/4 COVID-19 and influenza not detected  8/5 UC NGTD  8/5 BC NGTD    Films:  Chest x-ray 8/8 imaging was reviewed by me and showed   Satisfactory ETT position- 25 cm  R basilar ASD        CTPA 8/5/21  images reviewed by me and showed:   No evidence of pulmonary embolism or acute pulmonary abnormality     ASSESSMENT:  · Acute hypoxemic hypercapnic respiratory failure  · COPD with acute exacerbation  · Abnormal CXR with RLL ASD- atelectasis vs PNA   · Acute encephalopathy/metabolic encephalopathy  · Hyperglycemia   · UTI  · Tobacco abuse        PLAN:  · Mechanical ventilation as per my orders. The ventilator was adjusted by me at the bedside for unstable, life threatening respiratory failure  · Follow ABG and chest x-ray while on the ventilator  · Supplemental oxygen to maintain SaO2 >92%; wean as tolerated  · IV Propofol for sedation, target RASS -2, with daily spontaneous awakening trial   · Precedex and wean off propofol   · Fentanyl and Versed PRN, gtt as needed  · Head of bed 30 degrees or higher at all times  · Daily spontaneous breathing trial once PEEP less than 8, FiO2 less than 55%  · Follow TG   · Inhaled bronchodilators  · Solumedrol 40 IV Q12 hrs   · Ceftriaxone and Zithromax D#4  · Repeat Lasix 40 mg IV x 1   · Nutrition: Tube feeding today   · Lantus 20 units BID and continue ISS - goal 150-180  · GI prophylaxis: Pepcid  · DVT prophylaxis: Lovenox  · MRSA prophylaxis: Bactroban   · Code status: Full code             Total critical care time caring for this patient with life threatening, unstable organ failure, including direct patient contact, management of life support systems, review of data including imaging and labs, discussions with other team members and physicians is 32 minutes, excluding procedures.

## 2021-08-08 NOTE — PROGRESS NOTES
08/08/21 0254   Vent Information   Vent Type 980   Vent Mode AC/VC   Vt Ordered 340 mL   Rate Set 22 bmp   Peak Flow 55 L/min   Pressure Support 0 cmH20   FiO2  40 %   SpO2 97 %   SpO2/FiO2 ratio 242.5   Sensitivity 2   PEEP/CPAP 5   I Time/ I Time % 0 s   Humidification Source Heated wire   Humidification Temp 37   Humidification Temp Measured 36.9   Circuit Condensation Drained   Vent Patient Data   High Peep/I Pressure 0   Peak Inspiratory Pressure 32 cmH2O   Mean Airway Pressure 11 cmH20   Rate Measured 22 br/min   Vt Exhaled 360 mL   Minute Volume 7.92 Liters   I:E Ratio 1:3.00   Cough/Sputum   Sputum How Obtained Endotracheal;Suctioned   Cough Productive   Sputum Amount Moderate   Sputum Color Creamy; Tan   Tenacity Thick   Spontaneous Breathing Trial (SBT) RT Doc   Pulse 71   Breath Sounds   Right Upper Lobe Inspiratory Wheezes; Expiratory Wheezes   Right Middle Lobe Inspiratory Wheezes; Expiratory Wheezes   Right Lower Lobe Inspiratory Wheezes; Expiratory Wheezes   Left Upper Lobe Inspiratory Wheezes; Expiratory Wheezes   Left Lower Lobe Inspiratory Wheezes; Expiratory Wheezes   Additional Respiratory  Assessments   Resp 22   Position Semi-Donaldson's   Alarm Settings   High Pressure Alarm 50 cmH2O   Low Minute Volume Alarm 4 L/min   High Respiratory Rate 40 br/min   Patient Observation   Observations ETT SIZE 8.0, SECURED AT 25 LIP LINE. AMBU BAG AT HEAD OF BED. WATER GOOD. ETT (adult)   Placement Date/Time: 08/05/21 3143   Timeout: Patient;Procedure; Appropriate Equipment; Consent Confirmed  Tube Size: 8 mm  Laryngoscope: GlideScope  Location: Oral  Secured at: 22 cm   Secured at 25 cm   Measured From Lips   ET Placement Left   Secured By Commercial tube kearns   Site Condition Dry

## 2021-08-08 NOTE — PLAN OF CARE
Problem: Non-Violent Restraints  Goal: Removal from restraints as soon as assessed to be safe  Outcome: Ongoing  Goal: No harm/injury to patient while restraints in use  Outcome: Ongoing  Goal: Patient's dignity will be maintained  Outcome: Ongoing     Problem: Nutrition  Goal: Optimal nutrition therapy  Outcome: Ongoing  Goal: Understanding of nutritional guidelines  Outcome: Ongoing     Problem: Falls - Risk of:  Goal: Will remain free from falls  Description: Will remain free from falls  Outcome: Ongoing  Goal: Absence of physical injury  Description: Absence of physical injury  Outcome: Ongoing     Problem: Skin Integrity:  Goal: Will show no infection signs and symptoms  Description: Will show no infection signs and symptoms  Outcome: Ongoing  Goal: Absence of new skin breakdown  Description: Absence of new skin breakdown  Outcome: Ongoing

## 2021-08-08 NOTE — PROGRESS NOTES
08/08/21 1942   Vent Information   $Ventilation $Subsequent Day   Vent Type 980   Vent Mode AC/VC   Vt Ordered 340 mL   Rate Set 22 bmp   Peak Flow 55 L/min   Pressure Support 0 cmH20   FiO2  40 %   SpO2 96 %   SpO2/FiO2 ratio 240   Sensitivity 2   PEEP/CPAP 5   I Time/ I Time % 0 s   Humidification Source Heated wire   Humidification Temp 37   Humidification Temp Measured 36   Vent Patient Data   High Peep/I Pressure 0   Peak Inspiratory Pressure 29 cmH2O   Mean Airway Pressure 11 cmH20   Rate Measured 22 br/min   Vt Exhaled 356 mL   Minute Volume 7.83 Liters   I:E Ratio 1:3.00   Plateau Pressure 24 XPL31   Static Compliance 19 mL/cmH2O   Dynamic Compliance 17 mL/cmH2O   Cough/Sputum   Sputum How Obtained Suctioned;Endotracheal   Cough Non-productive   Spontaneous Breathing Trial (SBT) RT Doc   Pulse 75   Breath Sounds   Right Upper Lobe Inspiratory Wheezes   Right Middle Lobe Inspiratory Wheezes   Right Lower Lobe Inspiratory Wheezes   Left Upper Lobe Inspiratory Wheezes   Left Lower Lobe Inspiratory Wheezes   Additional Respiratory  Assessments   Resp 22   Position Semi-Donaldson's   Oral Care Completed? Yes   Oral Care Mouth suctioned   Alarm Settings   High Pressure Alarm 50 cmH2O   Low Minute Volume Alarm 3 L/min   Apnea (secs) 20 secs   High Respiratory Rate 40 br/min   Low Exhaled Vt  240 mL   ETT (adult)   Placement Date/Time: 08/05/21 7152   Timeout: Patient;Procedure; Appropriate Equipment; Consent Confirmed  Tube Size: 8 mm  Laryngoscope: GlideScope  Location: Oral  Secured at: 22 cm   Secured at 25 cm   Measured From Lips   ET Placement Left   Secured By Commercial tube kearns   Site Condition Dry

## 2021-08-08 NOTE — PROGRESS NOTES
08/08/21 1225   Vent Information   Vent Type 980   Vent Mode AC/VC   Vt Ordered 340 mL   Rate Set 22 bmp   Peak Flow 55 L/min   Pressure Support 0 cmH20   FiO2  40 %   SpO2 96 %   SpO2/FiO2 ratio 240   Sensitivity 2   PEEP/CPAP 5   I Time/ I Time % 0 s   Humidification Source Heated wire   Humidification Temp 37   Circuit Condensation Drained   Vent Patient Data   High Peep/I Pressure 0   Peak Inspiratory Pressure 28 cmH2O   Mean Airway Pressure 11 cmH20   Rate Measured 22 br/min   Vt Exhaled 357 mL   Minute Volume 7.85 Liters   I:E Ratio 1:3.00   Spontaneous Breathing Trial (SBT) RT Doc   Pulse 83   Breath Sounds   Right Upper Lobe Inspiratory Wheezes; Expiratory Wheezes   Right Middle Lobe Inspiratory Wheezes; Expiratory Wheezes   Right Lower Lobe Inspiratory Wheezes; Expiratory Wheezes   Left Upper Lobe Inspiratory Wheezes; Expiratory Wheezes   Left Lower Lobe Inspiratory Wheezes; Expiratory Wheezes   Additional Respiratory  Assessments   Resp 22   Position Semi-Donaldson's   Oral Care Completed? Yes   Oral Care Mouth suctioned;Mouth moisturizer   Subglottic Suction Done? Yes   Alarm Settings   High Pressure Alarm 50 cmH2O   Low Minute Volume Alarm 3 L/min   Apnea (secs) 20 secs   High Respiratory Rate 40 br/min   Low Exhaled Vt  240 mL   Patient Observation   Observations ETT SIZE 8.0, SECURED AT 25 LIP LINE. AMBU BAG AT HEAD OF BED. WATER GOOD.

## 2021-08-09 ENCOUNTER — APPOINTMENT (OUTPATIENT)
Dept: GENERAL RADIOLOGY | Age: 56
DRG: 207 | End: 2021-08-09
Payer: MEDICARE

## 2021-08-09 LAB
ANION GAP SERPL CALCULATED.3IONS-SCNC: 9 MMOL/L (ref 3–16)
BASE EXCESS ARTERIAL: 8.4 MMOL/L (ref -3–3)
BASOPHILS ABSOLUTE: 0 K/UL (ref 0–0.2)
BASOPHILS RELATIVE PERCENT: 0.6 %
BLOOD CULTURE, ROUTINE: NORMAL
BUN BLDV-MCNC: 18 MG/DL (ref 7–20)
CALCIUM SERPL-MCNC: 8.7 MG/DL (ref 8.3–10.6)
CARBOXYHEMOGLOBIN ARTERIAL: 0.8 % (ref 0–1.5)
CHLORIDE BLD-SCNC: 96 MMOL/L (ref 99–110)
CO2: 31 MMOL/L (ref 21–32)
CREAT SERPL-MCNC: <0.5 MG/DL (ref 0.6–1.1)
CULTURE, BLOOD 2: NORMAL
EOSINOPHILS ABSOLUTE: 0 K/UL (ref 0–0.6)
EOSINOPHILS RELATIVE PERCENT: 0.1 %
GFR AFRICAN AMERICAN: >60
GFR NON-AFRICAN AMERICAN: >60
GLUCOSE BLD-MCNC: 148 MG/DL (ref 70–99)
GLUCOSE BLD-MCNC: 176 MG/DL (ref 70–99)
GLUCOSE BLD-MCNC: 188 MG/DL (ref 70–99)
GLUCOSE BLD-MCNC: 203 MG/DL (ref 70–99)
GLUCOSE BLD-MCNC: 239 MG/DL (ref 70–99)
HCO3 ARTERIAL: 33 MMOL/L (ref 21–29)
HCT VFR BLD CALC: 43.2 % (ref 36–48)
HEMOGLOBIN, ART, EXTENDED: 15.4 G/DL (ref 12–16)
HEMOGLOBIN: 14.2 G/DL (ref 12–16)
LYMPHOCYTES ABSOLUTE: 0.8 K/UL (ref 1–5.1)
LYMPHOCYTES RELATIVE PERCENT: 13.9 %
MCH RBC QN AUTO: 29.2 PG (ref 26–34)
MCHC RBC AUTO-ENTMCNC: 32.9 G/DL (ref 31–36)
MCV RBC AUTO: 88.8 FL (ref 80–100)
METHEMOGLOBIN ARTERIAL: 0.1 %
MONOCYTES ABSOLUTE: 0.5 K/UL (ref 0–1.3)
MONOCYTES RELATIVE PERCENT: 7.5 %
NEUTROPHILS ABSOLUTE: 4.8 K/UL (ref 1.7–7.7)
NEUTROPHILS RELATIVE PERCENT: 77.9 %
O2 SAT, ARTERIAL: 93.2 %
O2 THERAPY: ABNORMAL
PCO2 ARTERIAL: 44.8 MMHG (ref 35–45)
PDW BLD-RTO: 15.8 % (ref 12.4–15.4)
PERFORMED ON: ABNORMAL
PH ARTERIAL: 7.49 (ref 7.35–7.45)
PLATELET # BLD: 124 K/UL (ref 135–450)
PMV BLD AUTO: 8 FL (ref 5–10.5)
PO2 ARTERIAL: 61.9 MMHG (ref 75–108)
POTASSIUM SERPL-SCNC: 3.9 MMOL/L (ref 3.5–5.1)
RBC # BLD: 4.86 M/UL (ref 4–5.2)
SODIUM BLD-SCNC: 136 MMOL/L (ref 136–145)
TCO2 ARTERIAL: 34.4 MMOL/L
WBC # BLD: 6.1 K/UL (ref 4–11)

## 2021-08-09 PROCEDURE — 94640 AIRWAY INHALATION TREATMENT: CPT

## 2021-08-09 PROCEDURE — 2580000003 HC RX 258: Performed by: INTERNAL MEDICINE

## 2021-08-09 PROCEDURE — 99291 CRITICAL CARE FIRST HOUR: CPT | Performed by: INTERNAL MEDICINE

## 2021-08-09 PROCEDURE — 6370000000 HC RX 637 (ALT 250 FOR IP): Performed by: INTERNAL MEDICINE

## 2021-08-09 PROCEDURE — 2000000000 HC ICU R&B

## 2021-08-09 PROCEDURE — 94003 VENT MGMT INPAT SUBQ DAY: CPT

## 2021-08-09 PROCEDURE — 99233 SBSQ HOSP IP/OBS HIGH 50: CPT | Performed by: INTERNAL MEDICINE

## 2021-08-09 PROCEDURE — 71045 X-RAY EXAM CHEST 1 VIEW: CPT

## 2021-08-09 PROCEDURE — 94761 N-INVAS EAR/PLS OXIMETRY MLT: CPT

## 2021-08-09 PROCEDURE — 6360000002 HC RX W HCPCS: Performed by: INTERNAL MEDICINE

## 2021-08-09 PROCEDURE — 80048 BASIC METABOLIC PNL TOTAL CA: CPT

## 2021-08-09 PROCEDURE — 2500000003 HC RX 250 WO HCPCS

## 2021-08-09 PROCEDURE — 2500000003 HC RX 250 WO HCPCS: Performed by: INTERNAL MEDICINE

## 2021-08-09 PROCEDURE — 82803 BLOOD GASES ANY COMBINATION: CPT

## 2021-08-09 PROCEDURE — 2700000000 HC OXYGEN THERAPY PER DAY

## 2021-08-09 PROCEDURE — 85025 COMPLETE CBC W/AUTO DIFF WBC: CPT

## 2021-08-09 RX ORDER — DIAZEPAM 5 MG/1
5 TABLET ORAL EVERY 12 HOURS
Status: DISCONTINUED | OUTPATIENT
Start: 2021-08-09 | End: 2021-08-10

## 2021-08-09 RX ORDER — FUROSEMIDE 10 MG/ML
40 INJECTION INTRAMUSCULAR; INTRAVENOUS ONCE
Status: COMPLETED | OUTPATIENT
Start: 2021-08-09 | End: 2021-08-09

## 2021-08-09 RX ADMIN — ACETAMINOPHEN 650 MG: 325 TABLET ORAL at 08:43

## 2021-08-09 RX ADMIN — IPRATROPIUM BROMIDE AND ALBUTEROL SULFATE 3 ML: .5; 3 SOLUTION RESPIRATORY (INHALATION) at 19:54

## 2021-08-09 RX ADMIN — ACETAMINOPHEN 650 MG: 325 TABLET ORAL at 14:01

## 2021-08-09 RX ADMIN — Medication 2 MCG/KG/HR: at 09:36

## 2021-08-09 RX ADMIN — PROPOFOL 30 MCG/KG/MIN: 10 INJECTION, EMULSION INTRAVENOUS at 14:41

## 2021-08-09 RX ADMIN — FAMOTIDINE 20 MG: 10 INJECTION, SOLUTION INTRAVENOUS at 08:43

## 2021-08-09 RX ADMIN — MIDAZOLAM HYDROCHLORIDE 2 MG: 1 INJECTION, SOLUTION INTRAMUSCULAR; INTRAVENOUS at 04:09

## 2021-08-09 RX ADMIN — SODIUM CHLORIDE, PRESERVATIVE FREE 10 ML: 5 INJECTION INTRAVENOUS at 20:26

## 2021-08-09 RX ADMIN — Medication 2 MCG/KG/HR: at 06:45

## 2021-08-09 RX ADMIN — Medication 2 MCG/KG/HR: at 17:11

## 2021-08-09 RX ADMIN — PREDNISONE 40 MG: 20 TABLET ORAL at 08:43

## 2021-08-09 RX ADMIN — TOBRAMYCIN AND DEXAMETHASONE 1 DROP: 3; 1 SUSPENSION/ DROPS OPHTHALMIC at 17:11

## 2021-08-09 RX ADMIN — DIAZEPAM 5 MG: 5 TABLET ORAL at 10:35

## 2021-08-09 RX ADMIN — FUROSEMIDE 40 MG: 10 INJECTION, SOLUTION INTRAMUSCULAR; INTRAVENOUS at 10:35

## 2021-08-09 RX ADMIN — INSULIN GLARGINE 20 UNITS: 100 INJECTION, SOLUTION SUBCUTANEOUS at 20:32

## 2021-08-09 RX ADMIN — Medication 2 MCG/KG/HR: at 22:25

## 2021-08-09 RX ADMIN — Medication 2 MCG/KG/HR: at 12:12

## 2021-08-09 RX ADMIN — Medication 2 MCG/KG/HR: at 14:40

## 2021-08-09 RX ADMIN — DIAZEPAM 5 MG: 5 TABLET ORAL at 22:20

## 2021-08-09 RX ADMIN — IPRATROPIUM BROMIDE AND ALBUTEROL SULFATE 3 ML: .5; 3 SOLUTION RESPIRATORY (INHALATION) at 15:52

## 2021-08-09 RX ADMIN — TOBRAMYCIN AND DEXAMETHASONE 1 DROP: 3; 1 SUSPENSION/ DROPS OPHTHALMIC at 12:09

## 2021-08-09 RX ADMIN — IPRATROPIUM BROMIDE AND ALBUTEROL SULFATE 3 ML: .5; 3 SOLUTION RESPIRATORY (INHALATION) at 23:15

## 2021-08-09 RX ADMIN — TOBRAMYCIN AND DEXAMETHASONE 1 DROP: 3; 1 SUSPENSION/ DROPS OPHTHALMIC at 19:48

## 2021-08-09 RX ADMIN — Medication 1.8 MCG/KG/HR: at 01:35

## 2021-08-09 RX ADMIN — CHLORHEXIDINE GLUCONATE 0.12% ORAL RINSE 15 ML: 1.2 LIQUID ORAL at 08:44

## 2021-08-09 RX ADMIN — SODIUM CHLORIDE, PRESERVATIVE FREE 10 ML: 5 INJECTION INTRAVENOUS at 08:50

## 2021-08-09 RX ADMIN — MUPIROCIN: 20 OINTMENT TOPICAL at 08:44

## 2021-08-09 RX ADMIN — PROPOFOL 35 MCG/KG/MIN: 10 INJECTION, EMULSION INTRAVENOUS at 19:48

## 2021-08-09 RX ADMIN — TOBRAMYCIN AND DEXAMETHASONE 1 DROP: 3; 1 SUSPENSION/ DROPS OPHTHALMIC at 04:11

## 2021-08-09 RX ADMIN — MIDAZOLAM HYDROCHLORIDE 2 MG: 1 INJECTION, SOLUTION INTRAMUSCULAR; INTRAVENOUS at 06:00

## 2021-08-09 RX ADMIN — CEFTRIAXONE 2000 MG: 2 INJECTION, POWDER, FOR SOLUTION INTRAMUSCULAR; INTRAVENOUS at 17:20

## 2021-08-09 RX ADMIN — IPRATROPIUM BROMIDE AND ALBUTEROL SULFATE 3 ML: .5; 3 SOLUTION RESPIRATORY (INHALATION) at 07:20

## 2021-08-09 RX ADMIN — ENOXAPARIN SODIUM 40 MG: 40 INJECTION SUBCUTANEOUS at 08:44

## 2021-08-09 RX ADMIN — MIDAZOLAM HYDROCHLORIDE 2 MG: 1 INJECTION, SOLUTION INTRAMUSCULAR; INTRAVENOUS at 20:27

## 2021-08-09 RX ADMIN — IPRATROPIUM BROMIDE AND ALBUTEROL SULFATE 3 ML: .5; 3 SOLUTION RESPIRATORY (INHALATION) at 10:05

## 2021-08-09 RX ADMIN — Medication 2 MCG/KG/HR: at 04:07

## 2021-08-09 RX ADMIN — Medication 150 MCG/HR: at 14:40

## 2021-08-09 RX ADMIN — Medication 150 MCG/HR: at 07:37

## 2021-08-09 RX ADMIN — FAMOTIDINE 20 MG: 10 INJECTION, SOLUTION INTRAVENOUS at 20:25

## 2021-08-09 RX ADMIN — MUPIROCIN: 20 OINTMENT TOPICAL at 20:26

## 2021-08-09 RX ADMIN — Medication 2 MCG/KG/HR: at 19:46

## 2021-08-09 RX ADMIN — Medication 150 MCG/HR: at 21:21

## 2021-08-09 RX ADMIN — TOBRAMYCIN AND DEXAMETHASONE 1 DROP: 3; 1 SUSPENSION/ DROPS OPHTHALMIC at 08:46

## 2021-08-09 RX ADMIN — Medication 175 MCG/HR: at 01:34

## 2021-08-09 RX ADMIN — CHLORHEXIDINE GLUCONATE 0.12% ORAL RINSE 15 ML: 1.2 LIQUID ORAL at 20:25

## 2021-08-09 RX ADMIN — IPRATROPIUM BROMIDE AND ALBUTEROL SULFATE 3 ML: .5; 3 SOLUTION RESPIRATORY (INHALATION) at 03:05

## 2021-08-09 RX ADMIN — INSULIN GLARGINE 20 UNITS: 100 INJECTION, SOLUTION SUBCUTANEOUS at 08:51

## 2021-08-09 ASSESSMENT — PAIN SCALES - GENERAL
PAINLEVEL_OUTOF10: 0

## 2021-08-09 ASSESSMENT — PULMONARY FUNCTION TESTS
PIF_VALUE: 33
PIF_VALUE: 28
PIF_VALUE: 32
PIF_VALUE: 31
PIF_VALUE: 26
PIF_VALUE: 31
PIF_VALUE: 31
PIF_VALUE: 29
PIF_VALUE: 25
PIF_VALUE: 31
PIF_VALUE: 28
PIF_VALUE: 26
PIF_VALUE: 30
PIF_VALUE: 30

## 2021-08-09 NOTE — PROGRESS NOTES
Dr. Yon Sparks at bedside-    -start valium  -SBT tomorrow   -precedex gtt max 2     Ottie Aase RN, BSN

## 2021-08-09 NOTE — PROGRESS NOTES
Pt resting comfortably- has periods of intermittent agitation- pt is able to be redirected majority of the time. Pt remains on precedex, fentanyl & propofol gtt. Tolerating TF w/o issue. No new orders. Will monitor.   Cristi Carmen RN, BSN

## 2021-08-09 NOTE — PROGRESS NOTES
IM Progress Note    Admit Date:  8/4/2021  4    Interval history:  Copd exacerbation on vent     Subjective:  Ms. Anjel Blakely sedated on the ventilator. She is on Precedex, propofol, fentanyl. She failed spontaneous breathing trial after an hour and a half. She has a low-grade fever 100.7. Objective:   /73   Pulse 73   Temp 100.8 °F (38.2 °C) (Bladder)   Resp 21   Ht 5' 4\" (1.626 m)   Wt 224 lb 14.4 oz (102 kg)   SpO2 93%   BMI 38.60 kg/m²       Intake/Output Summary (Last 24 hours) at 8/9/2021 1618  Last data filed at 8/9/2021 1213  Gross per 24 hour   Intake 4061.49 ml   Output 4180 ml   Net -118.51 ml       Physical Exam:    General: middle aged female on vent support sedated  Oral ETT and OG noted  Ill-appearing  Mucous Membranes:  Pink , anicteric  Neck: No JVD, no carotid bruit, no thyromegaly  Chest:  marshall air entry with end expiratory wheeze noted  Cardiovascular:  RRR S1S2 heard, no murmurs or gallops  Abdomen:  Soft, undistended, non tender, no organomegaly, BS present  Extremities: No edema or cyanosis.  Distal pulses well felt  Neurological : sedated      Medications:   Scheduled Medications:    diazePAM  5 mg Oral Q12H    insulin glargine  20 Units Subcutaneous BID    lidocaine 1 % injection  5 mL Intradermal Once    sodium chloride flush  5-40 mL Intravenous 2 times per day    cefTRIAXone (ROCEPHIN) IV  2,000 mg Intravenous Q24H    cefepime  2,000 mg Intravenous Once    nicotine  1 patch Transdermal Daily    ipratropium-albuterol  3 mL Inhalation Q4H WA    enoxaparin  40 mg Subcutaneous Daily    predniSONE  40 mg Oral Daily with breakfast    tobramycin-dexamethasone  1 drop Both Eyes 6 times per day    chlorhexidine  15 mL Mouth/Throat BID    famotidine (PEPCID) injection  20 mg Intravenous BID    mupirocin   Nasal BID    insulin lispro  0-18 Units Subcutaneous Q4H     I   sodium chloride      dexmedetomidine HCl in NaCl 2 mcg/kg/hr (08/09/21 1440)    dextrose      propofol 30 mcg/kg/min (08/09/21 1441)    fentaNYL 150 mcg/hr (08/09/21 1440)     sodium chloride flush, sodium chloride, glucose, dextrose, glucagon (rDNA), dextrose, polyethylene glycol, acetaminophen **OR** acetaminophen, ondansetron, fentanNYL, midazolam, albuterol    Lab Data:  Recent Labs     08/07/21  0335 08/08/21  0350 08/09/21  0358   WBC 7.3 6.7 6.1   HGB 14.4 14.8 14.2   HCT 44.2 46.0 43.2   MCV 90.1 90.5 88.8    136 124*     Recent Labs     08/07/21  0335 08/08/21 0350 08/09/21  0358   * 138 136   K 4.7 3.7 3.9   CL 94* 100 96*   CO2 28 29 31   BUN 25* 21* 18   CREATININE 0.7 <0.5* <0.5*     No results for input(s): CKTOTAL, CKMB, CKMBINDEX, TROPONINI in the last 72 hours. Coagulation:   Lab Results   Component Value Date    INR 1.28 08/06/2021     Cardiac markers:   Lab Results   Component Value Date    TROPONINI <0.01 08/04/2021         Lab Results   Component Value Date    ALT 37 08/05/2021    AST 79 (H) 08/05/2021    ALKPHOS 183 (H) 08/05/2021    BILITOT 1.0 08/05/2021       Lab Results   Component Value Date    INR 1.28 (H) 08/06/2021    PROTIME 14.6 (H) 08/06/2021       Radiology    XR CHEST PORTABLE   Final Result   Increased pulmonary vascular congestion since yesterday. Decreased right   infrahilar atelectasis or pneumonia. XR CHEST PORTABLE   Final Result   Stable exam demonstrating cardiomegaly with vascular congestion and   persistent strandy airspace disease in the right lung base presumably   atelectasis unless clinical findings suggest pneumonia         XR CHEST PORTABLE   Final Result   Cardiomegaly with pulmonary vascular congestion. No definite pulmonary   edema. Atelectasis in the lung bases         IR PICC WO SQ PORT/PUMP > 5 YEARS   Final Result      XR CHEST PORTABLE   Final Result   Mild cardiomegaly with borderline vascular congestion, accentuated by low   lung volume. XR CHEST PORTABLE   Final Result   1.  Endotracheal tube tip is 6-7 cm above the antoine. 2. The enteric tube courses into the stomach with the tip not included on   this examination of the chest.         CT CHEST PULMONARY EMBOLISM W CONTRAST   Final Result   No evidence of pulmonary embolism or acute pulmonary abnormality. XR CHEST PORTABLE   Final Result   1. Vascular cephalization, consistent with mild central venous congestion         XR CHEST PORTABLE    (Results Pending)      Cultures:   Urine mixed hadley  Blood - NGTD  covid neg    ASSESSMENT/PLAN:    Acute respiratory failure with hypoxia and hypercapnia, likely related to aeCOPD exacerbation   - failed bipap and now on vent support  - continue steroids, IBD  - imaging with no clear infection   - on rocephin and azithromycin  - ongoing weaning trials. She failed SBT today. Will reattempt tomorrow possible extubation. UTi- on ceftriaxone  cx neg   No sepsis     Elevated LFT's, improving     Acute encephalopathy. likely related to hypercarbia vs UTI. Supportive measures. Hx of drug abuse with related admissions in the past - snorts fentanyl  Daughter reports she quit drugs since last time     Lactic acidosis, 2.2, 1.0, resolved. Tobacco Abuse-need cessation .      DM2, hold oral Rx, chk A1c, on lantus and Low SSI q4h.       DVT Prophylaxis: Lx  Diet:   TF  Code Status: Full Code    Giuliana Pastor MD, 8/9/2021 4:18 PM

## 2021-08-09 NOTE — PROGRESS NOTES
08/08/21 2329   Vent Information   Vent Type 980   Vent Mode AC/VC   Vt Ordered 340 mL   Rate Set 22 bmp   Peak Flow 55 L/min   Pressure Support 0 cmH20   FiO2  35 %   SpO2 96 %   SpO2/FiO2 ratio 274.29   Sensitivity 2   PEEP/CPAP 5   I Time/ I Time % 0 s   Humidification Source Heated wire   Humidification Temp 37   Humidification Temp Measured 37   Circuit Condensation Drained   Vent Patient Data   High Peep/I Pressure 0   Peak Inspiratory Pressure 29 cmH2O   Mean Airway Pressure 11 cmH20   Rate Measured 22 br/min   Vt Exhaled 357 mL   Minute Volume 7.86 Liters   I:E Ratio 1:3.00   Plateau Pressure 19 BBU27   Cough/Sputum   Sputum How Obtained Suctioned;Endotracheal   Cough Non-productive   Spontaneous Breathing Trial (SBT) RT Doc   Pulse 70   Breath Sounds   Right Upper Lobe Inspiratory Wheezes   Right Middle Lobe Inspiratory Wheezes   Right Lower Lobe Inspiratory Wheezes   Left Upper Lobe Inspiratory Wheezes   Left Lower Lobe Inspiratory Wheezes   Additional Respiratory  Assessments   Resp 22   Position Semi-Donaldson's   Oral Care Completed? Yes   Oral Care Mouth suctioned   Subglottic Suction Done? Yes   Alarm Settings   High Pressure Alarm 50 cmH2O   Low Minute Volume Alarm 3 L/min   Apnea (secs) 20 secs   High Respiratory Rate 40 br/min   Low Exhaled Vt  240 mL   ETT (adult)   Placement Date/Time: 08/05/21 1826   Timeout: Patient;Procedure; Appropriate Equipment; Consent Confirmed  Tube Size: 8 mm  Laryngoscope: GlideScope  Location: Oral  Secured at: 22 cm   Secured at 25 cm   Measured From [address] By [employer] tube kearns   Site Condition Dry

## 2021-08-09 NOTE — PROGRESS NOTES
Comprehensive Nutrition Assessment    Type and Reason for Visit:  Reassess    Nutrition Recommendations/Plan:   1. Modified TF order - ADULT TUBE FEEDING; Orogastric; Peptide-Based High-Protein formula - Vital High-Protein with a goal rate of 45 ml/hr x 20 hours. Increase current rate by 20 ml/hr every 4 hours, as tolerated by patient, until goal rate can be achieved and maintained. Please administer one proteinex P2Go once daily via feeding tube. Water flushes, 30 ml every 3 hours or per MD guidance. 2. Monitor TF rate, intake, and tolerance + administration of one proteinex P2Go once daily + water flushes of 30 ml every 3 hours or per MD guidance. 3. Monitor vent status, sedation type/amount (propofol at 30 mcg x 24 hours which = 485 kcals from lipids), and plan of care. 4. Monitor nutrition-related labs, bowel function, and weight trends. Nutrition Assessment:  patient has improved from a nutritional standpoint AEB TF was started and is infusing at recommended goal rate at this time + patient is tolerating TF formula well, however, patient remains at risk for further compromise d/t intubation status, need for EN as sole source of nutrition at this time, and altered nutrition-related labs; will modify TF order to Vital High-Protein with a goal rate of 45 ml/hr x 20 hours + one proteinex P2Go once daily + modify water flushes to 30 ml water flushes every 3 hours or per MD guidance    Malnutrition Assessment:  Malnutrition Status:   At risk for malnutrition    Context:  Acute Illness     Findings of the 6 clinical characteristics of malnutrition:  Energy Intake:  No significant decrease in energy intake  Weight Loss:  No significant weight loss     Body Fat Loss:  No significant body fat loss     Muscle Mass Loss:  No significant muscle mass loss    Fluid Accumulation:  No significant fluid accumulation     Strength:  Not Performed    Estimated Daily Nutrient Needs:  Energy (kcal):  3599-3014 kcals based on 11-14 kcals/kg/CBW; Weight Used for Energy Requirements:  Current     Protein (g):  109-120 g protein based on 2-2.2 g/kg/IBW;  Weight Used for Protein Requirements:  Ideal        Fluid (ml/day):  2409-7578 ml; Method Used for Fluid Requirements:  1 ml/kcal      Nutrition Related Findings:  patient remains intubated and sedated on 30 mcg propofol at this time; abdomen is round, rotund, soft, and bowel sounds are active; BS were elevated this am; Cl is low; patient has prednisone, precedex, fentanyl, pepcid, valium, lovenox, 20 units lantus BID, and high-dose SSI ordered at this time      Wounds:  None       Current Nutrition Therapies:    Current Tube Feeding (TF) Orders:  · Feeding Route: Orogastric  · Formula: Peptide Based High Protein  · Schedule: Continuous  · Additives/Modulars: Protein (one proteinex P2Go once daily)  · Water Flushes: 30 ml every 3 hours or per MD guidance  · Current TF & Flush Orders Provides: Vital High-Protein at 25 ml/hr x 20 hours = 500 ml TV, 500 kcals, 44 g protein, and 418 ml free water + 30 ml every 4 hours or per MD guidance + 26 g protein and 104 kcals from one proteinex P2Go once daily (70 g protein and 604 kcals total)  · Goal TF & Flush Orders Provides: Vital High-Protein with a goal rate of 45 ml/hr x 20 hours = 900 ml TV, 900 kcals, 79 g protein, and 752 ml free water + 30 ml water flushes every 3 hours or per MD guidance + 26 g protein and 104 kcals from one proteinex P2Go once daily (105 g protein and 1004 kcals total)      Anthropometric Measures:  · Height: 5' 4\" (162.6 cm)  · Current Body Weight: 224 lb 14.4 oz (102 kg) (obtained on 8/9/21)   · Admission Body Weight: 220 lb 1.6 oz (99.8 kg) (obtained on 8/5/21; actual weight)    · Usual Body Weight: 183 lb 1.6 oz (83.1 kg) (obtained on 9/22/20; actual weight)     · Ideal Body Weight: 120 lbs; % Ideal Body Weight 187.4 %   · BMI: 38.6   · BMI Categories: Obese Class 2 (BMI 35.0 -39.9)       Nutrition Diagnosis: · Inadequate oral intake related to inadequate protein-energy intake, impaired respiratory function, cognitive or neurological impairment, endocrine dysfuntion as evidenced by NPO or clear liquid status due to medical condition, intubation, nutrition support - enteral nutrition, lab values      Nutrition Interventions:   Food and/or Nutrient Delivery:  Continue NPO, Modify Tube Feeding  Nutrition Education/Counseling:  No recommendation at this time   Coordination of Nutrition Care:  Continue to monitor while inpatient, Interdisciplinary Rounds    Goals:  patient will tolerate Vital High-Protein at goal rate of 45 ml/hr x 20 hours without GI distress, without s/s of aspiration, and without additional lab/fluid disturbances       Nutrition Monitoring and Evaluation:   Behavioral-Environmental Outcomes:  None Identified   Food/Nutrient Intake Outcomes:  Enteral Nutrition Intake/Tolerance  Physical Signs/Symptoms Outcomes:  Biochemical Data, GI Status, Fluid Status or Edema, Nutrition Focused Physical Findings, Skin, Weight     Discharge Planning:     Too soon to determine     Electronically signed by Maritza Tristan RD, LD on 8/9/21 at 2:35 PM EDT    Contact: [phone]

## 2021-08-09 NOTE — PROGRESS NOTES
08/09/21 0306   Vent Information   Vent Type 980   Vent Mode AC/VC   Vt Ordered 340 mL   Rate Set 22 bmp   Peak Flow 55 L/min   Pressure Support 0 cmH20   FiO2  35 %   SpO2 92 %   SpO2/FiO2 ratio 262.86   Sensitivity 2   PEEP/CPAP 5   I Time/ I Time % 0 s   Humidification Source Heated wire   Humidification Temp Measured 37   Circuit Condensation Drained   Vent Patient Data   High Peep/I Pressure 0   Peak Inspiratory Pressure 32 cmH2O   Mean Airway Pressure 12 cmH20   Rate Measured 22 br/min   Vt Exhaled 357 mL   Minute Volume 7.82 Liters   I:E Ratio 1:3.00   Cough/Sputum   Sputum How Obtained Endotracheal   Cough Productive   Sputum Amount Small   Sputum Color Creamy   Tenacity Thick   Spontaneous Breathing Trial (SBT) RT Doc   Pulse 76   Breath Sounds   Right Upper Lobe Expiratory Wheezes; Inspiratory Wheezes   Right Middle Lobe Expiratory Wheezes; Inspiratory Wheezes   Right Lower Lobe Expiratory Wheezes; Inspiratory Wheezes   Left Upper Lobe Expiratory Wheezes; Inspiratory Wheezes   Left Lower Lobe Expiratory Wheezes; Inspiratory Wheezes   Additional Respiratory  Assessments   Resp 22   Alarm Settings   High Pressure Alarm 50 cmH2O   Low Minute Volume Alarm 3 L/min   Apnea (secs) 20 secs   High Respiratory Rate 40 br/min   Low Exhaled Vt  240 mL   Patient Observation   Observations 8ett 25 at lip

## 2021-08-09 NOTE — CARE COORDINATION
INTERDISCIPLINARY PLAN OF CARE CONFERENCE    Date/Time: 8/9/2021 9:36 AM  Completed by: Carlos Dallas Case Management      Patient Name:  Denise Golden  YOB: 1965  Admitting Diagnosis: Septicemia ([facility] [address] [A41.9]  Acute cystitis without hematuria [N30.00]  Acute respiratory failure with hypoxia and hypercapnia ([facility] [address] [J96.01, J96.02]     Admit Date/Time:  8/4/2021  9:29 PM    Chart reviewed. Interdisciplinary team contacted or reviewed plan related to patient progress and discharge plans. Disciplines included Case Management, Nursing, and Dietitian. Current Status:ICU. Restraints. Vent. PT/OT recommendation for discharge plan of care: TBD    Expected D/C Disposition:  TBD    Discharge Plan Comments: Chart review completed. Pt remains in the ICU and is on a Vent. SBT ended per note from [facility] on this date. Pt is from home with her daughter. PT/OT may be beneficial when appropriate. CM will continue to follow and assist. Please notify CM if needs or concerns arise.      Home O2 in place on admit: Yes through [facility]

## 2021-08-09 NOTE — PLAN OF CARE
Nutrition Problem #1: Inadequate oral intake  Intervention: Food and/or Nutrient Delivery: Continue NPO, Modify Tube Feeding  Nutritional Goals: patient will tolerate Vital High-Protein at goal rate of 45 ml/hr x 20 hours without GI distress, without s/s of aspiration, and without additional lab/fluid disturbances

## 2021-08-09 NOTE — PROGRESS NOTES
Pulmonary and Critical Care Progress Note    CC: acute on chronic Respiratory failure    Subjective:   Precedex 2 mcg/kg/hr   Propofol 30 mcg/kg/min   Fentanyl 150 mcg/hr   Patient failed SBT after 1.5 hours. Tm 100.7    IV line: PICC 8/6     MV: 8/5      Intake/Output Summary (Last 24 hours) at 8/9/2021 0654  Last data filed at 8/9/2021 0604  Gross per 24 hour   Intake 3003.55 ml   Output 3230 ml   Net -226.45 ml       Exam:   /65   Pulse 76   Temp 100.4 °F (38 °C) (Bladder)   Resp 22   Ht 5' 4\" (1.626 m)   Wt 224 lb 14.4 oz (102 kg)   SpO2 94%   BMI 38.60 kg/m²  on vent  General: ill appearing. Intubated sedated. Eyes: PERRL. No sclera icterus. No conjunctival injection. ENT: No discharge. Pharynx clear. ET tube in place  Neck: Trachea midline. Resp: No accessory muscle use. No crackles. Bilateral wheezing. No rhonchi. No dullness on percussion. CV: Regular rate. Regular rhythm. No mumur or rub. No edema. GI: Non-tender. + distended. No masses. Skin: Warm and dry. No nodule on exposed extremities. No rash on exposed extremities. M/S: No cyanosis. No joint deformity. No clubbing. Neuro: somnolent, does not follow commands.      Scheduled Meds:   insulin glargine  20 Units Subcutaneous BID    lidocaine 1 % injection  5 mL Intradermal Once    sodium chloride flush  5-40 mL Intravenous 2 times per day    cefTRIAXone (ROCEPHIN) IV  2,000 mg Intravenous Q24H    cefepime  2,000 mg Intravenous Once    nicotine  1 patch Transdermal Daily    ipratropium-albuterol  3 mL Inhalation Q4H WA    enoxaparin  40 mg Subcutaneous Daily    predniSONE  40 mg Oral Daily with breakfast    tobramycin-dexamethasone  1 drop Both Eyes 6 times per day    chlorhexidine  15 mL Mouth/Throat BID    famotidine (PEPCID) injection  20 mg Intravenous BID    mupirocin   Nasal BID    insulin lispro  0-18 Units Subcutaneous Q4H     Continuous Infusions:   sodium chloride      dexmedetomidine HCl in NaCl 2 mcg/kg/hr (08/09/21 0645)    dextrose      propofol 30 mcg/kg/min (08/09/21 0645)    fentaNYL 150 mcg/hr (08/09/21 0650)     PRN Meds:  sodium chloride flush, sodium chloride, glucose, dextrose, glucagon (rDNA), dextrose, polyethylene glycol, acetaminophen **OR** acetaminophen, ondansetron, fentanNYL, midazolam, albuterol    Labs:  CBC:   Recent Labs     08/07/21  0335 08/08/21 0350 08/09/21 0358   WBC 7.3 6.7 6.1   HGB 14.4 14.8 14.2   HCT 44.2 46.0 43.2   MCV 90.1 90.5 88.8    136 124*     BMP:   Recent Labs     08/07/21  0335 08/08/21 0350 08/09/21 0358   * 138 136   K 4.7 3.7 3.9   CL 94* 100 96*   CO2 28 29 31   BUN 25* 21* 18   CREATININE 0.7 <0.5* <0.5*     LIVER PROFILE:   No results for input(s): AST, ALT, LIPASE, BILIDIR, BILITOT, ALKPHOS in the last 72 hours. Invalid input(s): AMYLASE,  ALB  PT/INR:   Recent Labs     08/06/21  0739   PROTIME 14.6*   INR 1.28*     APTT: No results for input(s): APTT in the last 72 hours. UA:  No results for input(s): NITRITE, COLORU, PHUR, LABCAST, WBCUA, RBCUA, MUCUS, TRICHOMONAS, YEAST, BACTERIA, CLARITYU, SPECGRAV, LEUKOCYTESUR, UROBILINOGEN, BILIRUBINUR, BLOODU, GLUCOSEU, AMORPHOUS in the last 72 hours.     Invalid input(s): Bonnie Odor  Recent Labs     08/08/21  0400 08/09/21  0359   PHART 7.459* 7.485*   [id number] 41.8 44.8   PO2ART 78.6 61.9*       Cultures:   8/4 COVID-19 and influenza not detected  8/5 UC NGTD  8/5 BC NGTD    Films:  Chest x-ray 8/9 imaging was reviewed by me and showed   Heart size is normal.  The pulmonary vasculature is slightly to mildly   prominent and more prominent than yesterday.  Small amount of opacity present   in the infrahilar right lung reduced since yesterday.  No sizable pleural   effusion.  Endotracheal tube, nasogastric tube, and right-sided PICC   unchanged.        CTPA 8/5/21  images reviewed by me and showed:   No evidence of pulmonary embolism or acute pulmonary abnormality     ASSESSMENT:  · Acute hypoxemic hypercapnic respiratory failure  · COPD with acute exacerbation  · Abnormal CXR with RLL ASD- atelectasis vs PNA   · Acute encephalopathy/metabolic encephalopathy  · Hyperglycemia   · UTI  · Tobacco abuse        PLAN:  · Mechanical ventilation as per my orders. The ventilator was adjusted by me at the bedside for unstable, life threatening respiratory failure. Supplemental oxygen to maintain SaO2 >92%; wean as tolerated  · IV Propofol for sedation, target RASS -2, with daily spontaneous awakening trial   · Precedex and wean off propofol if able  · Add valium 5 mg bid  · Fentanyl and Versed PRN, gtt as needed  · Head of bed 30 degrees or higher at all times  · Daily spontaneous breathing trial once PEEP less than 8, FiO2 less than 55%  · Follow TG   · Inhaled bronchodilators  · Solumedrol 40 IV Q12 hrs   · Ceftriaxone and Zithromax D#5  · Repeat Lasix 40 mg IV x 1   · Nutrition: Tube feeding approaching goal  · Lantus 20 units BID and continue ISS - goal 150-180  · GI prophylaxis: Pepcid  · DVT prophylaxis: Lovenox  · MRSA prophylaxis: Bactroban   · Code status: Full code           Patient is critically ill with acute respiratory failure. Critical care time spent reviewing labs/films, examining patient, collaborating with other physicians but excluding procedures for life threatening organ failure is 32 minutes.

## 2021-08-09 NOTE — FLOWSHEET NOTE
08/09/21 0700   Vitals   Temp 100.8 °F (38.2 °C)   Temp Source Bladder   Pulse 74   Heart Rate Source Monitor   Resp 22   /67   MAP (mmHg) 82   BP Method Automatic   Oxygen Therapy   SpO2 94 %   O2 Device Ventilator   FiO2  35 %   Vent Settings   Rate Set 22 bmp   Rate Measured 22 br/min   Vt Ordered 340 mL   Vt Exhaled 353 mL   PEEP/CPAP 5   Pressure Support 0 cmH20   Peak Inspiratory Pressure 31 cmH2O   Vital signs stable. Febrile. PRN tylenol provided. Shift assessment, completed, see flow sheet. RASS +1 Sedation off and TF held pending SBT. Intubated and sedated with # 8 ETT, at 25 LL. 22 / 340 /40 %/ 5. NSR, Respirations are easy, even, and unlabored. Intermittently agitated with weaning of sedation. Not tolerating SBT. Bilateral lung sounds with scattered wheezes. OG in place at 60, with 10 ml residual, Bowel sounds active. TRIP PICC, WNL with precedex @ 2 mcg/kg/hr, fentanyl at 150 mcg/hr and propofol at 30 mcg/kg/hr. Brown in place and draining clear yellow urine. Call light within reach. Bed in lowest position. Bed alarm on.  Will continue to monitor

## 2021-08-09 NOTE — PROGRESS NOTES
This note also relates to the following rows which could not be included:  Vt Ordered - Cannot attach notes to unvalidated device data  Rate Set - Cannot attach notes to unvalidated device data  Peak Flow - Cannot attach notes to unvalidated device data  Pressure Support - Cannot attach notes to unvalidated device data  Sensitivity - Cannot attach notes to unvalidated device data  PEEP/CPAP - Cannot attach notes to unvalidated device data  I Time/ I Time % - Cannot attach notes to unvalidated device data  High Peep/I Pressure - Cannot attach notes to unvalidated device data  Peak Inspiratory Pressure - Cannot attach notes to unvalidated device data  Mean Airway Pressure - Cannot attach notes to unvalidated device data  Rate Measured - Cannot attach notes to unvalidated device data  Minute Volume - Cannot attach notes to unvalidated device data  I:E Ratio - Cannot attach notes to unvalidated device data  Pulse - Cannot attach notes to unvalidated device data  High Pressure Alarm - Cannot attach notes to unvalidated device data  Low Minute Volume Alarm - Cannot attach notes to unvalidated device data  High Respiratory Rate - Cannot attach notes to unvalidated device data       08/09/21 1954   Vent Information   $Ventilation $Subsequent Day   Vent Type 980   Vent Mode AC/VC   FiO2  40 %   SpO2 95 %   SpO2/FiO2 ratio 237.5   Humidification Source Heated wire   Humidification Temp 37   Humidification Temp Measured 36.8   Circuit Condensation Drained   Vent Patient Data   Vt Exhaled 344 mL   Plateau Pressure 17 PKS42   Static Compliance 29 mL/cmH2O   Dynamic Compliance 16 mL/cmH2O   Cough/Sputum   Sputum How Obtained Endotracheal;Suctioned   Cough Productive   Sputum Amount Moderate   Sputum Color Creamy   Tenacity Thick   Breath Sounds   Right Upper Lobe Inspiratory Wheezes; Expiratory Wheezes   Right Middle Lobe Inspiratory Wheezes; Expiratory Grunting   Right Lower Lobe Diminished   Left Upper Lobe Inspiratory Wheezes; Expiratory Wheezes   Left Lower Lobe Diminished   Additional Respiratory  Assessments   Position Semi-Donaldson's   Alarm Settings   Apnea (secs) 20 secs   Low Exhaled Vt  240 mL   ETT (adult)   Placement Date/Time: 08/05/21 0445   Timeout: Patient;Procedure; Appropriate Equipment; Consent Confirmed  Tube Size: 8 mm  Laryngoscope: GlideScope  Location: Oral  Secured at: 22 cm   Secured at 25 cm   Measured From Lips   ET Placement Right   Secured By Commercial tube kearns   Site Condition Dry

## 2021-08-10 ENCOUNTER — APPOINTMENT (OUTPATIENT)
Dept: GENERAL RADIOLOGY | Age: 56
DRG: 207 | End: 2021-08-10
Payer: MEDICARE

## 2021-08-10 LAB
ANION GAP SERPL CALCULATED.3IONS-SCNC: 6 MMOL/L (ref 3–16)
BASE EXCESS ARTERIAL: 4.7 MMOL/L (ref -3–3)
BASOPHILS ABSOLUTE: 0 K/UL (ref 0–0.2)
BASOPHILS RELATIVE PERCENT: 0.5 %
BUN BLDV-MCNC: 16 MG/DL (ref 7–20)
CALCIUM SERPL-MCNC: 8.6 MG/DL (ref 8.3–10.6)
CARBOXYHEMOGLOBIN ARTERIAL: 1 % (ref 0–1.5)
CHLORIDE BLD-SCNC: 91 MMOL/L (ref 99–110)
CO2: 30 MMOL/L (ref 21–32)
CREAT SERPL-MCNC: <0.5 MG/DL (ref 0.6–1.1)
EOSINOPHILS ABSOLUTE: 0.1 K/UL (ref 0–0.6)
EOSINOPHILS RELATIVE PERCENT: 1 %
GFR AFRICAN AMERICAN: >60
GFR NON-AFRICAN AMERICAN: >60
GLUCOSE BLD-MCNC: 121 MG/DL (ref 70–99)
GLUCOSE BLD-MCNC: 131 MG/DL (ref 70–99)
GLUCOSE BLD-MCNC: 146 MG/DL (ref 70–99)
GLUCOSE BLD-MCNC: 175 MG/DL (ref 70–99)
GLUCOSE BLD-MCNC: 175 MG/DL (ref 70–99)
GLUCOSE BLD-MCNC: 191 MG/DL (ref 70–99)
GLUCOSE BLD-MCNC: 202 MG/DL (ref 70–99)
HCO3 ARTERIAL: 27.8 MMOL/L (ref 21–29)
HCT VFR BLD CALC: 42.8 % (ref 36–48)
HEMOGLOBIN, ART, EXTENDED: 14.9 G/DL (ref 12–16)
HEMOGLOBIN: 14.2 G/DL (ref 12–16)
LYMPHOCYTES ABSOLUTE: 1.2 K/UL (ref 1–5.1)
LYMPHOCYTES RELATIVE PERCENT: 22.7 %
MCH RBC QN AUTO: 29.3 PG (ref 26–34)
MCHC RBC AUTO-ENTMCNC: 33.2 G/DL (ref 31–36)
MCV RBC AUTO: 88.1 FL (ref 80–100)
METHEMOGLOBIN ARTERIAL: 0.3 %
MONOCYTES ABSOLUTE: 0.5 K/UL (ref 0–1.3)
MONOCYTES RELATIVE PERCENT: 9.4 %
NEUTROPHILS ABSOLUTE: 3.6 K/UL (ref 1.7–7.7)
NEUTROPHILS RELATIVE PERCENT: 66.4 %
O2 SAT, ARTERIAL: 97.1 %
O2 THERAPY: ABNORMAL
PCO2 ARTERIAL: 36.5 MMHG (ref 35–45)
PDW BLD-RTO: 15.6 % (ref 12.4–15.4)
PERFORMED ON: ABNORMAL
PH ARTERIAL: 7.5 (ref 7.35–7.45)
PLATELET # BLD: 122 K/UL (ref 135–450)
PMV BLD AUTO: 8 FL (ref 5–10.5)
PO2 ARTERIAL: 83.8 MMHG (ref 75–108)
POTASSIUM SERPL-SCNC: 3.5 MMOL/L (ref 3.5–5.1)
RBC # BLD: 4.86 M/UL (ref 4–5.2)
SODIUM BLD-SCNC: 127 MMOL/L (ref 136–145)
TCO2 ARTERIAL: 28.9 MMOL/L
WBC # BLD: 5.4 K/UL (ref 4–11)

## 2021-08-10 PROCEDURE — 85025 COMPLETE CBC W/AUTO DIFF WBC: CPT

## 2021-08-10 PROCEDURE — 6370000000 HC RX 637 (ALT 250 FOR IP): Performed by: INTERNAL MEDICINE

## 2021-08-10 PROCEDURE — 82803 BLOOD GASES ANY COMBINATION: CPT

## 2021-08-10 PROCEDURE — 94761 N-INVAS EAR/PLS OXIMETRY MLT: CPT

## 2021-08-10 PROCEDURE — 6360000002 HC RX W HCPCS: Performed by: INTERNAL MEDICINE

## 2021-08-10 PROCEDURE — 2500000003 HC RX 250 WO HCPCS: Performed by: INTERNAL MEDICINE

## 2021-08-10 PROCEDURE — 94003 VENT MGMT INPAT SUBQ DAY: CPT

## 2021-08-10 PROCEDURE — 71045 X-RAY EXAM CHEST 1 VIEW: CPT

## 2021-08-10 PROCEDURE — 2000000000 HC ICU R&B

## 2021-08-10 PROCEDURE — 2580000003 HC RX 258: Performed by: INTERNAL MEDICINE

## 2021-08-10 PROCEDURE — 80048 BASIC METABOLIC PNL TOTAL CA: CPT

## 2021-08-10 PROCEDURE — 2700000000 HC OXYGEN THERAPY PER DAY

## 2021-08-10 PROCEDURE — 94640 AIRWAY INHALATION TREATMENT: CPT

## 2021-08-10 PROCEDURE — 99291 CRITICAL CARE FIRST HOUR: CPT | Performed by: INTERNAL MEDICINE

## 2021-08-10 PROCEDURE — 99233 SBSQ HOSP IP/OBS HIGH 50: CPT | Performed by: INTERNAL MEDICINE

## 2021-08-10 RX ORDER — DIAZEPAM 5 MG/1
5 TABLET ORAL 3 TIMES DAILY
Status: DISCONTINUED | OUTPATIENT
Start: 2021-08-10 | End: 2021-08-13

## 2021-08-10 RX ADMIN — IPRATROPIUM BROMIDE AND ALBUTEROL SULFATE 3 ML: .5; 3 SOLUTION RESPIRATORY (INHALATION) at 08:38

## 2021-08-10 RX ADMIN — Medication 2 MCG/KG/HR: at 02:41

## 2021-08-10 RX ADMIN — TOBRAMYCIN AND DEXAMETHASONE 1 DROP: 3; 1 SUSPENSION/ DROPS OPHTHALMIC at 07:17

## 2021-08-10 RX ADMIN — PROPOFOL 35 MCG/KG/MIN: 10 INJECTION, EMULSION INTRAVENOUS at 09:22

## 2021-08-10 RX ADMIN — Medication 2 MCG/KG/HR: at 09:21

## 2021-08-10 RX ADMIN — Medication 175 MCG/HR: at 11:05

## 2021-08-10 RX ADMIN — ENOXAPARIN SODIUM 40 MG: 40 INJECTION SUBCUTANEOUS at 07:17

## 2021-08-10 RX ADMIN — DIAZEPAM 5 MG: 5 TABLET ORAL at 21:06

## 2021-08-10 RX ADMIN — FAMOTIDINE 20 MG: 10 INJECTION, SOLUTION INTRAVENOUS at 07:17

## 2021-08-10 RX ADMIN — TOBRAMYCIN AND DEXAMETHASONE 1 DROP: 3; 1 SUSPENSION/ DROPS OPHTHALMIC at 05:38

## 2021-08-10 RX ADMIN — INSULIN GLARGINE 20 UNITS: 100 INJECTION, SOLUTION SUBCUTANEOUS at 09:33

## 2021-08-10 RX ADMIN — Medication 2 MCG/KG/HR: at 10:32

## 2021-08-10 RX ADMIN — DIAZEPAM 5 MG: 5 TABLET ORAL at 14:04

## 2021-08-10 RX ADMIN — CHLORHEXIDINE GLUCONATE 0.12% ORAL RINSE 15 ML: 1.2 LIQUID ORAL at 21:05

## 2021-08-10 RX ADMIN — Medication 175 MCG/HR: at 22:43

## 2021-08-10 RX ADMIN — PROPOFOL 35 MCG/KG/MIN: 10 INJECTION, EMULSION INTRAVENOUS at 13:26

## 2021-08-10 RX ADMIN — PROPOFOL 35 MCG/KG/MIN: 10 INJECTION, EMULSION INTRAVENOUS at 01:08

## 2021-08-10 RX ADMIN — PROPOFOL 40 MCG/KG/MIN: 10 INJECTION, EMULSION INTRAVENOUS at 22:03

## 2021-08-10 RX ADMIN — CHLORHEXIDINE GLUCONATE 0.12% ORAL RINSE 15 ML: 1.2 LIQUID ORAL at 07:17

## 2021-08-10 RX ADMIN — IPRATROPIUM BROMIDE AND ALBUTEROL SULFATE 3 ML: .5; 3 SOLUTION RESPIRATORY (INHALATION) at 11:41

## 2021-08-10 RX ADMIN — Medication 2 MCG/KG/HR: at 13:11

## 2021-08-10 RX ADMIN — Medication 175 MCG/HR: at 03:27

## 2021-08-10 RX ADMIN — IPRATROPIUM BROMIDE AND ALBUTEROL SULFATE 3 ML: .5; 3 SOLUTION RESPIRATORY (INHALATION) at 15:39

## 2021-08-10 RX ADMIN — PROPOFOL 35 MCG/KG/MIN: 10 INJECTION, EMULSION INTRAVENOUS at 03:28

## 2021-08-10 RX ADMIN — Medication 2 MCG/KG/HR: at 05:39

## 2021-08-10 RX ADMIN — FAMOTIDINE 20 MG: 10 INJECTION, SOLUTION INTRAVENOUS at 21:05

## 2021-08-10 RX ADMIN — Medication 2 MCG/KG/HR: at 18:24

## 2021-08-10 RX ADMIN — Medication 175 MCG/HR: at 17:53

## 2021-08-10 RX ADMIN — PROPOFOL 40 MCG/KG/MIN: 10 INJECTION, EMULSION INTRAVENOUS at 17:50

## 2021-08-10 RX ADMIN — IPRATROPIUM BROMIDE AND ALBUTEROL SULFATE 3 ML: .5; 3 SOLUTION RESPIRATORY (INHALATION) at 19:24

## 2021-08-10 RX ADMIN — TOBRAMYCIN AND DEXAMETHASONE 1 DROP: 3; 1 SUSPENSION/ DROPS OPHTHALMIC at 11:56

## 2021-08-10 RX ADMIN — TOBRAMYCIN AND DEXAMETHASONE 1 DROP: 3; 1 SUSPENSION/ DROPS OPHTHALMIC at 21:06

## 2021-08-10 RX ADMIN — SODIUM CHLORIDE, PRESERVATIVE FREE 10 ML: 5 INJECTION INTRAVENOUS at 07:18

## 2021-08-10 RX ADMIN — Medication 2 MCG/KG/HR: at 20:06

## 2021-08-10 RX ADMIN — TOBRAMYCIN AND DEXAMETHASONE 1 DROP: 3; 1 SUSPENSION/ DROPS OPHTHALMIC at 17:09

## 2021-08-10 RX ADMIN — MIDAZOLAM HYDROCHLORIDE 2 MG: 1 INJECTION, SOLUTION INTRAMUSCULAR; INTRAVENOUS at 01:08

## 2021-08-10 RX ADMIN — INSULIN GLARGINE 20 UNITS: 100 INJECTION, SOLUTION SUBCUTANEOUS at 21:07

## 2021-08-10 RX ADMIN — Medication 2 MCG/KG/HR: at 22:44

## 2021-08-10 RX ADMIN — IPRATROPIUM BROMIDE AND ALBUTEROL SULFATE 3 ML: .5; 3 SOLUTION RESPIRATORY (INHALATION) at 23:15

## 2021-08-10 RX ADMIN — PREDNISONE 40 MG: 20 TABLET ORAL at 07:17

## 2021-08-10 RX ADMIN — TOBRAMYCIN AND DEXAMETHASONE 1 DROP: 3; 1 SUSPENSION/ DROPS OPHTHALMIC at 00:34

## 2021-08-10 RX ADMIN — Medication 2 MCG/KG/HR: at 00:34

## 2021-08-10 RX ADMIN — Medication 2 MCG/KG/HR: at 16:02

## 2021-08-10 RX ADMIN — CEFTRIAXONE 2000 MG: 2 INJECTION, POWDER, FOR SOLUTION INTRAMUSCULAR; INTRAVENOUS at 17:17

## 2021-08-10 ASSESSMENT — PULMONARY FUNCTION TESTS
PIF_VALUE: 31
PIF_VALUE: 31
PIF_VALUE: 29
PIF_VALUE: 31
PIF_VALUE: 26
PIF_VALUE: 32
PIF_VALUE: 29
PIF_VALUE: 28
PIF_VALUE: 29
PIF_VALUE: 28
PIF_VALUE: 27
PIF_VALUE: 29
PIF_VALUE: 32
PIF_VALUE: 29
PIF_VALUE: 29
PIF_VALUE: 32
PIF_VALUE: 29
PIF_VALUE: 33
PIF_VALUE: 30
PIF_VALUE: 29

## 2021-08-10 ASSESSMENT — PAIN SCALES - GENERAL: PAINLEVEL_OUTOF10: 0

## 2021-08-10 NOTE — PROGRESS NOTES
08/10/21 0301   Vent Information   Vent Type 980   Vent Mode AC/VC   Vt Ordered 330 mL   Rate Set 24 bmp   Peak Flow 55 L/min   Pressure Support 0 cmH20   FiO2  40 %   SpO2 94 %   SpO2/FiO2 ratio 235   Sensitivity 2   PEEP/CPAP 5   I Time/ I Time % 0 s   Humidification Source Heated wire   Humidification Temp Measured 36.9   Circuit Condensation Drained   Vent Patient Data   High Peep/I Pressure 0   Peak Inspiratory Pressure 29 cmH2O   Mean Airway Pressure 11 cmH20   Rate Measured 24 br/min   Vt Exhaled 347 mL   Minute Volume 8.33 Liters   I:E Ratio 1:2.80   Cough/Sputum   Sputum How Obtained Endotracheal   Cough Productive   Sputum Amount Moderate   Sputum Color Creamy   Tenacity Thick   Spontaneous Breathing Trial (SBT) RT Doc   Pulse 70   Breath Sounds   Right Upper Lobe Expiratory Wheezes; Inspiratory Wheezes   Right Middle Lobe Expiratory Wheezes; Inspiratory Wheezes   Right Lower Lobe Expiratory Wheezes; Inspiratory Wheezes   Left Upper Lobe Expiratory Wheezes; Inspiratory Wheezes   Left Lower Lobe Expiratory Wheezes; Inspiratory Wheezes   Additional Respiratory  Assessments   Resp 24   Alarm Settings   High Pressure Alarm 50 cmH2O   Low Minute Volume Alarm 3 L/min   Apnea (secs) 20 secs   High Respiratory Rate 40 br/min   Low Exhaled Vt  240 mL   Patient Observation   Observations 8ett 25 at lip

## 2021-08-10 NOTE — PROGRESS NOTES
This note also relates to the following rows which could not be included:  Vt Ordered - Cannot attach notes to unvalidated device data  Rate Set - Cannot attach notes to unvalidated device data  Peak Flow - Cannot attach notes to unvalidated device data  Pressure Support - Cannot attach notes to unvalidated device data  FiO2  - Cannot attach notes to unvalidated device data  SpO2 - Cannot attach notes to unvalidated device data  Sensitivity - Cannot attach notes to unvalidated device data  PEEP/CPAP - Cannot attach notes to unvalidated device data  I Time/ I Time % - Cannot attach notes to unvalidated device data  High Peep/I Pressure - Cannot attach notes to unvalidated device data  Peak Inspiratory Pressure - Cannot attach notes to unvalidated device data  Mean Airway Pressure - Cannot attach notes to unvalidated device data  Rate Measured - Cannot attach notes to unvalidated device data  Minute Volume - Cannot attach notes to unvalidated device data  I:E Ratio - Cannot attach notes to unvalidated device data  Pulse - Cannot attach notes to unvalidated device data  Resp - Cannot attach notes to unvalidated device data  High Pressure Alarm - Cannot attach notes to unvalidated device data  Low Minute Volume Alarm - Cannot attach notes to unvalidated device data  High Respiratory Rate - Cannot attach notes to unvalidated device data       08/10/21 1925   Vent Information   $Ventilation $Subsequent Day   Vent Type 980   Vent Mode AC/VC   Humidification Source Heated wire   Humidification Temp 37   Humidification Temp Measured 36.5   Circuit Condensation Drained   Vent Patient Data   Vt Exhaled 328 mL   Plateau Pressure 22 ABB22   Static Compliance 23 mL/cmH2O   Dynamic Compliance 16 mL/cmH2O   Cough/Sputum   Sputum How Obtained Endotracheal;Suctioned   Cough Productive   Sputum Amount Moderate   Sputum Color Creamy   Tenacity Thick   Breath Sounds   Right Upper Lobe Expiratory Wheezes   Right Middle Lobe Expiratory Wheezes   Right Lower Lobe Expiratory Wheezes   Left Upper Lobe Expiratory Wheezes   Left Lower Lobe Expiratory Wheezes   Additional Respiratory  Assessments   Position Semi-Donaldson's   Alarm Settings   Apnea (secs) 20 secs   Low Exhaled Vt  240 mL   ETT (adult)   Placement Date/Time: 08/05/21 3915   Timeout: Patient;Procedure; Appropriate Equipment; Consent Confirmed  Tube Size: 8 mm  Laryngoscope: GlideScope  Location: Oral  Secured at: 22 cm   Secured at 25 cm   Measured From Lips   ET Placement Right   Secured By Commercial tube kearns   Site Condition Dry

## 2021-08-10 NOTE — PROGRESS NOTES
IM Progress Note    Admit Date:  8/4/2021  5    Interval history:  Copd exacerbation on vent     Subjective:  Ms. Varma Hait sedated on the ventilator. She is on Precedex, propofol, fentanyl. She failed spontaneous breathing trial after an hour and a half. She has a low-grade fever 100.7. Patient got agitated during spontaneous breathing trial with hypoxemia. T-max 100.2. Objective:   /73   Pulse 71   Temp 100 °F (37.8 °C) (Bladder)   Resp 24   Ht 5' 4\" (1.626 m)   Wt 225 lb 8 oz (102.3 kg)   SpO2 95%   BMI 38.71 kg/m²       Intake/Output Summary (Last 24 hours) at 8/10/2021 1328  Last data filed at 8/10/2021 1200  Gross per 24 hour   Intake 521 ml   Output 1100 ml   Net -579 ml       Physical Exam:    General: middle aged female on vent support. Awake and alert and follows simple commands  Oral ETT and OG noted  Ill-appearing  Mucous Membranes:  Pink , anicteric  Neck: No JVD, no carotid bruit, no thyromegaly  Chest:  marshall air entry with end expiratory wheeze noted  Cardiovascular:  RRR S1S2 heard, no murmurs or gallops  Abdomen:  Soft, undistended, non tender, no organomegaly, BS present  Extremities: No edema or cyanosis. Distal pulses well felt  Neurological : Moves all 4 extremities. Follows simple commands.       Medications:   Scheduled Medications:    diazePAM  5 mg Oral TID    insulin glargine  20 Units Subcutaneous BID    lidocaine 1 % injection  5 mL Intradermal Once    sodium chloride flush  5-40 mL Intravenous 2 times per day    cefTRIAXone (ROCEPHIN) IV  2,000 mg Intravenous Q24H    cefepime  2,000 mg Intravenous Once    nicotine  1 patch Transdermal Daily    ipratropium-albuterol  3 mL Inhalation Q4H WA    enoxaparin  40 mg Subcutaneous Daily    predniSONE  40 mg Oral Daily with breakfast    tobramycin-dexamethasone  1 drop Both Eyes 6 times per day    chlorhexidine  15 mL Mouth/Throat BID    famotidine (PEPCID) injection  20 mg Intravenous BID    insulin lispro 0-18 Units Subcutaneous Q4H     I   sodium chloride      dexmedetomidine HCl in NaCl 2 mcg/kg/hr (08/10/21 1311)    dextrose      propofol 35 mcg/kg/min (08/10/21 1326)    fentaNYL 175 mcg/hr (08/10/21 1105)     sodium chloride flush, sodium chloride, glucose, dextrose, glucagon (rDNA), dextrose, polyethylene glycol, acetaminophen **OR** acetaminophen, ondansetron, fentanNYL, midazolam, albuterol    Lab Data:  Recent Labs     08/08/21  0350 08/09/21  0358 08/10/21  0510   WBC 6.7 6.1 5.4   HGB 14.8 14.2 14.2   HCT 46.0 43.2 42.8   MCV 90.5 88.8 88.1    124* 122*     Recent Labs     08/08/21  0350 08/09/21  0358 08/10/21  0510    136 127*   K 3.7 3.9 3.5    96* 91*   CO2 29 31 30   BUN 21* 18 16   CREATININE <0.5* <0.5* <0.5*     No results for input(s): CKTOTAL, CKMB, CKMBINDEX, TROPONINI in the last 72 hours. Coagulation:   Lab Results   Component Value Date    INR 1.28 08/06/2021     Cardiac markers:   Lab Results   Component Value Date    TROPONINI <0.01 08/04/2021         Lab Results   Component Value Date    ALT 37 08/05/2021    AST 79 (H) 08/05/2021    ALKPHOS 183 (H) 08/05/2021    BILITOT 1.0 08/05/2021       Lab Results   Component Value Date    INR 1.28 (H) 08/06/2021    PROTIME 14.6 (H) 08/06/2021       Radiology    XR CHEST PORTABLE   Preliminary Result   1. Stable appropriate positions of support apparatus. 2. No significant change in appearance of diffuse hazy opacity throughout   both lungs, likely a combination of asymmetric edema and multifocal pneumonia. XR CHEST PORTABLE   Final Result   Increased pulmonary vascular congestion since yesterday. Decreased right   infrahilar atelectasis or pneumonia.          XR CHEST PORTABLE   Final Result   Stable exam demonstrating cardiomegaly with vascular congestion and   persistent strandy airspace disease in the right lung base presumably   atelectasis unless clinical findings suggest pneumonia         XR CHEST PORTABLE Final Result   Cardiomegaly with pulmonary vascular congestion. No definite pulmonary   edema. Atelectasis in the lung bases         IR PICC WO SQ PORT/PUMP > 5 YEARS   Final Result      XR CHEST PORTABLE   Final Result   Mild cardiomegaly with borderline vascular congestion, accentuated by low   lung volume. XR CHEST PORTABLE   Final Result   1. Endotracheal tube tip is 6-7 cm above the antoine. 2. The enteric tube courses into the stomach with the tip not included on   this examination of the chest.         CT CHEST PULMONARY EMBOLISM W CONTRAST   Final Result   No evidence of pulmonary embolism or acute pulmonary abnormality. XR CHEST PORTABLE   Final Result   1. Vascular cephalization, consistent with mild central venous congestion         XR CHEST PORTABLE    (Results Pending)      Cultures:   Urine mixed hadley  Blood - NGTD  covid neg    ASSESSMENT/PLAN:    Acute respiratory failure with hypoxia and hypercapnia, likely related to aeCOPD exacerbation   - failed bipap and now on vent support  - continue steroids, IBD  - imaging with no clear infection   - on rocephin day#5 and azithromycin day#5/5  - ongoing weaning trials. She failed SBT today. Will reattempt tomorrow    UTi- on ceftriaxone  cx neg   No sepsis     Elevated LFT's, improving     Acute encephalopathy. likely related to hypercarbia vs UTI. Supportive measures. Hx of drug abuse with related admissions in the past - snorts fentanyl  Daughter reports she quit drugs since last time     Lactic acidosis, 2.2, 1.0, resolved. Tobacco Abuse-need cessation .      DM2, hold oral Rx, chk A1c, on lantus and Low SSI q4h.       DVT Prophylaxis: Lx  Diet:   TF  Code Status: Full Code    Marielos Harper MD, 8/10/2021 1:28 PM

## 2021-08-10 NOTE — PROGRESS NOTES
Attempted SBT of 5/5. Pt awake and following commands, but needed a lot of coaching to take breaths. Pt then becoming very anxious, not following commands, trying to sit up. VT decreasing to low 100s and sp02 decreased to 77%. Pt was placed back on previous support settings, fi02 was increased to 60% and PEEP to 8. Pt is currently at 50%.

## 2021-08-10 NOTE — PLAN OF CARE
Problem: Non-Violent Restraints  Goal: Removal from restraints as soon as assessed to be safe  Outcome: Ongoing  Goal: No harm/injury to patient while restraints in use  Outcome: Ongoing  Goal: Patient's dignity will be maintained  Outcome: Ongoing     Problem: Nutrition  Goal: Optimal nutrition therapy  8/9/2021 2205 by Roz Parada RN  Outcome: Ongoing  8/9/2021 1420 by Odalys Garcia RD, IVANA  Outcome: Met This Shift  Goal: Understanding of nutritional guidelines  8/9/2021 2205 by Roz Parada RN  Outcome: Ongoing  8/9/2021 1420 by Odalys Garcia RD, IVANA  Outcome: Met This Shift     Problem: Falls - Risk of:  Goal: Will remain free from falls  Description: Will remain free from falls  Outcome: Ongoing  Goal: Absence of physical injury  Description: Absence of physical injury  Outcome: Ongoing     Problem: Skin Integrity:  Goal: Will show no infection signs and symptoms  Description: Will show no infection signs and symptoms  Outcome: Ongoing  Goal: Absence of new skin breakdown  Description: Absence of new skin breakdown  Outcome: Ongoing

## 2021-08-10 NOTE — PROGRESS NOTES
Assessment unchanged. Pt remains intubated & sedated. PICC WNL. PIV WNL. Brown in place for strict I/o. Pts daughter at bedside. No new orders. See MAR for gtt rates.   Keya Holden RN, BSN

## 2021-08-10 NOTE — PROGRESS NOTES
Pulled 20 MG of valium instead of just one 5mg tablet. Individually crushed each med prior to scanning. Only 5 mg given to patient. Wasted excess tablets with Rhianna Correa RN. Resolved discrepancy with Pharmacy tech.   Electronically signed by Valerie Coronado RN on 8/10/2021 at 3:24 AM

## 2021-08-10 NOTE — PROGRESS NOTES
08/09/21 2316   Vent Information   Vent Type 980   Vent Mode AC/VC   Vt Ordered 330 mL   Rate Set 24 bmp   Peak Flow 55 L/min   Pressure Support 0 cmH20   FiO2  40 %   SpO2 96 %   SpO2/FiO2 ratio 240   Sensitivity 2   PEEP/CPAP 5   I Time/ I Time % 0 s   Humidification Source Heated wire   Humidification Temp Measured 36.5   Circuit Condensation Drained   Vent Patient Data   High Peep/I Pressure 0   Peak Inspiratory Pressure 28 cmH2O   Mean Airway Pressure 11 cmH20   Rate Measured 24 br/min   Vt Exhaled 345 mL   Minute Volume 8.27 Liters   I:E Ratio 1:2.80   Cough/Sputum   Sputum How Obtained Endotracheal   Cough Productive   Sputum Amount Moderate   Sputum Color Creamy   Tenacity Thick   Spontaneous Breathing Trial (SBT) RT Doc   Pulse 65   Breath Sounds   Right Upper Lobe Expiratory Wheezes; Inspiratory Wheezes   Right Middle Lobe Expiratory Wheezes; Inspiratory Wheezes   Right Lower Lobe Expiratory Wheezes; Inspiratory Wheezes   Left Upper Lobe Expiratory Wheezes; Inspiratory Wheezes   Left Lower Lobe Expiratory Wheezes; Inspiratory Wheezes   Additional Respiratory  Assessments   Resp 24   Alarm Settings   High Pressure Alarm 50 cmH2O   Low Minute Volume Alarm 3 L/min   Apnea (secs) 20 secs   High Respiratory Rate 40 br/min   Low Exhaled Vt  240 mL   Patient Observation   Observations 8ett 25 at lip

## 2021-08-10 NOTE — PROGRESS NOTES
Pulmonary and Critical Care Progress Note    CC: acute on chronic Respiratory failure    Subjective:   Precedex 2 mcg/kg/hr   Propofol 35 mcg/kg/min   Fentanyl 175 mcg/hr   Patient agitated during SBT with hypoxemia. Tm 100.2    IV line: PICC 8/6     MV: 8/5      Intake/Output Summary (Last 24 hours) at 8/10/2021 0722  Last data filed at 8/10/2021 0540  Gross per 24 hour   Intake 1578.94 ml   Output 1900 ml   Net -321.06 ml       Exam:   /77   Pulse 67   Temp 99.7 °F (37.6 °C) (Bladder)   Resp 24   Ht 5' 4\" (1.626 m)   Wt 225 lb 8 oz (102.3 kg)   SpO2 95%   BMI 38.71 kg/m²  on vent  General: ill appearing. Intubated sedated. Eyes: PERRL. No sclera icterus. No conjunctival injection. ENT: No discharge. Pharynx clear. ET tube in place  Neck: Trachea midline. Resp: No accessory muscle use. No crackles. Minimal wheezing. No rhonchi. No dullness on percussion. CV: Regular rate. Regular rhythm. No mumur or rub. No edema. GI: Non-tender. + distended. No masses. Skin: Warm and dry. No nodule on exposed extremities. No rash on exposed extremities. M/S: No cyanosis. No joint deformity. No clubbing. Neuro: somnolent, does not follow commands. Moves extremities.     Scheduled Meds:   diazePAM  5 mg Oral Q12H    insulin glargine  20 Units Subcutaneous BID    lidocaine 1 % injection  5 mL Intradermal Once    sodium chloride flush  5-40 mL Intravenous 2 times per day    cefTRIAXone (ROCEPHIN) IV  2,000 mg Intravenous Q24H    cefepime  2,000 mg Intravenous Once    nicotine  1 patch Transdermal Daily    ipratropium-albuterol  3 mL Inhalation Q4H WA    enoxaparin  40 mg Subcutaneous Daily    predniSONE  40 mg Oral Daily with breakfast    tobramycin-dexamethasone  1 drop Both Eyes 6 times per day    chlorhexidine  15 mL Mouth/Throat BID    famotidine (PEPCID) injection  20 mg Intravenous BID    insulin lispro  0-18 Units Subcutaneous Q4H     Continuous Infusions:   sodium chloride      dexmedetomidine HCl in NaCl 2 mcg/kg/hr (08/10/21 0539)    dextrose      propofol 35 mcg/kg/min (08/10/21 0328)    fentaNYL 175 mcg/hr (08/10/21 0327)     PRN Meds:  sodium chloride flush, sodium chloride, glucose, dextrose, glucagon (rDNA), dextrose, polyethylene glycol, acetaminophen **OR** acetaminophen, ondansetron, fentanNYL, midazolam, albuterol    Labs:  CBC:   Recent Labs     08/08/21  0350 08/09/21  0358 08/10/21  0510   WBC 6.7 6.1 5.4   HGB 14.8 14.2 14.2   HCT 46.0 43.2 42.8   MCV 90.5 88.8 88.1    124* 122*     BMP:   Recent Labs     08/08/21  0350 08/09/21  0358 08/10/21  0510    136 127*   K 3.7 3.9 3.5    96* 91*   CO2 29 31 30   BUN 21* 18 16   CREATININE <0.5* <0.5* <0.5*     LIVER PROFILE:   No results for input(s): AST, ALT, LIPASE, BILIDIR, BILITOT, ALKPHOS in the last 72 hours. Invalid input(s): AMYLASE,  ALB  PT/INR:   No results for input(s): PROTIME, INR in the last 72 hours. APTT: No results for input(s): APTT in the last 72 hours. UA:  No results for input(s): NITRITE, COLORU, PHUR, LABCAST, WBCUA, RBCUA, MUCUS, TRICHOMONAS, YEAST, BACTERIA, CLARITYU, SPECGRAV, LEUKOCYTESUR, UROBILINOGEN, BILIRUBINUR, BLOODU, GLUCOSEU, AMORPHOUS in the last 72 hours.     Invalid input(s): Masha Ahumada  Recent Labs     08/09/21  0359 08/10/21  0542   PHART 7.485* 7.500*   XGC1IFN 44.8 36.5   PO2ART 61.9* 83.8       Cultures:   8/4 COVID-19 and influenza not detected  8/5 UC NGTD  8/5 BC NGTD    Films:  Chest x-ray 8/10 imaging was reviewed by me and showed   stable endotracheal tube with tip lying approximately 3.7 cm above the antoine.  There is an NG tube again noted coursing into the stomach.  There is a stable right arm PICC with   tip overlying the cavoatrial junction.  The heart size is stable, and at the upper limits of normal.  The mediastinal contours are stable, and within normal limits.  There has been no significant change in appearance of diffuse hazy opacity throughout both lungs, right greater than left likely a combination of asymmetric edema and multifocal pneumonia. Kendall Rafat is no evidence of a pneumothorax.        CTPA 8/5/21  images reviewed by me and showed:   No evidence of pulmonary embolism or acute pulmonary abnormality     ASSESSMENT:  · Acute hypoxemic hypercapnic respiratory failure  · COPD with acute exacerbation  · Abnormal CXR with RLL ASD- atelectasis vs PNA   · Acute encephalopathy/metabolic encephalopathy  · Hyperglycemia   · UTI  · Tobacco abuse        PLAN:  · Mechanical ventilation as per my orders. The ventilator was adjusted by me at the bedside for unstable, life threatening respiratory failure. Supplemental oxygen to maintain SaO2 >92%; wean as tolerated  · IV Propofol for sedation, target RASS -2, with daily spontaneous awakening trial   · Precedex and wean off propofol if able  · valium 5 mg change to tid  · Fentanyl and Versed PRN, gtt as needed  · Head of bed 30 degrees or higher at all times  · Daily spontaneous breathing trial once PEEP less than 8, FiO2 less than 55%  · Follow TG   · Inhaled bronchodilators  · Solumedrol 40 IV Q12 hrs   · Ceftriaxone D6 and completed Zithromax D#5  · Nutrition: Tube feeding approaching goal  · Lantus 20 units BID and continue ISS - goal 150-180  · GI prophylaxis: Pepcid  · DVT prophylaxis: Lovenox  · MRSA prophylaxis: Bactroban   · Code status: Full code           Patient is critically ill with acute respiratory failure. Critical care time spent reviewing labs/films, examining patient, collaborating with other physicians but excluding procedures for life threatening organ failure is 33 minutes.

## 2021-08-11 ENCOUNTER — APPOINTMENT (OUTPATIENT)
Dept: GENERAL RADIOLOGY | Age: 56
DRG: 207 | End: 2021-08-11
Payer: MEDICARE

## 2021-08-11 LAB
ANION GAP SERPL CALCULATED.3IONS-SCNC: 10 MMOL/L (ref 3–16)
BASE EXCESS ARTERIAL: 4.5 MMOL/L (ref -3–3)
BASE EXCESS VENOUS: 1.8 MMOL/L (ref -3–3)
BASOPHILS ABSOLUTE: 0 K/UL (ref 0–0.2)
BASOPHILS RELATIVE PERCENT: 0.2 %
BUN BLDV-MCNC: 14 MG/DL (ref 7–20)
CALCIUM SERPL-MCNC: 8.8 MG/DL (ref 8.3–10.6)
CARBOXYHEMOGLOBIN ARTERIAL: 0.9 % (ref 0–1.5)
CARBOXYHEMOGLOBIN: 1.7 % (ref 0–1.5)
CHLORIDE BLD-SCNC: 97 MMOL/L (ref 99–110)
CO2: 26 MMOL/L (ref 21–32)
CREAT SERPL-MCNC: <0.5 MG/DL (ref 0.6–1.1)
EOSINOPHILS ABSOLUTE: 0 K/UL (ref 0–0.6)
EOSINOPHILS RELATIVE PERCENT: 1 %
GFR AFRICAN AMERICAN: >60
GFR NON-AFRICAN AMERICAN: >60
GLUCOSE BLD-MCNC: 128 MG/DL (ref 70–99)
GLUCOSE BLD-MCNC: 150 MG/DL (ref 70–99)
GLUCOSE BLD-MCNC: 167 MG/DL (ref 70–99)
GLUCOSE BLD-MCNC: 205 MG/DL (ref 70–99)
GLUCOSE BLD-MCNC: 206 MG/DL (ref 70–99)
GLUCOSE BLD-MCNC: 212 MG/DL (ref 70–99)
GLUCOSE BLD-MCNC: 215 MG/DL (ref 70–99)
HCO3 ARTERIAL: 29.5 MMOL/L (ref 21–29)
HCO3 VENOUS: 30.4 MMOL/L (ref 23–29)
HCT VFR BLD CALC: 43.1 % (ref 36–48)
HEMOGLOBIN, ART, EXTENDED: 15 G/DL (ref 12–16)
HEMOGLOBIN: 14.1 G/DL (ref 12–16)
LYMPHOCYTES ABSOLUTE: 0.7 K/UL (ref 1–5.1)
LYMPHOCYTES RELATIVE PERCENT: 16.3 %
MCH RBC QN AUTO: 29.1 PG (ref 26–34)
MCHC RBC AUTO-ENTMCNC: 32.7 G/DL (ref 31–36)
MCV RBC AUTO: 88.9 FL (ref 80–100)
METHEMOGLOBIN ARTERIAL: 0 %
METHEMOGLOBIN VENOUS: 0.1 %
MONOCYTES ABSOLUTE: 0.5 K/UL (ref 0–1.3)
MONOCYTES RELATIVE PERCENT: 10.7 %
NEUTROPHILS ABSOLUTE: 3.2 K/UL (ref 1.7–7.7)
NEUTROPHILS RELATIVE PERCENT: 71.8 %
O2 CONTENT, VEN: 19 VOL %
O2 SAT, ARTERIAL: 97.1 %
O2 SAT, VEN: 93 %
O2 THERAPY: ABNORMAL
O2 THERAPY: ABNORMAL
PCO2 ARTERIAL: 45.1 MMHG (ref 35–45)
PCO2, VEN: 65.5 MMHG (ref 40–50)
PDW BLD-RTO: 15.8 % (ref 12.4–15.4)
PERFORMED ON: ABNORMAL
PH ARTERIAL: 7.43 (ref 7.35–7.45)
PH VENOUS: 7.29 (ref 7.35–7.45)
PLATELET # BLD: 122 K/UL (ref 135–450)
PMV BLD AUTO: 8.1 FL (ref 5–10.5)
PO2 ARTERIAL: 90.9 MMHG (ref 75–108)
PO2, VEN: 73.6 MMHG (ref 25–40)
POTASSIUM SERPL-SCNC: 3.9 MMOL/L (ref 3.5–5.1)
RBC # BLD: 4.85 M/UL (ref 4–5.2)
SODIUM BLD-SCNC: 133 MMOL/L (ref 136–145)
TCO2 ARTERIAL: 30.9 MMOL/L
TCO2 CALC VENOUS: 32 MMOL/L
TRIGL SERPL-MCNC: 136 MG/DL (ref 0–150)
WBC # BLD: 4.5 K/UL (ref 4–11)

## 2021-08-11 PROCEDURE — 2580000003 HC RX 258: Performed by: INTERNAL MEDICINE

## 2021-08-11 PROCEDURE — 6360000002 HC RX W HCPCS: Performed by: INTERNAL MEDICINE

## 2021-08-11 PROCEDURE — 6370000000 HC RX 637 (ALT 250 FOR IP): Performed by: INTERNAL MEDICINE

## 2021-08-11 PROCEDURE — 99291 CRITICAL CARE FIRST HOUR: CPT | Performed by: INTERNAL MEDICINE

## 2021-08-11 PROCEDURE — 84478 ASSAY OF TRIGLYCERIDES: CPT

## 2021-08-11 PROCEDURE — 94761 N-INVAS EAR/PLS OXIMETRY MLT: CPT

## 2021-08-11 PROCEDURE — 2500000003 HC RX 250 WO HCPCS: Performed by: INTERNAL MEDICINE

## 2021-08-11 PROCEDURE — 94640 AIRWAY INHALATION TREATMENT: CPT

## 2021-08-11 PROCEDURE — 71045 X-RAY EXAM CHEST 1 VIEW: CPT

## 2021-08-11 PROCEDURE — 99233 SBSQ HOSP IP/OBS HIGH 50: CPT | Performed by: INTERNAL MEDICINE

## 2021-08-11 PROCEDURE — 2700000000 HC OXYGEN THERAPY PER DAY

## 2021-08-11 PROCEDURE — 94003 VENT MGMT INPAT SUBQ DAY: CPT

## 2021-08-11 PROCEDURE — 82803 BLOOD GASES ANY COMBINATION: CPT

## 2021-08-11 PROCEDURE — 80048 BASIC METABOLIC PNL TOTAL CA: CPT

## 2021-08-11 PROCEDURE — 2000000000 HC ICU R&B

## 2021-08-11 PROCEDURE — 85025 COMPLETE CBC W/AUTO DIFF WBC: CPT

## 2021-08-11 RX ORDER — INSULIN GLARGINE 100 [IU]/ML
30 INJECTION, SOLUTION SUBCUTANEOUS 2 TIMES DAILY
Status: DISCONTINUED | OUTPATIENT
Start: 2021-08-11 | End: 2021-08-13

## 2021-08-11 RX ORDER — IPRATROPIUM BROMIDE AND ALBUTEROL SULFATE 2.5; .5 MG/3ML; MG/3ML
3 SOLUTION RESPIRATORY (INHALATION) EVERY 4 HOURS
Status: DISCONTINUED | OUTPATIENT
Start: 2021-08-11 | End: 2021-08-13

## 2021-08-11 RX ADMIN — Medication 175 MCG/HR: at 05:14

## 2021-08-11 RX ADMIN — IPRATROPIUM BROMIDE AND ALBUTEROL SULFATE 3 ML: .5; 3 SOLUTION RESPIRATORY (INHALATION) at 19:19

## 2021-08-11 RX ADMIN — Medication 100 MCG/HR: at 21:34

## 2021-08-11 RX ADMIN — PROPOFOL 40 MCG/KG/MIN: 10 INJECTION, EMULSION INTRAVENOUS at 02:24

## 2021-08-11 RX ADMIN — PREDNISONE 40 MG: 20 TABLET ORAL at 08:28

## 2021-08-11 RX ADMIN — Medication 2 MCG/KG/HR: at 11:41

## 2021-08-11 RX ADMIN — MIDAZOLAM HYDROCHLORIDE 2 MG: 1 INJECTION, SOLUTION INTRAMUSCULAR; INTRAVENOUS at 18:17

## 2021-08-11 RX ADMIN — MIDAZOLAM HYDROCHLORIDE 2 MG: 1 INJECTION, SOLUTION INTRAMUSCULAR; INTRAVENOUS at 15:47

## 2021-08-11 RX ADMIN — Medication 2 MCG/KG/HR: at 23:29

## 2021-08-11 RX ADMIN — DIAZEPAM 5 MG: 5 TABLET ORAL at 08:28

## 2021-08-11 RX ADMIN — FAMOTIDINE 20 MG: 10 INJECTION, SOLUTION INTRAVENOUS at 08:28

## 2021-08-11 RX ADMIN — ALBUTEROL SULFATE 2.5 MG: 2.5 SOLUTION RESPIRATORY (INHALATION) at 03:24

## 2021-08-11 RX ADMIN — SODIUM CHLORIDE, PRESERVATIVE FREE 10 ML: 5 INJECTION INTRAVENOUS at 08:29

## 2021-08-11 RX ADMIN — PROPOFOL 35 MCG/KG/MIN: 10 INJECTION, EMULSION INTRAVENOUS at 23:08

## 2021-08-11 RX ADMIN — CHLORHEXIDINE GLUCONATE 0.12% ORAL RINSE 15 ML: 1.2 LIQUID ORAL at 08:28

## 2021-08-11 RX ADMIN — Medication 2 MCG/KG/HR: at 06:44

## 2021-08-11 RX ADMIN — Medication 2 MCG/KG/HR: at 01:30

## 2021-08-11 RX ADMIN — Medication 2 MCG/KG/HR: at 16:30

## 2021-08-11 RX ADMIN — IPRATROPIUM BROMIDE AND ALBUTEROL SULFATE 3 ML: .5; 3 SOLUTION RESPIRATORY (INHALATION) at 06:55

## 2021-08-11 RX ADMIN — CHLORHEXIDINE GLUCONATE 0.12% ORAL RINSE 15 ML: 1.2 LIQUID ORAL at 20:00

## 2021-08-11 RX ADMIN — PROPOFOL 40 MCG/KG/MIN: 10 INJECTION, EMULSION INTRAVENOUS at 06:53

## 2021-08-11 RX ADMIN — INSULIN GLARGINE 30 UNITS: 100 INJECTION, SOLUTION SUBCUTANEOUS at 20:37

## 2021-08-11 RX ADMIN — TOBRAMYCIN AND DEXAMETHASONE 1 DROP: 3; 1 SUSPENSION/ DROPS OPHTHALMIC at 08:29

## 2021-08-11 RX ADMIN — PROPOFOL 35 MCG/KG/MIN: 10 INJECTION, EMULSION INTRAVENOUS at 17:51

## 2021-08-11 RX ADMIN — IPRATROPIUM BROMIDE AND ALBUTEROL SULFATE 3 ML: .5; 3 SOLUTION RESPIRATORY (INHALATION) at 23:14

## 2021-08-11 RX ADMIN — TOBRAMYCIN AND DEXAMETHASONE 1 DROP: 3; 1 SUSPENSION/ DROPS OPHTHALMIC at 16:00

## 2021-08-11 RX ADMIN — Medication 2 MCG/KG/HR: at 04:25

## 2021-08-11 RX ADMIN — CEFTRIAXONE 2000 MG: 2 INJECTION, POWDER, FOR SOLUTION INTRAMUSCULAR; INTRAVENOUS at 17:14

## 2021-08-11 RX ADMIN — TOBRAMYCIN AND DEXAMETHASONE 1 DROP: 3; 1 SUSPENSION/ DROPS OPHTHALMIC at 04:00

## 2021-08-11 RX ADMIN — PROPOFOL 35 MCG/KG/MIN: 10 INJECTION, EMULSION INTRAVENOUS at 21:54

## 2021-08-11 RX ADMIN — MIDAZOLAM HYDROCHLORIDE 2 MG: 1 INJECTION, SOLUTION INTRAMUSCULAR; INTRAVENOUS at 12:37

## 2021-08-11 RX ADMIN — PROPOFOL 35 MCG/KG/MIN: 10 INJECTION, EMULSION INTRAVENOUS at 14:11

## 2021-08-11 RX ADMIN — FAMOTIDINE 20 MG: 10 INJECTION, SOLUTION INTRAVENOUS at 20:00

## 2021-08-11 RX ADMIN — DIAZEPAM 5 MG: 5 TABLET ORAL at 13:34

## 2021-08-11 RX ADMIN — TOBRAMYCIN AND DEXAMETHASONE 1 DROP: 3; 1 SUSPENSION/ DROPS OPHTHALMIC at 20:00

## 2021-08-11 RX ADMIN — ENOXAPARIN SODIUM 40 MG: 40 INJECTION SUBCUTANEOUS at 08:28

## 2021-08-11 RX ADMIN — Medication 2 MCG/KG/HR: at 21:33

## 2021-08-11 RX ADMIN — SODIUM CHLORIDE, PRESERVATIVE FREE 10 ML: 5 INJECTION INTRAVENOUS at 20:00

## 2021-08-11 RX ADMIN — TOBRAMYCIN AND DEXAMETHASONE 1 DROP: 3; 1 SUSPENSION/ DROPS OPHTHALMIC at 11:41

## 2021-08-11 RX ADMIN — INSULIN GLARGINE 20 UNITS: 100 INJECTION, SOLUTION SUBCUTANEOUS at 09:18

## 2021-08-11 RX ADMIN — MIDAZOLAM HYDROCHLORIDE 2 MG: 1 INJECTION, SOLUTION INTRAMUSCULAR; INTRAVENOUS at 10:45

## 2021-08-11 RX ADMIN — TOBRAMYCIN AND DEXAMETHASONE 1 DROP: 3; 1 SUSPENSION/ DROPS OPHTHALMIC at 00:14

## 2021-08-11 RX ADMIN — Medication 2 MCG/KG/HR: at 14:12

## 2021-08-11 RX ADMIN — Medication 2 MCG/KG/HR: at 18:52

## 2021-08-11 RX ADMIN — IPRATROPIUM BROMIDE AND ALBUTEROL SULFATE 3 ML: .5; 3 SOLUTION RESPIRATORY (INHALATION) at 14:59

## 2021-08-11 RX ADMIN — TOBRAMYCIN AND DEXAMETHASONE 1 DROP: 3; 1 SUSPENSION/ DROPS OPHTHALMIC at 23:09

## 2021-08-11 RX ADMIN — IPRATROPIUM BROMIDE AND ALBUTEROL SULFATE 3 ML: .5; 3 SOLUTION RESPIRATORY (INHALATION) at 10:57

## 2021-08-11 RX ADMIN — DIAZEPAM 5 MG: 5 TABLET ORAL at 20:00

## 2021-08-11 RX ADMIN — Medication 2 MCG/KG/HR: at 09:04

## 2021-08-11 RX ADMIN — MIDAZOLAM HYDROCHLORIDE 2 MG: 1 INJECTION, SOLUTION INTRAMUSCULAR; INTRAVENOUS at 20:00

## 2021-08-11 RX ADMIN — Medication 100 MCG/HR: at 10:49

## 2021-08-11 ASSESSMENT — PULMONARY FUNCTION TESTS
PIF_VALUE: 28
PIF_VALUE: 30
PIF_VALUE: 32
PIF_VALUE: 30
PIF_VALUE: 29
PIF_VALUE: 23
PIF_VALUE: 28
PIF_VALUE: 29
PIF_VALUE: 34
PIF_VALUE: 28
PIF_VALUE: 11
PIF_VALUE: 29
PIF_VALUE: 31
PIF_VALUE: 28
PIF_VALUE: 28
PIF_VALUE: 29
PIF_VALUE: 30
PIF_VALUE: 29
PIF_VALUE: 11
PIF_VALUE: 33
PIF_VALUE: 30
PIF_VALUE: 27
PIF_VALUE: 34
PIF_VALUE: 35
PIF_VALUE: 30
PIF_VALUE: 28
PIF_VALUE: 29

## 2021-08-11 ASSESSMENT — PAIN SCALES - GENERAL: PAINLEVEL_OUTOF10: 0

## 2021-08-11 NOTE — PROGRESS NOTES
Spoke with both children; update given; currently on wean; stable and following commands; slight agitation and restlessness; precedex at 2mcg/kg/hr and fentanyl at 150mcg/hr.

## 2021-08-11 NOTE — PROGRESS NOTES
RESPIRATORY THERAPY ASSESSMENT    Name:  Claire Caal Record Number:  [id number]  Age: 61 y.o. Gender: female  : 1965  Today's Date:  2021  Room:  [facility]3009-    Assessment     Is the patient being admitted for a COPD or Asthma exacerbation? Yes   (If yes the patient will be seen every 4 hours for the first 24 hours and then reassessed)    Patient Admission Diagnosis      Allergies  No Known Allergies    Minimum Predicted Vital Capacity:               Actual Vital Capacity:                    Pulmonary History:COPD  Home Oxygen Therapy:  room air  Home Respiratory Therapy:Albuterol Q6prn  Current Respiratory Therapy:  duoneb Q4wa and Q4prn  Treatment Type: Aerosol generator  Medications: Albuterol/Ipratropium    Respiratory Severity Index(RSI)   Patients with orders for inhalation medications, oxygen, or any therapeutic treatment modality will be placed on Respiratory Protocol. They will be assessed with the first treatment and at least every 72 hours thereafter. The following severity scale will be used to determine frequency of treatment intervention.     Smoking History: Severe Exacerbation = 4    Social History  Social History     Tobacco Use    Smoking status: Current Every Day Smoker     Packs/day: 1.00     Types: Cigarettes    Smokeless tobacco: Never Used   Vaping Use    Vaping Use: Never used   Substance Use Topics    Alcohol use: Never    Drug use: Not Currently     Types: IV     Comment: Fentanyl and heroin       Recent Surgical History: None = 0  Past Surgical History  Past Surgical History:   Procedure Laterality Date    ANKLE FRACTURE SURGERY Right 2020    OPEN REDUCTION INTERNAL FIXATION RIGHT ANKLE FRACTURE performed by Nena Boswell DO at [address] Right 2020    ORIF    BRONCHOSCOPY N/A 2020    BRONCHOSCOPY performed by Julianne Kowalski MD at [address] [place] [address]  2020    [address] [place] [address] INITIAL performed by Yeimy Stanton MD at [address] [place] [address] N/A 2/10/2020    BRONCHOSCOPY ALVEOLAR LAVAGE performed by Luis Camargo MD at [address] [place] [address]  2/10/2020    BRONCHOSCOPY THERAPUTIC ASPIRATION SUBSEQUENT performed by Luis Camargo MD at [address] [facility]       Level of Consciousness: Comatose = 4    Level of Activity: Bedridden, unresponsive or quadriplegic = 4    Respiratory Pattern: Dyspnea with exertion;Irregular pattern;or RR less than 6 = 2    Breath Sounds: Absent bilaterally and/or with wheezes = 3    Sputum  Sputum Color: Creamy, Tenacity: Thick, Sputum How Obtained: Endotracheal, Suctioned  Cough: Weak, productive = 2    Vital Signs   /70   Pulse 72   Temp 99.4 °F (37.4 °C) (Bladder)   Resp 24   Ht 5' 4\" (1.626 m)   Wt 219 lb 9.3 oz (99.6 kg)   SpO2 99%   BMI 37.69 kg/m²   SPO2 (COPD values may differ): 86-87% on room air or greater than 92% on FiO2 35- 50% = 3    Peak Flow (asthma only): not applicable = 0    RSI: Greater than 17 = Contact physician        Plan       Goals: medication delivery    Patient/caregiver was educated on the proper method of use for Respiratory Care Devices:  Yes      Level of patient/caregiver understanding able to:   ? Verbalize understanding   ? Demonstrate understanding       ? Teach back        ? Needs reinforcement       ? No available caregiver               ? Other:     Response to education:  sedated     Is patient being placed on Home Treatment Regimen? No     Does the patient have everything they need prior to discharge? Yes     Comments: chart reviewed, pt assessed    Plan of Care: duoneb Q4    Electronically signed by Lambert Braswell RCP on 8/11/2021 at 5:10 PM    Respiratory Protocol Guidelines     1. Assessment and treatment by Respiratory Therapy will be initiated for medication and therapeutic interventions upon initiation of aerosolized medication.   2. Physician will be contacted for respiratory rate (RR) greater than 35 breaths per minute. Therapy will be held for heart rate (HR) greater than 140 beats per minute, pending direction from physician. 3. Bronchodilators will be administered via Metered Dose Inhaler (MDI) with spacer when the following criteria are met:  a. Alert and cooperative     b. HR < 140 bpm  c. RR < 30 bpm                d. Can demonstrate a 2-3 second inspiratory hold  4. Bronchodilators will be administered via Hand Held Nebulizer [facility]) to patients when ANY of the following criteria are met  a. Incognizant or uncooperative          b. Patients treated with HHN at Home        c. Unable to demonstrate proper use of MDI with spacer     d. RR > 30 bpm   5. Bronchodilators will be delivered via Metered Dose Inhaler (MDI), HHN, Aerogen to intubated patients on mechanical ventilation. 6. Inhalation medication orders will be delivered and/or substituted as outlined below. Aerosolized Medications Ordering and Administration Guidelines:    1. All Medications will be ordered by a physician, and their frequency and/or modality will be adjusted as defined by the patients Respiratory Severity Index (RSI) score. 2. If the patient does not have documented COPD, consider discontinuing anticholinergics when RSI is less than 9.  3. If the bronchospasm worsens (increased RSI), then the bronchodilator frequency can be increased to a maximum of every 4 hours. If greater than every 4 hours is required, the physician will be contacted. 4. If the bronchospasm improves, the frequency of the bronchodilator can be decreased, based on the patient's RSI, but not less than home treatment regimen frequency. 5. Bronchodilator(s) will be discontinued if patient has a RSI less than 9 and has received no scheduled or as needed treatment for 72  Hrs. Patients Ordered on a Mucolytic Agent:    1. Must always be administered with a bronchodilator.     2. Discontinue if patient experiences worsened bronchospasm, or secretions have lessened to the point that the patient is able to clear them with a cough. Anti-inflammatory and Combination Medications:    1. If the patient lacks prior history of lung disease, is not using inhaled anti-inflammatory medication at home, and lacks wheezing by examination or by history for at least 24 hours, contact physician for possible discontinuation.

## 2021-08-11 NOTE — PROGRESS NOTES
Pt placed on SBT 5/5, FiO2 45%  RSBI=81       08/11/21 0840   Vent Information   Vt Ordered 0 mL   Rate Set 0 bmp   Peak Flow 0 L/min   Pressure Support 5 cmH20   FiO2  50 %   SpO2 99 %   SpO2/FiO2 ratio 198   Sensitivity 2   PEEP/CPAP 5   I Time/ I Time % 0 s   Vent Patient Data   High Peep/I Pressure 0   Peak Inspiratory Pressure 23 cmH2O   Mean Airway Pressure 10 cmH20   Rate Measured 24 br/min   Vt Exhaled 341 mL   Minute Volume 7.97 Liters   I:E Ratio 1:2.80   Spontaneous Breathing Trial (SBT) RT Doc   Pulse 76   Additional Respiratory  Assessments   Resp 19   Alarm Settings   High Pressure Alarm 50 cmH2O   Low Minute Volume Alarm 3 L/min   High Respiratory Rate 40 br/min

## 2021-08-11 NOTE — PROGRESS NOTES
This note also relates to the following rows which could not be included:  Vt Ordered - Cannot attach notes to unvalidated device data  Rate Set - Cannot attach notes to unvalidated device data  Peak Flow - Cannot attach notes to unvalidated device data  Pressure Support - Cannot attach notes to unvalidated device data  Sensitivity - Cannot attach notes to unvalidated device data  PEEP/CPAP - Cannot attach notes to unvalidated device data  I Time/ I Time % - Cannot attach notes to unvalidated device data  High Peep/I Pressure - Cannot attach notes to unvalidated device data  Peak Inspiratory Pressure - Cannot attach notes to unvalidated device data  Mean Airway Pressure - Cannot attach notes to unvalidated device data  Rate Measured - Cannot attach notes to unvalidated device data  Vt Exhaled - Cannot attach notes to unvalidated device data  Minute Volume - Cannot attach notes to unvalidated device data  I:E Ratio - Cannot attach notes to unvalidated device data  Pulse - Cannot attach notes to unvalidated device data  High Pressure Alarm - Cannot attach notes to unvalidated device data  Low Minute Volume Alarm - Cannot attach notes to unvalidated device data  High Respiratory Rate - Cannot attach notes to unvalidated device data       08/11/21 1920   Vent Information   $Ventilation $Subsequent Day   Vent Type 980   Vent Mode AC/VC   FiO2  40 %   SpO2 99 %   SpO2/FiO2 ratio 247.5   Humidification Source Heated wire   Humidification Temp 37   Humidification Temp Measured 37.1   Circuit Condensation Drained   Vent Patient Data   Plateau Pressure 19 [id number]   Static Compliance 25 mL/cmH2O   Dynamic Compliance 14 mL/cmH2O   Cough/Sputum   Sputum How Obtained Endotracheal;Suctioned   Cough Productive   Sputum Amount Moderate   Sputum Color Creamy   Tenacity Thick   Breath Sounds   Right Upper Lobe Diminished; Expiratory Wheezes   Right Middle Lobe Diminished; Expiratory Wheezes   Right Lower Lobe Diminished   Left Upper Lobe Diminished; Expiratory Wheezes   Left Lower Lobe Diminished   Additional Respiratory  Assessments   Resp 24   Position Semi-Donaldson's   Alarm Settings   Apnea (secs) 20 secs   Low Exhaled Vt  240 mL   ETT (adult)   Placement Date/Time: 08/05/21 1075   Timeout: Patient;Procedure; Appropriate Equipment; Consent Confirmed  Tube Size: 8 mm  Laryngoscope: GlideScope  Location: Oral  Secured at: 22 cm   Secured at 25 cm   Measured From Lips   ET Placement Right  (moved to right)   Secured By Commercial tube kearns   Site Condition Dry

## 2021-08-11 NOTE — PROGRESS NOTES
Pulmonary and Critical Care Progress Note    CC: acute on chronic Respiratory failure    Subjective:   Precedex 2 mcg/kg/hr   Propofol 40 mcg/kg/min   Fentanyl 175 mcg/hr   Patient tolerating SBT. Tm 98.9    IV line: PICC 8/6     MV: 8/5      Intake/Output Summary (Last 24 hours) at 8/11/2021 0719  Last data filed at 8/11/2021 0532  Gross per 24 hour   Intake 4145.73 ml   Output 2260 ml   Net 1885.73 ml       Exam:   /74   Pulse 64   Temp 98.9 °F (37.2 °C) (Bladder)   Resp 24   Ht 5' 4\" (1.626 m)   Wt 219 lb 9.3 oz (99.6 kg)   SpO2 99%   BMI 37.69 kg/m²  on vent  General: ill appearing. Intubated sedated. Eyes: PERRL. No sclera icterus. No conjunctival injection. ENT: No discharge. Pharynx clear. ET tube in place  Neck: Trachea midline. Resp: No accessory muscle use. No crackles. No wheezing. No rhonchi. No dullness on percussion. CV: Regular rate. Regular rhythm. No mumur or rub. No edema. GI: Non-tender. + distended. No masses. Skin: Warm and dry. No nodule on exposed extremities. No rash on exposed extremities. M/S: No cyanosis. No joint deformity. No clubbing. Neuro: awake and alert,  follows commands. Moves extremities.     Scheduled Meds:   diazePAM  5 mg Oral TID    insulin glargine  20 Units Subcutaneous BID    lidocaine 1 % injection  5 mL Intradermal Once    sodium chloride flush  5-40 mL Intravenous 2 times per day    cefTRIAXone (ROCEPHIN) IV  2,000 mg Intravenous Q24H    cefepime  2,000 mg Intravenous Once    nicotine  1 patch Transdermal Daily    ipratropium-albuterol  3 mL Inhalation Q4H WA    enoxaparin  40 mg Subcutaneous Daily    predniSONE  40 mg Oral Daily with breakfast    tobramycin-dexamethasone  1 drop Both Eyes 6 times per day    chlorhexidine  15 mL Mouth/Throat BID    famotidine (PEPCID) injection  20 mg Intravenous BID    insulin lispro  0-18 Units Subcutaneous Q4H     Continuous Infusions:   sodium chloride      dexmedetomidine HCl in NaCl 2 mcg/kg/hr (08/11/21 5643)    dextrose      propofol 40 mcg/kg/min (08/11/21 0653)    fentaNYL 175 mcg/hr (08/11/21 0514)     PRN Meds:  sodium chloride flush, sodium chloride, glucose, dextrose, glucagon (rDNA), dextrose, polyethylene glycol, acetaminophen **OR** acetaminophen, ondansetron, fentanNYL, midazolam, albuterol    Labs:  CBC:   Recent Labs     08/09/21  0358 08/10/21  0510 08/11/21  0528   WBC 6.1 5.4 4.5   HGB 14.2 14.2 14.1   HCT 43.2 42.8 43.1   MCV 88.8 88.1 88.9   * 122* 122*     BMP:   Recent Labs     08/09/21  0358 08/10/21  0510 08/11/21  0528    127* 133*   K 3.9 3.5 3.9   CL 96* 91* 97*   CO2 31 30 26   BUN 18 16 14   CREATININE <0.5* <0.5* <0.5*     LIVER PROFILE:   No results for input(s): AST, ALT, LIPASE, BILIDIR, BILITOT, ALKPHOS in the last 72 hours. Invalid input(s): AMYLASE,  ALB  PT/INR:   No results for input(s): PROTIME, INR in the last 72 hours. APTT: No results for input(s): APTT in the last 72 hours. UA:  No results for input(s): NITRITE, COLORU, PHUR, LABCAST, WBCUA, RBCUA, MUCUS, TRICHOMONAS, YEAST, BACTERIA, CLARITYU, SPECGRAV, LEUKOCYTESUR, UROBILINOGEN, BILIRUBINUR, BLOODU, GLUCOSEU, AMORPHOUS in the last 72 hours.     Invalid input(s): [facility]  Recent Labs     08/10/21  0542 08/11/21  0528   PHART 7.500* 7.434   [id number] 36.5 45.1*   PO2ART 83.8 90.9       Cultures:   8/4 COVID-19 and influenza not detected  8/5 UC NGTD  8/5 BC NGTD    Films:  Chest x-ray 8/11 imaging was reviewed by me and showed   Tubes and lines are unchanged.  There is interval development of a small   right-sided pleural effusion.  Lungs are otherwise unchanged.        CTPA 8/5/21  images reviewed by me and showed:   No evidence of pulmonary embolism or acute pulmonary abnormality     ASSESSMENT:  · Acute hypoxemic hypercapnic respiratory failure  · COPD with acute exacerbation  · Abnormal CXR with RLL ASD- atelectasis vs PNA   · Acute encephalopathy/metabolic encephalopathy  · Hyperglycemia   · UTI  · Tobacco abuse        PLAN:  · Mechanical ventilation as per my orders. The ventilator was adjusted by me at the bedside for unstable, life threatening respiratory failure. Supplemental oxygen to maintain SaO2 >92%; wean as tolerated  · IV Propofol for sedation, target RASS -2, with daily spontaneous awakening trial   · Precedex and wean off propofol if able  · valium 5 mg  tid  · Fentanyl and Versed PRN, gtt as needed  · Head of bed 30 degrees or higher at all times  · Daily spontaneous breathing trial once PEEP less than 8, FiO2 less than 55%  · Follow TG -recheck  · Inhaled bronchodilators  · pred taper  · Ceftriaxone D7/7 and completed Zithromax D#5  · Nutrition: Tube feeding approaching goal  · Lantus increase to 30 units BID and continue ISS - goal 150-180  · GI prophylaxis: Pepcid  · DVT prophylaxis: Lovenox  · MRSA prophylaxis: Bactroban   · Code status: Full code           Patient is critically ill with acute respiratory failure. Critical care time spent reviewing labs/films, examining patient, collaborating with other physicians but excluding procedures for life threatening organ failure is 32 minutes.

## 2021-08-11 NOTE — PROGRESS NOTES
Reassessment complete; see flowsheet. Pt continues to be restless at times; she has not required any additional PRN medications as she is easily redirectable and comforted. Pt remains on Fentanyl, Propofol and Precedex. VSS. *PEEP changed from 8 to 5.

## 2021-08-11 NOTE — PROGRESS NOTES
Shift assessment complete; see flowsheet. Pt is following commands with a RASS of -1, responding to voice/pain. Intubated/sedated # 8 ETT, at 25 LL. RR 24 /  / FiO2 40% / PEEP 8. HR NSR on monitor, HR 68, /73 (89), SpO2 100%. Respirations are easy, even, and unlabored. Bilateral lung sounds expiratory wheezing  Edema present on BUE. OG in place, with TF at 25 mL/hr, 0 mL residual.    Brown in place and patent with STAT lock. R DBL PICC and PIV in place, WNL with the following infusions:  Fentanyl @175   Propofol @40   Precedex @2  C[address]ela [address] running as well. Call light within reach, bed in lowest position, bed alarm on. Will continue to monitor.

## 2021-08-11 NOTE — PROGRESS NOTES
Patient sedation turned back on and vent switched to [facility] mode; will attempt wean tomorrow.

## 2021-08-11 NOTE — CARE COORDINATION
INTERDISCIPLINARY PLAN OF CARE CONFERENCE    Date/Time: 8/11/2021 10:17 AM  Completed by:  MARLA Smith. Case Management      Patient Name:  Garland Patino  YOB: 1965  Admitting Diagnosis: Septicemia [facility]) [A41.9]  Acute cystitis without hematuria [N30.00]  Acute respiratory failure with hypoxia and hypercapnia ([facility] [address] [J96.01, J96.02]     Admit Date/Time:  8/4/2021  9:29 PM    Chart reviewed. Interdisciplinary team contacted or reviewed plan related to patient progress and discharge plans. Disciplines included Case Management, Nursing, and Dietitian. Current Status:Ongoing. ICU. Restraints. Vent. SBT. Agitation. Precedex. PT/OT recommendation for discharge plan of care: TBD    Expected D/C Disposition:  TBD    Discharge Plan Comments: Chart review completed. Pt remains in the ICU and remains on a Vent. Pt was placed on SBT today per note from RCP on this date. Pt is from home with daughter. PT/OT may be beneficial when medically appropriate. CM will continue to follow and assist. Please notify CM if needs or concerns arise.      Home O2 in place on admit: Yes through [id number]

## 2021-08-11 NOTE — PROGRESS NOTES
This note also relates to the following rows which could not be included:  Vt Ordered - Cannot attach notes to unvalidated device data  Rate Set - Cannot attach notes to unvalidated device data  Peak Flow - Cannot attach notes to unvalidated device data  Pressure Support - Cannot attach notes to unvalidated device data  Sensitivity - Cannot attach notes to unvalidated device data  PEEP/CPAP - Cannot attach notes to unvalidated device data  I Time/ I Time % - Cannot attach notes to unvalidated device data  High Peep/I Pressure - Cannot attach notes to unvalidated device data  Peak Inspiratory Pressure - Cannot attach notes to unvalidated device data  Mean Airway Pressure - Cannot attach notes to unvalidated device data  Rate Measured - Cannot attach notes to unvalidated device data  Vt Exhaled - Cannot attach notes to unvalidated device data  Minute Volume - Cannot attach notes to unvalidated device data  I:E Ratio - Cannot attach notes to unvalidated device data  Pulse - Cannot attach notes to unvalidated device data  High Pressure Alarm - Cannot attach notes to unvalidated device data  Low Minute Volume Alarm - Cannot attach notes to unvalidated device data  High Respiratory Rate - Cannot attach notes to unvalidated device data       08/10/21 2315   Vent Information   Vent Type 980   Vent Mode AC/VC   FiO2  40 %   SpO2 98 %   SpO2/FiO2 ratio 245   Humidification Source Heated wire   Humidification Temp 37   Humidification Temp Measured 36.4   Circuit Condensation Drained   Vent Patient Data   Plateau Pressure 19 [id number]   Cough/Sputum   Sputum How Obtained Endotracheal;Suctioned   Cough Productive   Sputum Amount Small   Sputum Color Creamy   Tenacity Thick   Breath Sounds   Right Upper Lobe Expiratory Wheezes   Right Middle Lobe Expiratory Wheezes   Right Lower Lobe Diminished   Left Upper Lobe Expiratory Wheezes   Left Lower Lobe Diminished   Additional Respiratory  Assessments   Resp 24   Position Semi-Donaldson's Alarm Settings   Apnea (secs) 20 secs   Low Exhaled Vt  240 mL   ETT (adult)   Placement Date/Time: 08/05/21 4096   Timeout: Patient;Procedure; Appropriate Equipment; Consent Confirmed  Tube Size: 8 mm  Laryngoscope: GlideScope  Location: Oral  Secured at: 22 cm   Secured at 25 cm   Measured From [address] [facility] [address]  (moved to center)   Secured By Commercial tube kearns   Site Condition Dry

## 2021-08-11 NOTE — PROGRESS NOTES
08/11/21 1058   Vent Information   Vent Type 980   Vent Mode AC/VC   Vt Ordered 330 mL   Rate Set 24 bmp   Peak Flow 55 L/min   Pressure Support 0 cmH20   FiO2  40 %   SpO2 99 %   SpO2/FiO2 ratio 247.5   Sensitivity 2   PEEP/CPAP 5   I Time/ I Time % 0 s   Humidification Source Heated wire   Humidification Temp 37   Circuit Condensation Drained   Vent Patient Data   High Peep/I Pressure 0   Peak Inspiratory Pressure 29 cmH2O   Mean Airway Pressure 11 cmH20   Rate Measured 24 br/min   Vt Exhaled 348 mL   Minute Volume 8.36 Liters   I:E Ratio 1:2.80   Cough/Sputum   Cough None   Spontaneous Breathing Trial (SBT) RT Doc   Pulse 71   Breath Sounds   Right Upper Lobe Inspiratory Wheezes; Expiratory Wheezes   Right Middle Lobe Inspiratory Wheezes; Expiratory Wheezes   Right Lower Lobe Inspiratory Wheezes; Expiratory Wheezes   Left Upper Lobe Inspiratory Wheezes; Expiratory Wheezes   Left Lower Lobe Inspiratory Wheezes; Expiratory Wheezes   Additional Respiratory  Assessments   Resp 24   Position Semi-Donaldson's   Subglottic Suction Done? Yes   Alarm Settings   High Pressure Alarm 50 cmH2O   Low Minute Volume Alarm 3 L/min   Apnea (secs) 20 secs   High Respiratory Rate 40 br/min   Low Exhaled Vt  240 mL   Patient Observation   Observations 8ett 25 at lip   ETT (adult)   Placement Date/Time: 08/05/21 7334   Timeout: Patient;Procedure; Appropriate Equipment; Consent Confirmed  Tube Size: 8 mm  Laryngoscope: GlideScope  Location: Oral  Secured at: 22 cm   Secured at 25 cm   Measured From Lips   Secured By Commercial tube kearns

## 2021-08-11 NOTE — PROGRESS NOTES
08/11/21 1500   Vent Information   Vt Ordered 330 mL   Rate Set 24 bmp   Peak Flow 55 L/min   Pressure Support 0 cmH20   FiO2  40 %   SpO2 100 %   SpO2/FiO2 ratio 250   Sensitivity 2   PEEP/CPAP 5   I Time/ I Time % 0 s   Humidification Source Heated wire   Humidification Temp Measured 37   Vent Patient Data   High Peep/I Pressure 0   Peak Inspiratory Pressure 29 cmH2O   Mean Airway Pressure 11 cmH20   Rate Measured 24 br/min   Vt Exhaled 347 mL   Minute Volume 8.33 Liters   I:E Ratio 1:2.80   Cough/Sputum   Sputum How Obtained Endotracheal;Suctioned   Cough Productive   Sputum Amount Moderate   Sputum Color Creamy   Tenacity Thick   Spontaneous Breathing Trial (SBT) RT Doc   Pulse 69   Breath Sounds   Right Upper Lobe Inspiratory Wheezes; Expiratory Wheezes   Right Middle Lobe Inspiratory Wheezes; Expiratory Wheezes   Right Lower Lobe Inspiratory Wheezes; Expiratory Wheezes   Left Upper Lobe Inspiratory Wheezes; Expiratory Wheezes   Left Lower Lobe Inspiratory Wheezes; Expiratory Wheezes   Additional Respiratory  Assessments   Resp 24   Position Semi-Donaldson's   Oral Care Completed? Yes   Oral Care Mouth suctioned   Subglottic Suction Done? Yes   Alarm Settings   High Pressure Alarm 50 cmH2O   Low Minute Volume Alarm 3 L/min   High Respiratory Rate 40 br/min   ETT (adult)   Placement Date/Time: 08/05/21 2279   Timeout: Patient;Procedure; Appropriate Equipment; Consent Confirmed  Tube Size: 8 mm  Laryngoscope: GlideScope  Location: Oral  Secured at: 22 cm   Secured at 25 cm   Measured From Lips   ET Placement Left   Secured By Commercial tube kearns   Site Condition Dry

## 2021-08-11 NOTE — PROGRESS NOTES
08/11/21 0319   Vent Information   Vent Type 980   Vent Mode AC/VC   Vt Ordered 330 mL   Rate Set 24 bmp   Peak Flow 55 L/min   Pressure Support 0 cmH20   FiO2  40 %   SpO2 98 %   SpO2/FiO2 ratio 245   Sensitivity 2   PEEP/CPAP 5   I Time/ I Time % 0 s   Humidification Source Heated wire   Humidification Temp 37   Humidification Temp Measured 36.4   Circuit Condensation Drained   Vent Patient Data   High Peep/I Pressure 0   Peak Inspiratory Pressure 28 cmH2O   Mean Airway Pressure 11 cmH20   Rate Measured 24 br/min   Vt Exhaled 345 mL   Minute Volume 8.29 Liters   I:E Ratio 1:2.80   Plateau Pressure 20 [id number]   Cough/Sputum   Sputum How Obtained Endotracheal;Suctioned   Cough Productive   Sputum Amount Small   Sputum Color Creamy   Tenacity Thick   Spontaneous Breathing Trial (SBT) RT Doc   Pulse 64   Breath Sounds   Right Upper Lobe Expiratory Wheezes   Right Middle Lobe Expiratory Wheezes   Right Lower Lobe Diminished   Left Upper Lobe Expiratory Wheezes   Left Lower Lobe Diminished   Additional Respiratory  Assessments   Resp 24   Position Semi-Donaldson's   Alarm Settings   High Pressure Alarm 50 cmH2O   Low Minute Volume Alarm 3 L/min   High Respiratory Rate 40 br/min   ETT (adult)   Placement Date/Time: 08/05/21 5295   Timeout: Patient;Procedure; Appropriate Equipment; Consent Confirmed  Tube Size: 8 mm  Laryngoscope: GlideScope  Location: Oral  Secured at: 22 cm   Secured at 25 cm   Measured From [address] [place] [address],[address] By Commercial tube kearns   Site Condition Dry

## 2021-08-11 NOTE — PROGRESS NOTES
IM Progress Note    Admit Date:  8/4/2021  6    Interval history:  Copd exacerbation on vent     Subjective:  Ms. Fanny Schlatter sedated on the ventilator. She is on Precedex, propofol, fentanyl. She failed spontaneous breathing trial after an hour and a half. She has a low-grade fever 100.7. Patient got agitated during spontaneous breathing trial with hypoxemia. T-max 100.2.    8/11-she failed spontaneous breathing trial today. She was on the trial for 2 hours but blood gases did not look good and she was placed back on mechanical ventilation. Objective:   /76   Pulse 70   Temp 100 °F (37.8 °C) (Bladder)   Resp 24   Ht 5' 4\" (1.626 m)   Wt 219 lb 9.3 oz (99.6 kg)   SpO2 100%   BMI 37.69 kg/m²       Intake/Output Summary (Last 24 hours) at 8/11/2021 1458  Last data filed at 8/11/2021 1337  Gross per 24 hour   Intake 6034.97 ml   Output 2460 ml   Net 3574.97 ml       Physical Exam:    General: middle aged female on vent support. Sedated  Oral ETT and OG noted  Ill-appearing  Mucous Membranes:  Pink , anicteric  Neck: No JVD, no carotid bruit, no thyromegaly  Chest:  marshall air entry with end expiratory wheeze noted  Cardiovascular:  RRR S1S2 heard, no murmurs or gallops  Abdomen:  Soft, undistended, non tender, no organomegaly, BS present  Extremities: No edema or cyanosis. Distal pulses well felt  Neurological : Sedated.       Medications:   Scheduled Medications:    insulin glargine  30 Units Subcutaneous BID    diazePAM  5 mg Oral TID    lidocaine 1 % injection  5 mL Intradermal Once    sodium chloride flush  5-40 mL Intravenous 2 times per day    cefTRIAXone (ROCEPHIN) IV  2,000 mg Intravenous Q24H    cefepime  2,000 mg Intravenous Once    nicotine  1 patch Transdermal Daily    ipratropium-albuterol  3 mL Inhalation Q4H WA    enoxaparin  40 mg Subcutaneous Daily    predniSONE  40 mg Oral Daily with breakfast    tobramycin-dexamethasone  1 drop Both Eyes 6 times per day    chlorhexidine cardiomegaly with vascular congestion and   persistent strandy airspace disease in the right lung base presumably   atelectasis unless clinical findings suggest pneumonia         XR CHEST PORTABLE   Final Result   Cardiomegaly with pulmonary vascular congestion. No definite pulmonary   edema. Atelectasis in the lung bases         IR PICC WO SQ PORT/PUMP > 5 YEARS   Final Result      XR CHEST PORTABLE   Final Result   Mild cardiomegaly with borderline vascular congestion, accentuated by low   lung volume. XR CHEST PORTABLE   Final Result   1. Endotracheal tube tip is 6-7 cm above the antoine. 2. The enteric tube courses into the stomach with the tip not included on   this examination of the chest.         CT CHEST PULMONARY EMBOLISM W CONTRAST   Final Result   No evidence of pulmonary embolism or acute pulmonary abnormality. XR CHEST PORTABLE   Final Result   1. Vascular cephalization, consistent with mild central venous congestion         XR CHEST PORTABLE    (Results Pending)      Cultures:   Urine mixed hadley  Blood - NGTD  covid neg    ASSESSMENT/PLAN:    Acute respiratory failure with hypoxia and hypercapnia, likely related to aeCOPD exacerbation   - failed bipap and now on vent support  - continue steroids, IBD  - imaging with no clear infection   - on rocephin day#5 and azithromycin day#5/5  -Failed spontaneous breathing trial although tolerated 2 hours. Reattempt in the morning    UTi- on ceftriaxone  cx neg   No sepsis     Elevated LFT's, improving     Acute encephalopathy. likely related to hypercarbia vs UTI. Supportive measures. Hx of drug abuse with related admissions in the past - snorts fentanyl  Daughter reports she quit drugs since last time     Lactic acidosis, 2.2, 1.0, resolved. Tobacco Abuse-need cessation .      DM2, hold oral Rx, chk A1c, on lantus and Low SSI q4h.       DVT Prophylaxis: Lx  Diet:   TF  Code Status: Full Code    Justin Liu MD, 8/11/2021

## 2021-08-12 ENCOUNTER — APPOINTMENT (OUTPATIENT)
Dept: GENERAL RADIOLOGY | Age: 56
DRG: 207 | End: 2021-08-12
Payer: MEDICARE

## 2021-08-12 LAB
ALBUMIN SERPL-MCNC: 3 G/DL (ref 3.4–5)
ALP BLD-CCNC: 133 U/L (ref 40–129)
ALT SERPL-CCNC: 32 U/L (ref 10–40)
ANION GAP SERPL CALCULATED.3IONS-SCNC: 9 MMOL/L (ref 3–16)
AST SERPL-CCNC: 40 U/L (ref 15–37)
BASE EXCESS ARTERIAL: -6.9 MMOL/L (ref -3–3)
BASE EXCESS VENOUS: 1.3 MMOL/L (ref -3–3)
BASOPHILS ABSOLUTE: 0 K/UL (ref 0–0.2)
BASOPHILS RELATIVE PERCENT: 0.6 %
BILIRUB SERPL-MCNC: 0.7 MG/DL (ref 0–1)
BILIRUBIN DIRECT: 0.5 MG/DL (ref 0–0.3)
BILIRUBIN, INDIRECT: 0.2 MG/DL (ref 0–1)
BUN BLDV-MCNC: 16 MG/DL (ref 7–20)
CALCIUM SERPL-MCNC: 8.8 MG/DL (ref 8.3–10.6)
CARBOXYHEMOGLOBIN ARTERIAL: 0.5 % (ref 0–1.5)
CARBOXYHEMOGLOBIN: 0.9 % (ref 0–1.5)
CHLORIDE BLD-SCNC: 101 MMOL/L (ref 99–110)
CO2: 27 MMOL/L (ref 21–32)
CREAT SERPL-MCNC: <0.5 MG/DL (ref 0.6–1.1)
EOSINOPHILS ABSOLUTE: 0.1 K/UL (ref 0–0.6)
EOSINOPHILS RELATIVE PERCENT: 1.3 %
GFR AFRICAN AMERICAN: >60
GFR NON-AFRICAN AMERICAN: >60
GLUCOSE BLD-MCNC: 163 MG/DL (ref 70–99)
GLUCOSE BLD-MCNC: 204 MG/DL (ref 70–99)
GLUCOSE BLD-MCNC: 206 MG/DL (ref 70–99)
GLUCOSE BLD-MCNC: 217 MG/DL (ref 70–99)
GLUCOSE BLD-MCNC: 253 MG/DL (ref 70–99)
GLUCOSE BLD-MCNC: 361 MG/DL (ref 70–99)
HCO3 ARTERIAL: 15.3 MMOL/L (ref 21–29)
HCO3 VENOUS: 29.2 MMOL/L (ref 23–29)
HCT VFR BLD CALC: 42.7 % (ref 36–48)
HEMOGLOBIN, ART, EXTENDED: 13.8 G/DL (ref 12–16)
HEMOGLOBIN: 13.8 G/DL (ref 12–16)
LYMPHOCYTES ABSOLUTE: 0.8 K/UL (ref 1–5.1)
LYMPHOCYTES RELATIVE PERCENT: 19.5 %
MCH RBC QN AUTO: 29 PG (ref 26–34)
MCHC RBC AUTO-ENTMCNC: 32.3 G/DL (ref 31–36)
MCV RBC AUTO: 89.7 FL (ref 80–100)
METHEMOGLOBIN ARTERIAL: 0.3 %
METHEMOGLOBIN VENOUS: 0.3 %
MONOCYTES ABSOLUTE: 0.4 K/UL (ref 0–1.3)
MONOCYTES RELATIVE PERCENT: 10.4 %
NEUTROPHILS ABSOLUTE: 2.7 K/UL (ref 1.7–7.7)
NEUTROPHILS RELATIVE PERCENT: 68.2 %
O2 CONTENT, VEN: 17 VOL %
O2 SAT, ARTERIAL: 97 %
O2 SAT, VEN: 87 %
O2 THERAPY: ABNORMAL
O2 THERAPY: ABNORMAL
PCO2 ARTERIAL: 23.4 MMHG (ref 35–45)
PCO2, VEN: 60 MMHG (ref 40–50)
PDW BLD-RTO: 15.5 % (ref 12.4–15.4)
PERFORMED ON: ABNORMAL
PH ARTERIAL: 7.43 (ref 7.35–7.45)
PH VENOUS: 7.3 (ref 7.35–7.45)
PLATELET # BLD: 123 K/UL (ref 135–450)
PMV BLD AUTO: 8 FL (ref 5–10.5)
PO2 ARTERIAL: 86 MMHG (ref 75–108)
PO2, VEN: 57.6 MMHG (ref 25–40)
POTASSIUM SERPL-SCNC: 4.1 MMOL/L (ref 3.5–5.1)
RBC # BLD: 4.76 M/UL (ref 4–5.2)
SODIUM BLD-SCNC: 137 MMOL/L (ref 136–145)
TCO2 ARTERIAL: 16 MMOL/L
TCO2 CALC VENOUS: 31 MMOL/L
TOTAL PROTEIN: 6.1 G/DL (ref 6.4–8.2)
WBC # BLD: 4 K/UL (ref 4–11)

## 2021-08-12 PROCEDURE — 85025 COMPLETE CBC W/AUTO DIFF WBC: CPT

## 2021-08-12 PROCEDURE — 92526 ORAL FUNCTION THERAPY: CPT

## 2021-08-12 PROCEDURE — 92610 EVALUATE SWALLOWING FUNCTION: CPT

## 2021-08-12 PROCEDURE — 2500000003 HC RX 250 WO HCPCS: Performed by: INTERNAL MEDICINE

## 2021-08-12 PROCEDURE — 71045 X-RAY EXAM CHEST 1 VIEW: CPT

## 2021-08-12 PROCEDURE — 82803 BLOOD GASES ANY COMBINATION: CPT

## 2021-08-12 PROCEDURE — 80076 HEPATIC FUNCTION PANEL: CPT

## 2021-08-12 PROCEDURE — 94660 CPAP INITIATION&MGMT: CPT

## 2021-08-12 PROCEDURE — 80048 BASIC METABOLIC PNL TOTAL CA: CPT

## 2021-08-12 PROCEDURE — 6360000002 HC RX W HCPCS: Performed by: INTERNAL MEDICINE

## 2021-08-12 PROCEDURE — 6370000000 HC RX 637 (ALT 250 FOR IP): Performed by: INTERNAL MEDICINE

## 2021-08-12 PROCEDURE — 2700000000 HC OXYGEN THERAPY PER DAY

## 2021-08-12 PROCEDURE — 99291 CRITICAL CARE FIRST HOUR: CPT | Performed by: INTERNAL MEDICINE

## 2021-08-12 PROCEDURE — 2000000000 HC ICU R&B

## 2021-08-12 PROCEDURE — 94761 N-INVAS EAR/PLS OXIMETRY MLT: CPT

## 2021-08-12 PROCEDURE — 2580000003 HC RX 258: Performed by: INTERNAL MEDICINE

## 2021-08-12 PROCEDURE — 94640 AIRWAY INHALATION TREATMENT: CPT

## 2021-08-12 PROCEDURE — 99233 SBSQ HOSP IP/OBS HIGH 50: CPT | Performed by: INTERNAL MEDICINE

## 2021-08-12 RX ORDER — MECOBALAMIN 5000 MCG
10 TABLET,DISINTEGRATING ORAL NIGHTLY
Status: DISCONTINUED | OUTPATIENT
Start: 2021-08-12 | End: 2021-08-19 | Stop reason: HOSPADM

## 2021-08-12 RX ADMIN — FENTANYL CITRATE 25 MCG: 0.05 INJECTION, SOLUTION INTRAMUSCULAR; INTRAVENOUS at 04:43

## 2021-08-12 RX ADMIN — SODIUM CHLORIDE, PRESERVATIVE FREE 10 ML: 5 INJECTION INTRAVENOUS at 07:43

## 2021-08-12 RX ADMIN — MIDAZOLAM HYDROCHLORIDE 2 MG: 1 INJECTION, SOLUTION INTRAMUSCULAR; INTRAVENOUS at 04:00

## 2021-08-12 RX ADMIN — Medication 2 MCG/KG/HR: at 05:06

## 2021-08-12 RX ADMIN — TOBRAMYCIN AND DEXAMETHASONE 1 DROP: 3; 1 SUSPENSION/ DROPS OPHTHALMIC at 07:43

## 2021-08-12 RX ADMIN — SODIUM CHLORIDE, PRESERVATIVE FREE 10 ML: 5 INJECTION INTRAVENOUS at 20:13

## 2021-08-12 RX ADMIN — Medication 2 MCG/KG/HR: at 09:24

## 2021-08-12 RX ADMIN — TOBRAMYCIN AND DEXAMETHASONE 1 DROP: 3; 1 SUSPENSION/ DROPS OPHTHALMIC at 16:21

## 2021-08-12 RX ADMIN — Medication 2 MCG/KG/HR: at 20:10

## 2021-08-12 RX ADMIN — Medication 2 MCG/KG/HR: at 11:23

## 2021-08-12 RX ADMIN — INSULIN GLARGINE 30 UNITS: 100 INJECTION, SOLUTION SUBCUTANEOUS at 20:32

## 2021-08-12 RX ADMIN — IPRATROPIUM BROMIDE AND ALBUTEROL SULFATE 3 ML: .5; 3 SOLUTION RESPIRATORY (INHALATION) at 19:59

## 2021-08-12 RX ADMIN — PROPOFOL 35 MCG/KG/MIN: 10 INJECTION, EMULSION INTRAVENOUS at 06:21

## 2021-08-12 RX ADMIN — IPRATROPIUM BROMIDE AND ALBUTEROL SULFATE 3 ML: .5; 3 SOLUTION RESPIRATORY (INHALATION) at 07:45

## 2021-08-12 RX ADMIN — Medication 125 MCG/HR: at 07:20

## 2021-08-12 RX ADMIN — IPRATROPIUM BROMIDE AND ALBUTEROL SULFATE 3 ML: .5; 3 SOLUTION RESPIRATORY (INHALATION) at 02:55

## 2021-08-12 RX ADMIN — CHLORHEXIDINE GLUCONATE 0.12% ORAL RINSE 15 ML: 1.2 LIQUID ORAL at 07:37

## 2021-08-12 RX ADMIN — TOBRAMYCIN AND DEXAMETHASONE 1 DROP: 3; 1 SUSPENSION/ DROPS OPHTHALMIC at 20:13

## 2021-08-12 RX ADMIN — Medication 2 MCG/KG/HR: at 15:50

## 2021-08-12 RX ADMIN — DIAZEPAM 5 MG: 5 TABLET ORAL at 14:00

## 2021-08-12 RX ADMIN — IPRATROPIUM BROMIDE AND ALBUTEROL SULFATE 3 ML: .5; 3 SOLUTION RESPIRATORY (INHALATION) at 11:02

## 2021-08-12 RX ADMIN — FAMOTIDINE 20 MG: 10 INJECTION, SOLUTION INTRAVENOUS at 07:38

## 2021-08-12 RX ADMIN — Medication 1.9 MCG/KG/HR: at 13:35

## 2021-08-12 RX ADMIN — Medication 2 MCG/KG/HR: at 17:54

## 2021-08-12 RX ADMIN — Medication 2 MCG/KG/HR: at 07:02

## 2021-08-12 RX ADMIN — DIAZEPAM 5 MG: 5 TABLET ORAL at 20:13

## 2021-08-12 RX ADMIN — TOBRAMYCIN AND DEXAMETHASONE 1 DROP: 3; 1 SUSPENSION/ DROPS OPHTHALMIC at 11:21

## 2021-08-12 RX ADMIN — Medication 2 MCG/KG/HR: at 22:20

## 2021-08-12 RX ADMIN — IPRATROPIUM BROMIDE AND ALBUTEROL SULFATE 3 ML: .5; 3 SOLUTION RESPIRATORY (INHALATION) at 15:48

## 2021-08-12 RX ADMIN — INSULIN GLARGINE 30 UNITS: 100 INJECTION, SOLUTION SUBCUTANEOUS at 09:25

## 2021-08-12 RX ADMIN — CEFTRIAXONE 2000 MG: 2 INJECTION, POWDER, FOR SOLUTION INTRAMUSCULAR; INTRAVENOUS at 17:53

## 2021-08-12 RX ADMIN — IPRATROPIUM BROMIDE AND ALBUTEROL SULFATE 3 ML: .5; 3 SOLUTION RESPIRATORY (INHALATION) at 23:14

## 2021-08-12 RX ADMIN — DIAZEPAM 5 MG: 5 TABLET ORAL at 07:38

## 2021-08-12 RX ADMIN — Medication 10 MG: at 21:05

## 2021-08-12 RX ADMIN — ENOXAPARIN SODIUM 40 MG: 40 INJECTION SUBCUTANEOUS at 07:38

## 2021-08-12 RX ADMIN — Medication 2 MCG/KG/HR: at 02:04

## 2021-08-12 RX ADMIN — PROPOFOL 35 MCG/KG/MIN: 10 INJECTION, EMULSION INTRAVENOUS at 03:33

## 2021-08-12 RX ADMIN — PREDNISONE 40 MG: 20 TABLET ORAL at 07:38

## 2021-08-12 RX ADMIN — TOBRAMYCIN AND DEXAMETHASONE 1 DROP: 3; 1 SUSPENSION/ DROPS OPHTHALMIC at 04:26

## 2021-08-12 ASSESSMENT — PULMONARY FUNCTION TESTS
PIF_VALUE: 32
PIF_VALUE: 36
PIF_VALUE: 33
PIF_VALUE: 11
PIF_VALUE: 11
PIF_VALUE: 32
PIF_VALUE: 28
PIF_VALUE: 32
PIF_VALUE: 24
PIF_VALUE: 30
PIF_VALUE: 28
PIF_VALUE: 13
PIF_VALUE: 32
PIF_VALUE: 11
PIF_VALUE: 30

## 2021-08-12 ASSESSMENT — PAIN SCALES - GENERAL
PAINLEVEL_OUTOF10: 0

## 2021-08-12 NOTE — PROGRESS NOTES
IM Progress Note    Admit Date:  8/4/2021  7    Interval history:  Copd exacerbation on vent     Subjective:  Ms. Darci Mak sedated on the ventilator. She is on Precedex, propofol, fentanyl. She failed spontaneous breathing trial after an hour and a half. She has a low-grade fever 100.7. Patient got agitated during spontaneous breathing trial with hypoxemia. T-max 100.2.    8/11-she failed spontaneous breathing trial today. She was on the trial for 2 hours but blood gases did not look good and she was placed back on mechanical ventilation. 8/12- extubated today and on Bipap. Doing well. A little anxious. No chest pain. T-max 99.1    Objective:   /65   Pulse 74   Temp 99.4 °F (37.4 °C) (Bladder)   Resp 17   Ht 5' 4\" (1.626 m)   Wt 219 lb 9.3 oz (99.6 kg)   SpO2 99%   BMI 37.69 kg/m²       Intake/Output Summary (Last 24 hours) at 8/12/2021 1459  Last data filed at 8/12/2021 1149  Gross per 24 hour   Intake 3312.13 ml   Output 2450 ml   Net 862.13 ml       Physical Exam:    General: Patient is extubated. Awake and alert. On BiPAP. Mucous Membranes:  Pink , anicteric  Neck: No JVD, no carotid bruit, no thyromegaly  Chest: Diminished breath sounds at bases. Mild wheezing. Cardiovascular:  RRR S1S2 heard, no murmurs or gallops  Abdomen:  Soft, undistended, non tender, no organomegaly, BS present  Extremities: No edema or cyanosis. Distal pulses well felt  Neurological : Awake and alert. Mildly anxious. Moves all 4 extremities.       Medications:   Scheduled Medications:    [START ON 8/13/2021] predniSONE  30 mg Oral Daily with breakfast    insulin lispro  0-18 Units Subcutaneous 4x Daily AC & HS    insulin glargine  30 Units Subcutaneous BID    ipratropium-albuterol  3 mL Inhalation Q4H    diazePAM  5 mg Oral TID    lidocaine 1 % injection  5 mL Intradermal Once    sodium chloride flush  5-40 mL Intravenous 2 times per day    cefTRIAXone (ROCEPHIN) IV  2,000 mg Intravenous Q24H    cefepime  2,000 mg Intravenous Once    nicotine  1 patch Transdermal Daily    enoxaparin  40 mg Subcutaneous Daily    tobramycin-dexamethasone  1 drop Both Eyes 6 times per day    chlorhexidine  15 mL Mouth/Throat BID    famotidine (PEPCID) injection  20 mg Intravenous BID     I   sodium chloride      dexmedetomidine HCl in NaCl 1.9 mcg/kg/hr (08/12/21 1335)    dextrose      propofol Stopped (08/12/21 0750)    fentaNYL Stopped (08/12/21 1116)     sodium chloride flush, sodium chloride, glucose, dextrose, glucagon (rDNA), dextrose, polyethylene glycol, acetaminophen **OR** acetaminophen, ondansetron, fentanNYL, midazolam, albuterol    Lab Data:  Recent Labs     08/10/21  0510 08/11/21  0528 08/12/21  0403   WBC 5.4 4.5 4.0   HGB 14.2 14.1 13.8   HCT 42.8 43.1 42.7   MCV 88.1 88.9 89.7   * 122* 123*     Recent Labs     08/10/21  0510 08/11/21  0528 08/12/21  0403   * 133* 137   K 3.5 3.9 4.1   CL 91* 97* 101   CO2 30 26 27   BUN 16 14 16   CREATININE <0.5* <0.5* <0.5*     No results for input(s): CKTOTAL, CKMB, CKMBINDEX, TROPONINI in the last 72 hours. Coagulation:   Lab Results   Component Value Date    INR 1.28 08/06/2021     Cardiac markers:   Lab Results   Component Value Date    TROPONINI <0.01 08/04/2021         Lab Results   Component Value Date    ALT 32 08/12/2021    AST 40 (H) 08/12/2021    ALKPHOS 133 (H) 08/12/2021    BILITOT 0.7 08/12/2021       Lab Results   Component Value Date    INR 1.28 (H) 08/06/2021    PROTIME 14.6 (H) 08/06/2021       Radiology    XR CHEST PORTABLE   Final Result   Unchanged small right pleural effusion. Lines and tubes are in good position as described above. XR CHEST PORTABLE   Final Result   Interval development of small right-sided pleural effusion. XR CHEST PORTABLE   Final Result   1. Stable appropriate positions of support apparatus.    2. No significant change in appearance of diffuse hazy opacity throughout   both lungs, likely a combination of asymmetric edema and multifocal pneumonia. XR CHEST PORTABLE   Final Result   Increased pulmonary vascular congestion since yesterday. Decreased right   infrahilar atelectasis or pneumonia. XR CHEST PORTABLE   Final Result   Stable exam demonstrating cardiomegaly with vascular congestion and   persistent strandy airspace disease in the right lung base presumably   atelectasis unless clinical findings suggest pneumonia         XR CHEST PORTABLE   Final Result   Cardiomegaly with pulmonary vascular congestion. No definite pulmonary   edema. Atelectasis in the lung bases         IR PICC WO SQ PORT/PUMP > 5 YEARS   Final Result      XR CHEST PORTABLE   Final Result   Mild cardiomegaly with borderline vascular congestion, accentuated by low   lung volume. XR CHEST PORTABLE   Final Result   1. Endotracheal tube tip is 6-7 cm above the antoine. 2. The enteric tube courses into the stomach with the tip not included on   this examination of the chest.         CT CHEST PULMONARY EMBOLISM W CONTRAST   Final Result   No evidence of pulmonary embolism or acute pulmonary abnormality. XR CHEST PORTABLE   Final Result   1. Vascular cephalization, consistent with mild central venous congestion            Cultures:   Urine mixed hadley  Blood - NGTD  covid neg    ASSESSMENT/PLAN:    Acute respiratory failure with hypoxia and hypercapnia, likely related to aeCOPD exacerbation   - failed bipap and now on vent support. She was extubated on 8/12/2021  - continue steroids, IBD  - imaging with no clear infection   - finished rocephin day#7/7 and azithromycin day#5/5      UTi-finished Rocephin  cx neg   No sepsis     Elevated LFT's, improving     Acute encephalopathy. Resolved. Related to hypercarbia and UTI    Hx of drug abuse with related admissions in the past - snorts fentanyl  Daughter reports she quit drugs since last time     Lactic acidosis, 2.2, 1.0, resolved.

## 2021-08-12 NOTE — PROGRESS NOTES
Pulmonary and Critical Care Progress Note    CC: Acute on Chronic Respiratory failure    Subjective:   Precedex 2 mcg/kg/hr   Propofol 35 mcg/kg/min   Fentanyl 125 mcg/hr   Patient tolerating SBT well. Tm 99.1    IV line: PICC 8/6     MV: 8/5      Intake/Output Summary (Last 24 hours) at 8/12/2021 0731  Last data filed at 8/12/2021 8373  Gross per 24 hour   Intake 3618.87 ml   Output 2100 ml   Net 1518.87 ml       Exam:   /75   Pulse 63   Temp 98.8 °F (37.1 °C) (Bladder)   Resp 24   Ht 5' 4\" (1.626 m)   Wt 219 lb 9.3 oz (99.6 kg)   SpO2 99%   BMI 37.69 kg/m²  on vent  General: ill appearing. Intubated sedated. Eyes: PERRL. No sclera icterus. No conjunctival injection. ENT: No discharge. Pharynx clear. ET tube in place  Neck: Trachea midline. Resp: No accessory muscle use. No crackles. No wheezing. No rhonchi. No dullness on percussion. CV: Regular rate. Regular rhythm. No mumur or rub. No edema. GI: Non-tender. + distended. No masses. Skin: Warm and dry. No nodule on exposed extremities. No rash on exposed extremities. M/S: No cyanosis. No joint deformity. No clubbing. Neuro: awake and alert,  follows commands. Moves extremities.     Scheduled Meds:   insulin glargine  30 Units Subcutaneous BID    ipratropium-albuterol  3 mL Inhalation Q4H    diazePAM  5 mg Oral TID    lidocaine 1 % injection  5 mL Intradermal Once    sodium chloride flush  5-40 mL Intravenous 2 times per day    cefTRIAXone (ROCEPHIN) IV  2,000 mg Intravenous Q24H    cefepime  2,000 mg Intravenous Once    nicotine  1 patch Transdermal Daily    enoxaparin  40 mg Subcutaneous Daily    predniSONE  40 mg Oral Daily with breakfast    tobramycin-dexamethasone  1 drop Both Eyes 6 times per day    chlorhexidine  15 mL Mouth/Throat BID    famotidine (PEPCID) injection  20 mg Intravenous BID    insulin lispro  0-18 Units Subcutaneous Q4H     Continuous Infusions:   sodium chloride      dexmedetomidine HCl in NaCl 2 mcg/kg/hr (08/12/21 1302)    dextrose      propofol 35 mcg/kg/min (08/12/21 5643)    fentaNYL 125 mcg/hr (08/12/21 0720)     PRN Meds:  sodium chloride flush, sodium chloride, glucose, dextrose, glucagon (rDNA), dextrose, polyethylene glycol, acetaminophen **OR** acetaminophen, ondansetron, fentanNYL, midazolam, albuterol    Labs:  CBC:   Recent Labs     08/10/21  0510 08/11/21  0528 08/12/21  0403   WBC 5.4 4.5 4.0   HGB 14.2 14.1 13.8   HCT 42.8 43.1 42.7   MCV 88.1 88.9 89.7   * 122* 123*     BMP:   Recent Labs     08/10/21  0510 08/11/21  0528 08/12/21  0403   * 133* 137   K 3.5 3.9 4.1   CL 91* 97* 101   CO2 30 26 27   BUN 16 14 16   CREATININE <0.5* <0.5* <0.5*     LIVER PROFILE:   Recent Labs     08/12/21  0405   AST 40*   ALT 32   BILIDIR 0.5*   BILITOT 0.7   ALKPHOS 133*     PT/INR:   No results for input(s): PROTIME, INR in the last 72 hours. APTT: No results for input(s): APTT in the last 72 hours. UA:  No results for input(s): NITRITE, COLORU, PHUR, LABCAST, WBCUA, RBCUA, MUCUS, TRICHOMONAS, YEAST, BACTERIA, CLARITYU, SPECGRAV, LEUKOCYTESUR, UROBILINOGEN, BILIRUBINUR, BLOODU, GLUCOSEU, AMORPHOUS in the last 72 hours.     Invalid input(s): Thor Hall  Recent Labs     08/11/21 0528 08/12/21  0417   PHART 7.434 7.433   FIA8YLA 45.1* 23.4*   PO2ART 90.9 86.0       Cultures:   8/4 COVID-19 and influenza not detected  8/5 UC NGTD  8/5 BC NGTD    Films:  Chest x-ray 8/12 imaging was reviewed by me and showed   ET tube terminates above the antoine.  Right PICC terminates in the superior   right atrium.  Enteric tube courses through the chest below the diaphragm;   tip is not visualized.  Lungs are unchanged in appearance compared to prior   study.  No pneumothorax.  Small right pleural effusion.      CTPA 8/5/21  images reviewed by me and showed:   No evidence of pulmonary embolism or acute pulmonary abnormality     ASSESSMENT:  · Acute hypoxemic hypercapnic respiratory failure  · COPD with acute exacerbation  · Abnormal CXR with RLL ASD- atelectasis vs PNA   · Acute encephalopathy/metabolic encephalopathy  · Hyperglycemia   · UTI  · Tobacco abuse        PLAN:  · Mechanical ventilation as per my orders. The ventilator was adjusted by me at the bedside for unstable, life threatening respiratory failure. Supplemental oxygen to maintain SaO2 >92%; wean as tolerated  · IV Propofol for sedation, target RASS -2, with daily spontaneous awakening trial   · Precedex and wean off propofol if able  · valium 5 mg  tid  · Fentanyl and Versed PRN, gtt as needed  · Head of bed 30 degrees or higher at all times  · Daily spontaneous breathing trial once PEEP less than 8, FiO2 less than 55%  · Follow TG -recheck 136 8/11  · Inhaled bronchodilators  · pred taper- reduce to 30 mg daily  · Completed Ceftriaxone D7 and completed Zithromax D#5  · Nutrition: Tube feeding was at goal  · Lantus 30 units BID and continue ISS - goal 150-180  · GI prophylaxis: Pepcid  · DVT prophylaxis: Lovenox  · MRSA prophylaxis: Bactroban   · Code status: Full code   · Talked with daughter at bedside.          Patient is critically ill with acute respiratory failure. Critical care time spent reviewing labs/films, examining patient, collaborating with other physicians but excluding procedures for life threatening organ failure is 31 minutes.

## 2021-08-12 NOTE — PROGRESS NOTES
IPAP decraesed from 20cwp to 16cwp for [id number]         08/12/21 1554   NIV Type   Mode Bilevel   Mask Type Full face mask   Mask Size Medium   Settings/Measurements   IPAP 18 cmH20   CPAP/EPAP 8 cmH2O   Rate Ordered 14   Resp 17   Insp Rise Time (%) 2 %   FiO2  45 %   I Time/ I Time % 0.9 s   Vt Exhaled 661 mL   Minute Volume 11.4 Liters   Mask Leak (lpm) 0 lpm   Comfort Level Good   Using Accessory Muscles No   SpO2 100   Breath Sounds   Right Upper Lobe Diminished; Expiratory Wheezes   Right Middle Lobe Diminished; Expiratory Wheezes   Right Lower Lobe Diminished; Expiratory Wheezes   Left Upper Lobe Diminished; Expiratory Wheezes   Left Lower Lobe Diminished; Expiratory Wheezes   Alarm Settings   Press Low Alarm 8 cmH2O   High Pressure Alarm 30 cmH2O   Apnea (secs) 20 secs   Resp Rate Low Alarm 14   High Respiratory Rate 40 br/min   Oxygen Therapy/Pulse Ox   SpO2 100 %

## 2021-08-12 NOTE — PROGRESS NOTES
Pt extubated to 7lpm [facility]         08/12/21 1118   Oxygen Therapy/Pulse Ox   O2 Therapy Oxygen humidified   O2 Device High flow nasal cannula   O2 Flow Rate (L/min) 7 L/min   Resp 22   SpO2 100 %

## 2021-08-12 NOTE — PROGRESS NOTES
Speech Language Pathology  Facility/Department: [facility] ICU   CLINICAL BEDSIDE SWALLOW EVALUATION    NAME: Martha Mosley  : 1965  MRN: [id number]    ADMISSION DATE: 2021  ADMITTING DIAGNOSIS: has Hand sprain, left, initial encounter; Nondisplaced fracture of proximal phalanx of left ring finger, initial encounter for closed fracture; COPD exacerbation ([address] Acute on chronic respiratory failure with hypoxia and hypercapnia ([address] Smoker; Dementia ([address] Pulmonary infiltrates; Mediastinal adenopathy; Leukocytosis; Hyponatremia; Hyperglycemia; Acute encephalopathy; Aspiration into airway; Mucus plugging of bronchi; Cavitary lesion of lung; Opiate overdose ([address] Closed trimalleolar fracture of right ankle; Hypoxia; Polysubstance abuse ([address] S/P ORIF (open reduction internal fixation) fracture; Acute respiratory failure with hypoxia and hypercapnia ([address] Tobacco abuse; Acute cystitis without hematuria;  Abnormal chest x-ray; Septicemia ([address] and History of drug abuse ([address] on their problem list.  ONSET DATE: Pt admitted to [facility] ICU on 2021    Recent Chest Xray/CT of Chest:   Impression   Unchanged small right pleural effusion.       Lines and tubes are in good position as described above.           Date of Eval: 2021  Evaluating Therapist: WILLI Hernandes    Current Diet level:  Current Diet : NPO  Current Liquid Diet : NPO      Pain:  Pain Assessment  Pain Assessment: CPOT  Pain Level: 0  Patient's Stated Pain Goal: No pain  Pain Type: Acute pain  Pain Location: Chest  Pain Descriptors: Pressure  Pain Frequency: Continuous  Pain Onset: Gradual  RASS Score: Drowsy - Patient awakens with sustained eye opening and eye contact    Reason for Referral  Martha Mosley was referred for a bedside swallow evaluation to assess the efficiency of her swallow function, identify signs and symptoms of aspiration and make recommendations regarding safe dietary consistencies, effective grossly WFL  Pitch range: reduced  Cough: Weak- perceptually, Non-Productive and Dry    PO trials:   Ice: grossly functional, although prolonged, oral phase with no clinical s/s of aspiration  IDDSI 0 (thin):   - 5cc bolus (tsp): DNT  - Cup: No anterior bolus loss, suspect premature bolus loss to pharynx and possible delayed swallow, no coughing/choking or throat clearing, vocal quality dry, multiple swallows per bolus, no clinical s/s of aspiration, vitals stable. - Straw: No anterior bolus loss, suspect premature bolus loss to pharynx and possible delayed swallow, no coughing/choking or throat clearing, vocal quality dry, multiple swallows per bolus, no clinical s/s of aspiration, vitals stable. IDDSI 2 (mildly thick): not clinically indicated  IDDSI 3 (moderately thick): not clinically indicated  IDDSI 4 (puree): no anterior bolus loss, suspected timely swallow, good oral clearance, vitals stable and no clinical s/s of aspiration  IDDSI 5 (minced and moist): DNT  IDDSI 6 (soft and bite sized): No anterior bolus loss, suspected timely swallow, good oral clearance, prolonged oral phase with grossly functional mastication, vitals stable, no clinical s/s of aspiration  IDDSI 7 (regular): No anterior bolus loss, suspected timely swallow, good oral clearance, prolonged oral phase with prolonged and inefficient mastication, vitals stable, no clinical s/s of aspiration  3 oz water: PASS    Clinical signs of oropharyngeal dysphagia likely acute-on-chronic related to reduced physical mobility, increased O2 demands, respiratory illness, impaired cognition, fatigue, disuse atrophy, generalized weakness, impaired respiratory-swallow coordination and intubation. Swallow prognosis is good. Instrumental swallow study is not indicated. Given tolerance to PO at bedside and lack of clinical s/s of aspiration at bedside, pt is safe for oral diet at this time. Instrumentation: Not clinically indicated at this time.  Will continue to monitor  Diet recommendation: IDDSI 6 Soft and Bite Sized Solids; IDDSI 0 Thin Liquids; Meds whole in puree  Risk management: upright for all intake, stay upright for at least 30 mins after intake, small bites/sips, assist feed, oral care q4 hrs to reduce adverse affects in the event of aspiration, increase physical mobility as able, slow rate of intake, general aspiration precautions and hold PO and contact SLP if s/s of aspiration or worsening respiratory status develop. Impression  Dysphagia Diagnosis: Mild oral stage dysphagia;Mild pharyngeal stage dysphagia  Dysphagia Impression : Suspect mild oropharyngeal dysphagia  Dysphagia Outcome Severity Scale: Level 5: Mild dysphagia- Distant supervision. May need one diet consistency restricted     Treatment Plan  Requires SLP Intervention: Yes  Duration/Frequency of Treatment: 2-5x/wk  D/C Recommendations: To be determined  Referral To: Pulmonology    Skilled instruction re: evidence based methods of decreasing adverse outcomes of associated with aspiration, demonstration and explanation of benefits of oral care and self-feeding, and sequencing of compensatory strategeies developed based on clinical assessment. Pt able to recall and demonstrate understanding of recommendations with mod max cues      Recommended Diet and Intervention  Diet Solids Recommendation: Dysphagia Soft and Bite-Sized (Dysphagia III)  Liquid Consistency Recommendation: Thin  Recommended Form of Meds: Whole with puree  Recommendations: Dysphagia treatment  Therapeutic Interventions: Oral care;Diet tolerance monitoring; Therapeutic PO trials with SLP;Patient/Family education    Compensatory Swallowing Strategies  Compensatory Swallowing Strategies: Upright as possible for all oral intake;Eat/Feed slowly; Remain upright for 30-45 minutes after meals;Assist feed;Small bites/sips    Treatment/Goals  Short-term Goals  Timeframe for Short-term Goals: 5 days (08/17/2021)  Goal 1: The patient will tolerate recommended diet with no clinical s/s of aspiration 5/5  Goal 2: The patient will tolerate therapeutic diet upgrade trials with no clinical s/s of aspiration 5/5  Goal 3: The patient will recall/perform recommended compensatory strategies given min cues  Long-term Goals  Timeframe for Long-term Goals: 7 days (08/19/2021)  Goal 1: The patient will tolerate least restrictive diet with no clinical s/s of aspiration or worsening respiratory/pulmonary status    General  Chart Reviewed: Yes  Comments: Chart reviewed prior to completion of assessment  Subjective  Subjective: Pt seen in room at bedside with RN permission. Daughter present. Alert with some confusion  Behavior/Cognition: Alert; Cooperative;Pleasant mood;Confused  Respiratory Status: O2 via nasual cannula  O2 Device: High flow nasal cannula  Communication Observation: Functional  Follows Directions: Simple  Dentition: Dentures top  Patient Positioning: Upright in bed  Baseline Vocal Quality: Hoarse  Volitional Cough: Weak  Volitional Swallow: Delayed  Prior Dysphagia History: Pt was attempted to be seen x2 during prior admission at [facility] ~1yr prior  Consistencies Administered: Reg solid; Dysphagia Pureed (Dysphagia I); Dysphagia Soft and Bite-Sized (Dysphagia III); Thin - cup; Thin - straw; Ice Chips      Vision/Hearing  Vision  Vision: Within Functional Limits  Hearing  Hearing: Within functional limits    Oral Motor Deficits  Oral/Motor  Oral Motor: Within functional limits    Oral Phase Dysfunction  Oral Phase  Oral Phase: Exceptions     Indicators of Pharyngeal Phase Dysfunction   Pharyngeal Phase  Pharyngeal Phase: Exceptions    Prognosis  Prognosis  Prognosis for safe diet advancement: good  Individuals consulted  Consulted and agree with results and recommendations: Patient; Family member;RN  Family member consulted: Daughter    Education  Patient Education: SLP educated the patient re: Role of SLP, rationale for completion of assessment, anatomical components of swallow structures as they pertain to airway protection, results of assessment, recommendations and POC    Patient Education Response: Verbalizes understanding  Safety Devices in place: Yes  Type of devices: Left in bed; All fall risk precautions in place; Bed alarm in place;Call light within reach;Nurse notified       Therapy Time  SLP Individual Minutes  Time In: 4892  Time Out: 4189  Minutes: [facility], SLP  8/12/2021 2:15 PM  Layne Cline M.A., [facility] #[id number]  Speech-Language Pathologist  Phone: [phone], [phone]

## 2021-08-12 NOTE — PLAN OF CARE
Problem: Nutrition  Intervention: Swallowing evaluation  Note: SLP completed evaluation. Please refer to notes in EMR. Intervention: Aspiration precautions  Note: SLP completed evaluation. Please refer to notes in EMR.     Gisella Miller M.A., [id number] [facility] #[id number]  Speech-Language Pathologist

## 2021-08-12 NOTE — PROGRESS NOTES
Assessment complete as per flow sheets. NSR rhythm on cardiac monitor. VSS. Responsive with eye opening to voice. PRN versed given for agitation during assessment. Patient is intubated and sedated, on mechanical ventilation. Vent settings of ACVC, vT 350 ml, RR 24, PEEP 5, FiO2 40. ETT #8 @ 22cm lip. Sedation and other gtts infusing as per MAR;  Fentanyl gtt infusing at 100 mcg/hr. Precedex gtt infusing at 2mcg/kg/hr. Propofol gtt infusing at 35 mcg/kg/min. OG tube @ 60  cm lip. Tube feedings are ongoing. Patient voids per nieves catheter, site is patent and secured. UO is meagan colored and cloudy in appearance. All lines and monitors appears WNL. Dressings are C/D/I. Meds as per MAR. Safety measures in place and will continue to monitor.

## 2021-08-12 NOTE — PROGRESS NOTES
Pt placed on SBT 5/5, FiO2 40%  RSBI=99         08/12/21 0831   Vent Information   Vt Ordered 0 mL   Rate Set 0 bmp   Peak Flow 0 L/min   Pressure Support 5 cmH20   FiO2  45 %   SpO2 100 %   SpO2/FiO2 ratio 222.22   Sensitivity 2   PEEP/CPAP 5   I Time/ I Time % 0 s   Vent Patient Data   High Peep/I Pressure 0   Peak Inspiratory Pressure 24 cmH2O   Mean Airway Pressure 10 cmH20   Rate Measured 23 br/min   Vt Exhaled 396 mL   Minute Volume 7.88 Liters   I:E Ratio 1:4.50   Spontaneous Breathing Trial (SBT) RT Doc   Pulse 78   Additional Respiratory  Assessments   Resp 24   Alarm Settings   High Pressure Alarm 50 cmH2O   Low Minute Volume Alarm 3 L/min   High Respiratory Rate 40 br/min

## 2021-08-12 NOTE — PROGRESS NOTES
Started weaning trial at around 0830; patient tolerating well, precedex still at 2mcg/kg/hr and fentanyl at 125mcg/hr; daughter at bedside; VBG sent awaiting results. Patient follows commands and has remained relatively calm throughout the wean.

## 2021-08-12 NOTE — PROGRESS NOTES
EOS report and transfer of care to [facility], [facility]. Patient resting in bed, stable condition at hand off.

## 2021-08-12 NOTE — PROGRESS NOTES
08/12/21 0746   Vent Information   Vt Ordered 330 mL   Rate Set 24 bmp   Peak Flow 55 L/min   Pressure Support 0 cmH20   FiO2  40 %   SpO2 99 %   SpO2/FiO2 ratio 247.5   Sensitivity 2   PEEP/CPAP 5   I Time/ I Time % 0 s   Humidification Source Heated wire   Humidification Temp Measured 37   Vent Patient Data   High Peep/I Pressure 0   Peak Inspiratory Pressure 32 cmH2O   Mean Airway Pressure 12 cmH20   Rate Measured 24 br/min   Vt Exhaled 347 mL   Minute Volume 8.33 Liters   I:E Ratio 1:2.80   Plateau Pressure 21 SWM51   Static Compliance 22 mL/cmH2O   Total PEEP 11 cmH20   Auto PEEP 6 cmH20   Cough/Sputum   Sputum How Obtained Endotracheal;Suctioned   Cough Productive   Sputum Amount Large   Sputum Color Creamy   Tenacity Thick   Spontaneous Breathing Trial (SBT) RT Doc   Pulse 62   Breath Sounds   Right Upper Lobe Inspiratory Wheezes; Expiratory Wheezes   Right Middle Lobe Inspiratory Wheezes; Expiratory Wheezes   Right Lower Lobe Inspiratory Wheezes; Expiratory Wheezes   Left Upper Lobe Inspiratory Wheezes; Expiratory Wheezes   Left Lower Lobe Inspiratory Wheezes; Expiratory Wheezes   Additional Respiratory  Assessments   Resp 24   Position Semi-Donaldson's   Oral Care Completed? Yes   Oral Care Mouth suctioned   Subglottic Suction Done? Yes   Cuff Pressure (cm H2O) 27 cm H2O   Alarm Settings   High Pressure Alarm 50 cmH2O   Low Minute Volume Alarm 3 L/min   High Respiratory Rate 40 br/min   ETT (adult)   Placement Date/Time: 08/05/21 0445   Timeout: Patient;Procedure; Appropriate Equipment; Consent Confirmed  Tube Size: 8 mm  Laryngoscope: GlideScope  Location: Oral  Secured at: 22 cm   Secured at 25 cm   Measured From [address] tube kearns   Site Condition Dry

## 2021-08-12 NOTE — PROGRESS NOTES
This note also relates to the following rows which could not be included:  Vt Ordered - Cannot attach notes to unvalidated device data  Rate Set - Cannot attach notes to unvalidated device data  Peak Flow - Cannot attach notes to unvalidated device data  Pressure Support - Cannot attach notes to unvalidated device data  FiO2  - Cannot attach notes to unvalidated device data  SpO2 - Cannot attach notes to unvalidated device data  Sensitivity - Cannot attach notes to unvalidated device data  PEEP/CPAP - Cannot attach notes to unvalidated device data  I Time/ I Time % - Cannot attach notes to unvalidated device data  High Peep/I Pressure - Cannot attach notes to unvalidated device data  Peak Inspiratory Pressure - Cannot attach notes to unvalidated device data  Mean Airway Pressure - Cannot attach notes to unvalidated device data  Rate Measured - Cannot attach notes to unvalidated device data  Vt Exhaled - Cannot attach notes to unvalidated device data  Minute Volume - Cannot attach notes to unvalidated device data  I:E Ratio - Cannot attach notes to unvalidated device data  Pulse - Cannot attach notes to unvalidated device data  Resp - Cannot attach notes to unvalidated device data  High Pressure Alarm - Cannot attach notes to unvalidated device data  Low Minute Volume Alarm - Cannot attach notes to unvalidated device data  High Respiratory Rate - Cannot attach notes to unvalidated device data       08/12/21 0255   Vent Information   Vent Type 980   Vent Mode AC/VC   Humidification Source Heated wire   Humidification Temp 37   Humidification Temp Measured 37   Circuit Condensation Drained   Vent Patient Data   Plateau Pressure 21 NCI27   Breath Sounds   Right Upper Lobe Expiratory Wheezes   Right Middle Lobe Expiratory Wheezes   Right Lower Lobe Expiratory Wheezes   Left Upper Lobe Expiratory Wheezes   Left Lower Lobe Expiratory Wheezes   Additional Respiratory  Assessments   Position Semi-Donaldson's   Alarm Settings Apnea (secs) 20 secs   Low Exhaled Vt  240 mL   ETT (adult)   Placement Date/Time: 08/05/21 0445   Timeout: Patient;Procedure; Appropriate Equipment; Consent Confirmed  Tube Size: 8 mm  Laryngoscope: GlideScope  Location: Oral  Secured at: 22 cm   Secured at 25 cm   Measured From [address] [place] [address],[address] ke[address]s   Site Condition Dry

## 2021-08-12 NOTE — PROGRESS NOTES
This note also relates to the following rows which could not be included:  Vt Ordered - Cannot attach notes to unvalidated device data  Rate Set - Cannot attach notes to unvalidated device data  Peak Flow - Cannot attach notes to unvalidated device data  Pressure Support - Cannot attach notes to unvalidated device data  SpO2 - Cannot attach notes to unvalidated device data  Sensitivity - Cannot attach notes to unvalidated device data  PEEP/CPAP - Cannot attach notes to unvalidated device data  I Time/ I Time % - Cannot attach notes to unvalidated device data  High Peep/I Pressure - Cannot attach notes to unvalidated device data  Peak Inspiratory Pressure - Cannot attach notes to unvalidated device data  Mean Airway Pressure - Cannot attach notes to unvalidated device data  Rate Measured - Cannot attach notes to unvalidated device data  Vt Exhaled - Cannot attach notes to unvalidated device data  Minute Volume - Cannot attach notes to unvalidated device data  I:E Ratio - Cannot attach notes to unvalidated device data  Pulse - Cannot attach notes to unvalidated device data  Resp - Cannot attach notes to unvalidated device data  High Pressure Alarm - Cannot attach notes to unvalidated device data  Low Minute Volume Alarm - Cannot attach notes to unvalidated device data  High Respiratory Rate - Cannot attach notes to unvalidated device data       08/11/21 2315   Vent Information   Vent Type 980   Vent Mode AC/VC   Humidification Source Heated wire   Humidification Temp 37   Humidification Temp Measured 36.9   Circuit Condensation Drained   Vent Patient Data   Plateau Pressure 21 TRN99   Cough/Sputum   Sputum How Obtained Endotracheal;Suctioned   Cough Productive   Sputum Amount Moderate   Sputum Color Creamy   Tenacity Thick   Breath Sounds   Right Upper Lobe Diminished; Expiratory Wheezes   Right Middle Lobe Diminished; Expiratory Wheezes   Right Lower Lobe Diminished   Left Upper Lobe Diminished; Expiratory Wheezes Left Lower Lobe Diminished   Additional Respiratory  Assessments   Position Semi-Donaldson's   Alarm Settings   Apnea (secs) 20 secs   Low Exhaled Vt  240 mL   ETT (adult)   Placement Date/Time: 08/05/21 0038   Timeout: Patient;Procedure; Appropriate Equipment; Consent Confirmed  Tube Size: 8 mm  Laryngoscope: GlideScope  Location: Oral  Secured at: 22 cm   Secured at 25 cm   Measured From [address] [facility] [address]  (moved to center)   Secured By [employer] tube kearns   Site Condition Dry

## 2021-08-13 LAB
ANION GAP SERPL CALCULATED.3IONS-SCNC: 8 MMOL/L (ref 3–16)
BASE EXCESS ARTERIAL: 2.8 MMOL/L (ref -3–3)
BASOPHILS ABSOLUTE: 0 K/UL (ref 0–0.2)
BASOPHILS RELATIVE PERCENT: 0.5 %
BUN BLDV-MCNC: 16 MG/DL (ref 7–20)
CALCIUM SERPL-MCNC: 8.3 MG/DL (ref 8.3–10.6)
CARBOXYHEMOGLOBIN ARTERIAL: 0.7 % (ref 0–1.5)
CHLORIDE BLD-SCNC: 105 MMOL/L (ref 99–110)
CO2: 28 MMOL/L (ref 21–32)
CREAT SERPL-MCNC: <0.5 MG/DL (ref 0.6–1.1)
EOSINOPHILS ABSOLUTE: 0.1 K/UL (ref 0–0.6)
EOSINOPHILS RELATIVE PERCENT: 1.7 %
GFR AFRICAN AMERICAN: >60
GFR NON-AFRICAN AMERICAN: >60
GLUCOSE BLD-MCNC: 112 MG/DL (ref 70–99)
GLUCOSE BLD-MCNC: 114 MG/DL (ref 70–99)
GLUCOSE BLD-MCNC: 125 MG/DL (ref 70–99)
GLUCOSE BLD-MCNC: 269 MG/DL (ref 70–99)
GLUCOSE BLD-MCNC: 53 MG/DL (ref 70–99)
GLUCOSE BLD-MCNC: 55 MG/DL (ref 70–99)
GLUCOSE BLD-MCNC: 58 MG/DL (ref 70–99)
GLUCOSE BLD-MCNC: 65 MG/DL (ref 70–99)
GLUCOSE BLD-MCNC: 96 MG/DL (ref 70–99)
GLUCOSE BLD-MCNC: 98 MG/DL (ref 70–99)
HCO3 ARTERIAL: 29.9 MMOL/L (ref 21–29)
HCT VFR BLD CALC: 42.8 % (ref 36–48)
HEMOGLOBIN, ART, EXTENDED: 14 G/DL (ref 12–16)
HEMOGLOBIN: 13.5 G/DL (ref 12–16)
LYMPHOCYTES ABSOLUTE: 1.8 K/UL (ref 1–5.1)
LYMPHOCYTES RELATIVE PERCENT: 23.3 %
MCH RBC QN AUTO: 28.4 PG (ref 26–34)
MCHC RBC AUTO-ENTMCNC: 31.6 G/DL (ref 31–36)
MCV RBC AUTO: 90.1 FL (ref 80–100)
METHEMOGLOBIN ARTERIAL: 0.3 %
MONOCYTES ABSOLUTE: 0.8 K/UL (ref 0–1.3)
MONOCYTES RELATIVE PERCENT: 9.9 %
NEUTROPHILS ABSOLUTE: 5 K/UL (ref 1.7–7.7)
NEUTROPHILS RELATIVE PERCENT: 64.6 %
O2 SAT, ARTERIAL: 96.6 %
O2 THERAPY: ABNORMAL
PCO2 ARTERIAL: 56.6 MMHG (ref 35–45)
PDW BLD-RTO: 15.7 % (ref 12.4–15.4)
PERFORMED ON: ABNORMAL
PERFORMED ON: NORMAL
PERFORMED ON: NORMAL
PH ARTERIAL: 7.34 (ref 7.35–7.45)
PLATELET # BLD: 155 K/UL (ref 135–450)
PMV BLD AUTO: 8.5 FL (ref 5–10.5)
PO2 ARTERIAL: 93.9 MMHG (ref 75–108)
POTASSIUM SERPL-SCNC: 3.5 MMOL/L (ref 3.5–5.1)
RBC # BLD: 4.76 M/UL (ref 4–5.2)
SODIUM BLD-SCNC: 141 MMOL/L (ref 136–145)
TCO2 ARTERIAL: 31.7 MMOL/L
TRIGL SERPL-MCNC: 102 MG/DL (ref 0–150)
WBC # BLD: 7.7 K/UL (ref 4–11)

## 2021-08-13 PROCEDURE — 94761 N-INVAS EAR/PLS OXIMETRY MLT: CPT

## 2021-08-13 PROCEDURE — 6370000000 HC RX 637 (ALT 250 FOR IP): Performed by: INTERNAL MEDICINE

## 2021-08-13 PROCEDURE — 99232 SBSQ HOSP IP/OBS MODERATE 35: CPT | Performed by: INTERNAL MEDICINE

## 2021-08-13 PROCEDURE — 85025 COMPLETE CBC W/AUTO DIFF WBC: CPT

## 2021-08-13 PROCEDURE — 2500000003 HC RX 250 WO HCPCS: Performed by: INTERNAL MEDICINE

## 2021-08-13 PROCEDURE — 80048 BASIC METABOLIC PNL TOTAL CA: CPT

## 2021-08-13 PROCEDURE — 2700000000 HC OXYGEN THERAPY PER DAY

## 2021-08-13 PROCEDURE — 2580000003 HC RX 258: Performed by: INTERNAL MEDICINE

## 2021-08-13 PROCEDURE — 94660 CPAP INITIATION&MGMT: CPT

## 2021-08-13 PROCEDURE — 82803 BLOOD GASES ANY COMBINATION: CPT

## 2021-08-13 PROCEDURE — 92526 ORAL FUNCTION THERAPY: CPT

## 2021-08-13 PROCEDURE — 99291 CRITICAL CARE FIRST HOUR: CPT | Performed by: INTERNAL MEDICINE

## 2021-08-13 PROCEDURE — 2000000000 HC ICU R&B

## 2021-08-13 PROCEDURE — 6360000002 HC RX W HCPCS: Performed by: INTERNAL MEDICINE

## 2021-08-13 PROCEDURE — 94640 AIRWAY INHALATION TREATMENT: CPT

## 2021-08-13 PROCEDURE — 84478 ASSAY OF TRIGLYCERIDES: CPT

## 2021-08-13 RX ORDER — IPRATROPIUM BROMIDE AND ALBUTEROL SULFATE 2.5; .5 MG/3ML; MG/3ML
3 SOLUTION RESPIRATORY (INHALATION)
Status: DISCONTINUED | OUTPATIENT
Start: 2021-08-13 | End: 2021-08-19 | Stop reason: HOSPADM

## 2021-08-13 RX ORDER — INSULIN GLARGINE 100 [IU]/ML
15 INJECTION, SOLUTION SUBCUTANEOUS EVERY MORNING
Status: DISCONTINUED | OUTPATIENT
Start: 2021-08-14 | End: 2021-08-19 | Stop reason: HOSPADM

## 2021-08-13 RX ADMIN — TOBRAMYCIN AND DEXAMETHASONE 1 DROP: 3; 1 SUSPENSION/ DROPS OPHTHALMIC at 04:29

## 2021-08-13 RX ADMIN — TOBRAMYCIN AND DEXAMETHASONE 1 DROP: 3; 1 SUSPENSION/ DROPS OPHTHALMIC at 16:00

## 2021-08-13 RX ADMIN — DEXTROSE MONOHYDRATE 12.5 G: 500 INJECTION PARENTERAL at 08:06

## 2021-08-13 RX ADMIN — IPRATROPIUM BROMIDE AND ALBUTEROL SULFATE 3 ML: .5; 3 SOLUTION RESPIRATORY (INHALATION) at 23:33

## 2021-08-13 RX ADMIN — IPRATROPIUM BROMIDE AND ALBUTEROL SULFATE 3 ML: .5; 3 SOLUTION RESPIRATORY (INHALATION) at 02:57

## 2021-08-13 RX ADMIN — TOBRAMYCIN AND DEXAMETHASONE 1 DROP: 3; 1 SUSPENSION/ DROPS OPHTHALMIC at 00:22

## 2021-08-13 RX ADMIN — ONDANSETRON HYDROCHLORIDE 4 MG: 2 INJECTION, SOLUTION INTRAMUSCULAR; INTRAVENOUS at 12:54

## 2021-08-13 RX ADMIN — Medication 2 MCG/KG/HR: at 02:38

## 2021-08-13 RX ADMIN — TOBRAMYCIN AND DEXAMETHASONE 1 DROP: 3; 1 SUSPENSION/ DROPS OPHTHALMIC at 23:57

## 2021-08-13 RX ADMIN — DIAZEPAM 5 MG: 5 TABLET ORAL at 08:12

## 2021-08-13 RX ADMIN — Medication 2 MCG/KG/HR: at 09:19

## 2021-08-13 RX ADMIN — Medication 2 MCG/KG/HR: at 04:56

## 2021-08-13 RX ADMIN — PREDNISONE 30 MG: 20 TABLET ORAL at 08:12

## 2021-08-13 RX ADMIN — Medication 0.9 MCG/KG/HR: at 12:40

## 2021-08-13 RX ADMIN — Medication 2 MCG/KG/HR: at 00:23

## 2021-08-13 RX ADMIN — SODIUM CHLORIDE, PRESERVATIVE FREE 10 ML: 5 INJECTION INTRAVENOUS at 09:00

## 2021-08-13 RX ADMIN — IPRATROPIUM BROMIDE AND ALBUTEROL SULFATE 3 ML: .5; 3 SOLUTION RESPIRATORY (INHALATION) at 10:50

## 2021-08-13 RX ADMIN — TOBRAMYCIN AND DEXAMETHASONE 1 DROP: 3; 1 SUSPENSION/ DROPS OPHTHALMIC at 20:17

## 2021-08-13 RX ADMIN — IPRATROPIUM BROMIDE AND ALBUTEROL SULFATE 3 ML: .5; 3 SOLUTION RESPIRATORY (INHALATION) at 07:48

## 2021-08-13 RX ADMIN — IPRATROPIUM BROMIDE AND ALBUTEROL SULFATE 3 ML: .5; 3 SOLUTION RESPIRATORY (INHALATION) at 14:56

## 2021-08-13 RX ADMIN — ENOXAPARIN SODIUM 40 MG: 40 INJECTION SUBCUTANEOUS at 08:12

## 2021-08-13 RX ADMIN — IPRATROPIUM BROMIDE AND ALBUTEROL SULFATE 3 ML: .5; 3 SOLUTION RESPIRATORY (INHALATION) at 19:33

## 2021-08-13 RX ADMIN — TOBRAMYCIN AND DEXAMETHASONE 1 DROP: 3; 1 SUSPENSION/ DROPS OPHTHALMIC at 08:00

## 2021-08-13 RX ADMIN — DEXTROSE MONOHYDRATE 12.5 G: 500 INJECTION PARENTERAL at 04:27

## 2021-08-13 RX ADMIN — TOBRAMYCIN AND DEXAMETHASONE 1 DROP: 3; 1 SUSPENSION/ DROPS OPHTHALMIC at 12:54

## 2021-08-13 RX ADMIN — Medication 10 MG: at 20:17

## 2021-08-13 RX ADMIN — Medication 2 MCG/KG/HR: at 07:18

## 2021-08-13 RX ADMIN — DEXTROSE MONOHYDRATE 12.5 G: 500 INJECTION PARENTERAL at 02:09

## 2021-08-13 ASSESSMENT — PAIN SCALES - GENERAL
PAINLEVEL_OUTOF10: 0

## 2021-08-13 NOTE — PROGRESS NOTES
Speech Language Pathology  Facility/Department: [facility] ICU  Dysphagia Daily Treatment Note    Recommendations:  Solid Consistency: IDDSI 6 Soft and Bite Sized Solids  Liquid Consistency: IDDSI 0 Thin Liquids - NO straws   Medication: Meds whole in puree    NAME: Sourav Ferguson  : 1965  MRN: [id number]    Patient Diagnosis(es):   Patient Active Problem List    Diagnosis Date Noted    [facility]icemia [facility]     History of drug abuse ([facility]     Abnormal chest x-ray     Acute cystitis without hematuria     Acute respiratory failure with hypoxia and hypercapnia ([address] 2021    Tobacco abuse     S/P ORIF (open reduction internal fixation) fracture 10/06/2020    Opiate overdose ([address]     Closed trimalleolar fracture of right ankle     Hypoxia     Polysubstance abuse ([facility]     COPD exacerbation ([facility] 2020    Acute on chronic respiratory failure with hypoxia and hypercapnia ([address] 2020    Smoker 2020    Dementia ([facility] 2020    Pulmonary infiltrates 2020    Mediastinal adenopathy 2020    Leukocytosis 2020    Hyponatremia 2020    Hyperglycemia 2020    Acute encephalopathy 2020    Aspiration into airway 2020    Mucus plugging of bronchi     Cavitary lesion of lung     Hand sprain, left, initial encounter 2020    Nondisplaced fracture of proximal phalanx of left ring finger, initial encounter for closed fracture 2020     Allergies: No Known Allergies  Onset Date: Pt admitted to [facility] ICU on 2021    Subjective: Pt seen in room with RN permission. Alert and cooperative. Latency in physical and verbal response. Appears confused. Pain: The patient does not complain of pain       Current Diet: ADULT DIET; Dysphagia - Soft and Bite Sized    Diet Tolerance:  Patient tolerating current diet level without signs/symptoms of penetration / aspiration.       Dysphagia Treatment and Impressions:   Pt seen in room at aspiration and/or worsening respiratory status, hold PO and contact SLP. Dysphagia Goals:  Short-term Goals  Timeframe for Short-term Goals: 5 days (08/17/2021)  Goal 1: The patient will tolerate recommended diet with no clinical s/s of aspiration 5/5 8/13/2021 : Goal addressed, see above. Ongoing, progressing. Goal 2: The patient will tolerate therapeutic diet upgrade trials with no clinical s/s of aspiration 5/5 8/13/2021 : Goal addressed, see above. Ongoing, progressing. Goal 3: The patient will recall/perform recommended compensatory strategies given min cues  8/13/2021 : Goal addressed, see above. Ongoing, progressing. Long-term Goals  Timeframe for Long-term Goals: 7 days (08/19/2021)  Goal 1: The patient will tolerate least restrictive diet with no clinical s/s of aspiration or worsening respiratory/pulmonary status  8/13/2021 : Goal addressed, see above. Ongoing, progressing. Speech/Language/Cog Goals: N/A      Recommendations:  Solid Consistency: IDDSI 6 Soft and Bite Sized Solids  Liquid Consistency: IDDSI 0 Thin Liquids - NO straws   Medication: Meds whole in puree    Patient/Family/Caregiver Education: Role of SLP, Rationale for dysphagia tx, Recommended compensatory strategies, BSE results, POC and See above    Compensatory Strategies: Upright for all intake, Remain upright at least 30 mins after intake, Small bites, Small sips, Check oral cavity for residue after intake and Take pills whole with applesauce/pudding    Plan:    Continued Dysphagia treatment with goals per plan of care. Discharge Recommendations: TBD    If pt discharges from hospital prior to Speech/Swallowing discharge, this note serves as tx and discharge summary.      Total Treatment Time / Charges     Time in Time out Total Time / units   Cognitive Tx         Speech Tx      Dysphagia Tx 0927 0922 11 mins / 1 unit     Signature:  Tang Centeno M.A., [address] [facility], [place], operated by [id number]  Speech-Language Pathologist  Phone: [phone]

## 2021-08-13 NOTE — PLAN OF CARE
Ongoing  8/12/2021 2251 by Johny Handler  Outcome: Ongoing     Problem: Psychomotor Activity - Altered:  Goal: Absence of psychomotor disturbance signs and symptoms  Description: Absence of psychomotor disturbance signs and symptoms  8/12/2021 2251 by Johny Handler  Outcome: Ongoing  8/12/2021 2251 by Johny Handler  Outcome: Ongoing     Problem: Sensory Perception - Impaired:  Goal: Demonstrations of improved sensory functioning will increase  Description: Demonstrations of improved sensory functioning will increase  8/12/2021 2251 by Johny Handler  Outcome: Ongoing  8/12/2021 2251 by Johny Handler  Outcome: Ongoing  Goal: Decrease in sensory misperception frequency  Description: Decrease in sensory misperception frequency  8/12/2021 2251 by Johny Handler  Outcome: Ongoing  8/12/2021 2251 by Johny Husainr  Outcome: Ongoing  Goal: Able to refrain from responding to false sensory perceptions  Description: Able to refrain from responding to false sensory perceptions  8/12/2021 2251 by Johny Handler  Outcome: Ongoing  8/12/2021 2251 by Johny Husainr  Outcome: Ongoing  Goal: Demonstrates accurate environmental perceptions  Description: Demonstrates accurate environmental perceptions  8/12/2021 2251 by Johny Handler  Outcome: Ongoing  8/12/2021 2251 by Johny Husainr  Outcome: Ongoing  Goal: Able to distinguish between reality-based and nonreality-based thinking  Description: Able to distinguish between reality-based and nonreality-based thinking  8/12/2021 2251 by Johny Husainr  Outcome: Ongoing  8/12/2021 2251 by Johny Husainr  Outcome: Ongoing  Goal: Able to interrupt nonreality-based thinking  Description: Able to interrupt nonreality-based thinking  8/12/2021 2251 by Johny Handler  Outcome: Ongoing  8/12/2021 2251 by Johny Handler  Outcome: Ongoing     Problem: Sleep Pattern Disturbance:  Goal: Appears well-rested  Description: Appears well-rested  8/12/2021 2251 by Johny Husainr  Outcome: Ongoing  8/12/2021 2251 by Janis Patel  Outcome: Ongoing

## 2021-08-13 NOTE — PROGRESS NOTES
02:05- FS glucose 53, 1/2 amp D50 given, see chart for re-check of glucose. Patient remains confused, A/Ox2. Not diaphoretic/clammy/ symptomatic. Continues to repeatedly attempts to get out of bed. Safety measures in place.

## 2021-08-13 NOTE — PROGRESS NOTES
Patient's emergency contact and son, Robson Llanes, updated at this time. Discussed patient condition and updated on plan of care. Questions answered and family member appears satisfied.

## 2021-08-13 NOTE — PROGRESS NOTES
Reassessment complete, no changed from previous. VSS. She remains on BiPAP, tolerating well while on precedex gtt at 2 mcg/kg/hr. Continues to attempt to get out of bed without assistance, removing lines/monitors. Safety measures in place and frequent reorientation provided (unable to utilize restraints as patient is on BiPAP). FS blood glucose rechecked, now with results of 55. Another 1/2 amp D50 given and recheck of 112. No needs voiced. Call light within reach.

## 2021-08-13 NOTE — PROGRESS NOTES
Pulmonary and Critical Care Progress Note    CC: Acute on Chronic Respiratory failure    Subjective:   Patient extubated to bipap. Precedex @1.9     IV line: PICC 8/6     MV: 8/5-8/12       Intake/Output Summary (Last 24 hours) at 8/13/2021 0742  Last data filed at 8/13/2021 0426  Gross per 24 hour   Intake 2698.71 ml   Output 1425 ml   Net 1273.71 ml       Exam:   /65   Pulse 75   Temp 99.5 °F (37.5 °C) (Bladder)   Resp 14   Ht 5' 4\" (1.626 m)   Wt 220 lb 9.6 oz (100.1 kg)   SpO2 100%   BMI 37.87 kg/m²  on bipap 35%  General: ill appearing. NCAT fatigued. Eyes: PERRL. No sclera icterus. No conjunctival injection. ENT: No discharge. Pharynx clear. Neck: Trachea midline. Resp: No accessory muscle use. No crackles. No wheezing. No rhonchi. No dullness on percussion. CV: Regular rate. Regular rhythm. No mumur or rub. No edema. GI: Non-tender. + distended. No masses. Skin: Warm and dry. No nodule on exposed extremities. No rash on exposed extremities. M/S: No cyanosis. No joint deformity. No clubbing. Neuro: awake and alert,  follows commands. Moves extremities.        Scheduled Meds:   predniSONE  30 mg Oral Daily with breakfast    insulin lispro  0-18 Units Subcutaneous 4x Daily AC & HS    melatonin  10 mg Oral Nightly    insulin glargine  30 Units Subcutaneous BID    ipratropium-albuterol  3 mL Inhalation Q4H    diazePAM  5 mg Oral TID    lidocaine 1 % injection  5 mL Intradermal Once    sodium chloride flush  5-40 mL Intravenous 2 times per day    cefepime  2,000 mg Intravenous Once    nicotine  1 patch Transdermal Daily    enoxaparin  40 mg Subcutaneous Daily    tobramycin-dexamethasone  1 drop Both Eyes 6 times per day     Continuous Infusions:   sodium chloride      dexmedetomidine HCl in NaCl 2 mcg/kg/hr (08/13/21 0718)    dextrose       PRN Meds:  sodium chloride flush, sodium chloride, glucose, dextrose, glucagon (rDNA), dextrose, polyethylene glycol, acetaminophen **OR** acetaminophen, ondansetron, albuterol    Labs:  CBC:   Recent Labs     08/11/21  0528 08/12/21  0403 08/13/21  0425   WBC 4.5 4.0 7.7   HGB 14.1 13.8 13.5   HCT 43.1 42.7 42.8   MCV 88.9 89.7 90.1   * 123* 155     BMP:   Recent Labs     08/11/21  0528 08/12/21  0403 08/13/21  0425   * 137 141   K 3.9 4.1 3.5   CL 97* 101 105   CO2 26 27 28   BUN 14 16 16   CREATININE <0.5* <0.5* <0.5*     LIVER PROFILE:   Recent Labs     08/12/21  0405   AST 40*   ALT 32   BILIDIR 0.5*   BILITOT 0.7   ALKPHOS 133*     PT/INR:   No results for input(s): PROTIME, INR in the last 72 hours. APTT: No results for input(s): APTT in the last 72 hours. UA:  No results for input(s): NITRITE, COLORU, PHUR, LABCAST, WBCUA, RBCUA, MUCUS, TRICHOMONAS, YEAST, BACTERIA, CLARITYU, SPECGRAV, LEUKOCYTESUR, UROBILINOGEN, BILIRUBINUR, BLOODU, GLUCOSEU, AMORPHOUS in the last 72 hours.     Invalid input(s): Juma Huerta  Recent Labs     08/12/21  0417 08/13/21  0551   PHART 7.433 7.341*   [id number] 23.4* 56.6*   PO2ART 86.0 93.9       Cultures:   8/4 COVID-19 and influenza not detected  8/5 UC NGTD  8/5 BC NGTD    Films:  Chest x-ray 8/12 imaging was reviewed by me and showed   ET tube terminates above the antoine.  Right PICC terminates in the superior   right atrium.  Enteric tube courses through the chest below the diaphragm;   tip is not visualized.  Lungs are unchanged in appearance compared to prior   study.  No pneumothorax.  Small right pleural effusion.      CTPA 8/5/21  images reviewed by me and showed:   No evidence of pulmonary embolism or acute pulmonary abnormality     ASSESSMENT:  · Acute hypoxemic hypercapnic respiratory failure  · COPD with acute exacerbation  · Abnormal CXR with RLL ASD- atelectasis vs PNA   · Acute encephalopathy/metabolic encephalopathy  · Hyperglycemia   · UTI  · Tobacco abuse        PLAN:  · Supplemental oxygen to maintain SaO2 >92%; wean as tolerated   · Non-invasive positive pressure ventilation  per my orders. The ventilator was adjusted by me at the bedside for unstable, life threatening respiratory failure. · valium 5 mg  Tid- stop   · Wean off precedex gtt as able  · Inhaled bronchodilators  · pred taper-  30 mg daily  · Completed Ceftriaxone D7 and completed Zithromax D#5  · Diet -soft thin per SLT  · Lantus 30 qD and continue ISS - goal 150-180  · GI prophylaxis: Pepcid  · DVT prophylaxis: Lovenox  · MRSA prophylaxis: Bactroban   · Code status: Full code             Patient is critically ill with acute respiratory failure. Critical care time spent reviewing labs/films, examining patient, collaborating with other physicians but excluding procedures for life threatening organ failure is 32 minutes.

## 2021-08-13 NOTE — PROGRESS NOTES
08/12/21 2000   NIV Type   $NIV $Daily Charge   Skin Assessment Clean, dry, & intact   Equipment Type v60   Mode Bilevel   Mask Type Full face mask   Mask Size Medium   Settings/Measurements   IPAP 16 cmH20   CPAP/EPAP 8 cmH2O   Rate Ordered 14   Resp 19   FiO2  45 %   Vt Exhaled 595 mL   Minute Volume 12.4 Liters   Mask Leak (lpm) 8 lpm   Comfort Level Good   Using Accessory Muscles No   SpO2 100   Patient Observation   Observations spo2 100% on 45% bipap   Oxygen Therapy/Pulse Ox   SpO2 100 %

## 2021-08-13 NOTE — PLAN OF CARE
Nutrition Problem #1: Inadequate oral intake  Intervention: Food and/or Nutrient Delivery: Continue Current Diet, Start Oral Nutrition Supplement  Nutritional Goals: patient will accept and consume at least 50% of her meals on ADULT DIET;  Dysphagia - Soft and Bite-Sized diet order x 3 meals per day + she will consume at least 50% of her Ensure High-Protein with breakfast and dinner meals

## 2021-08-13 NOTE — PROGRESS NOTES
Comprehensive Nutrition Assessment    Type and Reason for Visit:  Reassess    Nutrition Recommendations/Plan:   1. Continue ADULT DIET; Dysphagia - Soft and Bite-Sized diet order. 2. Added Ensure high-protein with breakfast and dinner meals. 3. Monitor appetite, meal intake, and acceptance/intake of ONS. 4. Monitor nutrition-related labs, bowel function (no BM documented during this admission), and weight trends. Nutrition Assessment:  patient is declining from a nutritional standpoint AEB she was extubated on 8/12/21 and she has no po intake for meals after po diet was started, post-extubation and she remains at risk for further compromise d/t decreased appetite/po intake, altered mental status, and need for altered po consistency d/t swallowing difficulties; will continue ADULT DIET; Dysphagia - Soft and Bite-Sized diet order and add Ensure High-Protein with breakfast and dinner meals    Malnutrition Assessment:  Malnutrition Status: At risk for malnutrition     Context:  Acute Illness     Findings of the 6 clinical characteristics of malnutrition:  Energy Intake:  Mild decrease in energy intake (no po intake on po diet order, post-extubation on 8/12/21)  Weight Loss:  No significant weight loss     Body Fat Loss:  No significant body fat loss     Muscle Mass Loss:  No significant muscle mass loss    Fluid Accumulation:  No significant fluid accumulation     Strength:  Not Performed    Estimated Daily Nutrient Needs:  Energy (kcal):  4320-0897 kcals based on 11-14 kcals/kg/CBW; Weight Used for Energy Requirements:  Current     Protein (g):  109-120 g protein based on 2-2.2 g/kg/IBW;  Weight Used for Protein Requirements:  Ideal        Fluid (ml/day):  0058-6791 ml; Method Used for Fluid Requirements:  1 ml/kcal      Nutrition Related Findings:  patient was extubated on 8/12/21; she is A & O x 2; abdomen is round, soft, and bowel sounds are active; no documented BM for this admission; BS have been low at times; patient has 15 units Lantus each morning, high-dose SSI, prednisone, lovenox, and precedex ordered at this time      Wounds:  None       Current Nutrition Therapies:    ADULT DIET; Dysphagia - Soft and Bite Sized  Adult Oral Nutrition Supplement; Low Calorie/High Protein Oral Supplement    Anthropometric Measures:  · Height: 5' 4\" (162.6 cm)  · Current Body Weight: 220 lb 9.6 oz (100.1 kg) (obtained on 8/13/21)   · Admission Body Weight: 220 lb 1.6 oz (99.8 kg) (obtained on 8/5/21; actual weight)    · Usual Body Weight: 220 lb 9.6 oz (100.1 kg) (obtained on 8/5/21; actual weight)     · Ideal Body Weight: 120 lbs; % Ideal Body Weight 183.8 %   · BMI: 37.8  · BMI Categories: Obese Class 2 (BMI 35.0 -39.9)       Nutrition Diagnosis:   · Inadequate oral intake related to inadequate protein-energy intake, impaired respiratory function, cognitive or neurological impairment, endocrine dysfuntion as evidenced by poor intake prior to admission, constipation, intake 0-25%, swallow study results      Nutrition Interventions:   Food and/or Nutrient Delivery:  Continue Current Diet, Start Oral Nutrition Supplement  Nutrition Education/Counseling:  No recommendation at this time   Coordination of Nutrition Care:  Continue to monitor while inpatient, Interdisciplinary Rounds, Speech Therapy    Goals:  patient will accept and consume at least 50% of her meals on ADULT DIET; Dysphagia - Soft and Bite-Sized diet order x 3 meals per day + she will consume at least 50% of her Ensure High-Protein with breakfast and dinner meals       Nutrition Monitoring and Evaluation:   Behavioral-Environmental Outcomes:  None Identified   Food/Nutrient Intake Outcomes:  Food and Nutrient Intake, Supplement Intake  Physical Signs/Symptoms Outcomes:  Biochemical Data, Chewing or Swallowing, Constipation, GI Status, Meal Time Behavior, Nutrition Focused Physical Findings, Skin, Weight     Discharge Planning:     Too soon to determine Electronically signed by Juancho Ovalle RD, LD on 8/13/21 at 12:29 PM EDT    Contact: [phone]

## 2021-08-13 NOTE — PROGRESS NOTES
RESPIRATORY THERAPY ASSESSMENT    Name:  Claire Caal Record Number:  [id number]  Age: 61 y.o. Gender: female  : 1965  Today's Date:  2021  Room:  [facility]3009-    Assessment     Is the patient being admitted for a COPD or Asthma exacerbation? No   (If yes the patient will be seen every 4 hours for the first 24 hours and then reassessed)    Patient Admission Diagnosis      Allergies  No Known Allergies    Minimum Predicted Vital Capacity:               Actual Vital Capacity:                    Pulmonary History: COPD  Home Oxygen Therapy:   Room air  Home Respiratory Therapy: Duoneb, Albuterol    Current Respiratory Therapy:   Duoneb Q4, Albuterol prn   Treatment Type: HHN  Medications: Albuterol/Ipratropium    Respiratory Severity Index(RSI)   Patients with orders for inhalation medications, oxygen, or any therapeutic treatment modality will be placed on Respiratory Protocol. They will be assessed with the first treatment and at least every 72 hours thereafter. The following severity scale will be used to determine frequency of treatment intervention.     Smoking History: Mild Exacerbation = 3    Social History  Social History     Tobacco Use    Smoking status: Current Every Day Smoker     Packs/day: 1.00     Types: Cigarettes    Smokeless tobacco: Never Used   Vaping Use    Vaping Use: Never used   Substance Use Topics    Alcohol use: Never    Drug use: Not Currently     Types: IV     Comment: Fentanyl and heroin       Recent Surgical History: None = 0  Past Surgical History  Past Surgical History:   Procedure Laterality Date    ANKLE FRACTURE SURGERY Right 2020    OPEN REDUCTION INTERNAL FIXATION RIGHT ANKLE FRACTURE performed by Nick Barron DO at [address] [place] [address] Right 2020    ORIF    BRONCHOSCOPY N/A 2020    BRONCHOSCOPY performed by Floridalma Ham MD at [address] [place] [address]  2020    [address] [place] [address] performed by Ras Keane MD at [address] [place] [address] N/A 2/10/2020    BRONCHOSCOPY ALVEOLAR LAVAGE performed by Naty Diane MD at [address] [place] [address]  2/10/2020    BRONCHOSCOPY THERAPUTIC ASPIRATION SUBSEQUENT performed by Naty Diane MD at [address] [facility]       Level of Consciousness: Alert, Follows Commands but Disoriented = 1    Level of Activity: Non weight bearing- transfers bed to chair only = 3    Respiratory Pattern: Regular Pattern; RR 8-20 = 0    Breath Sounds: Diminshed bilaterally and/or crackles = 2    Sputum  Sputum Color: Creamy, Tenacity: Thick, Sputum How Obtained: Endotracheal, Suctioned  Cough: Strong, spontaneous, non-productive = 0    Vital Signs   /66   Pulse 103   Temp 99.2 °F (37.3 °C) (Bladder)   Resp 17   Ht 5' 4\" (1.626 m)   Wt 220 lb 9.6 oz (100.1 kg)   SpO2 98%   BMI 37.87 kg/m²   SPO2 (COPD values may differ): 88-89% on room air or greater than 92% on FiO2 28- 35% = 2    Peak Flow (asthma only): not applicable = 0    RSI: 06-49 = Q6H or QID and Q4HPRN for dyspnea        Plan       Goals: medication delivery     Patient/caregiver was educated on the proper method of use for Respiratory Care Devices:  Yes      Level of patient/caregiver understanding able to:   ? Verbalize understanding   ? Demonstrate understanding       ? Teach back        ? Needs reinforcement       ? No available caregiver               ? Other:     Response to education:  Good     Is patient being placed on Home Treatment Regimen? No     Does the patient have everything they need prior to discharge? NA     Comments:  Chart reviewed, patient assessed    Plan of Care:  Duoneb Q4WA, Albuterol prn     Electronically signed by Heather Pearson RCP on 8/13/2021 at 5:20 PM    Respiratory Protocol Guidelines     1.  Assessment and treatment by Respiratory Therapy will be initiated for medication and therapeutic interventions upon initiation of aerosolized medication. 2. Physician will be contacted for respiratory rate (RR) greater than 35 breaths per minute. Therapy will be held for heart rate (HR) greater than 140 beats per minute, pending direction from physician. 3. Bronchodilators will be administered via Metered Dose Inhaler (MDI) with spacer when the following criteria are met:  a. Alert and cooperative     b. HR < 140 bpm  c. RR < 30 bpm                d. Can demonstrate a 2-3 second inspiratory hold  4. Bronchodilators will be administered via Hand Held Nebulizer [facility]) to patients when ANY of the following criteria are met  a. Incognizant or uncooperative          b. Patients treated with HHN at Home        c. Unable to demonstrate proper use of MDI with spacer     d. RR > 30 bpm   5. Bronchodilators will be delivered via Metered Dose Inhaler (MDI), HHN, [employer] to intubated patients on mechanical ventilation. 6. Inhalation medication orders will be delivered and/or substituted as outlined below. Aerosolized Medications Ordering and Administration Guidelines:    1. All Medications will be ordered by a physician, and their frequency and/or modality will be adjusted as defined by the patients Respiratory Severity Index (RSI) score. 2. If the patient does not have documented COPD, consider discontinuing anticholinergics when RSI is less than 9.  3. If the bronchospasm worsens (increased RSI), then the bronchodilator frequency can be increased to a maximum of every 4 hours. If greater than every 4 hours is required, the physician will be contacted. 4. If the bronchospasm improves, the frequency of the bronchodilator can be decreased, based on the patient's RSI, but not less than home treatment regimen frequency. 5. Bronchodilator(s) will be discontinued if patient has a RSI less than 9 and has received no scheduled or as needed treatment for 72  Hrs. Patients Ordered on a Mucolytic Agent:    1.  Must always be administered with a bronchodilator. 2. Discontinue if patient experiences worsened bronchospasm, or secretions have lessened to the point that the patient is able to clear them with a cough. Anti-inflammatory and Combination Medications:    1. If the patient lacks prior history of lung disease, is not using inhaled anti-inflammatory medication at home, and lacks wheezing by examination or by history for at least 24 hours, contact physician for possible discontinuation.

## 2021-08-13 NOTE — PROGRESS NOTES
08/12/21 2317   NIV Type   Equipment Type v60   Mode Bilevel   Mask Type Full face mask   Mask Size Medium   Settings/Measurements   IPAP 16 cmH20   CPAP/EPAP 8 cmH2O   Rate Ordered 14   Resp 18   FiO2  40 %   Vt Exhaled 594 mL   Minute Volume 11.4 Liters   Mask Leak (lpm) 0 lpm   Comfort Level Good   Using Accessory Muscles No   SpO2 100   Alarm Settings   Alarms On Y   Press Low Alarm 8 cmH2O   High Pressure Alarm 30 cmH2O   Delay Alarm 20 sec(s)   Resp Rate Low Alarm 14   High Respiratory Rate 40 br/min   Oxygen Therapy/Pulse Ox   SpO2 100 %

## 2021-08-13 NOTE — PROGRESS NOTES
Entered room in response to bed alarm. Patient laying horizontally (perpendicular to bed) and has all wires/ tubes/ lines wrapped around her. Repositioned in bed, lines secured, and posey belt placed on patient for safety (order obtained from Dr. Patricia Soriano, nocturnist).

## 2021-08-13 NOTE — CARE COORDINATION
INTERDISCIPLINARY PLAN OF CARE CONFERENCE    Date/Time: 8/13/2021 1:17 PM  Completed by: Yael Garcia RN, Case Management      Patient Name:  Roberto Ferrari  YOB: 1965  Admitting Diagnosis: Septicemia ([facility] [address] [A41.9]  Acute cystitis without hematuria [N30.00]  Acute respiratory failure with hypoxia and hypercapnia ([address] [J96.01, J96.02]     Admit Date/Time:  8/4/2021  9:29 PM    Chart reviewed. Interdisciplinary team contacted or reviewed plan related to patient progress and discharge plans. Disciplines included Case Management, Nursing, and Dietitian. Current Status:inpt, ICU LOC  PT/OT recommendation for discharge plan of care: tbd    Expected D/C Disposition:  tbd    Discharge Plan Comments: Reviewed chart. Pt in ICU, extubated to BiPAP. Will need PT/OT. Pt active with [employer] for home O2, 2 liters at baseline. Pt from home with daughter.  following    Home O2 in place on admit: Yes

## 2021-08-13 NOTE — PROGRESS NOTES
IM Progress Note    Admit Date:  8/4/2021  8    Interval history:  Copd exacerbation on vent     Subjective:  Ms. Jaz Dailey sedated on the ventilator. She is on Precedex, propofol, fentanyl. She failed spontaneous breathing trial after an hour and a half. She has a low-grade fever 100.7. Patient got agitated during spontaneous breathing trial with hypoxemia. T-max 100.2.    8/11-she failed spontaneous breathing trial today. She was on the trial for 2 hours but blood gases did not look good and she was placed back on mechanical ventilation. 8/12- extubated today and on Bipap. Doing well. A little anxious. No chest pain. T-max 99.1    8/13- she is doing better. On 3 L of oxygen. Anxious. He is on Precedex. This is being weaned down. Objective:   /68   Pulse 80   Temp 99.2 °F (37.3 °C) (Bladder)   Resp 16   Ht 5' 4\" (1.626 m)   Wt 220 lb 9.6 oz (100.1 kg)   SpO2 99%   BMI 37.87 kg/m²       Intake/Output Summary (Last 24 hours) at 8/13/2021 1548  Last data filed at 8/13/2021 1436  Gross per 24 hour   Intake 3378.11 ml   Output 1525 ml   Net 1853.11 ml       Physical Exam:    General: Patient is extubated. Awake and alert. On BiPAP. Mildly anxious. Answers all questions. Mucous Membranes:  Pink , anicteric  Neck: No JVD, no carotid bruit, no thyromegaly  Chest: Diminished breath sounds at bases. Mild wheezing. Cardiovascular:  RRR S1S2 heard, no murmurs or gallops  Abdomen:  Soft, undistended, non tender, no organomegaly, BS present  Extremities: No edema or cyanosis. Distal pulses well felt  Neurological : Awake and alert. Mildly anxious. Moves all 4 extremities.       Medications:   Scheduled Medications:    [START ON 8/14/2021] insulin glargine  15 Units Subcutaneous QAM    predniSONE  30 mg Oral Daily with breakfast    insulin lispro  0-18 Units Subcutaneous 4x Daily AC & HS    melatonin  10 mg Oral Nightly    ipratropium-albuterol  3 mL Inhalation Q4H    lidocaine 1 % injection  5 mL Intradermal Once    sodium chloride flush  5-40 mL Intravenous 2 times per day    cefepime  2,000 mg Intravenous Once    nicotine  1 patch Transdermal Daily    enoxaparin  40 mg Subcutaneous Daily    tobramycin-dexamethasone  1 drop Both Eyes 6 times per day     I   sodium chloride      dexmedetomidine HCl in NaCl 0.5 mcg/kg/hr (08/13/21 1436)    dextrose       sodium chloride flush, sodium chloride, glucose, dextrose, glucagon (rDNA), dextrose, polyethylene glycol, acetaminophen **OR** acetaminophen, ondansetron, albuterol    Lab Data:  Recent Labs     08/11/21 0528 08/12/21 0403 08/13/21  0425   WBC 4.5 4.0 7.7   HGB 14.1 13.8 13.5   HCT 43.1 42.7 42.8   MCV 88.9 89.7 90.1   * 123* 155     Recent Labs     08/11/21 0528 08/12/21 0403 08/13/21  0425   * 137 141   K 3.9 4.1 3.5   CL 97* 101 105   CO2 26 27 28   BUN 14 16 16   CREATININE <0.5* <0.5* <0.5*     No results for input(s): CKTOTAL, CKMB, CKMBINDEX, TROPONINI in the last 72 hours. Coagulation:   Lab Results   Component Value Date    INR 1.28 08/06/2021     Cardiac markers:   Lab Results   Component Value Date    TROPONINI <0.01 08/04/2021         Lab Results   Component Value Date    ALT 32 08/12/2021    AST 40 (H) 08/12/2021    ALKPHOS 133 (H) 08/12/2021    BILITOT 0.7 08/12/2021       Lab Results   Component Value Date    INR 1.28 (H) 08/06/2021    PROTIME 14.6 (H) 08/06/2021       Radiology    XR CHEST PORTABLE   Final Result   Unchanged small right pleural effusion. Lines and tubes are in good position as described above. XR CHEST PORTABLE   Final Result   Interval development of small right-sided pleural effusion. XR CHEST PORTABLE   Final Result   1. Stable appropriate positions of support apparatus. 2. No significant change in appearance of diffuse hazy opacity throughout   both lungs, likely a combination of asymmetric edema and multifocal pneumonia.          XR CHEST PORTABLE   Final Result   Increased pulmonary vascular congestion since yesterday. Decreased right   infrahilar atelectasis or pneumonia. XR CHEST PORTABLE   Final Result   Stable exam demonstrating cardiomegaly with vascular congestion and   persistent strandy airspace disease in the right lung base presumably   atelectasis unless clinical findings suggest pneumonia         XR CHEST PORTABLE   Final Result   Cardiomegaly with pulmonary vascular congestion. No definite pulmonary   edema. Atelectasis in the lung bases         IR PICC WO SQ PORT/PUMP > 5 YEARS   Final Result      XR CHEST PORTABLE   Final Result   Mild cardiomegaly with borderline vascular congestion, accentuated by low   lung volume. XR CHEST PORTABLE   Final Result   1. Endotracheal tube tip is 6-7 cm above the antoine. 2. The enteric tube courses into the stomach with the tip not included on   this examination of the chest.         CT CHEST PULMONARY EMBOLISM W CONTRAST   Final Result   No evidence of pulmonary embolism or acute pulmonary abnormality. XR CHEST PORTABLE   Final Result   1. Vascular cephalization, consistent with mild central venous congestion            Cultures:   Urine mixed hadley  Blood - NGTD  covid neg    ASSESSMENT/PLAN:    Acute respiratory failure with hypoxia and hypercapnia, likely related to aeCOPD exacerbation   - failed bipap and now on vent support. She was extubated on 8/12/2021. She is on 3 L of oxygen. Doing well. Will try to wean off Precedex  - continue steroids, IBD  - imaging with no clear infection   - finished rocephin day#7/7 and azithromycin day#5/5      UTi-finished Rocephin  cx neg   No sepsis     Elevated LFT's, improving     Acute encephalopathy. Resolved. Related to hypercarbia and UTI    Hx of drug abuse with related admissions in the past - snorts fentanyl  Daughter reports she quit drugs since last time     Lactic acidosis, 2.2, 1.0, resolved. Tobacco Abuse-need cessation. DM2, hold oral Rx, chk A1c, on lantus and and low-dose sliding scale. Change to before meals and at bedtime. Start diet.       DVT Prophylaxis: Lx  Diet: Speech therapy evaluation-to recommend soft and bite-size solids and thin liquids. Meds: Purée.   Code Status: Full Code    Bella Escobar MD, 8/13/2021 3:48 PM

## 2021-08-13 NOTE — PROGRESS NOTES
@20:49- Patient very confused, oriented x 2 -self and place (\"hospital\"). Repeatedly removing BiPAP mask, attempting to get out of bed and pulling at lines. HR tach up briefly to 140s ST at this time. Precedex gtt is maxed, running at [employer]. She already received scheduled nightly valium 5mg. Perfectserve to Dr. Landen Guillaume, nocturnist, regarding the above. New orders for melatonin received. See MAR for administration. @22:08- Patient now resting calmly in bed, eyes closed and unlabored breathing. BiPAP in place, 16/8 and 45%.

## 2021-08-13 NOTE — PROGRESS NOTES
08/13/21 0300   NIV Type   Equipment Type v60   Mode Bilevel   Mask Type Full face mask   Mask Size Medium   Settings/Measurements   IPAP 16 cmH20   CPAP/EPAP 8 cmH2O   Rate Ordered 14   Resp 19   FiO2  35 %   Vt Exhaled 608 mL   Minute Volume 12 Liters   Mask Leak (lpm) 10 lpm   Comfort Level Good   Using Accessory Muscles No   SpO2 99   Alarm Settings   Alarms On Y   Press Low Alarm 8 cmH2O   High Pressure Alarm 30 cmH2O   Delay Alarm 20 sec(s)   Resp Rate Low Alarm 14   High Respiratory Rate 40 br/min   Oxygen Therapy/Pulse Ox   SpO2 100 %

## 2021-08-14 ENCOUNTER — APPOINTMENT (OUTPATIENT)
Dept: GENERAL RADIOLOGY | Age: 56
DRG: 207 | End: 2021-08-14
Payer: MEDICARE

## 2021-08-14 LAB
ANION GAP SERPL CALCULATED.3IONS-SCNC: 11 MMOL/L (ref 3–16)
BASOPHILS ABSOLUTE: 0 K/UL (ref 0–0.2)
BASOPHILS RELATIVE PERCENT: 0.3 %
BUN BLDV-MCNC: 14 MG/DL (ref 7–20)
CALCIUM SERPL-MCNC: 9.4 MG/DL (ref 8.3–10.6)
CHLORIDE BLD-SCNC: 101 MMOL/L (ref 99–110)
CO2: 32 MMOL/L (ref 21–32)
CREAT SERPL-MCNC: <0.5 MG/DL (ref 0.6–1.1)
EOSINOPHILS ABSOLUTE: 0 K/UL (ref 0–0.6)
EOSINOPHILS RELATIVE PERCENT: 0.3 %
GFR AFRICAN AMERICAN: >60
GFR NON-AFRICAN AMERICAN: >60
GLUCOSE BLD-MCNC: 103 MG/DL (ref 70–99)
GLUCOSE BLD-MCNC: 103 MG/DL (ref 70–99)
GLUCOSE BLD-MCNC: 79 MG/DL (ref 70–99)
GLUCOSE BLD-MCNC: 90 MG/DL (ref 70–99)
GLUCOSE BLD-MCNC: 99 MG/DL (ref 70–99)
HCT VFR BLD CALC: 45.5 % (ref 36–48)
HEMOGLOBIN: 14.4 G/DL (ref 12–16)
LYMPHOCYTES ABSOLUTE: 1 K/UL (ref 1–5.1)
LYMPHOCYTES RELATIVE PERCENT: 9.1 %
MCH RBC QN AUTO: 28.3 PG (ref 26–34)
MCHC RBC AUTO-ENTMCNC: 31.7 G/DL (ref 31–36)
MCV RBC AUTO: 89.2 FL (ref 80–100)
MONOCYTES ABSOLUTE: 0.9 K/UL (ref 0–1.3)
MONOCYTES RELATIVE PERCENT: 8.2 %
NEUTROPHILS ABSOLUTE: 8.7 K/UL (ref 1.7–7.7)
NEUTROPHILS RELATIVE PERCENT: 82.1 %
PDW BLD-RTO: 15.7 % (ref 12.4–15.4)
PERFORMED ON: ABNORMAL
PERFORMED ON: ABNORMAL
PERFORMED ON: NORMAL
PERFORMED ON: NORMAL
PLATELET # BLD: 176 K/UL (ref 135–450)
PMV BLD AUTO: 7.7 FL (ref 5–10.5)
POTASSIUM SERPL-SCNC: 3.1 MMOL/L (ref 3.5–5.1)
RBC # BLD: 5.1 M/UL (ref 4–5.2)
SODIUM BLD-SCNC: 144 MMOL/L (ref 136–145)
WBC # BLD: 10.6 K/UL (ref 4–11)

## 2021-08-14 PROCEDURE — 36592 COLLECT BLOOD FROM PICC: CPT

## 2021-08-14 PROCEDURE — 94761 N-INVAS EAR/PLS OXIMETRY MLT: CPT

## 2021-08-14 PROCEDURE — 80048 BASIC METABOLIC PNL TOTAL CA: CPT

## 2021-08-14 PROCEDURE — 2700000000 HC OXYGEN THERAPY PER DAY

## 2021-08-14 PROCEDURE — 6370000000 HC RX 637 (ALT 250 FOR IP): Performed by: INTERNAL MEDICINE

## 2021-08-14 PROCEDURE — 99233 SBSQ HOSP IP/OBS HIGH 50: CPT | Performed by: INTERNAL MEDICINE

## 2021-08-14 PROCEDURE — 2500000003 HC RX 250 WO HCPCS: Performed by: INTERNAL MEDICINE

## 2021-08-14 PROCEDURE — 94660 CPAP INITIATION&MGMT: CPT

## 2021-08-14 PROCEDURE — 94640 AIRWAY INHALATION TREATMENT: CPT

## 2021-08-14 PROCEDURE — 2580000003 HC RX 258: Performed by: INTERNAL MEDICINE

## 2021-08-14 PROCEDURE — 6360000002 HC RX W HCPCS: Performed by: INTERNAL MEDICINE

## 2021-08-14 PROCEDURE — C9113 INJ PANTOPRAZOLE SODIUM, VIA: HCPCS | Performed by: INTERNAL MEDICINE

## 2021-08-14 PROCEDURE — 99232 SBSQ HOSP IP/OBS MODERATE 35: CPT | Performed by: INTERNAL MEDICINE

## 2021-08-14 PROCEDURE — 85025 COMPLETE CBC W/AUTO DIFF WBC: CPT

## 2021-08-14 PROCEDURE — 74018 RADEX ABDOMEN 1 VIEW: CPT

## 2021-08-14 PROCEDURE — 2060000000 HC ICU INTERMEDIATE R&B

## 2021-08-14 RX ORDER — PANTOPRAZOLE SODIUM 40 MG/10ML
40 INJECTION, POWDER, LYOPHILIZED, FOR SOLUTION INTRAVENOUS DAILY
Status: DISCONTINUED | OUTPATIENT
Start: 2021-08-14 | End: 2021-08-15

## 2021-08-14 RX ORDER — DEXTROSE, SODIUM CHLORIDE, AND POTASSIUM CHLORIDE 5; .45; .15 G/100ML; G/100ML; G/100ML
INJECTION INTRAVENOUS CONTINUOUS
Status: DISCONTINUED | OUTPATIENT
Start: 2021-08-14 | End: 2021-08-19

## 2021-08-14 RX ORDER — METHYLPREDNISOLONE SODIUM SUCCINATE 40 MG/ML
20 INJECTION, POWDER, LYOPHILIZED, FOR SOLUTION INTRAMUSCULAR; INTRAVENOUS DAILY
Status: DISCONTINUED | OUTPATIENT
Start: 2021-08-14 | End: 2021-08-19 | Stop reason: HOSPADM

## 2021-08-14 RX ORDER — POTASSIUM CHLORIDE 750 MG/1
40 TABLET, EXTENDED RELEASE ORAL PRN
Status: DISCONTINUED | OUTPATIENT
Start: 2021-08-14 | End: 2021-08-19 | Stop reason: HOSPADM

## 2021-08-14 RX ORDER — MAGNESIUM SULFATE 1 G/100ML
1000 INJECTION INTRAVENOUS PRN
Status: DISCONTINUED | OUTPATIENT
Start: 2021-08-14 | End: 2021-08-19 | Stop reason: HOSPADM

## 2021-08-14 RX ORDER — POTASSIUM CHLORIDE 7.45 MG/ML
10 INJECTION INTRAVENOUS PRN
Status: DISCONTINUED | OUTPATIENT
Start: 2021-08-14 | End: 2021-08-19 | Stop reason: HOSPADM

## 2021-08-14 RX ADMIN — TOBRAMYCIN AND DEXAMETHASONE 1 DROP: 3; 1 SUSPENSION/ DROPS OPHTHALMIC at 08:30

## 2021-08-14 RX ADMIN — IPRATROPIUM BROMIDE AND ALBUTEROL SULFATE 3 ML: .5; 3 SOLUTION RESPIRATORY (INHALATION) at 19:25

## 2021-08-14 RX ADMIN — ENOXAPARIN SODIUM 40 MG: 40 INJECTION SUBCUTANEOUS at 08:23

## 2021-08-14 RX ADMIN — PANTOPRAZOLE SODIUM 40 MG: 40 INJECTION, POWDER, FOR SOLUTION INTRAVENOUS at 08:21

## 2021-08-14 RX ADMIN — POTASSIUM CHLORIDE, DEXTROSE MONOHYDRATE AND SODIUM CHLORIDE: 150; 5; 450 INJECTION, SOLUTION INTRAVENOUS at 18:18

## 2021-08-14 RX ADMIN — PREDNISONE 30 MG: 20 TABLET ORAL at 08:15

## 2021-08-14 RX ADMIN — TOBRAMYCIN AND DEXAMETHASONE 1 DROP: 3; 1 SUSPENSION/ DROPS OPHTHALMIC at 23:52

## 2021-08-14 RX ADMIN — ONDANSETRON HYDROCHLORIDE 4 MG: 2 INJECTION, SOLUTION INTRAMUSCULAR; INTRAVENOUS at 08:46

## 2021-08-14 RX ADMIN — ACETAMINOPHEN 650 MG: 325 TABLET ORAL at 04:50

## 2021-08-14 RX ADMIN — SODIUM CHLORIDE, PRESERVATIVE FREE 10 ML: 5 INJECTION INTRAVENOUS at 08:23

## 2021-08-14 RX ADMIN — SODIUM CHLORIDE, PRESERVATIVE FREE 10 ML: 5 INJECTION INTRAVENOUS at 20:00

## 2021-08-14 RX ADMIN — ONDANSETRON HYDROCHLORIDE 4 MG: 2 INJECTION, SOLUTION INTRAMUSCULAR; INTRAVENOUS at 00:59

## 2021-08-14 RX ADMIN — IPRATROPIUM BROMIDE AND ALBUTEROL SULFATE 3 ML: .5; 3 SOLUTION RESPIRATORY (INHALATION) at 08:19

## 2021-08-14 RX ADMIN — TOBRAMYCIN AND DEXAMETHASONE 1 DROP: 3; 1 SUSPENSION/ DROPS OPHTHALMIC at 04:45

## 2021-08-14 RX ADMIN — IPRATROPIUM BROMIDE AND ALBUTEROL SULFATE 3 ML: .5; 3 SOLUTION RESPIRATORY (INHALATION) at 23:19

## 2021-08-14 RX ADMIN — Medication 10 MG: at 20:00

## 2021-08-14 RX ADMIN — TOBRAMYCIN AND DEXAMETHASONE 1 DROP: 3; 1 SUSPENSION/ DROPS OPHTHALMIC at 17:26

## 2021-08-14 RX ADMIN — POTASSIUM CHLORIDE 40 MEQ: 750 TABLET, EXTENDED RELEASE ORAL at 06:05

## 2021-08-14 RX ADMIN — IPRATROPIUM BROMIDE AND ALBUTEROL SULFATE 3 ML: .5; 3 SOLUTION RESPIRATORY (INHALATION) at 15:52

## 2021-08-14 RX ADMIN — POTASSIUM CHLORIDE, DEXTROSE MONOHYDRATE AND SODIUM CHLORIDE: 150; 5; 450 INJECTION, SOLUTION INTRAVENOUS at 06:05

## 2021-08-14 RX ADMIN — TOBRAMYCIN AND DEXAMETHASONE 1 DROP: 3; 1 SUSPENSION/ DROPS OPHTHALMIC at 20:00

## 2021-08-14 RX ADMIN — IPRATROPIUM BROMIDE AND ALBUTEROL SULFATE 3 ML: .5; 3 SOLUTION RESPIRATORY (INHALATION) at 12:26

## 2021-08-14 ASSESSMENT — PAIN SCALES - GENERAL
PAINLEVEL_OUTOF10: 0

## 2021-08-14 NOTE — PROGRESS NOTES
IM Progress Note    Admit Date:  8/4/2021  9    Interval history:      Copd exacerbation. S/P mech vent   Extubated to BiPAP on 8/12/2021. O2 requirement down to 3 L of oxygen on 8/13    Subjective:  Ms. Lane Cline seen. 8/14  Patient stable and has been transferred to PCU. she was extubated on 8/12/2021. oxygen saturation stable on 2 L .   overnight she had recurrent emesis- NG placed to intermittent low wall suction. Currently n.p.o. getting IV fluids. she is awake and alert, she is in no distress. abdomen is still distended, she is not passing any flatus    Objective:   BP (!) 140/76   Pulse 100   Temp 98.5 °F (36.9 °C) (Oral)   Resp 18   Ht 5' 4\" (1.626 m)   Wt 224 lb 3.3 oz (101.7 kg)   SpO2 96%   BMI 38.49 kg/m²       Intake/Output Summary (Last 24 hours) at 8/14/2021 1413  Last data filed at 8/14/2021 0836  Gross per 24 hour   Intake 1127.64 ml   Output 2455 ml   Net -1327.36 ml       Physical Exam:  General: Obese. Awake alert and well-oriented. NG to intermittent low wall suction. Mucous Membranes:  Pink , anicteric  Neck: No JVD, no carotid bruit, no thyromegaly  Chest: Clear to auscultation . No wheezes rales or rhonchi   Cardiovascular:  RRR S1S2 heard, no murmurs or gallops  Abdomen: Distended , bowel sounds are sluggish . Extremities: No edema or cyanosis. Distal pulses well felt  Neurological : Awake and alert. Nonfocal. Moves all 4 extremities.    Psych: anxiety has decreased today      Medications:   Scheduled Medications:    pantoprazole  40 mg Intravenous Daily    methylPREDNISolone  20 mg Intravenous Daily    [Held by provider] insulin glargine  15 Units Subcutaneous QAM    ipratropium-albuterol  3 mL Inhalation Q4H While awake    insulin lispro  0-18 Units Subcutaneous 4x Daily AC & HS    melatonin  10 mg Oral Nightly    lidocaine 1 % injection  5 mL Intradermal Once    sodium chloride flush  5-40 mL Intravenous 2 times per day    cefepime  2,000 mg Intravenous Once    nicotine  1 patch Transdermal Daily    enoxaparin  40 mg Subcutaneous Daily    tobramycin-dexamethasone  1 drop Both Eyes 6 times per day     I   dextrose 5% and 0.45% NaCl with KCl 20 mEq 100 mL/hr at 08/14/21 0836    sodium chloride      dextrose       potassium chloride **OR** potassium alternative oral replacement **OR** potassium chloride, magnesium sulfate, sodium chloride flush, sodium chloride, glucose, dextrose, glucagon (rDNA), dextrose, polyethylene glycol, acetaminophen **OR** acetaminophen, ondansetron, albuterol    Lab Data:  Recent Labs     08/12/21  0403 08/13/21  0425 08/14/21  0504   WBC 4.0 7.7 10.6   HGB 13.8 13.5 14.4   HCT 42.7 42.8 45.5   MCV 89.7 90.1 89.2   * 155 176     Recent Labs     08/12/21  0403 08/13/21  0425 08/14/21  0504    141 144   K 4.1 3.5 3.1*    105 101   CO2 27 28 32   BUN 16 16 14   CREATININE <0.5* <0.5* <0.5*     No results for input(s): CKTOTAL, CKMB, CKMBINDEX, TROPONINI in the last 72 hours. Coagulation:   Lab Results   Component Value Date    INR 1.28 08/06/2021     Cardiac markers:   Lab Results   Component Value Date    TROPONINI <0.01 08/04/2021         Lab Results   Component Value Date    ALT 32 08/12/2021    AST 40 (H) 08/12/2021    ALKPHOS 133 (H) 08/12/2021    BILITOT 0.7 08/12/2021       Lab Results   Component Value Date    INR 1.28 (H) 08/06/2021    PROTIME 14.6 (H) 08/06/2021       Radiology    XR ABDOMEN FOR NG/OG/NE TUBE PLACEMENT   Final Result   The enteric tube is in good position in the stomach. XR ABDOMEN (KUB) (SINGLE AP VIEW)   Final Result   Nonspecific but probably nonobstructive bowel gas pattern. XR CHEST PORTABLE   Final Result   Unchanged small right pleural effusion. Lines and tubes are in good position as described above. XR CHEST PORTABLE   Final Result   Interval development of small right-sided pleural effusion. XR CHEST PORTABLE   Final Result   1.  Stable appropriate positions of support apparatus. 2. No significant change in appearance of diffuse hazy opacity throughout   both lungs, likely a combination of asymmetric edema and multifocal pneumonia. XR CHEST PORTABLE   Final Result   Increased pulmonary vascular congestion since yesterday. Decreased right   infrahilar atelectasis or pneumonia. XR CHEST PORTABLE   Final Result   Stable exam demonstrating cardiomegaly with vascular congestion and   persistent strandy airspace disease in the right lung base presumably   atelectasis unless clinical findings suggest pneumonia         XR CHEST PORTABLE   Final Result   Cardiomegaly with pulmonary vascular congestion. No definite pulmonary   edema. Atelectasis in the lung bases         IR PICC WO SQ PORT/PUMP > 5 YEARS   Final Result      XR CHEST PORTABLE   Final Result   Mild cardiomegaly with borderline vascular congestion, accentuated by low   lung volume. XR CHEST PORTABLE   Final Result   1. Endotracheal tube tip is 6-7 cm above the antoine. 2. The enteric tube courses into the stomach with the tip not included on   this examination of the chest.         CT CHEST PULMONARY EMBOLISM W CONTRAST   Final Result   No evidence of pulmonary embolism or acute pulmonary abnormality. XR CHEST PORTABLE   Final Result   1. Vascular cephalization, consistent with mild central venous congestion         XR CHEST PORTABLE    (Results Pending)      Cultures:   Urine mixed hadley  Blood - NGTD  covid neg      ASSESSMENT/PLAN:    Acute respiratory failure with hypoxia and hypercapnia  - related to COPD exacerbation   - failed bipap and placed on mechanical ventilation. she was extubated on 8/12/2021. She is on 3 L -> 2L of oxygen. Doing well.  -Weaned off of Precedex. Transfer out of ICU to PCU.   - continue steroids, IBD  - imaging with no clear infection   - finished rocephin day#7/7 and azithromycin day#5/5    UTi  -cx neg   -Antibiotics as above    No sepsis     Ileus  With intractable nausea and vomiting last night  -NG placed  -Keep n.p.o., continue NG to intermittent low wall suction, continue IV fluids    Elevated LFT's  - improving     Acute encephalopathy  - Resolved. Related to hypercarbia and UTI    Hx of drug abuse with related admissions in the past   - snorts fentanyl  Daughter reports she quit drugs since last time     Lactic acidosis  -2.2, 1.0, resolved. Tobacco Abuse  -need cessation. DM2  -On Lantus -held as patient is n.p.o. . Continue sliding scale insulin . Generalized weakness  -Consult PT OT    Hypernatremia, hypokalemia  -Sodium 144, potassium 3.1  -On IV fluids D5 half-normal saline with KCl       DVT Prophylaxis: Lx  Diet: Speech therapy evaluation- recommend soft and bite-size solids and thin liquids. Meds: Purée.   NPO due to nausea and vomiting  Code Status: Full Code          Nicole Isabel MD, 8/14/2021 2:13 PM

## 2021-08-14 NOTE — PROGRESS NOTES
Pulmonary and Critical Care Progress Note    CC: Acute on Chronic Respiratory failure    Subjective:   Patient with emesis x 2 overnight, NGT placed   Precedex off    IV line: PICC 8/6     MV: 8/5-8/12       Intake/Output Summary (Last 24 hours) at 8/14/2021 0741  Last data filed at 8/14/2021 0600  Gross per 24 hour   Intake 1882.28 ml   Output 2845 ml   Net -962.72 ml       Exam:   BP (!) 138/114   Pulse 113   Temp 100.6 °F (38.1 °C) (Bladder) Comment: Tylenol administered  Resp 19   Ht 5' 4\" (1.626 m)   Wt 224 lb 3.3 oz (101.7 kg)   SpO2 98%   BMI 38.49 kg/m²  on 2l NC  General: ill appearing. NCAT fatigued. Eyes: PERRL. No sclera icterus. No conjunctival injection. ENT: No discharge. Pharynx clear. NGT in place  Neck: Trachea midline. Resp: No accessory muscle use. No crackles. No wheezing. No rhonchi. No dullness on percussion. CV: Regular rate. Regular rhythm. No mumur or rub. No edema. GI: Non-tender. + distended. No masses. Skin: Warm and dry. No nodule on exposed extremities. No rash on exposed extremities. M/S: No cyanosis. No joint deformity. No clubbing. Neuro: awake and alert,  follows commands. Moves extremities.        Scheduled Meds:   insulin glargine  15 Units Subcutaneous QAM    ipratropium-albuterol  3 mL Inhalation Q4H While awake    predniSONE  30 mg Oral Daily with breakfast    insulin lispro  0-18 Units Subcutaneous 4x Daily AC & HS    melatonin  10 mg Oral Nightly    lidocaine 1 % injection  5 mL Intradermal Once    sodium chloride flush  5-40 mL Intravenous 2 times per day    cefepime  2,000 mg Intravenous Once    nicotine  1 patch Transdermal Daily    enoxaparin  40 mg Subcutaneous Daily    tobramycin-dexamethasone  1 drop Both Eyes 6 times per day     Continuous Infusions:   dextrose 5% and 0.45% NaCl with KCl 20 mEq 100 mL/hr at 08/14/21 0605    sodium chloride      dexmedetomidine HCl in NaCl Stopped (08/13/21 1554)    dextrose       PRN Meds:  potassium chloride **OR** potassium alternative oral replacement **OR** potassium chloride, magnesium sulfate, sodium chloride flush, sodium chloride, glucose, dextrose, glucagon (rDNA), dextrose, polyethylene glycol, acetaminophen **OR** acetaminophen, ondansetron, albuterol    Labs:  CBC:   Recent Labs     08/12/21  0403 08/13/21  0425 08/14/21  0504   WBC 4.0 7.7 10.6   HGB 13.8 13.5 14.4   HCT 42.7 42.8 45.5   MCV 89.7 90.1 89.2   * 155 176     BMP:   Recent Labs     08/12/21  0403 08/13/21  0425 08/14/21  0504    141 144   K 4.1 3.5 3.1*    105 101   CO2 27 28 32   BUN 16 16 14   CREATININE <0.5* <0.5* <0.5*     LIVER PROFILE:   Recent Labs     08/12/21  0405   AST 40*   ALT 32   BILIDIR 0.5*   BILITOT 0.7   ALKPHOS 133*     PT/INR:   No results for input(s): PROTIME, INR in the last 72 hours. APTT: No results for input(s): APTT in the last 72 hours. UA:  No results for input(s): NITRITE, COLORU, PHUR, LABCAST, WBCUA, RBCUA, MUCUS, TRICHOMONAS, YEAST, BACTERIA, CLARITYU, SPECGRAV, LEUKOCYTESUR, UROBILINOGEN, BILIRUBINUR, BLOODU, GLUCOSEU, AMORPHOUS in the last 72 hours.     Invalid input(s): Perccornelia Santana  Recent Labs     08/12/21  0417 08/13/21  0551   PHART 7.433 7.341*   RDH0DTO 23.4* 56.6*   PO2ART 86.0 93.9       Cultures:   8/4 COVID-19 and influenza not detected  8/5 UC NGTD  8/5 BC NGTD    Films:  Chest x-ray 8/12 imaging was reviewed by me and showed   ET tube terminates above the antoine.  Right PICC terminates in the superior   right atrium.  Enteric tube courses through the chest below the diaphragm;   tip is not visualized.  Lungs are unchanged in appearance compared to prior   study.  No pneumothorax.  Small right pleural effusion.      CTPA 8/5/21  images reviewed by me and showed:   No evidence of pulmonary embolism or acute pulmonary abnormality     KUB 8/14: nonspecific bowel gas pattern    ASSESSMENT:  · Acute hypoxemic hypercapnic respiratory failure  · COPD with acute

## 2021-08-14 NOTE — PLAN OF CARE
Pt continued to have frequent emesis with worsening abdominal distention despite IV zofran. An NG was placed to intermittent wall suction with aspiration of > 200 cc of bilious gastric content.  Will reevaluate in the AM.

## 2021-08-14 NOTE — PROGRESS NOTES
Patient moved to dialysis holding area, preparing to transfer to PCU. Report given to Joshua Rae RN  at bedside. Patient care handed off in stable condition at this time.

## 2021-08-14 NOTE — PROGRESS NOTES
08/13/21 5584   NIV Type   $NIV $Daily Charge   Equipment Type v60   Mode Bilevel   Mask Type Full face mask   Mask Size Medium   Settings/Measurements   IPAP 16 cmH20   CPAP/EPAP 8 cmH2O   Rate Ordered 14   Resp 23   FiO2  30 %   Vt Exhaled 377 mL   Minute Volume 9.2 Liters   Mask Leak (lpm) 0 lpm   Comfort Level Good   Using Accessory Muscles No   SpO2 98   Alarm Settings   Alarms On Y   Press Low Alarm 8 cmH2O   High Pressure Alarm 30 cmH2O   Delay Alarm 20 sec(s)   Resp Rate Low Alarm 14   High Respiratory Rate 40 br/min   Oxygen Therapy/Pulse Ox   SpO2 98 %

## 2021-08-14 NOTE — PROGRESS NOTES
08/13/21 2335   Treatment   Treatment Type N   $Treatment Type $Inhaled Therapy/Meds   Medications Albuterol/Ipratropium   Pre-Tx Pulse 131   Pre-Tx Resps 22   Breath Sounds Pre-Tx JARAD Diminished   Breath Sounds Pre-Tx LLL Diminished   Breath Sounds Pre-Tx RUL Diminished   Breath Sounds Pre-Tx RML Diminished   Breath Sounds Pre-Tx RLL Diminished   Breath Sounds Post-Tx JARAD Diminished; Expiratory Wheezes   Breath Sounds Post-Tx LLL Diminished   Breath Sounds Post-Tx RUL Diminished; Expiratory Wheezes   Breath Sounds Post-Tx RML Diminished   Breath Sounds Post-Tx RLL Diminished   Delivery Source Air   Position Semi-Donaldson's   Tx Tolerance Well   Is patient on O2?  Y   Oxygen Therapy/Pulse Ox   O2 Therapy Oxygen   O2 Device PAP (positive airway pressure)   FiO2  30 %   Resp 20   SpO2 98 %

## 2021-08-14 NOTE — PROGRESS NOTES
Shift assessment completed. Patient A/O to self and place. Gets confused easily, but redirectible. Restraints in place due to patient attempting to pull NC off and NG out. PROM performed. Currently on 2L NC. Oxygen saturation in the mid to upper 90's. NG at 65, to intermittent LWS. Scant greenish output obtained at this time. D5 1/2 NS with 20 mE of K+ running at 100 mL/h. Abdomen is rounded, bowel sounds hypoactive at this time. ST on monitor between 100-110. VSS. Patient repositioned for comfort.     Electronically signed by Salma Green RN on 8/14/2021 at 8:35 AM    Vitals:    08/14/21 0800   BP: 138/64   Pulse: 114   Resp: 20   Temp: 100.2 °F (37.9 °C)   SpO2: 98%

## 2021-08-14 NOTE — PROGRESS NOTES
Shift report given to nurse Juan Francisco Garcia at bedside. Patient care handed off in stable condition at this time.  Tamar Craft RN

## 2021-08-14 NOTE — PLAN OF CARE
Problem: Nutrition  Goal: Optimal nutrition therapy  Outcome: Ongoing  Goal: Understanding of nutritional guidelines  Outcome: Ongoing     Problem: Falls - Risk of:  Goal: Will remain free from falls  Description: Will remain free from falls  Outcome: Ongoing  Goal: Absence of physical injury  Description: Absence of physical injury  Outcome: Ongoing     Problem: Skin Integrity:  Goal: Will show no infection signs and symptoms  Description: Will show no infection signs and symptoms  Outcome: Ongoing  Goal: Absence of new skin breakdown  Description: Absence of new skin breakdown  Outcome: Ongoing     Problem: Confusion - Acute:  Goal: Absence of continued neurological deterioration signs and symptoms  Description: Absence of continued neurological deterioration signs and symptoms  Outcome: Ongoing  Goal: Mental status will be restored to baseline  Description: Mental status will be restored to baseline  Outcome: Ongoing     Problem: Discharge Planning:  Goal: Ability to perform activities of daily living will improve  Description: Ability to perform activities of daily living will improve  Outcome: Ongoing  Goal: Participates in care planning  Description: Participates in care planning  Outcome: Ongoing     Problem: Injury - Risk of, Physical Injury:  Goal: Will remain free from falls  Description: Will remain free from falls  Outcome: Ongoing  Goal: Absence of physical injury  Description: Absence of physical injury  Outcome: Ongoing     Problem: Mood - Altered:  Goal: Mood stable  Description: Mood stable  Outcome: Ongoing  Goal: Absence of abusive behavior  Description: Absence of abusive behavior  Outcome: Ongoing  Goal: Verbalizations of feeling emotionally comfortable while being cared for will increase  Description: Verbalizations of feeling emotionally comfortable while being cared for will increase  Outcome: Ongoing     Problem: Psychomotor Activity - Altered:  Goal: Absence of psychomotor disturbance signs and symptoms  Description: Absence of psychomotor disturbance signs and symptoms  Outcome: Ongoing     Problem: Sensory Perception - Impaired:  Goal: Demonstrations of improved sensory functioning will increase  Description: Demonstrations of improved sensory functioning will increase  Outcome: Ongoing  Goal: Decrease in sensory misperception frequency  Description: Decrease in sensory misperception frequency  Outcome: Ongoing  Goal: Able to refrain from responding to false sensory perceptions  Description: Able to refrain from responding to false sensory perceptions  Outcome: Ongoing  Goal: Demonstrates accurate environmental perceptions  Description: Demonstrates accurate environmental perceptions  Outcome: Ongoing  Goal: Able to distinguish between reality-based and nonreality-based thinking  Description: Able to distinguish between reality-based and nonreality-based thinking  Outcome: Ongoing  Goal: Able to interrupt nonreality-based thinking  Description: Able to interrupt nonreality-based thinking  Outcome: Ongoing     Problem: Sleep Pattern Disturbance:  Goal: Appears well-rested  Description: Appears well-rested  Outcome: Ongoing

## 2021-08-14 NOTE — PROGRESS NOTES
Patient experienced emesis x1 since shift change. Patient pulled out NG. NG replaced in the left nare, at the 65 saturnino and hooked to intermittent LWS.

## 2021-08-15 ENCOUNTER — APPOINTMENT (OUTPATIENT)
Dept: GENERAL RADIOLOGY | Age: 56
DRG: 207 | End: 2021-08-15
Payer: MEDICARE

## 2021-08-15 LAB
ANION GAP SERPL CALCULATED.3IONS-SCNC: 10 MMOL/L (ref 3–16)
BASOPHILS ABSOLUTE: 0 K/UL (ref 0–0.2)
BASOPHILS RELATIVE PERCENT: 0.3 %
BUN BLDV-MCNC: 16 MG/DL (ref 7–20)
CALCIUM SERPL-MCNC: 9.1 MG/DL (ref 8.3–10.6)
CHLORIDE BLD-SCNC: 100 MMOL/L (ref 99–110)
CO2: 33 MMOL/L (ref 21–32)
CREAT SERPL-MCNC: <0.5 MG/DL (ref 0.6–1.1)
EOSINOPHILS ABSOLUTE: 0 K/UL (ref 0–0.6)
EOSINOPHILS RELATIVE PERCENT: 0.6 %
GFR AFRICAN AMERICAN: >60
GFR NON-AFRICAN AMERICAN: >60
GLUCOSE BLD-MCNC: 140 MG/DL (ref 70–99)
GLUCOSE BLD-MCNC: 185 MG/DL (ref 70–99)
GLUCOSE BLD-MCNC: 197 MG/DL (ref 70–99)
GLUCOSE BLD-MCNC: 219 MG/DL (ref 70–99)
GLUCOSE BLD-MCNC: 221 MG/DL (ref 70–99)
GLUCOSE BLD-MCNC: 224 MG/DL (ref 70–99)
GLUCOSE BLD-MCNC: 233 MG/DL (ref 70–99)
HCT VFR BLD CALC: 42.8 % (ref 36–48)
HEMOGLOBIN: 13.8 G/DL (ref 12–16)
LYMPHOCYTES ABSOLUTE: 0.7 K/UL (ref 1–5.1)
LYMPHOCYTES RELATIVE PERCENT: 9.4 %
MCH RBC QN AUTO: 28.9 PG (ref 26–34)
MCHC RBC AUTO-ENTMCNC: 32.3 G/DL (ref 31–36)
MCV RBC AUTO: 89.3 FL (ref 80–100)
MONOCYTES ABSOLUTE: 0.5 K/UL (ref 0–1.3)
MONOCYTES RELATIVE PERCENT: 6.5 %
NEUTROPHILS ABSOLUTE: 6.1 K/UL (ref 1.7–7.7)
NEUTROPHILS RELATIVE PERCENT: 83.2 %
PDW BLD-RTO: 15.4 % (ref 12.4–15.4)
PERFORMED ON: ABNORMAL
PLATELET # BLD: 140 K/UL (ref 135–450)
PMV BLD AUTO: 7.8 FL (ref 5–10.5)
POTASSIUM SERPL-SCNC: 3.6 MMOL/L (ref 3.5–5.1)
RBC # BLD: 4.79 M/UL (ref 4–5.2)
SODIUM BLD-SCNC: 143 MMOL/L (ref 136–145)
WBC # BLD: 7.4 K/UL (ref 4–11)

## 2021-08-15 PROCEDURE — 2500000003 HC RX 250 WO HCPCS: Performed by: INTERNAL MEDICINE

## 2021-08-15 PROCEDURE — 97530 THERAPEUTIC ACTIVITIES: CPT

## 2021-08-15 PROCEDURE — 6370000000 HC RX 637 (ALT 250 FOR IP): Performed by: INTERNAL MEDICINE

## 2021-08-15 PROCEDURE — 6360000002 HC RX W HCPCS: Performed by: INTERNAL MEDICINE

## 2021-08-15 PROCEDURE — 94640 AIRWAY INHALATION TREATMENT: CPT

## 2021-08-15 PROCEDURE — 85025 COMPLETE CBC W/AUTO DIFF WBC: CPT

## 2021-08-15 PROCEDURE — 2580000003 HC RX 258: Performed by: INTERNAL MEDICINE

## 2021-08-15 PROCEDURE — 97167 OT EVAL HIGH COMPLEX 60 MIN: CPT

## 2021-08-15 PROCEDURE — 2700000000 HC OXYGEN THERAPY PER DAY

## 2021-08-15 PROCEDURE — 99233 SBSQ HOSP IP/OBS HIGH 50: CPT | Performed by: INTERNAL MEDICINE

## 2021-08-15 PROCEDURE — C9113 INJ PANTOPRAZOLE SODIUM, VIA: HCPCS | Performed by: INTERNAL MEDICINE

## 2021-08-15 PROCEDURE — 94761 N-INVAS EAR/PLS OXIMETRY MLT: CPT

## 2021-08-15 PROCEDURE — 94660 CPAP INITIATION&MGMT: CPT

## 2021-08-15 PROCEDURE — 97162 PT EVAL MOD COMPLEX 30 MIN: CPT

## 2021-08-15 PROCEDURE — 6360000002 HC RX W HCPCS: Performed by: HOSPITALIST

## 2021-08-15 PROCEDURE — 97165 OT EVAL LOW COMPLEX 30 MIN: CPT

## 2021-08-15 PROCEDURE — C9113 INJ PANTOPRAZOLE SODIUM, VIA: HCPCS | Performed by: HOSPITALIST

## 2021-08-15 PROCEDURE — 97535 SELF CARE MNGMENT TRAINING: CPT

## 2021-08-15 PROCEDURE — 97110 THERAPEUTIC EXERCISES: CPT

## 2021-08-15 PROCEDURE — 80048 BASIC METABOLIC PNL TOTAL CA: CPT

## 2021-08-15 PROCEDURE — 2060000000 HC ICU INTERMEDIATE R&B

## 2021-08-15 RX ORDER — METOCLOPRAMIDE HYDROCHLORIDE 5 MG/ML
10 INJECTION INTRAMUSCULAR; INTRAVENOUS ONCE
Status: COMPLETED | OUTPATIENT
Start: 2021-08-15 | End: 2021-08-15

## 2021-08-15 RX ORDER — PANTOPRAZOLE SODIUM 40 MG/10ML
40 INJECTION, POWDER, LYOPHILIZED, FOR SOLUTION INTRAVENOUS 2 TIMES DAILY
Status: DISCONTINUED | OUTPATIENT
Start: 2021-08-15 | End: 2021-08-19 | Stop reason: HOSPADM

## 2021-08-15 RX ORDER — TRAMADOL HYDROCHLORIDE 50 MG/1
50 TABLET ORAL EVERY 6 HOURS PRN
Status: DISCONTINUED | OUTPATIENT
Start: 2021-08-15 | End: 2021-08-19 | Stop reason: HOSPADM

## 2021-08-15 RX ADMIN — IPRATROPIUM BROMIDE AND ALBUTEROL SULFATE 3 ML: .5; 3 SOLUTION RESPIRATORY (INHALATION) at 11:37

## 2021-08-15 RX ADMIN — SODIUM CHLORIDE, PRESERVATIVE FREE 10 ML: 5 INJECTION INTRAVENOUS at 09:45

## 2021-08-15 RX ADMIN — TOBRAMYCIN AND DEXAMETHASONE 1 DROP: 3; 1 SUSPENSION/ DROPS OPHTHALMIC at 09:35

## 2021-08-15 RX ADMIN — TOBRAMYCIN AND DEXAMETHASONE 1 DROP: 3; 1 SUSPENSION/ DROPS OPHTHALMIC at 15:47

## 2021-08-15 RX ADMIN — IPRATROPIUM BROMIDE AND ALBUTEROL SULFATE 3 ML: .5; 3 SOLUTION RESPIRATORY (INHALATION) at 18:58

## 2021-08-15 RX ADMIN — Medication 10 MG: at 21:24

## 2021-08-15 RX ADMIN — IPRATROPIUM BROMIDE AND ALBUTEROL SULFATE 3 ML: .5; 3 SOLUTION RESPIRATORY (INHALATION) at 22:55

## 2021-08-15 RX ADMIN — TOBRAMYCIN AND DEXAMETHASONE 1 DROP: 3; 1 SUSPENSION/ DROPS OPHTHALMIC at 21:23

## 2021-08-15 RX ADMIN — POTASSIUM CHLORIDE, DEXTROSE MONOHYDRATE AND SODIUM CHLORIDE: 150; 5; 450 INJECTION, SOLUTION INTRAVENOUS at 02:51

## 2021-08-15 RX ADMIN — PANTOPRAZOLE SODIUM 40 MG: 40 INJECTION, POWDER, FOR SOLUTION INTRAVENOUS at 21:24

## 2021-08-15 RX ADMIN — ONDANSETRON HYDROCHLORIDE 4 MG: 2 INJECTION, SOLUTION INTRAMUSCULAR; INTRAVENOUS at 05:24

## 2021-08-15 RX ADMIN — IPRATROPIUM BROMIDE AND ALBUTEROL SULFATE 3 ML: .5; 3 SOLUTION RESPIRATORY (INHALATION) at 15:54

## 2021-08-15 RX ADMIN — TOBRAMYCIN AND DEXAMETHASONE 1 DROP: 3; 1 SUSPENSION/ DROPS OPHTHALMIC at 04:00

## 2021-08-15 RX ADMIN — TOBRAMYCIN AND DEXAMETHASONE 1 DROP: 3; 1 SUSPENSION/ DROPS OPHTHALMIC at 12:08

## 2021-08-15 RX ADMIN — POTASSIUM CHLORIDE, DEXTROSE MONOHYDRATE AND SODIUM CHLORIDE: 150; 5; 450 INJECTION, SOLUTION INTRAVENOUS at 13:14

## 2021-08-15 RX ADMIN — METHYLPREDNISOLONE SODIUM SUCCINATE 20 MG: 40 INJECTION, POWDER, FOR SOLUTION INTRAMUSCULAR; INTRAVENOUS at 09:36

## 2021-08-15 RX ADMIN — POTASSIUM CHLORIDE, DEXTROSE MONOHYDRATE AND SODIUM CHLORIDE: 150; 5; 450 INJECTION, SOLUTION INTRAVENOUS at 22:17

## 2021-08-15 RX ADMIN — ACETAMINOPHEN 650 MG: 650 SUPPOSITORY RECTAL at 21:33

## 2021-08-15 RX ADMIN — PANTOPRAZOLE SODIUM 40 MG: 40 INJECTION, POWDER, FOR SOLUTION INTRAVENOUS at 09:36

## 2021-08-15 RX ADMIN — ENOXAPARIN SODIUM 40 MG: 40 INJECTION SUBCUTANEOUS at 09:40

## 2021-08-15 RX ADMIN — IPRATROPIUM BROMIDE AND ALBUTEROL SULFATE 3 ML: .5; 3 SOLUTION RESPIRATORY (INHALATION) at 08:00

## 2021-08-15 RX ADMIN — METOCLOPRAMIDE 10 MG: 5 INJECTION, SOLUTION INTRAMUSCULAR; INTRAVENOUS at 12:58

## 2021-08-15 ASSESSMENT — PAIN DESCRIPTION - DESCRIPTORS: DESCRIPTORS: PRESSURE;DISCOMFORT

## 2021-08-15 ASSESSMENT — PAIN DESCRIPTION - ORIENTATION
ORIENTATION: RIGHT
ORIENTATION: INNER

## 2021-08-15 ASSESSMENT — PAIN SCALES - GENERAL
PAINLEVEL_OUTOF10: 8
PAINLEVEL_OUTOF10: 7
PAINLEVEL_OUTOF10: 8

## 2021-08-15 ASSESSMENT — PAIN DESCRIPTION - LOCATION
LOCATION: ABDOMEN
LOCATION: ABDOMEN

## 2021-08-15 ASSESSMENT — PAIN DESCRIPTION - ONSET: ONSET: GRADUAL

## 2021-08-15 ASSESSMENT — PAIN DESCRIPTION - FREQUENCY: FREQUENCY: CONTINUOUS

## 2021-08-15 ASSESSMENT — PAIN DESCRIPTION - PAIN TYPE
TYPE: ACUTE PAIN
TYPE: ACUTE PAIN

## 2021-08-15 ASSESSMENT — PAIN DESCRIPTION - DIRECTION: RADIATING_TOWARDS: FEET

## 2021-08-15 ASSESSMENT — PAIN - FUNCTIONAL ASSESSMENT: PAIN_FUNCTIONAL_ASSESSMENT: PREVENTS OR INTERFERES SOME ACTIVE ACTIVITIES AND ADLS

## 2021-08-15 ASSESSMENT — PAIN DESCRIPTION - PROGRESSION: CLINICAL_PROGRESSION: NOT CHANGED

## 2021-08-15 NOTE — PROGRESS NOTES
Resting quietly in bed with respirations WNL on 2L NC. Bilateral soft wrist restraints in place due to patient pulling NG tube out twice earlier today. Patient alert to self & place. Bed alarm on for patient's safety. Continue to monitor closely.   Goran Wilks RN

## 2021-08-15 NOTE — FLOWSHEET NOTE
08/15/21 1430   Vital Signs   Temp 99 °F (37.2 °C)   Temp Source Oral   Pulse 102   Heart Rate Source Monitor   Resp 18   BP (!) 144/75   BP Location Left upper arm   Patient Position High fowlers   Level of Consciousness Alert (0)   MEWS Score 2   Patient Currently in Pain Yes   Oxygen Therapy   SpO2 91 %   Pulse Oximeter Device Mode Intermittent   Pulse Oximeter Device Location Finger   O2 Device Nasal cannula   O2 Flow Rate (L/min) 2 L/min       Patient in bilateral wrist restraints, continues to have abdominal distention and discomfort. Family updated per telephone. Will continue to monitor progress.  Armando Watson RN

## 2021-08-15 NOTE — PROGRESS NOTES
Shift report given to nurse Warden Pepper at bedside. Patient care handed off in stable condition at this time. Daughter at bedside.  Scott López RN

## 2021-08-15 NOTE — PROGRESS NOTES
Inpatient Physical Therapy Evaluation and Treatment    Unit: PCU  Date:  8/15/2021  Patient Name:    Marlen Romero  Admitting diagnosis:  Septicemia [facility]) [A41.9]  Acute cystitis without hematuria [N30.00]  Acute respiratory failure with hypoxia and hypercapnia ([facility] [address] [J96.01, J96.02]  Admit Date:  8/4/2021  Precautions/Restrictions/WB Status/ Lines/ Wounds/ Oxygen: Fall risk, Bed/chair alarm, Lines -IV, Supplemental O2 (1.5), Brown catheter, NG tube and PICC right, Confusion, Telemetry, Continuous pulse oximetry and Isolation Precautions: Contact    Patient cleared for bed level activity, currently in soft wrist restraints due to NG tube protection  Treatment Time: 8:40-9:30  Treatment Number:  1   Timed Code Treatment Minutes: 40 minutes  Total Treatment Minutes:  50  minutes    Patient Goals for Therapy: \" to get moving \"          Discharge Recommendations: SNF  DME needs for discharge: defer to facility       Therapy recommendation for EMS Transport: requires transport by cot due to inability to transfer OOB    Therapy recommendations for staff:   Encourage bed level activites until cleared for OOB/out of wrist restraints    History Of Present Illness:     Per Ambreen 8/5/21:  \"The patient is a 61 y.o. female with PMH below, presents with CP, SOB, MS change, abd/facial edema. Pt has hx of dementia and is not able to give much hx. She denies CP currently. Hx obtained mainly from pt daughter at bedside. She reports that her mother was c/o CP last week. For the last week, she has been c/o increasing SOB. She has had increasingly productive cough of mostly thick/clear but sometimes yellow mucous.   Daughter also reports that she thinks her mom's face and abd seems more swollen than usual.\"  To ICU, intubated and ventilated from 8/5-8/12  Home Health S4 Level Recommendation:  NA  AM-PAC Mobility Score    AM-PAC Inpatient Mobility Raw Score : Metsa 36 scored a 11/24 on the AM-PAC short mobility form. Current research shows that an AM-PAC score of 17 or less is typically not associated with a discharge to the patient's home setting. If patient discharges prior to next session this note will serve as a discharge summary. Please see below for the latest assessment towards goals. Preadmission Environment  (copied from admission  and updated)  Pt. Lives with family (dtr + son and granddtr (2 y/o))  Home environment:    one story home  Steps to enter first floor:7-8 ARNOLD cannot remember if there is a railing  Steps to second floor: N/A  Bathroom: tub/shower unit  Equipment owned: shower chair,RW     Preadmission Status:  Pt. Able to drive: no  Pt Fully independent with ADLs: Yes  Pt. Required assistance from family for: cooking,cleaning laundry  Pt. independent for transfers and gait and walked with RW  History of falls Yes - 2020, resulted in R ankle fx    Pain   Yes  Location: R lower abdomen  Ratin /10  Pain Medicine Status: RN notified and No request made    Cognition    A&O Person and Place   Disoriented to time and decreased insight into situation   Able to follow 1 step commands    Subjective  Patient lying supine in bed with no family present. Pt agreeable to this PT eval & tx. Upper Extremity ROM/Strength  Please see OT evaluation. Lower Extremity ROM / Strength   AROM WFL: Yes  ROM limitations:     WFL within the bed    Lower Extremity Sensation    [facility]    Lower Extremity Proprioception:   NT    Coordination and Tone  NT    Balance  Sitting:  Not tested; Not Tested  Comments:     Standing: Not tested;  Not Tested  Comments:    Bed Mobility   Supine to Sit:    Not Tested  Sit to Supine:   Not Tested  Rolling:   Not Tested  Scooting in sitting: Not Tested  Scooting in supine:  Not Tested    Transfer Training     Sit to stand:   Not Tested  Stand to sit:   Not Tested  Bed to Chair:   Not Tested with use of N/A    Gait gait deferred due to cleared for bed level activites only; pt ambulated 0 ft. Stair Training deferred, pt unsafe/ not appropriate to complete stairs at this time    Activity Tolerance   Pt completed therapy session with No adverse symptoms noted with activity    Spo2 93%  /80  Positioning Needs   Pt in bed, alarm set, positioned in proper neutral alignment and pressure relief provided. Call light provided and all needs within reach    Exercises Initiated  all completed bilaterally unless indicated  Ankle Pumps x 10 reps  Heel slides x 10 reps  Hip abduction: x 10 reps   SAQ x10    Other  RN aware of pain level    Patient/Family Education   Pt educated on role of inpatient PT, POC, importance of continued activity, DC recommendations, safety awareness and calling for assist with mobility. Assessment  Pt seen for Physical Therapy evaluation in acute care setting. Pt demonstrated decreased Activity tolerance, Safety and Strength as well as decreased independence with Ambulation, Bed Mobility  and Transfers. Patient will benefit from skilled PT intervention while in acute care. Recommending SNF at this time, pending further mobility and safety assessment. Recommending SNF upon discharge as patient functioning well below baseline, demonstrates good rehab potential and unable to return home due to inability to negotiate stairs to enter home/bedroom/bathroom, burden of care beyond caregiver ability and limited safety awareness. Goals : To be met in 3 visits:  1). Independent with LE Ex x 10 reps  2.) Bed to chair: Mod A     To be met in 6 visits:  1). Supine to/from sit: SBA  2). Sit to/from stand: SBA  3). Bed to chair: CGA  4). Gait: Ambulate 100 ft.   with  CGA and use of rolling walker (RW)  5). Tolerate B LE exercises 3 sets of 10-15 reps  6).   Ascend/descend 8 steps with CGA with use of hand rail unilateral and LRAD (least restrictive assistive device)    Rehabilitation Potential: Good  Strengths for achieving goals include:   Pt motivated, PLOF, Family Support and Pt cooperative   Barriers to achieving goals include:    Complexity of condition    Plan    To be seen 3-5 x / week  while in acute care setting for therapeutic exercises, bed mobility, transfers, progressive gait training, balance training, and family/patient education. Signature: Cierra Blankenship, PT #[id number]     If patient discharges from this facility prior to next visit, this note will serve as the Discharge Summary.

## 2021-08-15 NOTE — PROGRESS NOTES
IM Progress Note    Admit Date:  8/4/2021  10    Interval history:    58-year-old female with history of dementia, COPD, diabetes type 2 admitted with acute respiratory failure due to COPD exacerbation pneumonia. Patient was intubated on 8/ 5 and extubated to BiPAP on 8/12  O2 requirement down to 3 L of oxygen on 8/13  Patient was vomiting 2 nights ago has an NG tube now abdomen is distended  Patient denies nausea but there is no bowel movement reported  Subjective:  Ms. Marcos Pretty seen.  - NG placed to intermittent low wall suction. Not much getting   she is awake and alert, she is in no distress. abdomen is still distended,    Objective:   BP (!) 144/75   Pulse 102   Temp 99 °F (37.2 °C) (Oral)   Resp 18   Ht 5' 4\" (1.626 m)   Wt 224 lb 9.6 oz (101.9 kg)   SpO2 91%   BMI 38.55 kg/m²       Intake/Output Summary (Last 24 hours) at 8/15/2021 1555  Last data filed at 8/15/2021 1315  Gross per 24 hour   Intake 2688.65 ml   Output 575 ml   Net 2113.65 ml       Physical Exam:  General: Obese. Awake alert and well-oriented. NG to intermittent low wall suction. Mucous Membranes:  Pink , anicteric  Neck: No JVD, no carotid bruit, no thyromegaly  Chest: Clear to auscultation . No wheezes rales or rhonchi   Cardiovascular:  RRR S1S2 heard, no murmurs or gallops  Abdomen: Distended , bowel sounds are sluggish . Extremities: No edema or cyanosis. Distal pulses well felt  Neurological : Awake and alert. Nonfocal. Moves all 4 extremities.    Psych: Stable      Medications:   Scheduled Medications:    pantoprazole  40 mg Intravenous BID    methylPREDNISolone  20 mg Intravenous Daily    [Held by provider] insulin glargine  15 Units Subcutaneous QAM    ipratropium-albuterol  3 mL Inhalation Q4H While awake    insulin lispro  0-18 Units Subcutaneous 4x Daily AC & HS    melatonin  10 mg Oral Nightly    lidocaine 1 % injection  5 mL Intradermal Once    sodium chloride flush  5-40 mL Intravenous 2 times per day  cefepime  2,000 mg Intravenous Once    nicotine  1 patch Transdermal Daily    enoxaparin  40 mg Subcutaneous Daily    tobramycin-dexamethasone  1 drop Both Eyes 6 times per day     I   dextrose 5% and 0.45% NaCl with KCl 20 mEq 100 mL/hr at 08/15/21 1315    sodium chloride      dextrose       traMADol, potassium chloride **OR** potassium alternative oral replacement **OR** potassium chloride, magnesium sulfate, sodium chloride flush, sodium chloride, glucose, dextrose, glucagon (rDNA), dextrose, polyethylene glycol, acetaminophen **OR** acetaminophen, ondansetron, albuterol    Lab Data:  Recent Labs     08/13/21  0425 08/14/21  0504 08/15/21  0515   WBC 7.7 10.6 7.4   HGB 13.5 14.4 13.8   HCT 42.8 45.5 42.8   MCV 90.1 89.2 89.3    176 140     Recent Labs     08/13/21  0425 08/14/21  0504 08/15/21  0515    144 143   K 3.5 3.1* 3.6    101 100   CO2 28 32 33*   BUN 16 14 16   CREATININE <0.5* <0.5* <0.5*           ASSESSMENT/PLAN:    Acute respiratory failure with hypoxia and hypercapnia  - related to COPD exacerbation   - fextubated on 8/12/2021.   - finished rocephin day#7/7 and azithromycin day#5/5      Ileus  With intractable nausea and vomiting  -NG placed  -N.p.o. continue IV fluids  Consider getting a CT abdomen and pelvis  Patient does have some abdominal pain    Acute encephalopathy patient appears to have some baseline dementia  - Resolved. Hx of drug abuse with related admissions in the past   - snorts fentanyl  Daughter reports she quit drugs since last time     Tobacco Abuse  -need cessation. DM2  -On Lantus -held as patient is n.p.o. . Continue sliding scale insulin .     Generalized weakness/morbid obesity  -Consult PT OT      DVT Prophylaxis: Lx  NPO due to nausea and vomiting  Code Status: Full Code          Dalton Porter MD, 8/15/2021 3:55 PM

## 2021-08-15 NOTE — PROGRESS NOTES
Inpatient Occupational Therapy  Evaluation and Treatment    Unit: PCU  Date:  8/15/2021  Patient Name:    Kei Chiang  Admitting diagnosis:  Septicemia [facility]) [A41.9]  Acute cystitis without hematuria [N30.00]  Acute respiratory failure with hypoxia and hypercapnia ([facility] [address] [J96.01, J96.02]  Admit Date:  8/4/2021  Precautions/Restrictions/WB Status/ Lines/ Wounds/ Oxygen: contact precautions    Treatment Time:  8:44-9:20  Treatment Number: 1     Billable Treatment Time:  26 minutes   Total Treatment Time:   36 minutes    Patient Goals for Therapy:  \" Get back on my feet again. \"      Discharge Recommendations: SNF  DME needs for discharge: defer to facility       Therapy recommendations for staff:   Encourage bed level activities. History of Present Illness:  Per chart note by ITZEL Glover; Date of Service:  8/4/2021  Kei Chiang is a 61 y.o. female with his history of dementia, COPD, type 2 diabetes who presents via private vehicle from her home with her daughter for evaluation of shortness of breath. Hx dementia   Last week she was complaining of some chest pains but daughter was not able to get her to go to the DR. She was complaining of chest pressure. One week ago today. She had that for 3-4 days in a row but it was intermttient pain. She refused to go the hostital. She is not having any chest pain currently. Daughter notes that she has no hx of CHF but she does have COPD and has needed O2 at home but does not have any at home. She has been using her inhalers more at home. No Pulse ox at home. She has been coughing, it is rattling and productive. No hemoptysis. She has not been complaining of congestion, headacehs or sore throat. Daughter notes her face seems swollen. She has not had any fevers. NO syncope. She was complaing of abdominal pain a cople of days ago the whole house had a 24 hour stomach flu. She has not been vaccinated. Final Impression:  1.  Acute respiratory failure with bed:   Not Tested    Bridging:   Not Tested    Transfers:    Sit to stand:  Not Tested  Stand to sit:  Not Tested  Bed to chair:   Not Tested  Standard toilet: Not Tested  Bed to [facility]:  Not Tested    Dressing:      UE:   Max A  LE:    Total A     Bathing:    UE:  Max A  LE:  Max A    Eating:   Not Tested    Toileting: Total A     Activity Tolerance   Pt completed therapy session with No adverse symptoms noted w/activity  SpO2: 93% on 1.5 L  HR: 103  BP: 144/80    Positioning Needs: In bed, call light and needs in reach. Exercise / Activities Initiated:   N/A    Patient/Family Education:   Role of OT    Assessment of Deficits: Pt seen for Occupational therapy evaluation in acute care setting. Pt demonstrated decreased Activity tolerance, cognition, ROM, Coordination, strength, ADL status. Pt functioning below baseline and will likely benefit from skilled occupational therapy services to maximize safety and independence. Goal(s) : To be met in 3 Visits:  1). Bed to toilet/BSC: Max A    To be met in 5 Visits:  1). Supine to/from Sit:  Mod A  2). Upper Body Bathing: Mod A  3). Lower Body Bathing: Mod A  4). Upper Body Dressing:  Min A and Mod A  5). Lower Body Dressing:  Max A  6). Pt to demonstrate UE exs x 15 reps with minimal cues    Rehabilitation Potential:  Good for goals listed above. Strengths for achieving goals include: PLOF  Barriers to achieving goals include:  Cognition     Plan: To be seen 3-5 x/wk while in acute care setting for therapeutic exercises, bed mobility, transfers, dressing, bathing, family/patient education, ADL/IADL retraining, energy conservation training.      Ruel Owens, OTR/L  #[id number]            If patient discharges from this facility prior to next visit, this note will serve as the Discharge Summary

## 2021-08-15 NOTE — PROGRESS NOTES
Pulmonary and Critical Care Progress Note    CC: Acute on Chronic Respiratory failure    Subjective:   Patient still with some nausea, breathing is easier. IV line: PICC 8/6     MV: 8/5-8/12       Intake/Output Summary (Last 24 hours) at 8/15/2021 0821  Last data filed at 8/15/2021 0546  Gross per 24 hour   Intake 2157.03 ml   Output 635 ml   Net 1522.03 ml       Exam:   BP (!) 158/85   Pulse 105   Temp 100.2 °F (37.9 °C) (Oral)   Resp 18   Ht 5' 4\" (1.626 m)   Wt 224 lb 9.6 oz (101.9 kg)   SpO2 94%   BMI 38.55 kg/m²  on 2l NC  General: ill appearing. NCAT fatigued. Eyes: PERRL. No sclera icterus. No conjunctival injection. ENT: No discharge. Pharynx clear. NGT in place  Neck: Trachea midline. Resp: No accessory muscle use. No crackles. No wheezing. No rhonchi. No dullness on percussion. CV: Regular rate. Regular rhythm. No mumur or rub. No edema. GI: Non-tender. + distended. No masses. No BS. Skin: Warm and dry. No nodule on exposed extremities. No rash on exposed extremities. M/S: No cyanosis. No joint deformity. No clubbing. Neuro: awake and alert,  follows commands. Moves extremities. Conversant.        Scheduled Meds:   pantoprazole  40 mg Intravenous Daily    methylPREDNISolone  20 mg Intravenous Daily    [Held by provider] insulin glargine  15 Units Subcutaneous QAM    ipratropium-albuterol  3 mL Inhalation Q4H While awake    insulin lispro  0-18 Units Subcutaneous 4x Daily AC & HS    melatonin  10 mg Oral Nightly    lidocaine 1 % injection  5 mL Intradermal Once    sodium chloride flush  5-40 mL Intravenous 2 times per day    cefepime  2,000 mg Intravenous Once    nicotine  1 patch Transdermal Daily    enoxaparin  40 mg Subcutaneous Daily    tobramycin-dexamethasone  1 drop Both Eyes 6 times per day     Continuous Infusions:   dextrose 5% and 0.45% NaCl with KCl 20 mEq 100 mL/hr at 08/15/21 0523    sodium chloride      dextrose       PRN Meds:  potassium chloride **OR** potassium alternative oral replacement **OR** potassium chloride, magnesium sulfate, sodium chloride flush, sodium chloride, glucose, dextrose, glucagon (rDNA), dextrose, polyethylene glycol, acetaminophen **OR** acetaminophen, ondansetron, albuterol    Labs:  CBC:   Recent Labs     08/13/21  0425 08/14/21  0504 08/15/21  0515   WBC 7.7 10.6 7.4   HGB 13.5 14.4 13.8   HCT 42.8 45.5 42.8   MCV 90.1 89.2 89.3    176 140     BMP:   Recent Labs     08/13/21  0425 08/14/21  0504 08/15/21  0515    144 143   K 3.5 3.1* 3.6    101 100   CO2 28 32 33*   BUN 16 14 16   CREATININE <0.5* <0.5* <0.5*     LIVER PROFILE:   No results for input(s): AST, ALT, LIPASE, BILIDIR, BILITOT, ALKPHOS in the last 72 hours. Invalid input(s): AMYLASE,  ALB  PT/INR:   No results for input(s): PROTIME, INR in the last 72 hours. APTT: No results for input(s): APTT in the last 72 hours. UA:  No results for input(s): NITRITE, COLORU, PHUR, LABCAST, WBCUA, RBCUA, MUCUS, TRICHOMONAS, YEAST, BACTERIA, CLARITYU, SPECGRAV, LEUKOCYTESUR, UROBILINOGEN, BILIRUBINUR, BLOODU, GLUCOSEU, AMORPHOUS in the last 72 hours.     Invalid input(s): Vy Camacho  Recent Labs     08/13/21  0551   PHART 7.341*   [id number] 56.6*   PO2ART 93.9       Cultures:   8/4 COVID-19 and influenza not detected  8/5 UC NGTD  8/5 BC NGTD    Films:  Chest x-ray 8/12 imaging was reviewed by me and showed   ET tube terminates above the antoine.  Right PICC terminates in the superior   right atrium.  Enteric tube courses through the chest below the diaphragm;   tip is not visualized.  Lungs are unchanged in appearance compared to prior   study.  No pneumothorax.  Small right pleural effusion.      CTPA 8/5/21  images reviewed by me and showed:   No evidence of pulmonary embolism or acute pulmonary abnormality     KUB 8/14: nonspecific bowel gas pattern    ASSESSMENT:  · Acute hypoxemic hypercapnic respiratory failure  · COPD with acute exacerbation  · Abnormal CXR with RLL ASD- atelectasis vs PNA   · Acute encephalopathy/metabolic encephalopathy  · Hyperglycemia   · UTI  · Tobacco abuse  · Possible ileus          PLAN:  · Supplemental oxygen to maintain SaO2 >92%; wean as tolerated   · Inhaled bronchodilators  · Solumedrol taper  · Completed Ceftriaxone D7 and completed Zithromax D#5  · Diet -per SLT- now NPO  · zofran  · Consider repeat KUB  · Code status: Full code

## 2021-08-15 NOTE — PROGRESS NOTES
Shift assessment complete, morning medications given per [facility], patient resting with  complaints of pain 7 out of 10 in abdomen, will continue to monitor Billy Velazquez RN

## 2021-08-16 ENCOUNTER — APPOINTMENT (OUTPATIENT)
Dept: CT IMAGING | Age: 56
DRG: 207 | End: 2021-08-16
Payer: MEDICARE

## 2021-08-16 LAB
ANION GAP SERPL CALCULATED.3IONS-SCNC: 7 MMOL/L (ref 3–16)
BASE EXCESS ARTERIAL: 8.2 MMOL/L (ref -3–3)
BASOPHILS ABSOLUTE: 0 K/UL (ref 0–0.2)
BASOPHILS RELATIVE PERCENT: 0.4 %
BUN BLDV-MCNC: 12 MG/DL (ref 7–20)
CALCIUM SERPL-MCNC: 8.8 MG/DL (ref 8.3–10.6)
CARBOXYHEMOGLOBIN ARTERIAL: 1.2 % (ref 0–1.5)
CHLORIDE BLD-SCNC: 100 MMOL/L (ref 99–110)
CO2: 33 MMOL/L (ref 21–32)
CREAT SERPL-MCNC: <0.5 MG/DL (ref 0.6–1.1)
EOSINOPHILS ABSOLUTE: 0.1 K/UL (ref 0–0.6)
EOSINOPHILS RELATIVE PERCENT: 1.2 %
GFR AFRICAN AMERICAN: >60
GFR NON-AFRICAN AMERICAN: >60
GLUCOSE BLD-MCNC: 181 MG/DL (ref 70–99)
GLUCOSE BLD-MCNC: 194 MG/DL (ref 70–99)
GLUCOSE BLD-MCNC: 209 MG/DL (ref 70–99)
GLUCOSE BLD-MCNC: 211 MG/DL (ref 70–99)
GLUCOSE BLD-MCNC: 243 MG/DL (ref 70–99)
HCO3 ARTERIAL: 35.7 MMOL/L (ref 21–29)
HCT VFR BLD CALC: 41.8 % (ref 36–48)
HEMOGLOBIN, ART, EXTENDED: 14.1 G/DL (ref 12–16)
HEMOGLOBIN: 13.7 G/DL (ref 12–16)
LYMPHOCYTES ABSOLUTE: 1.1 K/UL (ref 1–5.1)
LYMPHOCYTES RELATIVE PERCENT: 16.4 %
MCH RBC QN AUTO: 29 PG (ref 26–34)
MCHC RBC AUTO-ENTMCNC: 32.7 G/DL (ref 31–36)
MCV RBC AUTO: 88.6 FL (ref 80–100)
METHEMOGLOBIN ARTERIAL: 0.3 %
MONOCYTES ABSOLUTE: 0.5 K/UL (ref 0–1.3)
MONOCYTES RELATIVE PERCENT: 7 %
NEUTROPHILS ABSOLUTE: 5.1 K/UL (ref 1.7–7.7)
NEUTROPHILS RELATIVE PERCENT: 75 %
O2 SAT, ARTERIAL: 94.7 %
O2 THERAPY: ABNORMAL
PCO2 ARTERIAL: 61.8 MMHG (ref 35–45)
PDW BLD-RTO: 15.7 % (ref 12.4–15.4)
PERFORMED ON: ABNORMAL
PH ARTERIAL: 7.38 (ref 7.35–7.45)
PLATELET # BLD: 146 K/UL (ref 135–450)
PMV BLD AUTO: 7.4 FL (ref 5–10.5)
PO2 ARTERIAL: 76.6 MMHG (ref 75–108)
POTASSIUM SERPL-SCNC: 3.8 MMOL/L (ref 3.5–5.1)
RBC # BLD: 4.71 M/UL (ref 4–5.2)
SODIUM BLD-SCNC: 140 MMOL/L (ref 136–145)
TCO2 ARTERIAL: 37.6 MMOL/L
TRIGL SERPL-MCNC: 107 MG/DL (ref 0–150)
WBC # BLD: 6.7 K/UL (ref 4–11)

## 2021-08-16 PROCEDURE — 6370000000 HC RX 637 (ALT 250 FOR IP): Performed by: INTERNAL MEDICINE

## 2021-08-16 PROCEDURE — C9113 INJ PANTOPRAZOLE SODIUM, VIA: HCPCS | Performed by: HOSPITALIST

## 2021-08-16 PROCEDURE — 6360000002 HC RX W HCPCS: Performed by: INTERNAL MEDICINE

## 2021-08-16 PROCEDURE — 99233 SBSQ HOSP IP/OBS HIGH 50: CPT | Performed by: INTERNAL MEDICINE

## 2021-08-16 PROCEDURE — 94761 N-INVAS EAR/PLS OXIMETRY MLT: CPT

## 2021-08-16 PROCEDURE — 2060000000 HC ICU INTERMEDIATE R&B

## 2021-08-16 PROCEDURE — 2580000003 HC RX 258: Performed by: INTERNAL MEDICINE

## 2021-08-16 PROCEDURE — 2500000003 HC RX 250 WO HCPCS: Performed by: INTERNAL MEDICINE

## 2021-08-16 PROCEDURE — 84478 ASSAY OF TRIGLYCERIDES: CPT

## 2021-08-16 PROCEDURE — 80048 BASIC METABOLIC PNL TOTAL CA: CPT

## 2021-08-16 PROCEDURE — 94640 AIRWAY INHALATION TREATMENT: CPT

## 2021-08-16 PROCEDURE — 6360000002 HC RX W HCPCS: Performed by: HOSPITALIST

## 2021-08-16 PROCEDURE — 2700000000 HC OXYGEN THERAPY PER DAY

## 2021-08-16 PROCEDURE — 82803 BLOOD GASES ANY COMBINATION: CPT

## 2021-08-16 PROCEDURE — 36600 WITHDRAWAL OF ARTERIAL BLOOD: CPT

## 2021-08-16 PROCEDURE — 74176 CT ABD & PELVIS W/O CONTRAST: CPT

## 2021-08-16 PROCEDURE — 85025 COMPLETE CBC W/AUTO DIFF WBC: CPT

## 2021-08-16 RX ADMIN — TOBRAMYCIN AND DEXAMETHASONE 1 DROP: 3; 1 SUSPENSION/ DROPS OPHTHALMIC at 20:13

## 2021-08-16 RX ADMIN — TOBRAMYCIN AND DEXAMETHASONE 1 DROP: 3; 1 SUSPENSION/ DROPS OPHTHALMIC at 09:14

## 2021-08-16 RX ADMIN — Medication 10 MG: at 20:13

## 2021-08-16 RX ADMIN — TOBRAMYCIN AND DEXAMETHASONE 1 DROP: 3; 1 SUSPENSION/ DROPS OPHTHALMIC at 05:14

## 2021-08-16 RX ADMIN — TOBRAMYCIN AND DEXAMETHASONE 1 DROP: 3; 1 SUSPENSION/ DROPS OPHTHALMIC at 12:03

## 2021-08-16 RX ADMIN — POTASSIUM CHLORIDE, DEXTROSE MONOHYDRATE AND SODIUM CHLORIDE: 150; 5; 450 INJECTION, SOLUTION INTRAVENOUS at 08:34

## 2021-08-16 RX ADMIN — IPRATROPIUM BROMIDE AND ALBUTEROL SULFATE 3 ML: .5; 3 SOLUTION RESPIRATORY (INHALATION) at 15:18

## 2021-08-16 RX ADMIN — IPRATROPIUM BROMIDE AND ALBUTEROL SULFATE 3 ML: .5; 3 SOLUTION RESPIRATORY (INHALATION) at 10:53

## 2021-08-16 RX ADMIN — PANTOPRAZOLE SODIUM 40 MG: 40 INJECTION, POWDER, FOR SOLUTION INTRAVENOUS at 20:13

## 2021-08-16 RX ADMIN — POTASSIUM CHLORIDE, DEXTROSE MONOHYDRATE AND SODIUM CHLORIDE: 150; 5; 450 INJECTION, SOLUTION INTRAVENOUS at 19:58

## 2021-08-16 RX ADMIN — IPRATROPIUM BROMIDE AND ALBUTEROL SULFATE 3 ML: .5; 3 SOLUTION RESPIRATORY (INHALATION) at 06:51

## 2021-08-16 RX ADMIN — METHYLPREDNISOLONE SODIUM SUCCINATE 20 MG: 40 INJECTION, POWDER, FOR SOLUTION INTRAMUSCULAR; INTRAVENOUS at 09:14

## 2021-08-16 RX ADMIN — SODIUM CHLORIDE, PRESERVATIVE FREE 10 ML: 5 INJECTION INTRAVENOUS at 20:13

## 2021-08-16 RX ADMIN — ENOXAPARIN SODIUM 40 MG: 40 INJECTION SUBCUTANEOUS at 09:15

## 2021-08-16 RX ADMIN — SODIUM CHLORIDE, PRESERVATIVE FREE 10 ML: 5 INJECTION INTRAVENOUS at 09:14

## 2021-08-16 RX ADMIN — IPRATROPIUM BROMIDE AND ALBUTEROL SULFATE 3 ML: .5; 3 SOLUTION RESPIRATORY (INHALATION) at 22:53

## 2021-08-16 RX ADMIN — PANTOPRAZOLE SODIUM 40 MG: 40 INJECTION, POWDER, FOR SOLUTION INTRAVENOUS at 09:14

## 2021-08-16 RX ADMIN — TOBRAMYCIN AND DEXAMETHASONE 1 DROP: 3; 1 SUSPENSION/ DROPS OPHTHALMIC at 17:14

## 2021-08-16 RX ADMIN — IPRATROPIUM BROMIDE AND ALBUTEROL SULFATE 3 ML: .5; 3 SOLUTION RESPIRATORY (INHALATION) at 20:16

## 2021-08-16 ASSESSMENT — PAIN SCALES - GENERAL
PAINLEVEL_OUTOF10: 8
PAINLEVEL_OUTOF10: 0
PAINLEVEL_OUTOF10: 0

## 2021-08-16 NOTE — PROGRESS NOTES
Patient is resting comfortably in bed and denies further needs at this time. Call light within reach. Will continue to monitor.

## 2021-08-16 NOTE — PROGRESS NOTES
Shift assessment completed. See flow sheet. Medications given. Patient is A&O with some confusion. Patient denies further needs at this time. Call light within reach. Will continue to  Monitor.

## 2021-08-16 NOTE — PROGRESS NOTES
Patient w/ only 100ml urine out throughout the night. Urine dark tea colored. Patient verbalizing feeling the need to urinate. Bladder scan performed w/ residual of 719ml per scan. Order received to irrigate nieves. innability to irrigate. Nieves removed and replaced per new order. New nieves placed per policy. Immediate urine return of 725ml tea colored urine w/ moderate amount sediment. .    Assessment in entirety completed.

## 2021-08-16 NOTE — PROGRESS NOTES
Pulmonary and Critical Care Progress Note    CC: Acute on Chronic Respiratory failure    Subjective:   Patient reports persistent abd discomfort. No BM. IV line: PICC 8/6     MV: 8/5-8/12       Intake/Output Summary (Last 24 hours) at 8/16/2021 0720  Last data filed at 8/15/2021 1838  Gross per 24 hour   Intake 782.79 ml   Output 100 ml   Net 682.79 ml       Exam:   BP (!) 152/82   Pulse 95   Temp 99.2 °F (37.3 °C) (Oral)   Resp 18   Ht 5' 4\" (1.626 m)   Wt 224 lb 9.6 oz (101.9 kg)   SpO2 96%   BMI 38.55 kg/m²  on 1.5 l NC  General: ill appearing. Tired. Eyes: PERRL. No sclera icterus. No conjunctival injection. ENT: No discharge. Pharynx clear. NGT in place  Neck: Trachea midline. Resp: No accessory muscle use. No crackles. No wheezing. No rhonchi. No dullness on percussion. CV: Regular rate. Regular rhythm. No mumur or rub. No edema. GI: Non-tender. + distended. No masses. No BS. Skin: Warm and dry. No nodule on exposed extremities. No rash on exposed extremities. M/S: No cyanosis. No joint deformity. No clubbing. Neuro: awake and alert,  follows commands. Moves extremities. Conversant.        Scheduled Meds:   pantoprazole  40 mg Intravenous BID    methylPREDNISolone  20 mg Intravenous Daily    [Held by provider] insulin glargine  15 Units Subcutaneous QAM    ipratropium-albuterol  3 mL Inhalation Q4H While awake    insulin lispro  0-18 Units Subcutaneous 4x Daily AC & HS    melatonin  10 mg Oral Nightly    lidocaine 1 % injection  5 mL Intradermal Once    sodium chloride flush  5-40 mL Intravenous 2 times per day    cefepime  2,000 mg Intravenous Once    nicotine  1 patch Transdermal Daily    enoxaparin  40 mg Subcutaneous Daily    tobramycin-dexamethasone  1 drop Both Eyes 6 times per day     Continuous Infusions:   dextrose 5% and 0.45% NaCl with KCl 20 mEq 100 mL/hr at 08/15/21 2217    sodium chloride      dextrose       PRN Meds:  traMADol, potassium chloride **OR** potassium alternative oral replacement **OR** potassium chloride, magnesium sulfate, sodium chloride flush, sodium chloride, glucose, dextrose, glucagon (rDNA), dextrose, polyethylene glycol, acetaminophen **OR** acetaminophen, ondansetron, albuterol    Labs:  CBC:   Recent Labs     08/14/21  0504 08/15/21  0515 08/16/21  0623   WBC 10.6 7.4 6.7   HGB 14.4 13.8 13.7   HCT 45.5 42.8 41.8   MCV 89.2 89.3 88.6    140 146     BMP:   Recent Labs     08/14/21  0504 08/15/21  0515 08/16/21  0623    143 140   K 3.1* 3.6 3.8    100 100   CO2 32 33* 33*   BUN 14 16 12   CREATININE <0.5* <0.5* <0.5*     LIVER PROFILE:   No results for input(s): AST, ALT, LIPASE, BILIDIR, BILITOT, ALKPHOS in the last 72 hours. Invalid input(s): AMYLASE,  ALB  PT/INR:   No results for input(s): PROTIME, INR in the last 72 hours. APTT: No results for input(s): APTT in the last 72 hours. UA:  No results for input(s): NITRITE, COLORU, PHUR, LABCAST, WBCUA, RBCUA, MUCUS, TRICHOMONAS, YEAST, BACTERIA, CLARITYU, SPECGRAV, LEUKOCYTESUR, UROBILINOGEN, BILIRUBINUR, BLOODU, GLUCOSEU, AMORPHOUS in the last 72 hours. Invalid input(s): KETONESU  No results for input(s): PHART, DPD7UBS, PO2ART in the last 72 hours.     Cultures:   8/4 COVID-19 and influenza not detected  8/5 UC NGTD  8/5 BC NGTD    Films:  Chest x-ray 8/12 imaging was reviewed by me and showed   ET tube terminates above the antoine.  Right PICC terminates in the superior   right atrium.  Enteric tube courses through the chest below the diaphragm;   tip is not visualized.  Lungs are unchanged in appearance compared to prior   study.  No pneumothorax.  Small right pleural effusion.      CTPA 8/5/21  images reviewed by me and showed:   No evidence of pulmonary embolism or acute pulmonary abnormality     KUB 8/14: nonspecific bowel gas pattern    ASSESSMENT:  · Acute hypoxemic hypercapnic respiratory failure  · COPD with acute exacerbation  · Abnormal CXR with RLL ASD- atelectasis vs PNA   · Acute encephalopathy/metabolic encephalopathy  · Hyperglycemia   · UTI  · Tobacco abuse  · Possible ileus          PLAN:  · Supplemental oxygen to maintain SaO2 >92%; wean as tolerated   · Inhaled bronchodilators  · Solumedrol taper  · Completed Ceftriaxone D7 and completed Zithromax D#5  · Diet -per SLT- now NPO  · zofran  · Code status: Full code

## 2021-08-16 NOTE — PROGRESS NOTES
Patient returned from CT scan  -  Daughter at bedside. Reconnected to [facility] for left Nares NGT. Patient continues to be confused to place and time. Brown draining cloudy meagan urine. Bilateral soft wrist restraints secured. Patient tolerating well but still reaching for tubes at times.

## 2021-08-16 NOTE — PROGRESS NOTES
Bedside report and transfer of care given to [facility] Pt currently resting in bed with the call light within reach. Pt denies any other care needs at this time. Pt stable at this time.

## 2021-08-16 NOTE — PROGRESS NOTES
IM Progress Note    Admit Date:  8/4/2021  11    Interval history:    62-year-old female with history of dementia, COPD, diabetes type 2 admitted with acute respiratory failure due to COPD exacerbation pneumonia. Patient was intubated on 8/ 5 and extubated to BiPAP on 8/12  O2 requirement down to 3 L of oxygen on 8/13  Patient was vomiting 2 nights ago has an NG tube now abdomen is distended  Patient denies nausea but there is no bowel movement reported    She is somnolent and confused today    Subjective:  Ms. Caldwell Gip seen.  - NG placed to intermittent low wall suction. Not much getting   she is drowsy she is in no distress. abdomen is still distended,    Objective:   BP (!) 152/82   Pulse 95   Temp 99.2 °F (37.3 °C) (Oral)   Resp 18   Ht 5' 4\" (1.626 m)   Wt 224 lb 9.6 oz (101.9 kg)   SpO2 96%   BMI 38.55 kg/m²       Intake/Output Summary (Last 24 hours) at 8/16/2021 0817  Last data filed at 8/16/2021 0300  Gross per 24 hour   Intake 782.79 ml   Output 200 ml   Net 582.79 ml       Physical Exam:  General: Obese. NG to intermittent low wall suction. Mucous Membranes:  Pink , anicteric  Neck: No JVD, no carotid bruit, no thyromegaly  Chest: Clear to auscultation . No wheezes rales or rhonchi   Cardiovascular:  RRR S1S2 heard, no murmurs or gallops  Abdomen: Distended , bowel sounds are sluggish . Extremities: No edema or cyanosis. Distal pulses well felt  Neurological :drowsy, arousable not fully oriented. Nonfocal. Moves all 4 extremities.    Psych: Stable      Medications:   Scheduled Medications:    pantoprazole  40 mg Intravenous BID    methylPREDNISolone  20 mg Intravenous Daily    [Held by provider] insulin glargine  15 Units Subcutaneous QAM    ipratropium-albuterol  3 mL Inhalation Q4H While awake    insulin lispro  0-18 Units Subcutaneous 4x Daily AC & HS    melatonin  10 mg Oral Nightly    lidocaine 1 % injection  5 mL Intradermal Once    sodium chloride flush  5-40 mL Intravenous 2 times per day    cefepime  2,000 mg Intravenous Once    nicotine  1 patch Transdermal Daily    enoxaparin  40 mg Subcutaneous Daily    tobramycin-dexamethasone  1 drop Both Eyes 6 times per day     I   dextrose 5% and 0.45% NaCl with KCl 20 mEq 100 mL/hr at 08/15/21 2217    sodium chloride      dextrose       traMADol, potassium chloride **OR** potassium alternative oral replacement **OR** potassium chloride, magnesium sulfate, sodium chloride flush, sodium chloride, glucose, dextrose, glucagon (rDNA), dextrose, polyethylene glycol, acetaminophen **OR** acetaminophen, ondansetron, albuterol    Lab Data:  Recent Labs     08/14/21  0504 08/15/21  0515 08/16/21  0623   WBC 10.6 7.4 6.7   HGB 14.4 13.8 13.7   HCT 45.5 42.8 41.8   MCV 89.2 89.3 88.6    140 146     Recent Labs     08/14/21  0504 08/15/21  0515 08/16/21  0623    143 140   K 3.1* 3.6 3.8    100 100   CO2 32 33* 33*   BUN 14 16 12   CREATININE <0.5* <0.5* <0.5*           ASSESSMENT/PLAN:    Acute respiratory failure with hypoxia and hypercapnia  - related to COPD exacerbation   - fextubated on 8/12/2021.   - finished rocephin day#7/7 and azithromycin day#5/5  Obtain arterial blood gas today as she is somnolent    Ileus  With intractable nausea and vomiting  -NG placed  -N.p.o. continue IV fluids  CT abdomen and pelvis today     Acute encephalopathy patient appears to have some baseline dementia  -Patient is somnolent today. Obtain arterial blood gas    Hx of drug abuse with related admissions in the past   - snorts fentanyl  Daughter reports she quit drugs since last time     Tobacco Abuse  -need cessation. DM2  -On Lantus -held as patient is n.p.o. . Continue sliding scale insulin .     Generalized weakness/morbid obesity  -Consult PT OT      DVT Prophylaxis: Lx  NPO due to nausea and vomiting  Code Status: Full Code      Harris Liu MD, 8/16/2021 8:17 AM

## 2021-08-16 NOTE — CARE COORDINATION
INTERDISCIPLINARY PLAN OF CARE CONFERENCE    Date/Time: 8/16/2021 3:43 PM  Completed by: MARLA Humphrey. Case Management      Patient Name:  Devan Hawkins  YOB: 1965  Admitting Diagnosis: Septicemia [facility]) [A41.9]  Acute cystitis without hematuria [N30.00]  Acute respiratory failure with hypoxia and hypercapnia ([facility] [address] [J96.01, J96.02]     Admit Date/Time:  8/4/2021  9:29 PM    Chart reviewed. Interdisciplinary team contacted or reviewed plan related to patient progress and discharge plans. Disciplines included Case Management, Nursing, and Dietitian. Current Status:Ongoing  PT/OT recommendation for discharge plan of care: SNF    Expected D/C Disposition:  Home vs SNF  Confirmed plan with patient and/or family Yes confirmed with: (name) pt and pt's daughter Kevin Castillo   Met with:pt and pt's daughter Kevin Castillo    Discharge Plan Comments: Chart review completed. Met with pt and pt's daughter Kevin Castillo at bedside whom pt stated it was okay to speak with. Discussed SNF recommendations with pt and her daughter at bedside. Pt stated she did not know and would need to think about this. Explained that she would likely benefit from the SNF as she has been in the hospital for several days. They stated understanding. Pt's daughter inquired about HHC rather than SNF. Explained that she would get more therapy at the SNF than at home with [facility] [address] and pt likely would benefit from more therapy. She stated understanding. A SNF list was given to pt's daughter per pt's request. They denied needs or questions for CM. Pt is currently in restraints and has an NG tube.      Home O2 in place on admit: Yes

## 2021-08-16 NOTE — PROGRESS NOTES
Patient continues to attempt to have a BM on the bedpan but has been unable to. She has also not passed gas.

## 2021-08-16 NOTE — PROGRESS NOTES
08/16/21 0920   Oxygen Therapy/Pulse Ox   Blood Gas  Performed? Yes   $ABG $ABG   James's Test #1 Pos   Site #1 Left Radial   Site Prepped #1 Yes   Number of Attempts #1 1   Pressure Held #1 Yes   Complications #1 None   Post-procedure #1 Standard   Specimen Status #1 To lab   How Tolerated?  Tolerated well

## 2021-08-17 ENCOUNTER — APPOINTMENT (OUTPATIENT)
Dept: GENERAL RADIOLOGY | Age: 56
DRG: 207 | End: 2021-08-17
Payer: MEDICARE

## 2021-08-17 ENCOUNTER — APPOINTMENT (OUTPATIENT)
Dept: ULTRASOUND IMAGING | Age: 56
DRG: 207 | End: 2021-08-17
Payer: MEDICARE

## 2021-08-17 PROBLEM — E44.1 MILD PROTEIN-CALORIE MALNUTRITION (HCC): Status: ACTIVE | Noted: 2021-08-17

## 2021-08-17 LAB
AMMONIA: 53 UMOL/L (ref 11–51)
ANION GAP SERPL CALCULATED.3IONS-SCNC: 6 MMOL/L (ref 3–16)
BASOPHILS ABSOLUTE: 0 K/UL (ref 0–0.2)
BASOPHILS RELATIVE PERCENT: 0.5 %
BUN BLDV-MCNC: 8 MG/DL (ref 7–20)
CALCIUM SERPL-MCNC: 9 MG/DL (ref 8.3–10.6)
CHLORIDE BLD-SCNC: 97 MMOL/L (ref 99–110)
CO2: 34 MMOL/L (ref 21–32)
CREAT SERPL-MCNC: <0.5 MG/DL (ref 0.6–1.1)
EOSINOPHILS ABSOLUTE: 0.1 K/UL (ref 0–0.6)
EOSINOPHILS RELATIVE PERCENT: 2.3 %
FERRITIN: 328.4 NG/ML (ref 15–150)
GFR AFRICAN AMERICAN: >60
GFR NON-AFRICAN AMERICAN: >60
GLUCOSE BLD-MCNC: 172 MG/DL (ref 70–99)
GLUCOSE BLD-MCNC: 176 MG/DL (ref 70–99)
GLUCOSE BLD-MCNC: 193 MG/DL (ref 70–99)
GLUCOSE BLD-MCNC: 203 MG/DL (ref 70–99)
GLUCOSE BLD-MCNC: 239 MG/DL (ref 70–99)
GLUCOSE BLD-MCNC: 255 MG/DL (ref 70–99)
HAV IGM SER IA-ACNC: NORMAL
HCT VFR BLD CALC: 40.8 % (ref 36–48)
HEMOGLOBIN: 13.3 G/DL (ref 12–16)
HEPATITIS B CORE IGM ANTIBODY: NORMAL
HEPATITIS B SURFACE ANTIGEN INTERPRETATION: NORMAL
HEPATITIS C ANTIBODY INTERPRETATION: NORMAL
LYMPHOCYTES ABSOLUTE: 1.2 K/UL (ref 1–5.1)
LYMPHOCYTES RELATIVE PERCENT: 18.7 %
MCH RBC QN AUTO: 28.9 PG (ref 26–34)
MCHC RBC AUTO-ENTMCNC: 32.6 G/DL (ref 31–36)
MCV RBC AUTO: 88.5 FL (ref 80–100)
MONOCYTES ABSOLUTE: 0.4 K/UL (ref 0–1.3)
MONOCYTES RELATIVE PERCENT: 7 %
NEUTROPHILS ABSOLUTE: 4.5 K/UL (ref 1.7–7.7)
NEUTROPHILS RELATIVE PERCENT: 71.5 %
PDW BLD-RTO: 15.4 % (ref 12.4–15.4)
PERFORMED ON: ABNORMAL
PLATELET # BLD: 154 K/UL (ref 135–450)
PMV BLD AUTO: 7.7 FL (ref 5–10.5)
POTASSIUM SERPL-SCNC: 3.4 MMOL/L (ref 3.5–5.1)
RBC # BLD: 4.61 M/UL (ref 4–5.2)
SODIUM BLD-SCNC: 137 MMOL/L (ref 136–145)
WBC # BLD: 6.3 K/UL (ref 4–11)

## 2021-08-17 PROCEDURE — 99233 SBSQ HOSP IP/OBS HIGH 50: CPT | Performed by: INTERNAL MEDICINE

## 2021-08-17 PROCEDURE — 99232 SBSQ HOSP IP/OBS MODERATE 35: CPT | Performed by: INTERNAL MEDICINE

## 2021-08-17 PROCEDURE — C9113 INJ PANTOPRAZOLE SODIUM, VIA: HCPCS | Performed by: HOSPITALIST

## 2021-08-17 PROCEDURE — 86038 ANTINUCLEAR ANTIBODIES: CPT

## 2021-08-17 PROCEDURE — 94640 AIRWAY INHALATION TREATMENT: CPT

## 2021-08-17 PROCEDURE — 6370000000 HC RX 637 (ALT 250 FOR IP): Performed by: INTERNAL MEDICINE

## 2021-08-17 PROCEDURE — 97530 THERAPEUTIC ACTIVITIES: CPT

## 2021-08-17 PROCEDURE — 2700000000 HC OXYGEN THERAPY PER DAY

## 2021-08-17 PROCEDURE — 83516 IMMUNOASSAY NONANTIBODY: CPT

## 2021-08-17 PROCEDURE — 82103 ALPHA-1-ANTITRYPSIN TOTAL: CPT

## 2021-08-17 PROCEDURE — 6360000002 HC RX W HCPCS: Performed by: INTERNAL MEDICINE

## 2021-08-17 PROCEDURE — 80074 ACUTE HEPATITIS PANEL: CPT

## 2021-08-17 PROCEDURE — 82390 ASSAY OF CERULOPLASMIN: CPT

## 2021-08-17 PROCEDURE — 2060000000 HC ICU INTERMEDIATE R&B

## 2021-08-17 PROCEDURE — 71045 X-RAY EXAM CHEST 1 VIEW: CPT

## 2021-08-17 PROCEDURE — 94761 N-INVAS EAR/PLS OXIMETRY MLT: CPT

## 2021-08-17 PROCEDURE — 2580000003 HC RX 258: Performed by: INTERNAL MEDICINE

## 2021-08-17 PROCEDURE — 36415 COLL VENOUS BLD VENIPUNCTURE: CPT

## 2021-08-17 PROCEDURE — 85025 COMPLETE CBC W/AUTO DIFF WBC: CPT

## 2021-08-17 PROCEDURE — 6360000002 HC RX W HCPCS: Performed by: HOSPITALIST

## 2021-08-17 PROCEDURE — 97110 THERAPEUTIC EXERCISES: CPT

## 2021-08-17 PROCEDURE — 2500000003 HC RX 250 WO HCPCS: Performed by: INTERNAL MEDICINE

## 2021-08-17 PROCEDURE — 82728 ASSAY OF FERRITIN: CPT

## 2021-08-17 PROCEDURE — 82140 ASSAY OF AMMONIA: CPT

## 2021-08-17 PROCEDURE — 80048 BASIC METABOLIC PNL TOTAL CA: CPT

## 2021-08-17 PROCEDURE — 76705 ECHO EXAM OF ABDOMEN: CPT

## 2021-08-17 RX ADMIN — TOBRAMYCIN AND DEXAMETHASONE 1 DROP: 3; 1 SUSPENSION/ DROPS OPHTHALMIC at 08:38

## 2021-08-17 RX ADMIN — PANTOPRAZOLE SODIUM 40 MG: 40 INJECTION, POWDER, FOR SOLUTION INTRAVENOUS at 21:18

## 2021-08-17 RX ADMIN — TOBRAMYCIN AND DEXAMETHASONE 1 DROP: 3; 1 SUSPENSION/ DROPS OPHTHALMIC at 00:03

## 2021-08-17 RX ADMIN — TOBRAMYCIN AND DEXAMETHASONE 1 DROP: 3; 1 SUSPENSION/ DROPS OPHTHALMIC at 16:34

## 2021-08-17 RX ADMIN — IPRATROPIUM BROMIDE AND ALBUTEROL SULFATE 3 ML: .5; 3 SOLUTION RESPIRATORY (INHALATION) at 22:50

## 2021-08-17 RX ADMIN — Medication 10 MG: at 21:18

## 2021-08-17 RX ADMIN — ENOXAPARIN SODIUM 40 MG: 40 INJECTION SUBCUTANEOUS at 08:13

## 2021-08-17 RX ADMIN — POTASSIUM CHLORIDE, DEXTROSE MONOHYDRATE AND SODIUM CHLORIDE: 150; 5; 450 INJECTION, SOLUTION INTRAVENOUS at 06:15

## 2021-08-17 RX ADMIN — SODIUM CHLORIDE, PRESERVATIVE FREE 10 ML: 5 INJECTION INTRAVENOUS at 21:18

## 2021-08-17 RX ADMIN — IPRATROPIUM BROMIDE AND ALBUTEROL SULFATE 3 ML: .5; 3 SOLUTION RESPIRATORY (INHALATION) at 06:55

## 2021-08-17 RX ADMIN — TOBRAMYCIN AND DEXAMETHASONE 1 DROP: 3; 1 SUSPENSION/ DROPS OPHTHALMIC at 21:00

## 2021-08-17 RX ADMIN — TOBRAMYCIN AND DEXAMETHASONE 1 DROP: 3; 1 SUSPENSION/ DROPS OPHTHALMIC at 12:10

## 2021-08-17 RX ADMIN — IPRATROPIUM BROMIDE AND ALBUTEROL SULFATE 3 ML: .5; 3 SOLUTION RESPIRATORY (INHALATION) at 11:05

## 2021-08-17 RX ADMIN — METHYLPREDNISOLONE SODIUM SUCCINATE 20 MG: 40 INJECTION, POWDER, FOR SOLUTION INTRAMUSCULAR; INTRAVENOUS at 08:12

## 2021-08-17 RX ADMIN — PANTOPRAZOLE SODIUM 40 MG: 40 INJECTION, POWDER, FOR SOLUTION INTRAVENOUS at 08:12

## 2021-08-17 RX ADMIN — TOBRAMYCIN AND DEXAMETHASONE 1 DROP: 3; 1 SUSPENSION/ DROPS OPHTHALMIC at 04:39

## 2021-08-17 RX ADMIN — IPRATROPIUM BROMIDE AND ALBUTEROL SULFATE 3 ML: .5; 3 SOLUTION RESPIRATORY (INHALATION) at 16:14

## 2021-08-17 RX ADMIN — SODIUM CHLORIDE, PRESERVATIVE FREE 10 ML: 5 INJECTION INTRAVENOUS at 08:30

## 2021-08-17 RX ADMIN — IPRATROPIUM BROMIDE AND ALBUTEROL SULFATE 3 ML: .5; 3 SOLUTION RESPIRATORY (INHALATION) at 19:02

## 2021-08-17 NOTE — PROGRESS NOTES
Comprehensive Nutrition Assessment    Type and Reason for Visit:  Reassess    Nutrition Recommendations/Plan:   1. Continue NPO status and monitor nutrition progression  2. Monitor for return of GI function + GI consult notes. 3. Monitor weight trends, bowel function, for any additional lab/fluid disturbances and POC. Nutrition Assessment:  patient is declining from a nutritional standpoint AEB need for NPO status on 8/14 d/t N/V with po diet r/t ileus/GI dysfunction, and she remains at risk for further compromise d/t inadeqaute energy intake x 13 days admission, ongoing altered mental status, altered nutrition related lab values, and awaiting return of GI function; Will continue NPO status and monitor nutrition plan of care. Malnutrition Assessment:  Malnutrition Status:  Mild malnutrition    Context:  Acute Illness     Findings of the 6 clinical characteristics of malnutrition:  Energy Intake:  7 - 50% or less of estimated energy requirements for 5 or more days (poor po intake since post extubation 8/12)  Weight Loss:  Unable to assess (d/t fluid accumulation; I/O's +12.6 L)     Body Fat Loss:  No significant body fat loss     Muscle Mass Loss:  No significant muscle mass loss    Fluid Accumulation:  1 - Mild Extremities (BLE nonpitting)   Strength:  Not Performed    Estimated Daily Nutrient Needs:  Energy (kcal):  3835-5904 kcals based on 15-18 kcals/kg/CBW; Weight Used for Energy Requirements:  Current     Protein (g):  65-82 g protein based on 1.2-1.5 g/kg/IBW;  Weight Used for Protein Requirements:  Ideal        Fluid (ml/day):  1596-0891 ml; Method Used for Fluid Requirements:  1 ml/kcal      Nutrition Related Findings:  attempted to meet with patient, receiving nursing care at time of assessment; oriented to place only; abdomen distenced, rotunded, rounded, BS hypoactive, no BM for this admission, however smear stool occurence on 8/14; BLE non pitting edema; Skin warm, dry; I/O's + 12.6 L = ~ 27.7#'s; noted pt pulled out NGT tube last night, +placement of new NG today; K+, Cl, and Crea are low; Co2 and BG are elevated; noted pt refused insulin this am; noted GI consult placed this am;      Wounds:  None       Current Nutrition Therapies:    Diet NPO Exceptions are: Ice Chips, Sips of Water with Meds    Anthropometric Measures:  · Height: 5' 4\" (162.6 cm)  · Current Body Weight: 223 lb 4.8 oz (101.3 kg) (obtained 8/17; actual)   · Admission Body Weight: 220 lb 1.6 oz (99.8 kg) (obtained 8/5/21; actual)    · Usual Body Weight: 220 lb 1.6 oz (99.8 kg) (obtained 8/5; actual)     · Ideal Body Weight: 120 lbs; % Ideal Body Weight 186.1 %   · BMI: 38.3  · BMI Categories: Obese Class 2 (BMI 35.0 -39.9)       Nutrition Diagnosis:   · Inadequate oral intake related to inadequate protein-energy intake, impaired respiratory function, cognitive or neurological impairment, altered GI function, endocrine dysfuntion as evidenced by NPO or clear liquid status due to medical condition, poor intake prior to admission, GI abnormality, constipation, lab values, other (comment), localized or generalized fluid accumulation, nausea, vomiting (ileus)    Nutrition Interventions:   Food and/or Nutrient Delivery:  Continue NPO  Nutrition Education/Counseling:  No recommendation at this time   Coordination of Nutrition Care:  Continue to monitor while inpatient, Speech Therapy    Goals:  patient will adhere to NPO status until medically cleared to receive nutrition therapy       Nutrition Monitoring and Evaluation:   Behavioral-Environmental Outcomes:  None Identified   Food/Nutrient Intake Outcomes:  Diet Advancement/Tolerance  Physical Signs/Symptoms Outcomes:  Biochemical Data, Chewing or Swallowing, Constipation, GI Status, Nausea or Vomiting, Nutrition Focused Physical Findings, Skin, Weight, Meal Time Behavior     Discharge Planning:     Too soon to determine     Electronically signed by Jaleesa Shea RD, LD on 8/17/21 at

## 2021-08-17 NOTE — PROGRESS NOTES
Bedside report and transfer of care given to [facility]. Pt currently resting in bed with the call light within reach. Pt denies any other care needs at this time. Pt stable at this time.     Luis Manuel Elizabeth RN

## 2021-08-17 NOTE — PROGRESS NOTES
cefepime  2,000 mg Intravenous Once    nicotine  1 patch Transdermal Daily    enoxaparin  40 mg Subcutaneous Daily    tobramycin-dexamethasone  1 drop Both Eyes 6 times per day     I   dextrose 5% and 0.45% NaCl with KCl 20 mEq 100 mL/hr at 08/17/21 0615    sodium chloride      dextrose       traMADol, potassium chloride **OR** potassium alternative oral replacement **OR** potassium chloride, magnesium sulfate, sodium chloride flush, sodium chloride, glucose, dextrose, glucagon (rDNA), dextrose, polyethylene glycol, acetaminophen **OR** acetaminophen, ondansetron, albuterol    Lab Data:  Recent Labs     08/15/21  0515 08/16/21  0623 08/17/21  0517   WBC 7.4 6.7 6.3   HGB 13.8 13.7 13.3   HCT 42.8 41.8 40.8   MCV 89.3 88.6 88.5    146 154     Recent Labs     08/15/21  0515 08/16/21  0623 08/17/21  0517    140 137   K 3.6 3.8 3.4*    100 97*   CO2 33* 33* 34*   BUN 16 12 8   CREATININE <0.5* <0.5* <0.5*       XR CHEST PORTABLE   Final Result   The enteric tube is in good position in the stomach. XR CHEST PORTABLE   Final Result   1. Enteric tube courses into the abdomen with the tip not included. 2. Pulmonary edema. CT ABDOMEN PELVIS WO CONTRAST Additional Contrast? None   Final Result   Findings consistent with cirrhosis and portal hypertension with a nodular   contour of the liver, ascites, splenomegaly. Small bilateral pleural effusions. Gaseous distention of the transverse colon. XR ABDOMEN FOR NG/OG/NE TUBE PLACEMENT   Final Result   The enteric tube is in good position in the stomach. XR ABDOMEN (KUB) (SINGLE AP VIEW)   Final Result   Nonspecific but probably nonobstructive bowel gas pattern. XR CHEST PORTABLE   Final Result   Unchanged small right pleural effusion. Lines and tubes are in good position as described above. XR CHEST PORTABLE   Final Result   Interval development of small right-sided pleural effusion. XR CHEST PORTABLE   Final Result   1. Stable appropriate positions of support apparatus. 2. No significant change in appearance of diffuse hazy opacity throughout   both lungs, likely a combination of asymmetric edema and multifocal pneumonia. XR CHEST PORTABLE   Final Result   Increased pulmonary vascular congestion since yesterday. Decreased right   infrahilar atelectasis or pneumonia. XR CHEST PORTABLE   Final Result   Stable exam demonstrating cardiomegaly with vascular congestion and   persistent strandy airspace disease in the right lung base presumably   atelectasis unless clinical findings suggest pneumonia         XR CHEST PORTABLE   Final Result   Cardiomegaly with pulmonary vascular congestion. No definite pulmonary   edema. Atelectasis in the lung bases         IR PICC WO SQ PORT/PUMP > 5 YEARS   Final Result      XR CHEST PORTABLE   Final Result   Mild cardiomegaly with borderline vascular congestion, accentuated by low   lung volume. XR CHEST PORTABLE   Final Result   1. Endotracheal tube tip is 6-7 cm above the antoine. 2. The enteric tube courses into the stomach with the tip not included on   this examination of the chest.         CT CHEST PULMONARY EMBOLISM W CONTRAST   Final Result   No evidence of pulmonary embolism or acute pulmonary abnormality. XR CHEST PORTABLE   Final Result   1. Vascular cephalization, consistent with mild central venous congestion               ASSESSMENT/PLAN:    Acute respiratory failure with hypoxia and hypercapnia  - related to COPD exacerbation   - fextubated on 8/12/2021.   - finished rocephin day#7/7 and azithromycin day#5/5  Obtain arterial blood gas today as she is somnolent-showed normal pH with elevated CO2.     Ileus  With intractable nausea and vomiting  -NG placed  -N.p.o. continue IV fluids  CT abdomen and pelvis -shows findings consistent with cirrhosis and portal hypertension and small bilateral pleural effusions    Cirrhosis with portal hypertension per CT. Hepatitis viral profile. Denies alcohol  ALFRED,antiSMA,ferritin, ceruloplasmin, alpha-1 antitrypsin. GI consult. Acute encephalopathy patient appears to have some baseline dementia  -Patient is somnolent today. Obtain arterial blood gas- normal ph with elevated p co2  Check ammonia level    Hx of drug abuse with related admissions in the past   - snorts fentanyl  Daughter reports she quit drugs since last time     Tobacco Abuse  -need cessation. DM2  -On Lantus -held as patient is n.p.o. . Continue sliding scale insulin .     Generalized weakness/morbid obesity  -Consult PT OT      DVT Prophylaxis: Lx  NPO due to nausea and vomiting  Code Status: Full Code      Pearl Carvalho MD, 8/17/2021 8:38 AM

## 2021-08-17 NOTE — PLAN OF CARE
Nutrition Problem #1: Inadequate oral intake  Intervention: Food and/or Nutrient Delivery: Continue NPO  Nutritional Goals: patient will adhere to NPO status until medically cleared to receive nutrition therapy

## 2021-08-17 NOTE — PROGRESS NOTES
Pulmonary Progress Note    CC: Acute on Chronic Respiratory failure, COPD, pneumonia    Subjective:   Slowly improving    IV line: Right cephalic PICC 8/6     MV: 8/5-8/12       Intake/Output Summary (Last 24 hours) at 8/17/2021 1504  Last data filed at 8/17/2021 1314  Gross per 24 hour   Intake 2152.91 ml   Output 1350 ml   Net 802.91 ml       Exam:   BP (!) 155/90   Pulse 94   Temp 99.1 °F (37.3 °C) (Oral)   Resp 15   Ht 5' 4\" (1.626 m)   Wt 223 lb 4.8 oz (101.3 kg)   SpO2 96%   BMI 38.33 kg/m²  on 2 L  Constitutional:  No acute distress. HENT:  Oropharynx is clear and moist.   Neck: No tracheal deviation present. Cardiovascular: Normal heart sounds. No lower extremity edema. Pulmonary/Chest: No wheezes. No rhonchi. No rales. No decreased breath sounds. No accessory muscle usage or stridor. Musculoskeletal: No cyanosis. No clubbing. Skin: Skin is warm and dry. Psychiatric: Normal mood and affect.   Neurologic: speech fluent, alert and oriented, strength symmetric        Scheduled Meds:   pantoprazole  40 mg Intravenous BID    methylPREDNISolone  20 mg Intravenous Daily    [Held by provider] insulin glargine  15 Units Subcutaneous QAM    ipratropium-albuterol  3 mL Inhalation Q4H While awake    insulin lispro  0-18 Units Subcutaneous 4x Daily AC & HS    melatonin  10 mg Oral Nightly    lidocaine 1 % injection  5 mL Intradermal Once    sodium chloride flush  5-40 mL Intravenous 2 times per day    cefepime  2,000 mg Intravenous Once    nicotine  1 patch Transdermal Daily    enoxaparin  40 mg Subcutaneous Daily    tobramycin-dexamethasone  1 drop Both Eyes 6 times per day     Continuous Infusions:   dextrose 5% and 0.45% NaCl with KCl 20 mEq 100 mL/hr at 08/17/21 1314    sodium chloride      dextrose       PRN Meds:  traMADol, potassium chloride **OR** potassium alternative oral replacement **OR** potassium chloride, magnesium sulfate, sodium chloride flush, sodium chloride, glucose, dextrose, glucagon (rDNA), dextrose, polyethylene glycol, acetaminophen **OR** acetaminophen, ondansetron, albuterol    Labs:  CBC:   Recent Labs     08/15/21  0515 08/16/21  0623 08/17/21  0517   WBC 7.4 6.7 6.3   HGB 13.8 13.7 13.3   HCT 42.8 41.8 40.8   MCV 89.3 88.6 88.5    146 154     BMP:   Recent Labs     08/15/21  0515 08/16/21  0623 08/17/21  0517    140 137   K 3.6 3.8 3.4*    100 97*   CO2 33* 33* 34*   BUN 16 12 8   CREATININE <0.5* <0.5* <0.5*     LIVER PROFILE:   No results for input(s): AST, ALT, LIPASE, BILIDIR, BILITOT, ALKPHOS in the last 72 hours. Invalid input(s): AMYLASE,  ALB  PT/INR:   No results for input(s): PROTIME, INR in the last 72 hours. APTT: No results for input(s): APTT in the last 72 hours. UA:  No results for input(s): NITRITE, COLORU, PHUR, LABCAST, WBCUA, RBCUA, MUCUS, TRICHOMONAS, YEAST, BACTERIA, CLARITYU, SPECGRAV, LEUKOCYTESUR, UROBILINOGEN, BILIRUBINUR, BLOODU, GLUCOSEU, AMORPHOUS in the last 72 hours.     Invalid input(s): Sultana Morgan  Recent Labs     08/16/21  0920   PHART 7.379   LSO9IOD 61.8*   PO2ART 76.6       Cultures:   8/4 COVID-19 and influenza not detected  8/5 UC mixed hadley  8/5 BC NG    Films:  Chest x-ray 8/12/21  ET tube terminates above the antoine.  Right PICC terminates in the superior   right atrium.  Enteric tube courses through the chest below the diaphragm;   tip is not visualized.  Lungs are unchanged in appearance compared to prior   study.  No pneumothorax.  Small right pleural effusion.      CTPA 8/5/21     No evidence of pulmonary embolism or acute pulmonary abnormality     KUB 8/14: nonspecific bowel gas pattern  ACT 8/16/21 small bilateral effusions, nodular liver with ascites and signs of portal hypertension    ASSESSMENT:  · Acute hypoxic and hypercapnic respiratory failure   · COPD with acute exacerbation  · CAP   · Acute encephalopathy/metabolic encephalopathy  · Hyperglycemia   · UTI  · Tobacco abuse  · Possible ileus  · Liver cirrhosis with portal hypertension     PLAN:  · Supplemental oxygen to maintain SaO2 >92%; wean as tolerated   · Inhaled bronchodilators  · Solumedrol taper, @ 20  · Completed 7 days ceftriaxone and 5 days azithromycin  · Gastroenterology to see   · Code status: Full code

## 2021-08-17 NOTE — PROGRESS NOTES
Consult called in to [address] [facility], Dr Pierre Ngo 8-17-21 @5804.  Marvin Gutierrez

## 2021-08-17 NOTE — PROGRESS NOTES
Patient return to floor, vital signs stable. Patient resting comfortable and states she has no needs at this time. Oxygen at 2L nasal cannula, NG in place. Bilateral wrist restraints present, no skin breakdown noted.

## 2021-08-17 NOTE — PROGRESS NOTES
Inpatient Physical Therapy Daily Treatment    Unit: PCU  Date:  8/17/2021  Patient Name:    Daria Cox  Admitting diagnosis:  Septicemia [facility]) [A41.9]  Acute cystitis without hematuria [N30.00]  Acute respiratory failure with hypoxia and hypercapnia ([facility] [address] [J96.01, J96.02]  Admit Date:  8/4/2021  Precautions/Restrictions/WB Status/ Lines/ Wounds/ Oxygen: Fall risk, Bed/chair alarm, Lines -IV, Supplemental O2 (1.5), Brown catheter, NG tube and PICC right, Confusion, Telemetry, Continuous pulse oximetry and Isolation Precautions: Contact, soft wrist restraints    Treatment Time: 15:13-15:51  Treatment Number:  2   Timed Code Treatment Minutes: 38 minutes  Total Treatment Minutes:  38  minutes    Patient Goals for Therapy: \" to get moving \" . 8/17 - family inquires about whether pt can try to get up and move to a chair or BSC. Discharge Recommendations: SNF  DME needs for discharge: defer to facility       Therapy recommendation for EMS Transport: requires transport by cot due to inability to transfer OOB without lift equipment    Therapy recommendations for staff:   Assist of 1 with STEDY to transfer to [facility]. History Of Present Illness:     Per Ambreen 8/5/21:  \"The patient is a 61 y.o. female with PMH below, presents with CP, SOB, MS change, abd/facial edema. Pt has hx of dementia and is not able to give much hx. She denies CP currently. Hx obtained mainly from pt daughter at bedside. She reports that her mother was c/o CP last week. For the last week, she has been c/o increasing SOB. She has had increasingly productive cough of mostly thick/clear but sometimes yellow mucous.   Daughter also reports that she thinks her mom's face and abd seems more swollen than usual.\"  To ICU, intubated and ventilated from 8/5-8/12  Home Health S4 Level Recommendation:  NA  AM-PAC Mobility Score    AM-PAC Inpatient Mobility Raw Score : 11  AM-PAC Inpatient Mobility without Stair Climbing Raw Score : [address] [place] [address] scored a 11/24 on the AM-PAC short mobility form. Current research shows that an AM-PAC score of 17 or less is typically not associated with a discharge to the patient's home setting. If patient discharges prior to next session this note will serve as a discharge summary. Please see below for the latest assessment towards goals. Pain   Yes  Location: \"all over\"  Rating: mild /10  Pain Medicine Status: No request made    Cognition    A&O Person and Place   Able to follow 1 step commands    Subjective  Patient lying supine in bed with family at bedside. Pt agreeable to this PT eval & tx. Balance  Sitting:  Fair +; Mod A initially upon sitting to EOB, progressing to SBA  Comments: Pt sat EOB ~12 minutes total.    Standing: Fair -; Min A  and 2 persons for static stance  Comments: 2 bouts standing ~10 seconds each at EOB, knees nearly buckling multiple times. Limited by fatigue. Bed Mobility   Supine to Sit:    Mod A   Sit to Supine:   Min A   Rolling:   Min A   Scooting in sitting: Min A   Scooting in supine:  Not Tested and Mod A     Transfer Training     Sit to stand:   Min A  and 2 persons with bilat HHA from EOB, 2x bouts  Stand to sit:   Min A  and 2 persons to control descent, pt fatigued. Bed to Chair:   Not Tested with use of N/A    Gait gait deferred due to fatigue; pt ambulated 0 ft. Stair Training deferred, pt unsafe/ not appropriate to complete stairs at this time    Activity Tolerance   Pt completed therapy session with No adverse symptoms noted with activity. BP (mmHg)  HR (bpm)  SpO2 (%)    Supine before activity        EOB  168/91  102  96% on 1.5L    Seated after activity   90's  93-96% on 1.5L       Positioning Needs   Pt in bed, alarm set, positioned in proper neutral alignment and pressure relief provided.    Call light provided and all needs within reach    Exercises Initiated  all completed bilaterally unless indicated  Ankle Pumps x 10 reps  Heel slides x 10 reps  Hip abduction: x 10 reps   Hip IR/ER x 10 reps  Glut sets x 10 reps    Other  None. Patient/Family Education   Pt educated on role of inpatient PT, POC, importance of continued activity, DC recommendations, safety awareness and calling for assist with mobility. Assessment  Pt seen for Physical Therapy follow up treatment. Pt participates well. She is limited by fatigue. She requires Min-Mod A for bed mobility and tolerates 2 short static standing trials at EOB with Min A x 2 to prevent knees buckling. Patient will benefit from skilled PT intervention while in acute care. Recommending SNF upon discharge as patient functioning well below baseline, demonstrates good rehab potential and unable to return home due to inability to negotiate stairs to enter home/bedroom/bathroom, burden of care beyond caregiver ability and limited safety awareness. Goals : To be met in 3 visits:  1). Independent with LE Ex x 10 reps  2.) Bed to chair: Mod A     To be met in 6 visits:  1). Supine to/from sit: SBA  2). Sit to/from stand: SBA  3). Bed to chair: CGA  4). Gait: Ambulate 100 ft.   with  CGA and use of rolling walker (RW)  5). Tolerate B LE exercises 3 sets of 10-15 reps  6). Ascend/descend 8 steps with CGA with use of hand rail unilateral and LRAD (least restrictive assistive device)    Rehabilitation Potential: Good  Strengths for achieving goals include:   Pt motivated, PLOF, Family Support and Pt cooperative   Barriers to achieving goals include:    Complexity of condition    Plan    To be seen 3-5 x / week  while in acute care setting for therapeutic exercises, bed mobility, transfers, progressive gait training, balance training, and family/patient education. Signature: Paul Rogers, PT, DPT    If patient discharges from this facility prior to next visit, this note will serve as the Discharge Summary.

## 2021-08-17 NOTE — PROGRESS NOTES
Shift assessment complete, scheduled meds given. Call light in reach. Pt still in bilateral wrist restraints for safety and pulling of lines. ROM performed. Pt denies needs at this time. Will continue to monitor.

## 2021-08-17 NOTE — PROGRESS NOTES
Speech Language Pathology  Attempt Note    Discussed pt w/ RN. Pt NPO for GI at this time. Pt w/ NG. Will reassess when medically appropriate. Edilia Soulier, M.A.  [address] [facility]  Speech Language Pathologist

## 2021-08-17 NOTE — PROGRESS NOTES
Bedside shift report given to Balaji Carlos, [address]. IVF continues at 100ml/hr. Patient denying pain.

## 2021-08-17 NOTE — PROGRESS NOTES
Bedside report received from [facility]. Care taken over.     Aliza Juarez RN

## 2021-08-17 NOTE — PROGRESS NOTES
Occupational Therapy Daily Treatment Note    Unit: PCU  Date:  8/17/2021  Patient Name:    Alexia Balbuena  Admitting diagnosis:  Septicemia [facility]) [A41.9]  Acute cystitis without hematuria [N30.00]  Acute respiratory failure with hypoxia and hypercapnia ([facility] [address] [J96.01, J96.02]  Admit Date:  8/4/2021  Precautions/Restrictions:  Fall risk, Bed/chair alarm, Lines -IV, Supplemental O2 (1.5), Brown catheter, NG tube and PICC right, Confusion, Telemetry, Continuous pulse oximetry and Isolation Precautions: Contact, B wrist restraints         Discharge Recommendations: SNF  DME needs for discharge: defer to facility       Therapy recommendations for staff:   Assist of 2 (minimal assist) with use of HARDEEP STEDY for all transfers to/from BSC/chair    AM-PAC Score: AM-PAC Inpatient Daily Activity Raw Score: 8  Home Health S4 Level: NA       Treatment Time:  15:13-15:50  Treatment number:  2    Total Treatment Time:  37 minutes    History of Present Illness:  Per Ambreen 8/5/21:  \"The patient is a 60 y. o. female with PMH below, presents with CP, SOB, MS change, abd/facial edema.  Pt has hx of dementia and is not able to give much hx.  She denies CP currently.  Hx obtained mainly from pt daughter at bedside. Georgia Brown reports that her mother was c/o CP last week.  For the last week, she has been c/o increasing SOB.  She has had increasingly productive cough of mostly thick/clear but sometimes yellow mucous.  Daughter also reports that she thinks her mom's face and abd seems more swollen than usual.\"  To ICU, intubated and ventilated from 8/5-8/12    Subjective:  Patient supine in bed and agreeable to treatment. Oriented to self and location. Disoriented to date. Patient able to follow 1 step commands. Pain   Yes  Rating: moderate  Location: abdomen  Pain Medicine Status: Pain med requested      Bed Mobility:   Supine to Sit:  Mod A  Sit to Supine:  Min A  Rolling:           Min A  Scooting:        CGA towards EOB.  Min A of 2 [facility]    Transfer Training:   Sit to stand:   hand held assist x2  Stand to sit:  hand held assist x2  Bed to Chair:  Not Tested  Bed to [facility]:   Not Tested  Standard toilet:   Not Tested    Activity Tolerance   Pt completed therapy session with Pain noted with movement       BP (mmHg)  HR (bpm)  SpO2 (%)    Supine before activity           EOB  168/91  102  96% on 1.5L    Seated after activity    90s  93-96% on 1.5L        ADL Training:   Upper body dressing:  Not Tested  Upper body bathing:  Not Tested  Lower body dressing:  Not Tested  Lower body bathing:  Not Tested  Toileting:   Not Tested  Grooming/Hygiene:  Not Tested    Therapeutic Exercise:   Hand flex/ext:  x5  Wrist flex/ext:  x5  shoulder elevation:  x5    Patient Education:   Role of OT  Recommendations for DC    Positioning Needs: In bed, call light and needs in reach. Alarm Set    Family Present:  Yes    Assessment: Patient demonstrates significant improvements with bed mobility and standing. Patient would benefit from transferring to chair/BSC daily with staff. Patient will need SNF at DC to improve independence and activity tolerance. GOALS  To be met in 3 Visits:  1). Bed to toilet/BSC: Max A     To be met in 5 Visits:  1). Supine to/from Sit:             Mod A  2). Upper Body Bathing: Mod A  3). Lower Body Bathing: Mod A  4). Upper Body Dressing:       Min A and Mod A  5). Lower Body Dressing:       Max A  6).  Pt to demonstrate UE exs x 15 reps with minimal cues    Plan: cont with [facility], OTR/L #[id number]      If patient discharges from this facility prior to next visit, this note will serve as the Discharge Summary

## 2021-08-18 ENCOUNTER — APPOINTMENT (OUTPATIENT)
Dept: GENERAL RADIOLOGY | Age: 56
DRG: 207 | End: 2021-08-18
Payer: MEDICARE

## 2021-08-18 LAB
ALBUMIN SERPL-MCNC: 3.1 G/DL (ref 3.4–5)
ALP BLD-CCNC: 160 U/L (ref 40–129)
ALT SERPL-CCNC: 53 U/L (ref 10–40)
ANION GAP SERPL CALCULATED.3IONS-SCNC: 5 MMOL/L (ref 3–16)
ANTI-NUCLEAR ANTIBODY (ANA): NEGATIVE
AST SERPL-CCNC: 51 U/L (ref 15–37)
BASOPHILS ABSOLUTE: 0 K/UL (ref 0–0.2)
BASOPHILS RELATIVE PERCENT: 0.6 %
BILIRUB SERPL-MCNC: 0.8 MG/DL (ref 0–1)
BILIRUBIN DIRECT: 0.4 MG/DL (ref 0–0.3)
BILIRUBIN, INDIRECT: 0.4 MG/DL (ref 0–1)
BUN BLDV-MCNC: 7 MG/DL (ref 7–20)
CALCIUM SERPL-MCNC: 8.6 MG/DL (ref 8.3–10.6)
CHLORIDE BLD-SCNC: 100 MMOL/L (ref 99–110)
CO2: 33 MMOL/L (ref 21–32)
CREAT SERPL-MCNC: <0.5 MG/DL (ref 0.6–1.1)
EOSINOPHILS ABSOLUTE: 0.2 K/UL (ref 0–0.6)
EOSINOPHILS RELATIVE PERCENT: 3 %
GFR AFRICAN AMERICAN: >60
GFR NON-AFRICAN AMERICAN: >60
GLUCOSE BLD-MCNC: 133 MG/DL (ref 70–99)
GLUCOSE BLD-MCNC: 151 MG/DL (ref 70–99)
GLUCOSE BLD-MCNC: 152 MG/DL (ref 70–99)
GLUCOSE BLD-MCNC: 161 MG/DL (ref 70–99)
GLUCOSE BLD-MCNC: 173 MG/DL (ref 70–99)
GLUCOSE BLD-MCNC: 195 MG/DL (ref 70–99)
GLUCOSE BLD-MCNC: 362 MG/DL (ref 70–99)
HCT VFR BLD CALC: 39.7 % (ref 36–48)
HEMOGLOBIN: 12.8 G/DL (ref 12–16)
LYMPHOCYTES ABSOLUTE: 1.1 K/UL (ref 1–5.1)
LYMPHOCYTES RELATIVE PERCENT: 21.4 %
MCH RBC QN AUTO: 28.8 PG (ref 26–34)
MCHC RBC AUTO-ENTMCNC: 32.2 G/DL (ref 31–36)
MCV RBC AUTO: 89.4 FL (ref 80–100)
MONOCYTES ABSOLUTE: 0.4 K/UL (ref 0–1.3)
MONOCYTES RELATIVE PERCENT: 7.3 %
NEUTROPHILS ABSOLUTE: 3.6 K/UL (ref 1.7–7.7)
NEUTROPHILS RELATIVE PERCENT: 67.7 %
PDW BLD-RTO: 15.1 % (ref 12.4–15.4)
PERFORMED ON: ABNORMAL
PLATELET # BLD: 137 K/UL (ref 135–450)
PMV BLD AUTO: 7.7 FL (ref 5–10.5)
POTASSIUM SERPL-SCNC: 4.9 MMOL/L (ref 3.5–5.1)
RBC # BLD: 4.43 M/UL (ref 4–5.2)
SODIUM BLD-SCNC: 138 MMOL/L (ref 136–145)
TOTAL PROTEIN: 5.8 G/DL (ref 6.4–8.2)
TRIGL SERPL-MCNC: 117 MG/DL (ref 0–150)
WBC # BLD: 5.3 K/UL (ref 4–11)

## 2021-08-18 PROCEDURE — 6370000000 HC RX 637 (ALT 250 FOR IP): Performed by: INTERNAL MEDICINE

## 2021-08-18 PROCEDURE — 92526 ORAL FUNCTION THERAPY: CPT

## 2021-08-18 PROCEDURE — 94761 N-INVAS EAR/PLS OXIMETRY MLT: CPT

## 2021-08-18 PROCEDURE — 2580000003 HC RX 258: Performed by: INTERNAL MEDICINE

## 2021-08-18 PROCEDURE — 97110 THERAPEUTIC EXERCISES: CPT

## 2021-08-18 PROCEDURE — 97116 GAIT TRAINING THERAPY: CPT

## 2021-08-18 PROCEDURE — 94640 AIRWAY INHALATION TREATMENT: CPT

## 2021-08-18 PROCEDURE — 99232 SBSQ HOSP IP/OBS MODERATE 35: CPT | Performed by: INTERNAL MEDICINE

## 2021-08-18 PROCEDURE — 74018 RADEX ABDOMEN 1 VIEW: CPT

## 2021-08-18 PROCEDURE — 84478 ASSAY OF TRIGLYCERIDES: CPT

## 2021-08-18 PROCEDURE — 97530 THERAPEUTIC ACTIVITIES: CPT

## 2021-08-18 PROCEDURE — 80076 HEPATIC FUNCTION PANEL: CPT

## 2021-08-18 PROCEDURE — C9113 INJ PANTOPRAZOLE SODIUM, VIA: HCPCS | Performed by: HOSPITALIST

## 2021-08-18 PROCEDURE — 6360000002 HC RX W HCPCS: Performed by: HOSPITALIST

## 2021-08-18 PROCEDURE — 2700000000 HC OXYGEN THERAPY PER DAY

## 2021-08-18 PROCEDURE — 2060000000 HC ICU INTERMEDIATE R&B

## 2021-08-18 PROCEDURE — 85025 COMPLETE CBC W/AUTO DIFF WBC: CPT

## 2021-08-18 PROCEDURE — 80048 BASIC METABOLIC PNL TOTAL CA: CPT

## 2021-08-18 PROCEDURE — 2500000003 HC RX 250 WO HCPCS: Performed by: INTERNAL MEDICINE

## 2021-08-18 PROCEDURE — 97535 SELF CARE MNGMENT TRAINING: CPT

## 2021-08-18 PROCEDURE — 6360000002 HC RX W HCPCS: Performed by: INTERNAL MEDICINE

## 2021-08-18 PROCEDURE — 6370000000 HC RX 637 (ALT 250 FOR IP): Performed by: PHYSICIAN ASSISTANT

## 2021-08-18 RX ORDER — POLYETHYLENE GLYCOL 3350 17 G/17G
17 POWDER, FOR SOLUTION ORAL 2 TIMES DAILY
Status: DISCONTINUED | OUTPATIENT
Start: 2021-08-18 | End: 2021-08-19 | Stop reason: HOSPADM

## 2021-08-18 RX ADMIN — IPRATROPIUM BROMIDE AND ALBUTEROL SULFATE 3 ML: .5; 3 SOLUTION RESPIRATORY (INHALATION) at 10:08

## 2021-08-18 RX ADMIN — TOBRAMYCIN AND DEXAMETHASONE 1 DROP: 3; 1 SUSPENSION/ DROPS OPHTHALMIC at 15:18

## 2021-08-18 RX ADMIN — TOBRAMYCIN AND DEXAMETHASONE 1 DROP: 3; 1 SUSPENSION/ DROPS OPHTHALMIC at 23:55

## 2021-08-18 RX ADMIN — Medication 10 MG: at 20:58

## 2021-08-18 RX ADMIN — SODIUM CHLORIDE, PRESERVATIVE FREE 10 ML: 5 INJECTION INTRAVENOUS at 10:48

## 2021-08-18 RX ADMIN — PANTOPRAZOLE SODIUM 40 MG: 40 INJECTION, POWDER, FOR SOLUTION INTRAVENOUS at 20:58

## 2021-08-18 RX ADMIN — IPRATROPIUM BROMIDE AND ALBUTEROL SULFATE 3 ML: .5; 3 SOLUTION RESPIRATORY (INHALATION) at 05:13

## 2021-08-18 RX ADMIN — TOBRAMYCIN AND DEXAMETHASONE 1 DROP: 3; 1 SUSPENSION/ DROPS OPHTHALMIC at 20:58

## 2021-08-18 RX ADMIN — SODIUM CHLORIDE, PRESERVATIVE FREE 10 ML: 5 INJECTION INTRAVENOUS at 21:01

## 2021-08-18 RX ADMIN — IPRATROPIUM BROMIDE AND ALBUTEROL SULFATE 3 ML: .5; 3 SOLUTION RESPIRATORY (INHALATION) at 23:24

## 2021-08-18 RX ADMIN — POTASSIUM CHLORIDE, DEXTROSE MONOHYDRATE AND SODIUM CHLORIDE: 150; 5; 450 INJECTION, SOLUTION INTRAVENOUS at 04:30

## 2021-08-18 RX ADMIN — TOBRAMYCIN AND DEXAMETHASONE 1 DROP: 3; 1 SUSPENSION/ DROPS OPHTHALMIC at 00:24

## 2021-08-18 RX ADMIN — POLYETHYLENE GLYCOL (3350) 17 G: 17 POWDER, FOR SOLUTION ORAL at 15:06

## 2021-08-18 RX ADMIN — TOBRAMYCIN AND DEXAMETHASONE 1 DROP: 3; 1 SUSPENSION/ DROPS OPHTHALMIC at 04:31

## 2021-08-18 RX ADMIN — ENOXAPARIN SODIUM 40 MG: 40 INJECTION SUBCUTANEOUS at 10:47

## 2021-08-18 RX ADMIN — IPRATROPIUM BROMIDE AND ALBUTEROL SULFATE 3 ML: .5; 3 SOLUTION RESPIRATORY (INHALATION) at 19:13

## 2021-08-18 RX ADMIN — TOBRAMYCIN AND DEXAMETHASONE 1 DROP: 3; 1 SUSPENSION/ DROPS OPHTHALMIC at 10:48

## 2021-08-18 RX ADMIN — METHYLPREDNISOLONE SODIUM SUCCINATE 20 MG: 40 INJECTION, POWDER, FOR SOLUTION INTRAMUSCULAR; INTRAVENOUS at 10:48

## 2021-08-18 RX ADMIN — PANTOPRAZOLE SODIUM 40 MG: 40 INJECTION, POWDER, FOR SOLUTION INTRAVENOUS at 10:48

## 2021-08-18 RX ADMIN — IPRATROPIUM BROMIDE AND ALBUTEROL SULFATE 3 ML: .5; 3 SOLUTION RESPIRATORY (INHALATION) at 15:40

## 2021-08-18 NOTE — PROGRESS NOTES
Assessment completed, see flowsheets. Night meds given. 3 units ss humalog given for fsbs 172. Pt with no reports of pain at this time. ROM exersizes given, no signs of skin breakdown noted. Bed/chair alarm on, bed in lowest position with call light in reach.

## 2021-08-18 NOTE — PROGRESS NOTES
Pulmonary Progress Note    CC: Acute on Chronic Respiratory failure, COPD, pneumonia    Subjective:   Breathing is okay, abdomen hurts    IV line: Right cephalic PICC 8/6     MV: 8/5-8/12       Intake/Output Summary (Last 24 hours) at 8/18/2021 0750  Last data filed at 8/18/2021 0431  Gross per 24 hour   Intake 1677.89 ml   Output 1800 ml   Net -122.11 ml       Exam:   BP (!) 154/86   Pulse 91   Temp 99.6 °F (37.6 °C) (Oral)   Resp 16   Ht 5' 4\" (1.626 m)   Wt 213 lb 1.6 oz (96.7 kg)   SpO2 99%   BMI 36.58 kg/m²  on 2 L  Constitutional:  No acute distress. HENT:  Oropharynx is clear and moist.   Neck: No tracheal deviation present. Cardiovascular: Normal heart sounds. No lower extremity edema. Pulmonary/Chest: No wheezes. No rhonchi. No rales. No decreased breath sounds. No accessory muscle usage or stridor. Abdomen: Distended, tender lower quadrants  Musculoskeletal: No cyanosis. No clubbing. Skin: Skin is warm and dry. Psychiatric: Normal mood and affect.   Neurologic: speech fluent, alert and oriented, strength symmetric        Scheduled Meds:   pantoprazole  40 mg Intravenous BID    methylPREDNISolone  20 mg Intravenous Daily    [Held by provider] insulin glargine  15 Units Subcutaneous QAM    ipratropium-albuterol  3 mL Inhalation Q4H While awake    insulin lispro  0-18 Units Subcutaneous 4x Daily AC & HS    melatonin  10 mg Oral Nightly    lidocaine 1 % injection  5 mL Intradermal Once    sodium chloride flush  5-40 mL Intravenous 2 times per day    cefepime  2,000 mg Intravenous Once    nicotine  1 patch Transdermal Daily    enoxaparin  40 mg Subcutaneous Daily    tobramycin-dexamethasone  1 drop Both Eyes 6 times per day     Continuous Infusions:   dextrose 5% and 0.45% NaCl with KCl 20 mEq 100 mL/hr at 08/18/21 0430    sodium chloride      dextrose       PRN Meds:  traMADol, potassium chloride **OR** potassium alternative oral replacement **OR** potassium chloride, magnesium encephalopathy  · DM  · UTI  · Tobacco abuse  · Possible ileus  · Liver cirrhosis with portal hypertension     PLAN:  · Supplemental oxygen to maintain SaO2 >92%; wean as tolerated   · Inhaled bronchodilators  · Solumedrol taper, @ 20  · Completed 7 days ceftriaxone and 5 days azithromycin  · Gastroenterology to see   · Code status: Full code

## 2021-08-18 NOTE — PROGRESS NOTES
PROGRESS NOTE  S:56 yrs Patient  admitted on 8/4/2021 with Septicemia [facility]) [A41.9]  Acute cystitis without hematuria [N30.00]  Acute respiratory failure with hypoxia and hypercapnia ([address] [J96.01, J96.02] . Today she feels well. She removed NG, and passed 2x BMs last night. Exam:   Vitals:    08/18/21 1008   BP:    Pulse:    Resp: 18   Temp:    SpO2: 96%      General appearance: alert, appears older than stated age, cooperative, slowed mentation and syndromic appearance - chronically ill appearing  HEENT: Neck supple with midline trachea  Neck: no adenopathy and supple, symmetrical, trachea midline  Lungs: clear to auscultation bilaterally  Heart: regular rate and rhythm, S1, S2 normal, no murmur, click, rub or gallop  Abdomen: normal findings: no masses palpable and symmetric and abnormal findings:  distended, hypoactive bowel sounds and obese  Extremities: extremities normal, atraumatic, no cyanosis or edema     Medications: Reviewed    Labs:  CBC:   Recent Labs     08/16/21  0623 08/17/21  0517 08/18/21  0420   WBC 6.7 6.3 5.3   HGB 13.7 13.3 12.8   HCT 41.8 40.8 39.7   MCV 88.6 88.5 89.4    154 137     BMP:   Recent Labs     08/16/21  0623 08/17/21  0517 08/18/21  0420    137 138   K 3.8 3.4* 4.9    97* 100   CO2 33* 34* 33*   BUN 12 8 7   CREATININE <0.5* <0.5* <0.5*     LIVER PROFILE:   Recent Labs     08/18/21  0420   AST 51*   ALT 53*   PROT 5.8*   BILIDIR 0.4*   BILITOT 0.8   ALKPHOS 160*     PT/INR: No results for input(s): INR in the last 72 hours. Invalid input(s): PT      IMAGING:  XR ABDOMEN (KUB) (SINGLE AP VIEW)   Final Result   Dilatation of the transverse colon and large volume of stool in the distal   colon. Findings overall similar in appearance to prior CT         US ABDOMEN LIMITED   Final Result   Trace ascites with no safe window for paracentesis. Procedure was not   performed.          XR CHEST PORTABLE   Final Result   The enteric tube is in good position in the stomach. XR CHEST PORTABLE   Final Result   1. Enteric tube courses into the abdomen with the tip not included. 2. Pulmonary edema. CT ABDOMEN PELVIS WO CONTRAST Additional Contrast? None   Final Result   Findings consistent with cirrhosis and portal hypertension with a nodular   contour of the liver, ascites, splenomegaly. Small bilateral pleural effusions. Gaseous distention of the transverse colon. XR ABDOMEN FOR NG/OG/NE TUBE PLACEMENT   Final Result   The enteric tube is in good position in the stomach. XR ABDOMEN (KUB) (SINGLE AP VIEW)   Final Result   Nonspecific but probably nonobstructive bowel gas pattern. XR CHEST PORTABLE   Final Result   Unchanged small right pleural effusion. Lines and tubes are in good position as described above. XR CHEST PORTABLE   Final Result   Interval development of small right-sided pleural effusion. XR CHEST PORTABLE   Final Result   1. Stable appropriate positions of support apparatus. 2. No significant change in appearance of diffuse hazy opacity throughout   both lungs, likely a combination of asymmetric edema and multifocal pneumonia. XR CHEST PORTABLE   Final Result   Increased pulmonary vascular congestion since yesterday. Decreased right   infrahilar atelectasis or pneumonia. XR CHEST PORTABLE   Final Result   Stable exam demonstrating cardiomegaly with vascular congestion and   persistent strandy airspace disease in the right lung base presumably   atelectasis unless clinical findings suggest pneumonia         XR CHEST PORTABLE   Final Result   Cardiomegaly with pulmonary vascular congestion. No definite pulmonary   edema. Atelectasis in the lung bases         IR PICC WO SQ PORT/PUMP > 5 YEARS   Final Result      XR CHEST PORTABLE   Final Result   Mild cardiomegaly with borderline vascular congestion, accentuated by low   lung volume. XR CHEST PORTABLE   Final Result   1. Endotracheal tube tip is 6-7 cm above the antoine. 2. The enteric tube courses into the stomach with the tip not included on   this examination of the chest.         CT CHEST PULMONARY EMBOLISM W CONTRAST   Final Result   No evidence of pulmonary embolism or acute pulmonary abnormality. XR CHEST PORTABLE   Final Result   1. Vascular cephalization, consistent with mild central venous congestion           Attending Supervising [de-identified] Attestation Statement  The patient is a 61 y.o. female. I have performed a history and physical examination of the patient. I discussed the case with my physician assistant Michelle Arvizu PA-C    I reviewed the patient's Past Medical History, Past Surgical History, Medications, and Allergies. Physical Exam:  Vitals:    08/18/21 1008 08/18/21 1145 08/18/21 1515 08/18/21 1540   BP:  (!) 156/89 (!) 154/81    Pulse:  100 92    Resp: 18 19 20 20   Temp:  97.8 °F (36.6 °C) 97.6 °F (36.4 °C)    TempSrc:  Oral Oral    SpO2: 96% 96% 95% 95%   Weight:       Height:           Physical Examination: General appearance - ill-appearing  Mental status - alert, oriented to person, place, and time  Eyes - pupils equal and reactive, extraocular eye movements intact  Neck - supple, no significant adenopathy  Chest - clear to auscultation, no wheezes, rales or rhonchi, symmetric air entry  Heart - normal rate, regular rhythm, normal S1, S2, no murmurs, rubs, clicks or gallops  Abdomen - soft, nontender, nondistended, no masses or organomegaly  Extremities - no pedal edema noted          Impression: 61year old female with a history of DM, COPD, dementia admitted with acute respiratory failure secondary to pneumonia c/b ileus and new diagnosis of cirrhosis, likely ORR. F/U KUB showed distention in transverse colon and large stool load in distal colon. Recommendation:  1. Continue supportive care  2. Monitor LFTs  3.  Monitor and document output  4. Monitor and replenish electrolytes  5. Minimize narcotics and anxiolytics as able  6. Continue broad spectrum antibiotics   7. Miralax BID  8. Tap water enema once  9. Liver serologies in progress  10. SLP evaluation   11. Advance diet as tolerated pending SLP evaluation   12. PT/OT  13. Encourage activity  14. Will follow      Wilber Gonzalez PA-C  10:15 AM 8/18/2021                      61year old female with a history of DM, COPD, dementia admitted with acute respiratory failure secondary to pneumonia c/b ileus, constipation and new diagnosis of cirrhosis, likely ORR     Continue supportive care. Liver serology workup in progress. Continue antibiotics. Encourage nutrition after swallow eval. PT/OT. Start miralax and tap water enema for constipation.  Outpatient follow up for management of cirrhosis upon discharge    Corrie Agudelo MD          [phone]  [phone]

## 2021-08-18 NOTE — PROGRESS NOTES
Inpatient Physical Therapy Daily Treatment Note    Unit: PCU  Date:  8/18/2021  Patient Name:    Amalia Buerger  Admitting diagnosis:  Septicemia [facility]) [A41.9]  Acute cystitis without hematuria [N30.00]  Acute respiratory failure with hypoxia and hypercapnia ([facility] [address] [J96.01, J96.02]  Admit Date:  8/4/2021  Precautions/Restrictions:  Fall risk, Bed/chair alarm, Lines -IV and Supplemental O2 (2), Telemetry, Continuous pulse oximetry and Isolation Precautions: Contact, Telesitter, Confusion    Cleared for OOB/chair activites per RN    Discharge Recommendations: SNF  DME needs for discharge: defer to facility       Therapy recommendation for EMS Transport: can transport by wheelchair    Therapy recommendations for staff:   Assist of 2 with use of rolling walker (RW) and gait belt  Or stedy for all transfers to/from [facility]  to/from chair       History of Present Illness:  Per Ambreen 8/5/21:  \"The patient is a 60 y. o. female with PMH below, presents with CP, SOB, MS change, abd/facial edema.  Pt has hx of dementia and is not able to give much hx.  She denies CP currently.  Hx obtained mainly from pt daughter at bedside. Domingo Johnson reports that her mother was c/o CP last week.  For the last week, she has been c/o increasing SOB.  She has had increasingly productive cough of mostly thick/clear but sometimes yellow mucous.  Daughter also reports that she thinks her mom's face and abd seems more swollen than usual.\"  To ICU, intubated and ventilated from 8/5-8/12      Home Health S4 Level Recommendation: NA  AM-PAC Mobility Score   AM-PAC Inpatient Mobility Raw Score : 13  AM-PAC Inpatient Mobility without Stair Climbing Raw Score : [facility] scored a 13/24 on the AM-PAC short mobility form. Current research shows that an AM-PAC score of 17 or less is typically not associated with a discharge to the patient's home setting. If patient discharges prior to next session this note will serve as a discharge summary.   Please see below for the latest assessment towards goals. Treatment Time:  14:20-15:20  Treatment number: 3  Timed Code Treatment Minutes: 60 minutes  Total Treatment Minutes:  60  minutes    Cognition    A&O Person   Able to follow 1 step commands    Subjective  Patient lying supine in bed with no family present   Pt agreeable to this PT tx. Pain   Yes  Location: abdomen  Rating:    mild/10  Pain Medicine Status: No request made     Bed Mobility   Supine to Sit:    CGA, HOB elevated,used handrail  Sit to Supine:   Not Tested  Rolling:   Not Tested  Scooting:   SBA to EOB    Transfer Training     Sit to stand:   CGA from EOB,chair,BSC  Stand to sit:   CGA to chair, BSC,chair  Bed to Chair:   Min A  with use of gait belt and rolling walker (RW)    Gait Training gait completed as indicated below  Distance:      2,2,2 ft  Deviations (firm surface/linoleum):  decreased delano, forward flexed posture, shuffles and step to pattern  Assistive Device Used:    gait belt and rolling walker (RW)  Level of Assist:    Min A   Comment: R knee occasionally lacked full extension (mildly unstable on 2 occasions)    Stair Training deferred, pt unsafe/not appropriate to complete stairs at this time    Therapeutic Exercise Inocencio deferred secondary to treatment focus on functional mobility    Balance  Sitting:  Good ; SBA  Comments: decreased safety awareness    Standing: Fair +; CGA  Comments: using RW, stood for pericare and clothing management    Patient Education      Role of PT, POC, Discharge recommendations, safety awareness, transfer techniques and calling for assist with mobility. Positioning Needs       Pt up in chair, alarm set, positioned in proper neutral alignment and pressure relief provided. Call light provided and all needs within reach    ROM Measurements N/A  Knee Flexion:  Knee Extension:     Activity Tolerance   Pt completed therapy session with No adverse symptoms noted w/activity.  Reports fatigue     BP (mmHg)  HR (bpm)  SpO2 (%)    Supine before activity           EOB  147/80   93  95% on 1.5L                     Other  RN aware that nicotine patch was off of patient. RN instructed this clinical to re-tape on L arm    Assessment :  Patient demonstrated good progress this date. Improved bed mobility and transfers  Recommending SNF upon discharge as patient functioning well below baseline, demonstrates good rehab potential and unable to return home due to inability to negotiate stairs to enter home/bedroom/bathroom, burden of care beyond caregiver ability, home environment not conducive to patient recovery and limited safety awareness. Goals : To be met in 3 visits:  1). Independent with LE Ex x 10 reps  2.) Bed to chair: Mod A      To be met in 6 visits:  1). Supine to/from sit: SBA  2). Sit to/from stand: SBA  3). Bed to chair: CGA  4). Gait: Ambulate 100 ft.   with  CGA and use of rolling walker (RW)  5). Tolerate B LE exercises 3 sets of 10-15 reps  6). Ascend/descend 8 steps with CGA with use of hand rail unilateral and LRAD (least restrictive assistive device)    Plan   Continue with plan of care. Signature: Nicolle Cortés, PT #[id number]    If patient discharges from this facility prior to next visit, this note will serve as the Discharge Summary.

## 2021-08-18 NOTE — PROGRESS NOTES
Attempted to call back daughter Connor Gaspar x2. Went straight to voicemail, with a voicemail box that was full. Will try again before end of shift.     Nelia Bawja RN

## 2021-08-18 NOTE — CARE COORDINATION
INTERDISCIPLINARY PLAN OF CARE CONFERENCE    Date/Time: 8/18/2021 1:33 PM  Completed by: Blake Gross RN, Case Management      Patient Name:  Racquel Damian  YOB: 1965  Admitting Diagnosis: Septicemia ([facility] [address] [A41.9]  Acute cystitis without hematuria [N30.00]  Acute respiratory failure with hypoxia and hypercapnia ([facility] [address] [J96.01, J96.02]     Admit Date/Time:  8/4/2021  9:29 PM    Chart reviewed. Interdisciplinary team contacted or reviewed plan related to patient progress and discharge plans. Disciplines included Case Management, Nursing, and Dietitian. Current Status:ongoing  PT/OT recommendation for discharge plan of care: SNF    Expected D/C Disposition:  Skilled nursing facility  Confirmed plan with patient and/or family Yes confirmed with: (name) pt's daughter/FRANCESCA Ramey  Met with:pt's lucila/FRANCESCA Ramey  Discharge Plan Comments: Reviewed chart. Role of discharge planner explained and patient's daughter/Karoline MA,  verbalized understanding. Pt is from home with daughter, and she provides 24/7 assist for pt, as pt has dementia. Pt states se would like pt to go to [facility]. CM called Madelyn with [facility] and referral sent to her. Await to hear if [facility] can accept pt. Pt would need a rapid covid if going to [facility]. Pt has not been covid vaccinated per Karoline.     Pt is active with [facility] for home O2 at 2L. Pt was only in restraints d/t trying to pull out NG tube and Brown. NH was pulled out on 8/17. Brown d/c'd today. Restraints d/c'd at 1053am, as CM was int he room when d/c'd. Home O2 in place on admit: Yes  Pt informed of need to bring portable home O2 tank on day of discharge for nursing to connect prior to leaving:  Yes  Verbalized agreement/Understanding:   Yes

## 2021-08-18 NOTE — PROGRESS NOTES
Patient resting in bed at this time. Restraints intact. Family at bedside. Assessment and vital signs complete. This RN states she will return to administer morning medications. Patient and family agreeable to plan.

## 2021-08-18 NOTE — PROGRESS NOTES
IM Progress Note    Admit Date:  8/4/2021  13    Interval history:    69-year-old female with history of dementia, COPD, diabetes type 2 admitted with acute respiratory failure due to COPD exacerbation pneumonia. Patient was intubated on 8/ 5 and extubated to BiPAP on 8/12  O2 requirement down to 3 L of oxygen on 8/13  Patient was vomiting 2 nights ago has an NG tube now abdomen is distended  Patient denies nausea but there is no bowel movement reported      Subjective:  Ms. Bartolo Asencio seen.  - NG placed to intermittent low wall suction. Not much getting  More alert this morning. But still confused  She pulled out the NG again  Had 2 BMs last night     Objective:   BP (!) 154/86   Pulse 91   Temp 99.6 °F (37.6 °C) (Oral)   Resp 16   Ht 5' 4\" (1.626 m)   Wt 213 lb 1.6 oz (96.7 kg)   SpO2 99%   BMI 36.58 kg/m²       Intake/Output Summary (Last 24 hours) at 8/18/2021 0741  Last data filed at 8/18/2021 0431  Gross per 24 hour   Intake 1677.89 ml   Output 1800 ml   Net -122.11 ml       Physical Exam:  General: Obese. NG to intermittent low wall suction. Mucous Membranes:  Pink , anicteric  Neck: No JVD, no carotid bruit, no thyromegaly  Chest: Clear to auscultation . No wheezes rales or rhonchi   Cardiovascular:  RRR S1S2 heard, no murmurs or gallops  Abdomen: Distended , bowel sounds are sluggish . Extremities: No edema or cyanosis. Distal pulses well felt  Neurological : More alert today. Not fully oriented. Nonfocal. Moves all 4 extremities.    Psych: Stable      Medications:   Scheduled Medications:    pantoprazole  40 mg Intravenous BID    methylPREDNISolone  20 mg Intravenous Daily    [Held by provider] insulin glargine  15 Units Subcutaneous QAM    ipratropium-albuterol  3 mL Inhalation Q4H While awake    insulin lispro  0-18 Units Subcutaneous 4x Daily AC & HS    melatonin  10 mg Oral Nightly    lidocaine 1 % injection  5 mL Intradermal Once    sodium chloride flush  5-40 mL Intravenous 2 times per day    cefepime  2,000 mg Intravenous Once    nicotine  1 patch Transdermal Daily    enoxaparin  40 mg Subcutaneous Daily    tobramycin-dexamethasone  1 drop Both Eyes 6 times per day     I   dextrose 5% and 0.45% NaCl with KCl 20 mEq 100 mL/hr at 08/18/21 0430    sodium chloride      dextrose       traMADol, potassium chloride **OR** potassium alternative oral replacement **OR** potassium chloride, magnesium sulfate, sodium chloride flush, sodium chloride, glucose, dextrose, glucagon (rDNA), dextrose, polyethylene glycol, acetaminophen **OR** acetaminophen, ondansetron, albuterol    Lab Data:  Recent Labs     08/16/21  0623 08/17/21  0517 08/18/21  0420   WBC 6.7 6.3 5.3   HGB 13.7 13.3 12.8   HCT 41.8 40.8 39.7   MCV 88.6 88.5 89.4    154 137     Recent Labs     08/16/21  0623 08/17/21  0517 08/18/21  0420    137 138   K 3.8 3.4* 4.9    97* 100   CO2 33* 34* 33*   BUN 12 8 7   CREATININE <0.5* <0.5* <0.5*       US ABDOMEN LIMITED   Final Result   Trace ascites with no safe window for paracentesis. Procedure was not   performed. XR CHEST PORTABLE   Final Result   The enteric tube is in good position in the stomach. XR CHEST PORTABLE   Final Result   1. Enteric tube courses into the abdomen with the tip not included. 2. Pulmonary edema. CT ABDOMEN PELVIS WO CONTRAST Additional Contrast? None   Final Result   Findings consistent with cirrhosis and portal hypertension with a nodular   contour of the liver, ascites, splenomegaly. Small bilateral pleural effusions. Gaseous distention of the transverse colon. XR ABDOMEN FOR NG/OG/NE TUBE PLACEMENT   Final Result   The enteric tube is in good position in the stomach. XR ABDOMEN (KUB) (SINGLE AP VIEW)   Final Result   Nonspecific but probably nonobstructive bowel gas pattern. XR CHEST PORTABLE   Final Result   Unchanged small right pleural effusion.       Lines and tubes are in good position as described above. XR CHEST PORTABLE   Final Result   Interval development of small right-sided pleural effusion. XR CHEST PORTABLE   Final Result   1. Stable appropriate positions of support apparatus. 2. No significant change in appearance of diffuse hazy opacity throughout   both lungs, likely a combination of asymmetric edema and multifocal pneumonia. XR CHEST PORTABLE   Final Result   Increased pulmonary vascular congestion since yesterday. Decreased right   infrahilar atelectasis or pneumonia. XR CHEST PORTABLE   Final Result   Stable exam demonstrating cardiomegaly with vascular congestion and   persistent strandy airspace disease in the right lung base presumably   atelectasis unless clinical findings suggest pneumonia         XR CHEST PORTABLE   Final Result   Cardiomegaly with pulmonary vascular congestion. No definite pulmonary   edema. Atelectasis in the lung bases         IR PICC WO SQ PORT/PUMP > 5 YEARS   Final Result      XR CHEST PORTABLE   Final Result   Mild cardiomegaly with borderline vascular congestion, accentuated by low   lung volume. XR CHEST PORTABLE   Final Result   1. Endotracheal tube tip is 6-7 cm above the antoine. 2. The enteric tube courses into the stomach with the tip not included on   this examination of the chest.         CT CHEST PULMONARY EMBOLISM W CONTRAST   Final Result   No evidence of pulmonary embolism or acute pulmonary abnormality. XR CHEST PORTABLE   Final Result   1. Vascular cephalization, consistent with mild central venous congestion         XR ABDOMEN (KUB) (SINGLE AP VIEW)    (Results Pending)         ASSESSMENT/PLAN:    Acute respiratory failure with hypoxia and hypercapnia  - related to COPD exacerbation   - fextubated on 8/12/2021.   - finished rocephin day#7/7 and azithromycin day#5/5  Obtain arterial blood gas as she is somnolent-showed normal pH with elevated CO2.     Ileus  With intractable nausea and vomiting  -NG placed  -N.p.o. continue IV fluids  CT abdomen and pelvis -shows findings consistent with cirrhosis and portal hypertension and small bilateral pleural effusions  Paracentesis ordered-ultrasound showed trace ascites and no safe window for paracentesis. Cirrhosis with portal hypertension per CT. Hepatitis viral profile. Denies alcohol  ALFRED,antiSMA,ferritin, ceruloplasmin, alpha-1 antitrypsin. GI consult. Acute encephalopathy patient appears to have some baseline dementia  -Patient is somnolent today. Obtain arterial blood gas- normal ph with elevated p co2  Check ammonia level- normal.    Hx of drug abuse with related admissions in the past   - snorts fentanyl  Daughter reports she quit drugs since last time     Tobacco Abuse  -need cessation. DM2  -On Lantus -held as patient is n.p.o. . Continue sliding scale insulin .     Generalized weakness/morbid obesity  -Consult PT OT      DVT Prophylaxis: Lx  NPO due to nausea and vomiting  Code Status: Full Code      Jaylene Johnson MD, 8/18/2021 7:41 AM [Normal] : Normal [Water heater temperature set at <120 degrees F] : Water heater temperature set at <120 degrees F [Car seat in back seat] : Car seat in back seat [Carbon Monoxide Detectors] : Carbon monoxide detectors [Smoke Detectors] : Smoke detectors [Supervised outdoor play] : Supervised outdoor play [Mother] : mother [2% ___ oz/d] : consumes [unfilled] oz of 2% cow's milk per day [Fruit] : fruit [Meat] : meat [Dairy] : dairy [Toilet Trained] :  toilet trained [Brushing teeth] : Brushing teeth [No] : Patient does not go to dentist yearly [Playtime (60 min/d)] : Playtime 60 min a day [Appropiate parent-child-sibling interaction] : Appropriate parent-child-sibling interaction [In ] : In  [Yes] : Cigarette smoke exposure [Gun in Home] : No gun in home [Exposure to electronic nicotine delivery system] : No exposure to electronic nicotine delivery system [Up to date] : Up to date [FreeTextEntry1] : \par 3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 As per parent previously followed by [facility] neurology last, after followed with Dr Burns. Patient continues to have delays, need for OT/ ST in school. No specific concerns from caregiver at today's visit. Patient is feeding, stooling, voiding appropriately. No spit ups or vomit. No reported colic. Sleeping well. Normal activity levels. No fevers, no URIs, no diarrhea, no rashes. No sick contacts. No ED visits/ or hospitalizations reported.\par \par  \par

## 2021-08-18 NOTE — PROGRESS NOTES
08/17/21 2252   Treatment   Treatment Type N   $Treatment Type $Inhaled Therapy/Meds   Medications Albuterol/Ipratropium   Pre-Tx Pulse 96   Pre-Tx Resps 18   Breath Sounds Pre-Tx JARAD Diminished   Breath Sounds Pre-Tx LLL Diminished   Breath Sounds Pre-Tx RUL Diminished   Breath Sounds Pre-Tx RML Diminished   Breath Sounds Pre-Tx RLL Diminished   Breath Sounds Post-Tx JARAD Diminished   Breath Sounds Post-Tx LLL Diminished   Breath Sounds Post-Tx RUL Diminished   Breath Sounds Post-Tx RML Diminished   Breath Sounds Post-Tx RLL Diminished   Delivery Source Air   Position Semi-Donaldson's   Tx Tolerance Well   Is patient on O2?  Y   Oxygen Therapy/Pulse Ox   O2 Therapy Oxygen   O2 Device Nasal cannula   O2 Flow Rate (L/min) 1.5 L/min   Resp 18   SpO2 97 %

## 2021-08-18 NOTE — PROGRESS NOTES
Bedside report given to [facility], RN. Patient care transferred. Patient lying in bed in stable condition. Denies needs.    Martita Anne RN

## 2021-08-18 NOTE — PROGRESS NOTES
Speech Language Pathology  [facility]  Dysphagia Daily Treatment Note    Recommendations:  Solid Consistency: IDDSI 7 RegularSolids  Liquid Consistency: IDDSI 0 Thin Liquids, small sips   Medication: Meds whole with thin liquids or puree    NAME: Racquel Damian  : 1965  MRN: [id number]    Patient Diagnosis(es):   Patient Active Problem List    Diagnosis Date Noted    Mild protein-calorie malnutrition ([facility] 2021    Ileus (HCC)     Nausea and vomiting     Hypokalemia     Septicemia (HCC)     History of drug abuse ([facility]     Abnormal chest x-ray     Acute cystitis without hematuria     Acute respiratory failure with hypoxia and hypercapnia (HCC) 2021    Tobacco abuse     S/P ORIF (open reduction internal fixation) fracture 10/06/2020    Opiate overdose ([facility]     Closed trimalleolar fracture of right ankle     Hypoxia     Polysubstance abuse ([facility]     COPD with acute exacerbation ([facility] 2020    Acute on chronic respiratory failure with hypoxia and [facility]capnia [facility]) 2020    Smoker 2020    Dementia ([facility] 2020    Pulmonary infiltrates 2020    Mediastinal adenopathy 2020    Leukocytosis 2020    Hyponatremia 2020    Hyperglycemia 2020    Acute encephalopathy 2020    Aspiration into airway 2020    Mucus plugging of bronchi     Cavitary lesion of lung     Hand sprain, left, initial encounter 2020    Nondisplaced fracture of proximal phalanx of left ring finger, initial encounter for closed fracture 2020     Allergies: No Known Allergies  Onset Date: Pt admitted to [facility] ICU on 2021    Subjective: Pt seen in room with RN permission. Alert and cooperative. Orients to person,  Ni[place] Craven (not name of hospital or city, and names year as [phone] but corrects with prompt given by ST. Pain: The patient c/o abd pain 7/10. RN notified. Current Diet: Diet NPO Exceptions are:  Ice Chips, Sips of Water with Meds, Sips of Clear Liquids    Diet Tolerance:  Patient tolerating current diet level without signs/symptoms of aspiration. Dysphagia Treatment and Impressions:   Pt seen in room at bedside with RN permission   Chart Review/Interview: GI requests diet upgrade as appropriate from reassessment by ST.    Respiratory Status: Pt with SPO2% of 93% on 2 HFNC with RR of 16.    Oral care completed by pt after set up and dentures placed by pt after provided by daughter.  Liquid PO Trials:    IDDSI 0 Thin:  Assessed via tsp, cup, and straw. No anterior bolus loss, suspect timely swallow, vitals stable. Pt demonstrates delayed cough after the swallow by cup x2. Pt cues to clear throat prior to next trials. And to take 10 ml sips from medicine cup then progressing to side of regular cup and straw (total: 10 additional presentations). No further cough or throat clear occurs. Vocal quality remains dry. Pt able to self feed when handed cup this day.  Solid PO Trials   IDDSI 4 Puree: No anterior bolus loss, no oral residue noted. Timely oral prep and transit. NO overt clinical sigsn of aspiration observed.  IDDSI 7 Regular:   No anterior bolus loss,timely mastication, no oral residue noted, vitals stable and no clinical s/s of aspiration       Education: SLP educated pt and pt's daughter re: Role of SLP, Rationale for dysphagia tx, Recommended compensatory strategies, Aspiration precautions, BSE results and POC. Pt verbalized understanding, would benefit from ongoing education and RN aware of recommendations   Assessment: Pt grossly tolerating current regular solids and thin liquids with use of small sips with no clinical s/s of aspiration. Carryover of recommended compensatory strategies improved during session. Pt's daughter aware of compensatory strategies.  Recommendations: SLP recommends to initiate IDDSI 7 regular Solids; IDDSI 0 Thin Liquids, small sips, straws OK;  Meds whole with thin liquids or in puree   Risk Management: upright for all intake, stay upright for at least 30 mins after intake, small bites/sips, cues fro small sips, oral care 2-3x/day to reduce adverse affects in the event of aspiration, increase physical mobility as able, slow rate of intake, general aspiration precautions and hold PO and contact SLP if s/s of aspiration or worsening respiratory status develop. . If the patient exhibits s/s of aspiration and/or worsening respiratory status, hold PO and contact SLP. Dysphagia Goals:  Short-term Goals  Timeframe for Short-term Goals: 5 days (08/17/2021)  Goal 1: The patient will tolerate recommended diet with no clinical s/s of aspiration 5/5 8/18/2021 : Goal addressed, see above. Ongoing, progressing. Goal 2: The patient will tolerate therapeutic diet upgrade trials with no clinical s/s of aspiration 5/5 8/18/2021 : Goal addressed, see above. Ongoing, progressing. Upgraded to regular solids this day. Goal 3: The patient will recall/perform recommended compensatory strategies given min cues  8/18/2021 : Goal addressed, see above. Ongoing, progressing. Long-term Goals  Timeframe for Long-term Goals: 7 days (08/19/2021)  Goal 1: The patient will tolerate least restrictive diet with no clinical s/s of aspiration or worsening respiratory/pulmonary status  8/18/2021 : Goal addressed, see above. Ongoing, progressing.     Speech/Language/Cog Goals: N/A      Recommendations:  Solid Consistency: IDDSI 7 RegularSolids  Liquid Consistency: IDDSI 0 Thin Liquids - small sips, straws okay  Medication: Meds whole with thin or in puree    Patient/Family/Caregiver Education: Role of SLP, Rationale for dysphagia tx, Recommended compensatory strategies, BSE results, POC and See above    Compensatory Strategies: Upright for all intake, Remain upright at least 30 mins after intake, Small bites, Small sips, Check oral cavity for residue after intake and Take pills whole with applesauce/pudding    Plan:    Continued Dysphagia treatment with goals per plan of care. Discharge Recommendations: TBD    If pt discharges from hospital prior to Speech/Swallowing discharge, this note serves as tx and discharge summary. Total Treatment Time / Charges     Time in Time out Total Time / units   Cognitive Tx         Speech Tx      Dysphagia Tx 2925 1325 30 mins / 1 unit     Signature:  Edith Clinton. Helena Maciel M.A.  [id number] [facility]  Speech-Language Pathologist

## 2021-08-18 NOTE — PROGRESS NOTES
Occupational Therapy Daily Treatment Note    Unit: PCU  Date:  8/18/2021  Patient Name:    Chelsi Carter  Admitting diagnosis:  Septicemia [facility]) [A41.9]  Acute cystitis without hematuria [N30.00]  Acute respiratory failure with hypoxia and hypercapnia ([facility] [J96.01, J96.02]  Admit Date:  8/4/2021  Precautions/Restrictions:  Fall risk, Bed/chair alarm, Lines -IV, Supplemental O2 (1.5), Brown catheter, NG tube and PICC right, Confusion, Telemetry, Continuous pulse oximetry and Isolation Precautions: Contact, B wrist restraints         Discharge Recommendations: SNF  DME needs for discharge: defer to facility       Therapy recommendations for staff:   Assist of 2 (minimal assist) with use RW and gait belt to [facility]   AM-PAC Score: AM-PAC Inpatient Daily Activity Raw Score: 8  Home Health S4 Level: NA       Treatment Time:  5887-1447   Treatment number:   3  Total Treatment Time: 50  minutes    History of Present Illness:  Per Ambreen 8/5/21:  \"The patient is a 60 y. o. female with PMH below, presents with CP, SOB, MS change, abd/facial edema.  Pt has hx of dementia and is not able to give much hx.  She denies CP currently.  Hx obtained mainly from pt daughter at bedside. Fabiola Smith reports that her mother was c/o CP last week.  For the last week, she has been c/o increasing SOB.  She has had increasingly productive cough of mostly thick/clear but sometimes yellow mucous.  Daughter also reports that she thinks her mom's face and abd seems more swollen than usual.\"  To ICU, intubated and ventilated from 8/5-8/12    Subjective:  Patient supine in bed and agreeable to treatment. Oriented to self , did not know the date and note oriented to place . Pain   Yes  Rating: mild   Location: abdomen  Pain Medicine Status: no pain meds requested      Bed Mobility:   Supine to Sit:  SBA  Sit to Supine:  NT  Rolling:           NT  Scooting:         Mod IND     Transfer Training:   Sit to stand:   CGA   Stand to sit:  CGA with VC for hand placement   Bed to Chair:  CGA with RW to Chair   Bed to [facility]:   Not Tested  Standard toilet:   Not Tested    Activity Tolerance   Pt completed therapy session with Pain noted with movement       BP (mmHg)  HR (bpm)  SpO2 (%)    Supine before activity           EOB  [birth year]   93  95% on 1.5L              ADL Training:   Upper body dressing:  Not Tested  Upper body bathing:  IND bathing face   Lower body dressing:  SBA to don pants over feet and CGA to stand and pull pants over hips   Lower body bathing:  Not Tested  Toileting:   Not Tested  Grooming/Hygiene:  Not Tested    Therapeutic Exercise:   Shoulder shrugs 10x  Shoulder [id number]  Reaching when sitting   Patient Education:   Role of OT  Recommendations for DC  Instructed in hand placement when standing/sitting from the RW  Positioning Needs: In bed, call light and needs in reach. Alarm Set    Family Present:  No     Assessment: Patient demonstrates significant improvements with bed mobility and standing. Pt was SBA for supine to sit and CGA for sit to stand to RW and to transfer with RW to the chair Pt able to don pants with SBA over feet and CGA to pull up over hips. Pt completed UE exercises. .[facility] and activity tolerance. GOALS  To be met in 3 Visits:  1). Bed to toilet/BSC: Max A- goal met 8-18 -21 new goal SBA with RW      To be met in 5 Visits:  1). Supine to/from Sit:             Mod A- goal met 8-18-21 new goal - sup  2). Upper Body Bathing:         mod A   3). Lower Body Bathing: Mod A  4). Upper Body Dressing:       Min A and Mod A-   5). Lower Body Dressing:       Max A- goal met pt was SBA-CGA to don pants   6).  Pt to demonstrate UE exs x 15 reps with minimal cues    Plan: cont with POC      Sachin Rule OTR/L [id number]      If patient discharges from this facility prior to next visit, this note will serve as the Discharge Summary

## 2021-08-18 NOTE — PLAN OF CARE
Problem: Mood - Altered:  Goal: Mood stable  Description: Mood stable  8/18/2021 0514 by Odilon Wolfe RN  Outcome: Met This Shift  Goal: Absence of abusive behavior  Description: Absence of abusive behavior  8/18/2021 0514 by Odilon Wolfe RN  Outcome: Met This Shift  Goal: Verbalizations of feeling emotionally comfortable while being cared for will increase  Description: Verbalizations of feeling emotionally comfortable while being cared for will increase  8/18/2021 0514 by Odilon Wolfe RN  Outcome: Met This Shift

## 2021-08-18 NOTE — PROGRESS NOTES
Okay to leave NG tube out for now per Dr. Roque Christensen. If the patient vomits, he wants a new one placed.      Leyla Quan RN

## 2021-08-18 NOTE — PROGRESS NOTES
Meds given per MAR. Stephanie D/C at this time. Restraints D/C'd. Family at bedside-addressed all pt and family concerns. Pt stable.     Ezra Page RN

## 2021-08-19 VITALS
HEART RATE: 92 BPM | OXYGEN SATURATION: 95 % | HEIGHT: 64 IN | WEIGHT: 213.1 LBS | RESPIRATION RATE: 18 BRPM | TEMPERATURE: 97.9 F | BODY MASS INDEX: 36.38 KG/M2 | SYSTOLIC BLOOD PRESSURE: 141 MMHG | DIASTOLIC BLOOD PRESSURE: 77 MMHG

## 2021-08-19 LAB
ALPHA-1 ANTITRYPSIN: 157 MG/DL (ref 90–200)
ANION GAP SERPL CALCULATED.3IONS-SCNC: 7 MMOL/L (ref 3–16)
BASOPHILS ABSOLUTE: 0 K/UL (ref 0–0.2)
BASOPHILS RELATIVE PERCENT: 0.6 %
BUN BLDV-MCNC: 7 MG/DL (ref 7–20)
CALCIUM SERPL-MCNC: 9.3 MG/DL (ref 8.3–10.6)
CERULOPLASMIN: 22 MG/DL (ref 16–45)
CHLORIDE BLD-SCNC: 99 MMOL/L (ref 99–110)
CO2: 35 MMOL/L (ref 21–32)
CREAT SERPL-MCNC: <0.5 MG/DL (ref 0.6–1.1)
EOSINOPHILS ABSOLUTE: 0.1 K/UL (ref 0–0.6)
EOSINOPHILS RELATIVE PERCENT: 3.1 %
F-ACTIN AB IGG: 10 UNITS (ref 0–19)
GFR AFRICAN AMERICAN: >60
GFR NON-AFRICAN AMERICAN: >60
GLUCOSE BLD-MCNC: 108 MG/DL (ref 70–99)
GLUCOSE BLD-MCNC: 120 MG/DL (ref 70–99)
GLUCOSE BLD-MCNC: 253 MG/DL (ref 70–99)
HCT VFR BLD CALC: 40.7 % (ref 36–48)
HEMOGLOBIN: 13.3 G/DL (ref 12–16)
LYMPHOCYTES ABSOLUTE: 1.1 K/UL (ref 1–5.1)
LYMPHOCYTES RELATIVE PERCENT: 24.5 %
MCH RBC QN AUTO: 28.6 PG (ref 26–34)
MCHC RBC AUTO-ENTMCNC: 32.6 G/DL (ref 31–36)
MCV RBC AUTO: 87.7 FL (ref 80–100)
MONOCYTES ABSOLUTE: 0.3 K/UL (ref 0–1.3)
MONOCYTES RELATIVE PERCENT: 6.7 %
NEUTROPHILS ABSOLUTE: 3 K/UL (ref 1.7–7.7)
NEUTROPHILS RELATIVE PERCENT: 65.1 %
PDW BLD-RTO: 15.2 % (ref 12.4–15.4)
PERFORMED ON: ABNORMAL
PERFORMED ON: ABNORMAL
PLATELET # BLD: 136 K/UL (ref 135–450)
PMV BLD AUTO: 7.5 FL (ref 5–10.5)
POTASSIUM SERPL-SCNC: 3.4 MMOL/L (ref 3.5–5.1)
RBC # BLD: 4.65 M/UL (ref 4–5.2)
SARS-COV-2, NAAT: NOT DETECTED
SODIUM BLD-SCNC: 141 MMOL/L (ref 136–145)
WBC # BLD: 4.6 K/UL (ref 4–11)

## 2021-08-19 PROCEDURE — 94640 AIRWAY INHALATION TREATMENT: CPT

## 2021-08-19 PROCEDURE — 6360000002 HC RX W HCPCS: Performed by: INTERNAL MEDICINE

## 2021-08-19 PROCEDURE — 6370000000 HC RX 637 (ALT 250 FOR IP): Performed by: INTERNAL MEDICINE

## 2021-08-19 PROCEDURE — 85025 COMPLETE CBC W/AUTO DIFF WBC: CPT

## 2021-08-19 PROCEDURE — 94660 CPAP INITIATION&MGMT: CPT

## 2021-08-19 PROCEDURE — 87635 SARS-COV-2 COVID-19 AMP PRB: CPT

## 2021-08-19 PROCEDURE — 6360000002 HC RX W HCPCS: Performed by: HOSPITALIST

## 2021-08-19 PROCEDURE — 2700000000 HC OXYGEN THERAPY PER DAY

## 2021-08-19 PROCEDURE — 99239 HOSP IP/OBS DSCHRG MGMT >30: CPT | Performed by: INTERNAL MEDICINE

## 2021-08-19 PROCEDURE — 2580000003 HC RX 258: Performed by: INTERNAL MEDICINE

## 2021-08-19 PROCEDURE — 80048 BASIC METABOLIC PNL TOTAL CA: CPT

## 2021-08-19 PROCEDURE — C9113 INJ PANTOPRAZOLE SODIUM, VIA: HCPCS | Performed by: HOSPITALIST

## 2021-08-19 PROCEDURE — 94761 N-INVAS EAR/PLS OXIMETRY MLT: CPT

## 2021-08-19 RX ORDER — POLYETHYLENE GLYCOL 3350 17 G/17G
17 POWDER, FOR SOLUTION ORAL DAILY
Qty: 527 G | Refills: 0
Start: 2021-08-19 | End: 2021-09-18

## 2021-08-19 RX ADMIN — ENOXAPARIN SODIUM 40 MG: 40 INJECTION SUBCUTANEOUS at 08:20

## 2021-08-19 RX ADMIN — METHYLPREDNISOLONE SODIUM SUCCINATE 20 MG: 40 INJECTION, POWDER, FOR SOLUTION INTRAMUSCULAR; INTRAVENOUS at 08:21

## 2021-08-19 RX ADMIN — POTASSIUM CHLORIDE 40 MEQ: 750 TABLET, EXTENDED RELEASE ORAL at 09:07

## 2021-08-19 RX ADMIN — TOBRAMYCIN AND DEXAMETHASONE 1 DROP: 3; 1 SUSPENSION/ DROPS OPHTHALMIC at 08:20

## 2021-08-19 RX ADMIN — TOBRAMYCIN AND DEXAMETHASONE 1 DROP: 3; 1 SUSPENSION/ DROPS OPHTHALMIC at 11:12

## 2021-08-19 RX ADMIN — TOBRAMYCIN AND DEXAMETHASONE 1 DROP: 3; 1 SUSPENSION/ DROPS OPHTHALMIC at 04:37

## 2021-08-19 RX ADMIN — PANTOPRAZOLE SODIUM 40 MG: 40 INJECTION, POWDER, FOR SOLUTION INTRAVENOUS at 08:21

## 2021-08-19 RX ADMIN — SODIUM CHLORIDE, PRESERVATIVE FREE 10 ML: 5 INJECTION INTRAVENOUS at 08:22

## 2021-08-19 RX ADMIN — IPRATROPIUM BROMIDE AND ALBUTEROL SULFATE 3 ML: .5; 3 SOLUTION RESPIRATORY (INHALATION) at 08:09

## 2021-08-19 ASSESSMENT — PAIN SCALES - GENERAL: PAINLEVEL_OUTOF10: 0

## 2021-08-19 NOTE — PROGRESS NOTES
Patient discharged into care of EMS. Discharge paperwork completed, went over with patient and daughter. Appropriate paperwork given to EMS. Assisted patient to [facility] per patient request before assisting to EMS cart. Patient tolerated well.

## 2021-08-19 NOTE — CARE COORDINATION
INTERDISCIPLINARY PLAN OF CARE CONFERENCE    Date/Time: 2021 9:24 AM  Completed by: Olive Damian RN, Case Management      Patient Name:  Ghassan Sow  YOB: 1965  Admitting Diagnosis: Septicemia ([address] [A41.9]  Acute cystitis without hematuria [N30.00]  Acute respiratory failure with hypoxia and hypercapnia ([address] [J96.01, J96.02]     Admit Date/Time:  2021  9:29 PM    Chart reviewed. Interdisciplinary team contacted or reviewed plan related to patient progress and discharge plans. Disciplines included Case Management, Nursing, and Dietitian. Current Status:ongoing---discharge order notedPT/OT recommendation for discharge plan of care: SNF    Expected D/C Disposition:  Skilled nursing facility  Confirmed plan with patient and/or family Yes confirmed with: (name) pt's daughterConnor (who is at bedside)  Met with:pt's daughterConnor (who is at bedside)  Discharge Plan Comments: Reviewed chart. Role of discharge planner explained and patient's daughterConnor (who is at bedside) verbalized understanding. Discharge order is noted. Pt is from home with daughter who provides / supervision. PT/OT is recommending SNF. Karoline chose [facility]. Referral sent last night to them, and RAMIRO has not heard back. CM spoke with Vicente Lyon with [facility] this AM and she states she can accept pt and accept her today if covid test is neg. Rapid covid for SNF ordered and J[facility] Griffiths, informed. Pt is currently on 1L of O2.      Home O2 in place on admit: No  Pt informed of need to bring portable home O2 tank on day of discharge for nursing to connect prior to leaving:  Not Indicated  Verbalized agreement/Understanding:  Not Indicated                                                                                                            DISCHARGE ORDER  Date/Time 2021 9:42 AM  Completed by: Olive Damian RN, Case Management    Patient Name: Ghassan Sow    : 1965      Admit order Date and Status:8/5/2021  Noted discharge order. (verify MD's last order for status of admission/Traditional Medicare 3 [place] Inpatient qualifying stay required for SNF)    Confirmed discharge plan with:              Patient:  Yes              When pt confirms DC plan does any support person need to be contacted by CM Yes if yes who_____daughter Crystal_                      Discharge to Facility: [facility]   · Facility phone number for staff giving report: Name: [facility]  · Address: [facility] [address] Latrice [address], [address] [place] [address]  · Phone: [phone]  · Fax: [phone]     Pre-certification completed: not needed   Hospital Exemption Notification (HENS) completed: yes   Discharge orders and Continuity of Care faxed to facility:  [phone]      Transportation:               Medical Transport explained with choice list offered to pt/family. Choice:Yes(no preference)  Agency used: [address] [place] [address] Rd up time:   [id number]      Pt/family/Nursing/Facility aware of  time: yes  Yes Names: pt, pt's daughter Med Wyatt RN, Johanna Pineda with [address]  Ambulance form completed:  yes:      Comments:Faxed FREDDY/AVS/neg covid test from 8/19/2021 to [phone]. Pt is being d/c'd to [facility] today. Pt's O2 sats are 95% on 1L. Discharge timeout done with JODY Griffiths. All discharge needs and concerns addressed. Discharging nurse to complete FREDDY, reconcile AVS, and place final copy with patient's discharge packet. Discharging RN to ensure that written prescriptions for  Level II medications are sent with patient to the facility as per protocol.

## 2021-08-19 NOTE — PLAN OF CARE
Problem: Nutrition  Goal: Optimal nutrition therapy  8/19/2021 0320 by Leyla Quan RN  Outcome: Met This Shift  8/18/2021 1633 by Rebecka Denver, RN  Outcome: Ongoing  Goal: Understanding of nutritional guidelines  8/19/2021 0320 by Leyla Quan RN  Outcome: Met This Shift  8/18/2021 1633 by Rebecka Denver, RN  Outcome: Ongoing     Problem: Falls - Risk of:  Goal: Will remain free from falls  Description: Will remain free from falls  8/19/2021 0320 by Leyla Quan RN  Outcome: Met This Shift  8/18/2021 1633 by Rebecka Denver, RN  Outcome: Ongoing  Goal: Absence of physical injury  Description: Absence of physical injury  8/19/2021 0320 by Leyla Quan RN  Outcome: Met This Shift  8/18/2021 1633 by Rebecka Denver, RN  Outcome: Ongoing     Problem: Skin Integrity:  Goal: Will show no infection signs and symptoms  Description: Will show no infection signs and symptoms  8/19/2021 0320 by Leyla Quan RN  Outcome: Met This Shift  8/18/2021 1633 by Rebecka Denver, RN  Outcome: Ongoing  Goal: Absence of new skin breakdown  Description: Absence of new skin breakdown  8/19/2021 0320 by Leyla Quan RN  Outcome: Met This Shift  8/18/2021 1633 by Rebecka Denver, RN  Outcome: Ongoing     Problem: Confusion - Acute:  Goal: Absence of continued neurological deterioration signs and symptoms  Description: Absence of continued neurological deterioration signs and symptoms  8/19/2021 0320 by Leyla Quan RN  Outcome: Met This Shift  8/18/2021 1633 by Rebecka Denver, RN  Outcome: Ongoing  Goal: Mental status will be restored to baseline  Description: Mental status will be restored to baseline  8/19/2021 0320 by Leyla Quan RN  Outcome: Met This Shift  8/18/2021 1633 by Rebecka Denver, RN  Outcome: Ongoing     Problem: Discharge Planning:  Goal: Participates in care planning  Description: Participates in care planning  Outcome: Met This Shift     Problem: Injury - Risk of, Physical Injury:  Goal: Will remain free from falls  Description: Will remain free from falls  8/19/2021 0320 by Harley Mata RN  Outcome: Met This Shift  8/18/2021 1633 by Shady Cheek RN  Outcome: Ongoing  Goal: Absence of physical injury  Description: Absence of physical injury  8/19/2021 0320 by Harley Mata RN  Outcome: Met This Shift  8/18/2021 1633 by Shady Cheek RN  Outcome: Ongoing     Problem: Mood - Altered:  Goal: Mood stable  Description: Mood stable  Outcome: Met This Shift  Goal: Absence of abusive behavior  Description: Absence of abusive behavior  Outcome: Met This Shift  Goal: Verbalizations of feeling emotionally comfortable while being cared for will increase  Description: Verbalizations of feeling emotionally comfortable while being cared for will increase  Outcome: Met This Shift     Problem: Psychomotor Activity - Altered:  Goal: Absence of psychomotor disturbance signs and symptoms  Description: Absence of psychomotor disturbance signs and symptoms  Outcome: Met This Shift     Problem: Sensory Perception - Impaired:  Goal: Demonstrations of improved sensory functioning will increase  Description: Demonstrations of improved sensory functioning will increase  Outcome: Met This Shift  Goal: Decrease in sensory misperception frequency  Description: Decrease in sensory misperception frequency  Outcome: Met This Shift  Goal: Able to refrain from responding to false sensory perceptions  Description: Able to refrain from responding to false sensory perceptions  Outcome: Met This Shift  Goal: Demonstrates accurate environmental perceptions  Description: Demonstrates accurate environmental perceptions  Outcome: Met This Shift  Goal: Able to distinguish between reality-based and nonreality-based thinking  Description: Able to distinguish between reality-based and nonreality-based thinking  Outcome: Met This Shift  Goal: Able to interrupt nonreality-based thinking  Description: Able to interrupt nonreality-based thinking  Outcome: Met This Shift     Problem: Sleep Pattern Disturbance:  Goal: Appears well-rested  Description: Appears well-rested  Outcome: Met This Shift     Problem: Pain:  Goal: Pain level will decrease  Description: Pain level will decrease  Outcome: Met This Shift  Goal: Control of acute pain  Description: Control of acute pain  Outcome: Met This Shift  Goal: Control of chronic pain  Description: Control of chronic pain  Outcome: Met This Shift

## 2021-08-19 NOTE — PROGRESS NOTES
Patient resting in bed at this time completing breathing treatment. Patient assessment complete and vital signs stable. Patient denies pain.

## 2021-08-19 NOTE — PROGRESS NOTES
Assessment completed, see flowsheets. Night meds given. Assisted pt to bedside commode via stedy, pt tolerated well. Had large watery BM. Pt with no reports of pain at this time. FSBS 152, ss Humalog 3 units given per mar. Bed/chair alarm on, bed in lowest position with call light in reach.     Milton Paredes RN

## 2021-08-19 NOTE — PLAN OF CARE
Problem: Nutrition  Goal: Optimal nutrition therapy  8/19/2021 1141 by Elaina Ibanez RN  Outcome: Met This Shift  8/19/2021 0320 by Wendy Stevenson RN  Outcome: Met This Shift  Goal: Understanding of nutritional guidelines  8/19/2021 1141 by Elaina Ibanez RN  Outcome: Met This Shift  8/19/2021 0320 by Wendy Stevenson RN  Outcome: Met This Shift     Problem: Falls - Risk of:  Goal: Will remain free from falls  Description: Will remain free from falls  8/19/2021 1141 by Elaina Ibanez RN  Outcome: Met This Shift  8/19/2021 0320 by Wendy Stevenson RN  Outcome: Met This Shift  Goal: Absence of physical injury  Description: Absence of physical injury  8/19/2021 1141 by Elaina Ibanez RN  Outcome: Met This Shift  8/19/2021 0320 by Wendy Stevenson RN  Outcome: Met This Shift     Problem: Skin Integrity:  Goal: Will show no infection signs and symptoms  Description: Will show no infection signs and symptoms  8/19/2021 1141 by Elaina Ibanez RN  Outcome: Met This Shift  8/19/2021 0320 by Wendy Stevenson RN  Outcome: Met This Shift  Goal: Absence of new skin breakdown  Description: Absence of new skin breakdown  8/19/2021 1141 by Elaina Ibanez RN  Outcome: Met This Shift  8/19/2021 0320 by Wendy Stevenson RN  Outcome: Met This Shift     Problem: Confusion - Acute:  Goal: Absence of continued neurological deterioration signs and symptoms  Description: Absence of continued neurological deterioration signs and symptoms  8/19/2021 1141 by Elaina Ibanez RN  Outcome: Met This Shift  8/19/2021 0320 by Wendy Stevenson RN  Outcome: Met This Shift  Goal: Mental status will be restored to baseline  Description: Mental status will be restored to baseline  8/19/2021 1141 by Elaina Ibanez RN  Outcome: Met This Shift  8/19/2021 0320 by Wendy Stevenson RN  Outcome: Met This Shift     Problem: Discharge Planning:  Goal: Ability to perform activities of daily living will improve  Description: Ability to perform activities of daily living will improve  8/19/2021 1141 by Emilee Chase RN  Outcome: Met This Shift  8/19/2021 0320 by Iline Babinski, RN  Outcome: Ongoing  Goal: Participates in care planning  Description: Participates in care planning  8/19/2021 1141 by Emilee Chase RN  Outcome: Met This Shift  8/19/2021 0320 by Iline Babinski, RN  Outcome: Met This Shift     Problem: Injury - Risk of, Physical Injury:  Goal: Will remain free from falls  Description: Will remain free from falls  8/19/2021 1141 by Emilee Chase RN  Outcome: Met This Shift  8/19/2021 0320 by Iline Babinski, RN  Outcome: Met This Shift  Goal: Absence of physical injury  Description: Absence of physical injury  8/19/2021 1141 by Emilee Chase RN  Outcome: Met This Shift  8/19/2021 0320 by Iline Babinski, RN  Outcome: Met This Shift     Problem: Mood - Altered:  Goal: Mood stable  Description: Mood stable  8/19/2021 1141 by Emilee Chase RN  Outcome: Met This Shift  8/19/2021 0320 by Iline Babinski, RN  Outcome: Met This Shift  Goal: Absence of abusive behavior  Description: Absence of abusive behavior  8/19/2021 1141 by Emilee Chase RN  Outcome: Met This Shift  8/19/2021 0320 by Iline Babinski, RN  Outcome: Met This Shift  Goal: Verbalizations of feeling emotionally comfortable while being cared for will increase  Description: Verbalizations of feeling emotionally comfortable while being cared for will increase  8/19/2021 1141 by Emilee Chase RN  Outcome: Met This Shift  8/19/2021 0320 by Iline Babinski, RN  Outcome: Met This Shift     Problem: Psychomotor Activity - Altered:  Goal: Absence of psychomotor disturbance signs and symptoms  Description: Absence of psychomotor disturbance signs and symptoms  8/19/2021 1141 by Emilee Chase RN  Outcome: Met This Shift  8/19/2021 0320 by Iline Babinski, RN  Outcome: Met This Shift     Problem: Sensory Perception - Impaired:  Goal: Demonstrations of improved sensory functioning will increase  Description: Demonstrations of improved sensory functioning will increase  8/19/2021 1141 by Casie Gaviria RN  Outcome: Met This Shift  8/19/2021 0320 by Heath Weathers RN  Outcome: Met This Shift  Goal: Decrease in sensory misperception frequency  Description: Decrease in sensory misperception frequency  8/19/2021 1141 by Casie Gaviria RN  Outcome: Met This Shift  8/19/2021 0320 by Heath Weathers RN  Outcome: Met This Shift  Goal: Able to refrain from responding to false sensory perceptions  Description: Able to refrain from responding to false sensory perceptions  8/19/2021 1141 by Casie Gaviria RN  Outcome: Met This Shift  8/19/2021 0320 by Heath Weathers RN  Outcome: Met This Shift  Goal: Demonstrates accurate environmental perceptions  Description: Demonstrates accurate environmental perceptions  8/19/2021 1141 by Casie Gaviria RN  Outcome: Met This Shift  8/19/2021 0320 by Heath Weathers RN  Outcome: Met This Shift  Goal: Able to distinguish between reality-based and nonreality-based thinking  Description: Able to distinguish between reality-based and nonreality-based thinking  8/19/2021 1141 by Casie Gaviria RN  Outcome: Met This Shift  8/19/2021 0320 by Heath Weathers RN  Outcome: Met This Shift  Goal: Able to interrupt nonreality-based thinking  Description: Able to interrupt nonreality-based thinking  8/19/2021 1141 by Casie Gaviria RN  Outcome: Met This Shift  8/19/2021 0320 by Heath Weathers RN  Outcome: Met This Shift     Problem: Sleep Pattern Disturbance:  Goal: Appears well-rested  Description: Appears well-rested  8/19/2021 1141 by Casie Gaviria RN  Outcome: Met This Shift  8/19/2021 0320 by Heath Weathers RN  Outcome: Met This Shift     Problem: Pain:  Goal: Pain level will decrease  Description: Pain level will decrease  8/19/2021 1141 by Casie Gaviria RN  Outcome: Met This Shift  8/19/2021 0320 by Heath Weathers RN  Outcome: Met This Shift  Goal: Control of acute pain  Description: Control of acute pain  8/19/2021 1141 by Casie Gaviria RN  Outcome: Met This

## 2021-08-19 NOTE — DISCHARGE INSTR - COC
Continuity of Care Form    Patient Name: Vishnu Schafer   :  1965  MRN:  [id number]    Admit date:  2021  Discharge date:  2021    Code Status Order: Full Code   Advance Directives:      Admitting Physician:  Marissa Camp MD  PCP: No primary care provider on file. Discharging Nurse: Elieser Bobo Unit/Room#: /2579-74  Discharging Unit Phone Number: ***    Emergency Contact:   Extended Emergency Contact Information  Primary Emergency Contact: natalie sosa  Home Phone: [phone]  Relation: Child   needed? No  Secondary Emergency Contact: Rhea Elizabeth  Mobile Phone: [phone]  Relation: Lay Caregiver    Past Surgical History:  Past Surgical History:   Procedure Laterality Date    ANKLE FRACTURE SURGERY Right 2020    OPEN REDUCTION INTERNAL FIXATION RIGHT ANKLE FRACTURE performed by Kyle Rivera DO at [facility] Right 2020    ORIF    BRONCHOSCOPY N/A 2020    BRONCHOSCOPY performed by Ras Keane MD at [address] [place] [address]  2020    BRONCHOSCOPY THERAPUTIC ASPIRATION INITIAL performed by Ras Keane MD at [address] [place] [address] N/A 2/10/2020    BRONCHOSCOPY ALVEOLAR LAVAGE performed by Naty Diane MD at [address] [place] [address]  2/10/2020    BRONCHOSCOPY THERAPUTIC ASPIRATION SUBSEQUENT performed by Naty Diane MD at [address] [place] [address]       Immunization History:   Immunization History   Administered Date(s) Administered    Influenza, Quadv, IM, PF (6 mo and older Fluzone, Flulaval, Fluarix, and 3 yrs and older Afluria) 2020, 10/07/2020       Active Problems:  Patient Active Problem List   Diagnosis Code    Hand sprain, left, initial encounter S63. 92XA    Nondisplaced fracture of proximal phalanx of left ring finger, initial encounter for closed fracture S62.645A    COPD with acute exacerbation (HCC) J44.1    Acute on chronic respiratory failure with hypoxia and hypercapnia (HCC) J96.21, J96.22    Smoker F17.200    Dementia ([facility]) F03.90    Pulmonary infiltrates R91.8    Mediastinal adenopathy R59.0    Leukocytosis D72.829    Hyponatremia E87.1    Hyperglycemia R73.9    Acute encephalopathy G93.40    Aspiration into airway T17.908A    Mucus plugging of bronchi T17.500A    Cavitary lesion of lung J98.4    Opiate overdose ([facility]) T40.601A    Closed trimalleolar fracture of right ankle S82.851A    Hypoxia R09.02    Polysubstance abuse ([facility]) F19.10    S/P ORIF (open reduction internal fixation) fracture Z98.890, Z87.81    Acute respiratory failure with hypoxia and hypercapnia ([facility]) J96.01, J96.02    Tobacco abuse Z72.0    Acute cystitis without hematuria N30.00    Abnormal chest x-ray R93.89    Septicemia ([facility]) A41.9    History of drug abuse ([facility]) F19.11    Nausea and vomiting R11.2    Hypokalemia E87.6    Ileus ([facility]) K56.7    Mild protein-calorie malnutrition ([facility]) E44.1       Isolation/Infection:   Isolation            Contact          Patient Infection Status       Infection Onset Added Last Indicated Last Indicated By Review Planned Expiration Resolved Resolved By    conjunctivitis - viral  08/10/21 08/10/21 Mathew Oleary RN 08/12/21       Pinkeye    Resolved    COVID-19 Rule Out 08/04/21 08/04/21 08/04/21 COVID-19 & Influenza Combo (Ordered)   08/04/21 Rule-Out Test Resulted    COVID-19 Rule Out 09/23/20 09/23/20 09/23/20 COVID-19 (Ordered)   09/23/20 Rule-Out Test Resulted            Nurse Assessment:  Last Vital Signs: BP (!) 141/77   Pulse 92   Temp 97.9 °F (36.6 °C) (Oral)   Resp 18   Ht 5' 4\" (1.626 m)   Wt 213 lb 1.6 oz (96.7 kg)   SpO2 95%   BMI 36.58 kg/m²     Last documented pain score (0-10 scale): Pain Level: 0  Last Weight:   Wt Readings from Last 1 Encounters:   08/18/21 213 lb 1.6 oz (96.7 kg)     Mental Status:  disoriented    IV Access:  - None    Nursing Mobility/ADLs:  Walking   Assisted  Transfer Assisted  Bathing  Assisted  Dressing  Assisted  Toileting  Assisted  Feeding  Independent  Med Admin  Assisted  Med Delivery   whole    Wound Care Documentation and Therapy:        Elimination:  Continence:   · Bowel: Yes  · Bladder: Yes  Urinary Catheter: None   Colostomy/Ileostomy/Ileal Conduit: No       Date of Last BM: ***    Intake/Output Summary (Last 24 hours) at 8/19/2021 0843  Last data filed at 8/18/2021 1634  Gross per 24 hour   Intake 120 ml   Output 750 ml   Net -630 ml     I/O last 3 completed shifts: In: 120 [P.O.:120]  Out: 750 [Urine:750]    Safety Concerns: At Risk for Falls    Impairments/Disabilities:      None    Nutrition Therapy:  Current Nutrition Therapy:   - Oral Diet:  General    Routes of Feeding: Oral  Liquids: Thin Liquids  Daily Fluid Restriction: no  Last Modified Barium Swallow with Video (Video Swallowing Test): not done    Treatments at the Time of Hospital Discharge:   Respiratory Treatments: ***  Oxygen Therapy:  is not on home oxygen therapy.   Ventilator:    - CPAP   only when sleeping    Rehab Therapies: Physical Therapy, Occupational Therapy and SN/Speech Therapy  Weight Bearing Status/Restrictions: No weight bearing restirctions  Other Medical Equipment (for information only, NOT a DME order):  bedside commode  Other Treatments: ***    Patient's personal belongings (please select all that are sent with patient):  Dentures upper    RN SIGNATURE:  Electronically signed by Jose Carlos Orlando RN on 8/19/21 at 12:08 PM EDT    CASE MANAGEMENT/SOCIAL WORK SECTION    Inpatient Status Date: 8/5/2021    Readmission Risk Assessment Score:  Readmission Risk              Risk of Unplanned Readmission:  25           Discharging to Facility/ Agency   Name: [facility]  Address: [place] [address] [address]i [address], [address] [place] [address]  Phone: [phone]  Fax: [phone]          / signature: Electronically signed by Orestes Sweet RN on 8/19/21 at 9:50 AM EDT    PHYSICIAN SECTION    Prognosis: Fair    Condition at Discharge: Stable    Rehab Potential (if transferring to Rehab): Fair    Recommended Labs or Other Treatments After Discharge: none    Physician Certification: I certify the above information and transfer of Wilfredo Hall  is necessary for the continuing treatment of the diagnosis listed and that she requires [place] for less 30 days.      Update Admission H&P: No change in H&P    PHYSICIAN SIGNATURE:  Electronically signed by Dorian Gayle MD on 8/19/21 at 8:43 AM EDT

## 2021-08-19 NOTE — PROGRESS NOTES
IM Progress Note    Admit Date:  8/4/2021  14    Interval history:    69-year-old female with history of dementia, COPD, diabetes type 2 admitted with acute respiratory failure due to COPD exacerbation pneumonia. Patient was intubated on 8/ 5 and extubated to BiPAP on 8/12  O2 requirement down to 3 L of oxygen on 8/13  Patient was vomiting 2 nights ago has an NG tube now abdomen is distended  Patient denies nausea but there is no bowel movement reported      Subjective:  Ms. Avani Schneider seen.  - NG placed to intermittent low wall suction. Not much getting  More alert this morning. She pulled out the NG again  Had 2 BMs last night     Objective:   BP (!) 141/77   Pulse 92   Temp 97.9 °F (36.6 °C) (Oral)   Resp 18   Ht 5' 4\" (1.626 m)   Wt 213 lb 1.6 oz (96.7 kg)   SpO2 95%   BMI 36.58 kg/m²       Intake/Output Summary (Last 24 hours) at 8/19/2021 0836  Last data filed at 8/18/2021 1634  Gross per 24 hour   Intake 120 ml   Output 750 ml   Net -630 ml       Physical Exam:  General: Obese. NG to intermittent low wall suction. Mucous Membranes:  Pink , anicteric  Neck: No JVD, no carotid bruit, no thyromegaly  Chest: Clear to auscultation . No wheezes rales or rhonchi   Cardiovascular:  RRR S1S2 heard, no murmurs or gallops  Abdomen: Distended , bowel sounds are sluggish . Extremities: No edema or cyanosis. Distal pulses well felt  Neurological : More alert today. Not fully oriented. Nonfocal. Moves all 4 extremities.    Psych: Stable      Medications:   Scheduled Medications:    polyethylene glycol  17 g Oral BID    pantoprazole  40 mg Intravenous BID    methylPREDNISolone  20 mg Intravenous Daily    [Held by provider] insulin glargine  15 Units Subcutaneous QAM    ipratropium-albuterol  3 mL Inhalation Q4H While awake    insulin lispro  0-18 Units Subcutaneous 4x Daily AC & HS    melatonin  10 mg Oral Nightly    lidocaine 1 % injection  5 mL Intradermal Once    sodium chloride flush  5-40 mL Intravenous 2 times per day    cefepime  2,000 mg Intravenous Once    nicotine  1 patch Transdermal Daily    enoxaparin  40 mg Subcutaneous Daily    tobramycin-dexamethasone  1 drop Both Eyes 6 times per day     I   dextrose 5% and 0.45% NaCl with KCl 20 mEq 100 mL/hr at 08/18/21 0430    sodium chloride      dextrose       traMADol, potassium chloride **OR** potassium alternative oral replacement **OR** potassium chloride, magnesium sulfate, sodium chloride flush, sodium chloride, glucose, dextrose, glucagon (rDNA), dextrose, acetaminophen **OR** acetaminophen, ondansetron, albuterol    Lab Data:  Recent Labs     08/17/21  0517 08/18/21  0420 08/19/21  0435   WBC 6.3 5.3 4.6   HGB 13.3 12.8 13.3   HCT 40.8 39.7 40.7   MCV 88.5 89.4 87.7    137 136     Recent Labs     08/17/21  0517 08/18/21  0420 08/19/21  0435    138 141   K 3.4* 4.9 3.4*   CL 97* 100 99   CO2 34* 33* 35*   BUN 8 7 7   CREATININE <0.5* <0.5* <0.5*       XR ABDOMEN (KUB) (SINGLE AP VIEW)   Final Result   Dilatation of the transverse colon and large volume of stool in the distal   colon. Findings overall similar in appearance to prior CT         US ABDOMEN LIMITED   Final Result   Trace ascites with no safe window for paracentesis. Procedure was not   performed. XR CHEST PORTABLE   Final Result   The enteric tube is in good position in the stomach. XR CHEST PORTABLE   Final Result   1. Enteric tube courses into the abdomen with the tip not included. 2. Pulmonary edema. CT ABDOMEN PELVIS WO CONTRAST Additional Contrast? None   Final Result   Findings consistent with cirrhosis and portal hypertension with a nodular   contour of the liver, ascites, splenomegaly. Small bilateral pleural effusions. Gaseous distention of the transverse colon. XR ABDOMEN FOR NG/OG/NE TUBE PLACEMENT   Final Result   The enteric tube is in good position in the stomach.          XR ABDOMEN (KUB) (SINGLE AP VIEW) and azithromycin day#5/5  Obtain arterial blood gas as she is somnolent-showed normal pH with elevated CO2. Solumedrol  Now on 1 L of O2    Ileus  With intractable nausea and vomiting  -NG placed  -N.p.o. continue IV fluids  CT abdomen and pelvis -shows findings consistent with cirrhosis and portal hypertension and small bilateral pleural effusions  Paracentesis ordered-ultrasound showed trace ascites and no safe window for paracentesis. Recd miralax. Had 2 BMs    Cirrhosis with portal hypertension per CT. Likely ORR  Hepatitis viral profile- negative  Denies alcohol  ALFRED,antiSMA,ferritin, ceruloplasmin, alpha-1 antitrypsin. GI consult. Outpatient follow up     Acute encephalopathy patient appears to have some baseline dementia  -Patient is somnolent today. Obtain arterial blood gas- normal ph with elevated p co2  Check ammonia level- normal.    Hx of drug abuse with related admissions in the past   - snorts fentanyl  Daughter reports she quit drugs since last time     Tobacco Abuse  -need cessation. DM2  -On Lantus -held as patient is n.p.o. . Continue sliding scale insulin .     Generalized weakness/morbid obesity  -Consult PT OT      DVT Prophylaxis: Lx  NPO due to nausea and vomiting  Code Status: Full Code    Dc to SNF  precert   Olive Lee MD, 8/19/2021 8:36 AM

## 2021-08-19 NOTE — DISCHARGE SUMMARY
Name:  Niur[facility]  Room:  /3417-25  MRN:    [id number]    Discharge Summary      This discharge summary is in conjunction with a complete physical exam done on the day of discharge. Attending Physician: Dr. Akira Gibson  Discharging Physician: Dr. Shanell Pastor: 8/4/2021  Discharge:   8/19/2021    HPI:  The patient is a 61 y.o. female with PMH below, presents with CP, SOB, MS change, abd/facial edema. Pt has hx of dementia and is not able to give much hx. She denies CP currently. Hx obtained mainly from pt daughter at bedside. She reports that her mother was c/o CP last week. For the last week, she has been c/o increasing SOB. She has had increasingly productive cough of mostly thick/clear but sometimes yellow mucous. Daughter also reports that she thinks her mom's face and abd seems more swollen than usual.    Diagnoses this Admission and Hospital Course   Acute respiratory failure with hypoxia and hypercapnia  - related to COPD exacerbation   - extubated on 8/12/2021.   - finished rocephin day#7/7 and azithromycin day#5/5  Obtain arterial blood gas on 8/16 as she was somnolent-showed normal pH with elevated CO2. Solumedrol  Now on 1 L of O2     Ileus  With intractable nausea and vomiting  -NG placed  -N.p.o. continue IV fluids  CT abdomen and pelvis -shows findings consistent with cirrhosis and portal hypertension and small bilateral pleural effusions  Paracentesis ordered-ultrasound showed trace ascites and no safe window for paracentesis. Recd miralax. Had 2 BMs     Cirrhosis with portal hypertension per CT. Likely ORR  Hepatitis viral profile- negative  Denies alcohol  ALFRED,antiSMA,ferritin, ceruloplasmin, alpha-1 antitrypsin. GI consult.   Outpatient follow up      Acute encephalopathy patient appears to have some baseline dementia  -Obtained arterial blood gas on 8/16- normal ph with elevated p co2  Checked ammonia level- normal.     Hx of drug abuse with related admissions in the past   - snorts fentanyl  Daughter reports she quit drugs since last time      Tobacco Abuse  -need cessation.       DM2  -On Lantus. Continued sliding scale insulin .     Generalized weakness/morbid obesity  -Consulted PT OT. D/c to SNF     DVT Prophylaxis: Lovenox    Procedures (Please Review Full Report for Details)  GI  Pulmonology     Consults    Intubation  PICC line placement    Physical Exam at Discharge:    BP (!) 141/77   Pulse 92   Temp 97.9 °F (36.6 °C) (Oral)   Resp 18   Ht 5' 4\" (1.626 m)   Wt 213 lb 1.6 oz (96.7 kg)   SpO2 95%   BMI 36.58 kg/m²     See today's progress note    CBC: Recent Labs     08/17/21 0517 08/18/21 0420 08/19/21  0435   WBC 6.3 5.3 4.6   HGB 13.3 12.8 13.3   HCT 40.8 39.7 40.7   MCV 88.5 89.4 87.7    137 136     BMP:   Recent Labs     08/17/21 0517 08/18/21 0420 08/19/21  0435    138 141   K 3.4* 4.9 3.4*   CL 97* 100 99   CO2 34* 33* 35*   BUN 8 7 7   CREATININE <0.5* <0.5* <0.5*     LIVER PROFILE:   Recent Labs     08/18/21 0420   AST 51*   ALT 53*   BILIDIR 0.4*   BILITOT 0.8   ALKPHOS 160*       U/A:    Lab Results   Component Value Date    COLORU Yellow 08/05/2021    WBCUA 10-20 08/05/2021    RBCUA 3-4 08/05/2021    BACTERIA Rare 08/05/2021    CLARITYU Clear 08/05/2021    SPECGRAV <=1.005 08/05/2021    LEUKOCYTESUR MODERATE 08/05/2021    BLOODU TRACE-INTACT 08/05/2021    GLUCOSEU >=1000 08/05/2021    GLUCOSEU NEGATIVE 08/14/2010    AMORPHOUS 4+ 02/07/2020       ABG    Lab Results   Component Value Date    LVL6BBP 35.7 08/16/2021    BEART 8.2 08/16/2021    T8VWPKQE 94.7 08/16/2021    PHART 7.379 08/16/2021    AMZ9YNG 61.8 08/16/2021    PO2ART 76.6 08/16/2021    YMO7EXS 37.6 08/16/2021         CULTURES  COVID: not detected  Blood: NGTD  Urine: Mixed hadley    RADIOLOGY  XR ABDOMEN (KUB) (SINGLE AP VIEW)   Final Result   Dilatation of the transverse colon and large volume of stool in the distal   colon.   Findings overall similar in appearance to prior CT         US ABDOMEN LIMITED   Final Result   Trace ascites with no safe window for paracentesis. Procedure was not   performed. XR CHEST PORTABLE   Final Result   The enteric tube is in good position in the stomach. XR CHEST PORTABLE   Final Result   1. Enteric tube courses into the abdomen with the tip not included. 2. Pulmonary edema. CT ABDOMEN PELVIS WO CONTRAST Additional Contrast? None   Final Result   Findings consistent with cirrhosis and portal hypertension with a nodular   contour of the liver, ascites, splenomegaly. Small bilateral pleural effusions. Gaseous distention of the transverse colon. XR ABDOMEN FOR NG/OG/NE TUBE PLACEMENT   Final Result   The enteric tube is in good position in the stomach. XR ABDOMEN (KUB) (SINGLE AP VIEW)   Final Result   Nonspecific but probably nonobstructive bowel gas pattern. XR CHEST PORTABLE   Final Result   Unchanged small right pleural effusion. Lines and tubes are in good position as described above. XR CHEST PORTABLE   Final Result   Interval development of small right-sided pleural effusion. XR CHEST PORTABLE   Final Result   1. Stable appropriate positions of support apparatus. 2. No significant change in appearance of diffuse hazy opacity throughout   both lungs, likely a combination of asymmetric edema and multifocal pneumonia. XR CHEST PORTABLE   Final Result   Increased pulmonary vascular congestion since yesterday. Decreased right   infrahilar atelectasis or pneumonia. XR CHEST PORTABLE   Final Result   Stable exam demonstrating cardiomegaly with vascular congestion and   persistent strandy airspace disease in the right lung base presumably   atelectasis unless clinical findings suggest pneumonia         XR CHEST PORTABLE   Final Result   Cardiomegaly with pulmonary vascular congestion. No definite pulmonary   edema.   Atelectasis in the lung bases         IR

## 2022-02-17 ENCOUNTER — APPOINTMENT (OUTPATIENT)
Dept: GENERAL RADIOLOGY | Age: 57
End: 2022-02-17
Payer: MEDICARE

## 2022-02-17 ENCOUNTER — TELEPHONE (OUTPATIENT)
Dept: PULMONOLOGY | Age: 57
End: 2022-02-17

## 2022-02-17 ENCOUNTER — HOSPITAL ENCOUNTER (EMERGENCY)
Age: 57
Discharge: HOME | End: 2022-02-17
Payer: MEDICARE

## 2022-02-17 VITALS
DIASTOLIC BLOOD PRESSURE: 76 MMHG | WEIGHT: 184 LBS | HEIGHT: 64 IN | TEMPERATURE: 97.2 F | RESPIRATION RATE: 16 BRPM | SYSTOLIC BLOOD PRESSURE: 146 MMHG | OXYGEN SATURATION: 95 % | BODY MASS INDEX: 31.41 KG/M2 | HEART RATE: 86 BPM

## 2022-02-17 DIAGNOSIS — E87.1 HYPONATREMIA: ICD-10-CM

## 2022-02-17 DIAGNOSIS — R03.0 ELEVATED BLOOD PRESSURE READING: ICD-10-CM

## 2022-02-17 DIAGNOSIS — R79.89 ELEVATED LFTS: ICD-10-CM

## 2022-02-17 DIAGNOSIS — R73.9 HYPERGLYCEMIA: Primary | ICD-10-CM

## 2022-02-17 DIAGNOSIS — K74.69 OTHER CIRRHOSIS OF LIVER (HCC): ICD-10-CM

## 2022-02-17 DIAGNOSIS — R53.83 OTHER FATIGUE: ICD-10-CM

## 2022-02-17 DIAGNOSIS — E87.8 HYPOCHLOREMIA: ICD-10-CM

## 2022-02-17 LAB
A/G RATIO: 1.2 (ref 1.1–2.2)
ALBUMIN SERPL-MCNC: 4.1 G/DL (ref 3.4–5)
ALP BLD-CCNC: 229 U/L (ref 40–129)
ALT SERPL-CCNC: 62 U/L (ref 10–40)
AMMONIA: 36 UMOL/L (ref 11–51)
ANION GAP SERPL CALCULATED.3IONS-SCNC: 9 MMOL/L (ref 3–16)
AST SERPL-CCNC: 74 U/L (ref 15–37)
BASE EXCESS VENOUS: 3.6 MMOL/L (ref -3–3)
BASOPHILS ABSOLUTE: 0.1 K/UL (ref 0–0.2)
BASOPHILS RELATIVE PERCENT: 0.9 %
BILIRUB SERPL-MCNC: 0.9 MG/DL (ref 0–1)
BILIRUBIN URINE: NEGATIVE
BLOOD, URINE: NEGATIVE
BUN BLDV-MCNC: 5 MG/DL (ref 7–20)
CALCIUM SERPL-MCNC: 7.1 MG/DL (ref 8.3–10.6)
CARBOXYHEMOGLOBIN: 3.7 % (ref 0–1.5)
CHLORIDE BLD-SCNC: 95 MMOL/L (ref 99–110)
CHP ED QC CHECK: NORMAL
CLARITY: CLEAR
CO2: 30 MMOL/L (ref 21–32)
COLOR: YELLOW
CREAT SERPL-MCNC: <0.5 MG/DL (ref 0.6–1.1)
EKG ATRIAL RATE: 100 BPM
EKG DIAGNOSIS: NORMAL
EKG P AXIS: 61 DEGREES
EKG P-R INTERVAL: 114 MS
EKG Q-T INTERVAL: 340 MS
EKG QRS DURATION: 106 MS
EKG QTC CALCULATION (BAZETT): 438 MS
EKG R AXIS: 73 DEGREES
EKG T AXIS: 76 DEGREES
EKG VENTRICULAR RATE: 100 BPM
EOSINOPHILS ABSOLUTE: 0.2 K/UL (ref 0–0.6)
EOSINOPHILS RELATIVE PERCENT: 2.4 %
GFR AFRICAN AMERICAN: >60
GFR NON-AFRICAN AMERICAN: >60
GLUCOSE BLD-MCNC: 315 MG/DL
GLUCOSE BLD-MCNC: 315 MG/DL (ref 70–99)
GLUCOSE BLD-MCNC: 404 MG/DL (ref 70–99)
GLUCOSE BLD-MCNC: 429 MG/DL (ref 70–99)
GLUCOSE URINE: >=1000 MG/DL
HCO3 VENOUS: 29.6 MMOL/L (ref 23–29)
HCT VFR BLD CALC: 43.6 % (ref 36–48)
HEMOGLOBIN: 15.4 G/DL (ref 12–16)
INFLUENZA A: NOT DETECTED
INFLUENZA B: NOT DETECTED
KETONES, URINE: NEGATIVE MG/DL
LEUKOCYTE ESTERASE, URINE: NEGATIVE
LYMPHOCYTES ABSOLUTE: 1.9 K/UL (ref 1–5.1)
LYMPHOCYTES RELATIVE PERCENT: 22.3 %
MCH RBC QN AUTO: 30.7 PG (ref 26–34)
MCHC RBC AUTO-ENTMCNC: 35.4 G/DL (ref 31–36)
MCV RBC AUTO: 86.8 FL (ref 80–100)
METHEMOGLOBIN VENOUS: 0.3 %
MICROSCOPIC EXAMINATION: ABNORMAL
MONOCYTES ABSOLUTE: 0.3 K/UL (ref 0–1.3)
MONOCYTES RELATIVE PERCENT: 3.7 %
NEUTROPHILS ABSOLUTE: 6 K/UL (ref 1.7–7.7)
NEUTROPHILS RELATIVE PERCENT: 70.7 %
NITRITE, URINE: NEGATIVE
O2 CONTENT, VEN: 21 VOL %
O2 SAT, VEN: 93 %
O2 THERAPY: ABNORMAL
PCO2, VEN: 49.3 MMHG (ref 40–50)
PDW BLD-RTO: 13.4 % (ref 12.4–15.4)
PERFORMED ON: ABNORMAL
PERFORMED ON: ABNORMAL
PH UA: 6 (ref 5–8)
PH VENOUS: 7.4 (ref 7.35–7.45)
PLATELET # BLD: 154 K/UL (ref 135–450)
PMV BLD AUTO: 8 FL (ref 5–10.5)
PO2, VEN: 65.7 MMHG (ref 25–40)
POTASSIUM REFLEX MAGNESIUM: 4.2 MMOL/L (ref 3.5–5.1)
PROTEIN UA: NEGATIVE MG/DL
RBC # BLD: 5.02 M/UL (ref 4–5.2)
SARS-COV-2 RNA, RT PCR: NOT DETECTED
SODIUM BLD-SCNC: 134 MMOL/L (ref 136–145)
SPECIFIC GRAVITY UA: 1.02 (ref 1–1.03)
TCO2 CALC VENOUS: 31 MMOL/L
TOTAL PROTEIN: 7.5 G/DL (ref 6.4–8.2)
TROPONIN: <0.01 NG/ML
URINE REFLEX TO CULTURE: ABNORMAL
URINE TYPE: ABNORMAL
UROBILINOGEN, URINE: 0.2 E.U./DL
WBC # BLD: 8.5 K/UL (ref 4–11)

## 2022-02-17 PROCEDURE — 2580000003 HC RX 258: Performed by: PHYSICIAN ASSISTANT

## 2022-02-17 PROCEDURE — 81003 URINALYSIS AUTO W/O SCOPE: CPT

## 2022-02-17 PROCEDURE — 93010 ELECTROCARDIOGRAM REPORT: CPT | Performed by: INTERNAL MEDICINE

## 2022-02-17 PROCEDURE — 71045 X-RAY EXAM CHEST 1 VIEW: CPT

## 2022-02-17 PROCEDURE — 99282 EMERGENCY DEPT VISIT SF MDM: CPT

## 2022-02-17 PROCEDURE — 82803 BLOOD GASES ANY COMBINATION: CPT

## 2022-02-17 PROCEDURE — 36415 COLL VENOUS BLD VENIPUNCTURE: CPT

## 2022-02-17 PROCEDURE — 6370000000 HC RX 637 (ALT 250 FOR IP): Performed by: PHYSICIAN ASSISTANT

## 2022-02-17 PROCEDURE — 82140 ASSAY OF AMMONIA: CPT

## 2022-02-17 PROCEDURE — 80053 COMPREHEN METABOLIC PANEL: CPT

## 2022-02-17 PROCEDURE — 84484 ASSAY OF TROPONIN QUANT: CPT

## 2022-02-17 PROCEDURE — 93005 ELECTROCARDIOGRAM TRACING: CPT | Performed by: PHYSICIAN ASSISTANT

## 2022-02-17 PROCEDURE — 96372 THER/PROPH/DIAG INJ SC/IM: CPT

## 2022-02-17 PROCEDURE — 87636 SARSCOV2 & INF A&B AMP PRB: CPT

## 2022-02-17 PROCEDURE — 85025 COMPLETE CBC W/AUTO DIFF WBC: CPT

## 2022-02-17 RX ORDER — 0.9 % SODIUM CHLORIDE 0.9 %
1000 INTRAVENOUS SOLUTION INTRAVENOUS ONCE
Status: COMPLETED | OUTPATIENT
Start: 2022-02-17 | End: 2022-02-17

## 2022-02-17 RX ADMIN — INSULIN HUMAN 8 UNITS: 100 INJECTION, SOLUTION PARENTERAL at 17:43

## 2022-02-17 RX ADMIN — SODIUM CHLORIDE 1000 ML: 9 INJECTION, SOLUTION INTRAVENOUS at 17:42

## 2022-02-17 ASSESSMENT — ENCOUNTER SYMPTOMS
VOMITING: 0
ABDOMINAL PAIN: 0
COUGH: 0
SHORTNESS OF BREATH: 1

## 2022-02-17 NOTE — TELEPHONE ENCOUNTER
Referral received - called pt to schedule an appt and the number we have on file is no longer a working number.

## 2022-02-17 NOTE — ED PROVIDER NOTES
[address] ED  EMERGENCY DEPARTMENT ENCOUNTER        Pt Name: Sammie Rodriguez  274: [id number]  [place] 1965  Date of evaluation: 2/17/2022  Provider: Brian Lai PA-C  PCP: BEST Fan CNP    Shared Visit or Autonomous Visit: MART. I have evaluated this patient. My supervising physician was available for consultation. CHIEF COMPLAINT       Chief Complaint   Patient presents with    Hyperglycemia     pt family doctor called stated glucose on labs yesterday was 400, had been out of metformin for 4 days       HISTORY OF PRESENT ILLNESS   (Location/Symptom, Timing/Onset, Context/Setting, Quality, Duration, Modifying Factors, Severity)  Note limiting factors. Sammie Rodriguez is a 61 y.o. female brought in by daughter primary care doctor called her and told her she had abnormal labs and to come to the ER for evaluation. Glucose elevated, sodium was little low and elevated LFTs. She has a past medical history of COPD, dementia, diabetes mellitus, states when she was just in the hospital was told she also had liver cirrhosis and had elevated LFTs then. She is out of her Metformin the past 4 days. States they had originally went into the primary care doctor because she had been feeling fatigued and in a brain fog for about 2 weeks. States nothing is really hurting or bothering her. No chest pain no abdominal pain no vomiting or diarrhea. Has been thirsty. The history is provided by the patient (daughter).    Fatigue  Onset quality:  Gradual  Duration:  2 weeks  Timing:  Constant  Progression:  Worsening  Chronicity:  New  Associated symptoms: shortness of breath    Associated symptoms: no abdominal pain, no chest pain, no cough, no dizziness, no dysuria, no fever, no loss of consciousness and no vomiting    Risk factors: diabetes        Nursing Notes were reviewed    REVIEW OF SYSTEMS    (2-9 systems for level 4, 10 or more for level 5)     Review of Systems Constitutional: Positive for fatigue. Negative for fever. Respiratory: Positive for shortness of breath. Negative for cough. Cardiovascular: Negative for chest pain and leg swelling. Gastrointestinal: Negative for abdominal pain and vomiting. Endocrine: Positive for polydipsia. Genitourinary: Negative for difficulty urinating and dysuria. Neurological: Negative for dizziness, loss of consciousness and syncope. All other systems reviewed and are negative. Positives and Pertinent negatives as per HPI.        PAST MEDICAL HISTORY     Past Medical History:   Diagnosis Date    [facility] obstructive [facility] disease[facility]     Dementia [facility]     [facility]          SURGICAL HISTORY     Past Surgical History:   Procedure Laterality Date    ANKLE FRACTURE SURGERY Right 9/23/2020    OPEN REDUCTION INTERNAL FIXATION RIGHT ANKLE FRACTURE performed by Nava Ricci DO at [address] [place] [address] Right 09/23/2020    ORIF    BRONCHOSCOPY N/A 2/7/2020    BRONCHOSCOPY performed by Bj Braswell MD at [address] [place] [address]  2/7/2020    BRONCHOSCOPY THERAPUTIC ASPIRATION INITIAL performed by Bj Braswell MD at [address] [place] [address] N/A 2/10/2020    BRONCHOSCOPY ALVEOLAR LAVAGE performed by Cleve Lisa MD at [address] [place] [address]  2/10/2020    BRONCHOSCOPY THERAPUTIC ASPIRATION SUBSEQUENT performed by Cleve Lisa MD at [address]       Discharge Medication List as of 2/17/2022  7:11 PM      CONTINUE these medications which have NOT CHANGED    Details   !! metFORMIN (GLUCOPHAGE) 500 MG tablet Take 500 mg by mouth 2 times daily (with meals)Historical Med      albuterol sulfate HFA (PROVENTIL HFA) 108 (90 Base) MCG/ACT inhaler Inhale 2 puffs into the lungs every 6 hours as needed for Wheezing, Disp-1 Inhaler,R-3Normal      ipratropium-albuterol (DUONEB) 0.5-2.5 (3) MG/3ML SOLN nebulizer solution Inhale 3 mLs into the lungs every 4 hours (while awake), Disp-360 mL,R-2Print       !! - Potential duplicate medications found. Please discuss with provider. ALLERGIES     Patient has no known allergies. FAMILYHISTORY       Family History   Problem Relation Age of Onset    Cancer Father           SOCIAL HISTORY       Social History     Socioeconomic History    Marital status:      Spouse name: None    Number of children: None    Years of education: None    Highest education level: None   Occupational History    None   Tobacco Use    Smoking status: Current Every Day Smoker     Packs/day: 1.00     Types: Cigarettes    Smokeless tobacco: Never Used   Vaping Use    Vaping Use: Never used   Substance and Sexual Activity    Alcohol use: Never    Drug use: Not Currently     Types: IV     Comment: Fentanyl and heroin    Sexual activity: Not Currently   Other Topics Concern    None   Social History Narrative    None     Social Determinants of Health     Financial Resource Strain:     Difficulty of Paying Living Expenses: Not on file   Food Insecurity:     Worried About Running Out of Food in the Last Year: Not on file    Jonny of Food in the Last Year: Not on file   Transportation Needs:     Lack of Transportation (Medical): Not on file    Lack of Transportation (Non-Medical):  Not on file   Physical Activity:     Days of Exercise per Week: Not on file    Minutes of Exercise per Session: Not on file   Stress:     Feeling of Stress : Not on file   Social Connections:     Frequency of Communication with Friends and Family: Not on file    Frequency of Social Gatherings with Friends and Family: Not on file    Attends Hoahaoism Services: Not on file    Active Member of Clubs or Organizations: Not on file    Attends Club or Organization Meetings: Not on file    Marital Status: Not on file   Intimate Partner Violence:     Fear of Current or Ex-Partner: Not on file    Emotionally Abused: Not on file    Physically Abused: Not on file    Sexually Abused: Not on file   Housing Stability:     Unable to Pay for Housing in the Last Year: Not on file    Number of Places Lived in the Last Year: Not on file    Unstable Housing in the Last Year: Not on file       SCREENINGS    [place] Coma Scale  Eye Opening: Spontaneous  Best Verbal Response: Oriented  Best Motor Response: Obeys commands  [place] Coma Scale Score: 15        PHYSICAL EXAM    (up to 7 for level 4, 8 or more for level 5)     ED Triage Vitals [02/17/22 1316]   BP Temp Temp Source Pulse Resp SpO2 Height Weight   (!) 159/80 97.2 °F (36.2 °C) Tympanic 98 18 93 % 5' 4\" (1.626 m) 184 lb (83.5 kg)       Physical Exam  Vitals and nursing note reviewed. Constitutional:       Appearance: She is well-developed. She is not toxic-appearing. HENT:      Head: Normocephalic and atraumatic. Mouth/Throat:      Mouth: Mucous membranes are moist.      Pharynx: Oropharynx is clear. No pharyngeal swelling, oropharyngeal exudate or posterior oropharyngeal erythema. Eyes:      Conjunctiva/sclera: Conjunctivae normal.      Pupils: Pupils are equal, round, and reactive to light. Neck:      Vascular: No JVD. Cardiovascular:      Rate and Rhythm: Normal rate and regular rhythm. Pulmonary:      Effort: Pulmonary effort is normal. No respiratory distress. Breath sounds: Normal breath sounds. No stridor. No wheezing, rhonchi or rales. Abdominal:      General: Bowel sounds are normal. There is no distension. Palpations: Abdomen is soft. Abdomen is not rigid. There is no mass. Tenderness: There is no abdominal tenderness. There is no guarding or rebound. Musculoskeletal:         General: Normal range of motion. Cervical back: Normal range of motion and neck supple. Skin:     General: Skin is warm and dry. Findings: No rash. Neurological:      Mental Status: She is alert and oriented to person, place, and time.       GCS: GCS eye subscore is 4. GCS verbal subscore is 5. GCS motor subscore is 6. Cranial Nerves: No cranial nerve deficit. Sensory: Sensation is intact. No sensory deficit. Motor: Motor function is intact. No abnormal muscle tone.       Coordination: Coordination normal.   Psychiatric:         Behavior: Behavior normal.         DIAGNOSTIC RESULTS   LABS:    Labs Reviewed   COMPREHENSIVE METABOLIC PANEL W/ REFLEX TO MG FOR LOW K - Abnormal; Notable for the following components:       Result Value    Sodium 134 (*)     Chloride 95 (*)     Glucose 429 (*)     BUN 5 (*)     CREATININE <0.5 (*)     Calcium 7.1 (*)     Alkaline Phosphatase 229 (*)     ALT 62 (*)     AST 74 (*)     All other components within normal limits    Narrative:     Performed at:  [facility],  [employer]   Phone [phone]   BLOOD GAS, VENOUS - Abnormal; Notable for the following components:    pO2, Agusto 65.7 (*)     HCO3, Venous 29.6 (*)     Base Excess, Agusto 3.6 (*)     Carboxyhemoglobin 3.7 (*)     All other components within normal limits    Narrative:     Performed at:  [facility]) - [facility],  [employer]   Phone [phone]   URINALYSIS WITH REFLEX TO CULTURE - Abnormal; Notable for the following components:    Glucose, Ur >=1000 (*)     All other components within normal limits    Narrative:     Performed at:  [facility]) - [facility],  [employer]   Phone [phone]   POCT GLUCOSE - Abnormal; Notable for the following components:    POC Glucose 404 (*)     All other components within normal limits    Narrative:     Performed at:  [facility]) [facility],  [email or web address]   Phone [phone]   POCT GLUCOSE - Abnormal; Notable for the following components:    POC Glucose 315 (*)     All other components within normal limits Narrative:     Performed at:  [facility]) - [facility],  [place], [email or web address]   Phone [phone]   POCT GLUCOSE - Normal   COVID-19 & INFLUENZA COMBO    Narrative:     Performed at:  [facility],  [place], [email or web address]   Phone [phone]   CBC WITH AUTO DIFFERENTIAL    Narrative:     Performed at:  [facility],  [place], [email or web address]   Phone [phone]   TROPONIN    Narrative:     Performed at:  [place] [address],  [place], [email or web address]   Phone [phone]   AMMONIA    Narrative:     Performed at:  [place] [address],  [place], [email or web address]   Phone [phone]     Results for orders placed or performed during the hospital encounter of 02/17/22   COVID-19 & Influenza Combo    Specimen: Nasopharyngeal Swab   Result Value Ref Range    SARS-CoV-2 RNA, RT PCR NOT DETECTED NOT DETECTED    INFLUENZA A NOT DETECTED NOT DETECTED    INFLUENZA B NOT DETECTED NOT DETECTED   CBC with Auto Differential   Result Value Ref Range    WBC 8.5 4.0 - 11.0 K/uL    RBC 5.02 4.00 - 5.20 M/uL    Hemoglobin 15.4 12.0 - 16.0 g/dL    Hematocrit 43.6 36.0 - 48.0 %    MCV 86.8 80.0 - 100.0 fL    MCH 30.7 26.0 - 34.0 pg    MCHC 35.4 31.0 - 36.0 g/dL    RDW 13.4 12.4 - 15.4 %    Platelets 810 162 - 854 K/uL    MPV 8.0 5.0 - 10.5 fL    Neutrophils % 70.7 %    Lymphocytes % 22.3 %    Monocytes % 3.7 %    Eosinophils % 2.4 %    Basophils % 0.9 %    Neutrophils Absolute 6.0 1.7 - 7.7 K/uL    Lymphocytes Absolute 1.9 1.0 - 5.1 K/uL    Monocytes Absolute 0.3 0.0 - 1.3 K/uL    Eosinophils Absolute 0.2 0.0 - 0.6 K/uL    Basophils Absolute 0.1 0.0 - 0.2 K/uL   Comprehensive Metabolic Panel w/ Reflex to MG   Result Value Ref Range    Sodium 134 (L) 136 - 145 mmol/L    Potassium reflex Magnesium 4.2 3.5 - 5.1 mmol/L Chloride 95 (L) 99 - 110 mmol/L    CO2 30 21 - 32 mmol/L    Anion Gap 9 3 - 16    Glucose 429 (H) 70 - 99 mg/dL    BUN 5 (L) 7 - 20 mg/dL    CREATININE <0.5 (L) 0.6 - 1.1 mg/dL    GFR Non-African American >60 >60    GFR African American >60 >60    Calcium 7.1 (L) 8.3 - 10.6 mg/dL    Total Protein 7.5 6.4 - 8.2 g/dL    Albumin 4.1 3.4 - 5.0 g/dL    Albumin/Globulin Ratio 1.2 1.1 - 2.2    Total Bilirubin 0.9 0.0 - 1.0 mg/dL    Alkaline Phosphatase 229 (H) 40 - 129 U/L    ALT 62 (H) 10 - 40 U/L    AST 74 (H) 15 - 37 U/L   Troponin   Result Value Ref Range    Troponin <0.01 <0.01 ng/mL   Blood gas, venous   Result Value Ref Range    pH, Agusto 7.396 7.350 - 7.450    pCO2, Agusto 49.3 40.0 - 50.0 mmHg    pO2, Agusto 65.7 (H) 25.0 - 40.0 mmHg    HCO3, Venous 29.6 (H) 23.0 - 29.0 mmol/L    Base Excess, Agusto 3.6 (H) -3.0 - 3.0 mmol/L    O2 Sat, Agusto 93 Not Established %    Carboxyhemoglobin 3.7 (H) 0.0 - 1.5 %    MetHgb, Agusto 0.3 <1.5 %    TC02 (Calc), Agusto 31 Not Established mmol/L    O2 Content, Agusto 21 Not Established VOL %    O2 Therapy Unknown    Urinalysis with Reflex to Culture    Specimen: Urine, clean catch   Result Value Ref Range    Color, UA Yellow Straw/Yellow    Clarity, UA Clear Clear    Glucose, Ur >=1000 (A) Negative mg/dL    Bilirubin Urine Negative Negative    Ketones, Urine Negative Negative mg/dL    Specific Gravity, UA 1.020 1.005 - 1.030    Blood, Urine Negative Negative    pH, UA 6.0 5.0 - 8.0    Protein, UA Negative Negative mg/dL    Urobilinogen, Urine 0.2 <2.0 E.U./dL    Nitrite, Urine Negative Negative    Leukocyte Esterase, Urine Negative Negative    Microscopic Examination Not Indicated     Urine Type NotGiven     Urine Reflex to Culture Not Indicated    Ammonia   Result Value Ref Range    Ammonia 36 11 - 51 umol/L   POCT Glucose   Result Value Ref Range    POC Glucose 404 (H) 70 - 99 mg/dl    Performed on ACCU-CHEK    POCT glucose   Result Value Ref Range    Glucose 315 mg/dL    QC OK? ok    POCT Glucose Result Value Ref Range    POC Glucose 315 (H) 70 - 99 mg/dl    Performed on ACCU-CHEK    EKG 12 Lead   Result Value Ref Range    Ventricular Rate 100 BPM    Atrial Rate 100 BPM    P-R Interval 114 ms    QRS Duration 106 ms    Q-T Interval 340 ms    QTc Calculation (Bazett) 438 ms    P Axis 61 degrees    R Axis 73 degrees    T Axis 76 degrees    Diagnosis       Normal sinus rhythmNormal ECGWhen compared with ECG of 04-AUG-2021 21:50,T wave inversion no longer evident in Anterior leadsConfirmed by CARMEN HOFFMAN MD ([id number]) on 2/17/2022 5:31:50 PM       All other labs were within normal range or not returned as of this dictation. EKG: All EKG's are interpreted by the Emergency Department Physician in the absence of a cardiologist.  Please see their note for interpretation of EKG. RADIOLOGY:   Non-plain film images such as CT, Ultrasound and MRI are read by the radiologist. Plain radiographic images are visualized andpreliminarily interpreted by the  ED Provider with the below findings:        Interpretation perthe Radiologist below, if available at the time of this note:    XR CHEST PORTABLE   Final Result   No acute cardiopulmonary findings. XR CHEST PORTABLE    Result Date: 2/17/2022  EXAMINATION: ONE XRAY VIEW OF THE CHEST 2/17/2022 4:09 pm COMPARISON: Chest x-ray 08/17/2021, 08/20/2021 HISTORY: ORDERING SYSTEM PROVIDED HISTORY: shortness of breath TECHNOLOGIST PROVIDED HISTORY: Reason for exam:->shortness of breath Reason for Exam: sob FINDINGS: Calcified granuloma projects in the right upper lung zone, unchanged. Lungs are otherwise clear. No effusion or pneumothorax. Cardiomediastinal silhouette is within normal limits with calcification noted in the aortic arch. No acute cardiopulmonary findings.          PROCEDURES   Unless otherwise noted below, none     Procedures    CRITICAL CARE TIME   N/A    CONSULTS:  None      EMERGENCY DEPARTMENT COURSE and DIFFERENTIAL DIAGNOSIS/MDM:   Vitals: Vitals:    02/17/22 1316 02/17/22 1915   BP: (!) 159/80 (!) 146/76   Pulse: 98 86   Resp: 18 16   Temp: 97.2 °F (36.2 °C)    TempSrc: Tympanic    SpO2: 93% 95%   Weight: 184 lb (83.5 kg)    Height: 5' 4\" (1.626 m)        Patient was given thefollowing medications:  Medications   0.9 % sodium chloride bolus (0 mLs IntraVENous Stopped 2/17/22 1926)   insulin regular (HUMULIN R;NOVOLIN R) injection 8 Units (8 Units SubCUTAneous Given 2/17/22 1743)         ED course  Patient presented to the ER for evaluation of elevated glucose was almost 500 on labs that were drawn at the family doctor office was advised to come to the ER for evaluation. Dates she has been out of her Metformin for the past 4 days. Denies any pain, no chest pain no abdominal pain no vomiting states she has been thirsty and has had general fatigue. Work-up was obtained. On labs white count is normal 8.5. Hemoglobin 15.4. Sodium mildly low 134, chloride mildly low 95, she was given 1 L normal saline here. Potassium is normal 4.2. Glucose elevated at 429, CO2 is normal at 30, anion gap normal at 9, VBG serum pH 7.396 normal, PCO2 49.3 normal, not in DKA. Elevated LFTs, history of same, these are improved from labs drawn yesterday alk phos 229, yesterday with 283. ALT today is 62, down from yesterday 90, and AST today 74, down from 118. She has history of liver cirrhosis. No abdominal tenderness on exam.  No vomiting. She will be referred to GI for follow-up. Her EKG is unremarkable. Troponin is normal.  Chest x-ray unremarkable. She was given subcu regular insulin here and on recheck her glucose is down to 315.   6:56 PM EST  Patient reevaluated. States she is feeling better after IV fluids and insulin here. Discussed test results with her. Insulin coming down. Daughter comfortable taking her home. LFTs are little lower than they were at last check. She has history of liver disease referral to GI for follow-up.   No abdominal tenderness. Ammonia level is normal.  He is overall well-appearing here. She will be discharged home advise close follow-up with her doctor and to return to the ER for any worsening symptoms. She was provided refill of her Metformin. FINAL IMPRESSION      1. Hyperglycemia    2. Elevated LFTs    3. Other cirrhosis of liver (HCC)    4. Elevated blood pressure reading    5. Hyponatremia    6. Hypochloremia    7. Other fatigue          DISPOSITION/PLAN   DISPOSITION     PATIENT REFERREDTO: Pako Guthrie, APRN - CNP  [address] [place] [address] [facility]  [phone]    Call in 1 day  Call to arrange follow-up appointment from ER visit    Yohannes Koehler MD  [address] [place] [address] [address] . [facility]  [phone]    Call in 1 day  GI referral call for follow-up appointment    [place] ([place]) [facility]  [address] [facility]  [phone]    If symptoms worsen      DISCHARGE MEDICATIONS:  Discharge Medication List as of 2/17/2022  7:11 PM      START taking these medications    Details   !! metFORMIN (GLUCOPHAGE) 500 MG tablet Take 1 tablet by mouth 2 times daily (with meals), Disp-60 tablet, R-0Normal       !! - Potential duplicate medications found. Please discuss with provider.           DISCONTINUED MEDICATIONS:  Discharge Medication List as of 2/17/2022  7:11 PM                 (Please note that portions ofthis note were completed with a voice recognition program.  Efforts were made to edit the dictations but occasionally words are mis-transcribed.)    Johny Kehr, PA-C (electronically signed)            Elizabeth Guerrero PA-C  02/18/22 [id number]

## 2022-02-18 NOTE — ED NOTES
Discharge instructions reviewed with Ms. Hale. She verbalized understanding. Copy of discharge instructions and prescriptions given. Ms. Karissa Hernandez was discharged to home in good condition per personal vehicle, friend/family driving. She exited the ED without difficulty.    JODY Quinteros RN  02/17/22 0390

## 2022-08-09 NOTE — CONSULTS
Gastroenterology Consult Note    Patient: Irene Cline   :    1965   Facility:   [facility]  Referring/PCP: No primary care provider on file. Date:     2021  Consultant:   Terry Hall PA-C      Chief Complaint   Patient presents with    Shortness of Breath     pt states SOB and cough for one week. family states pt has been sleeping alot more. History of Present illness     61year old female with a history of dementia, COPD, and diabetes admitted with acute respiratory failure due to COPD exacerbation pneumonia. GI was consulted for evaluation of cirrhosis. She was intubated on  and extubated to BiPAP on . She started vomiting two days ago prompting NG placement. Recent CT abd/pelvis from 2021 showed cirrhosis with protal HTN and gaseous distention of the transverse colon. She admits to upper abdominal bloating. She denies nausea, vomiting, dysphagia, heartburn, abdominal pain, cramping, melena, rectal bleeding, confusion, mental fog, rash, itching, jaundice, SOB, early satiety, and weight loss. She has never had an EGD or colonoscopy. She admits to family history of colon cancer in her father. She has a history of illicit substance and IV drug use. She has been clean for the past 1.5 years. She recently stopped taking Metformin two weeks ago. She denies alcohol, marijuana, and ibuprofen use.      Past Medical History:   Diagnosis Date    COPD [facility]chronic obstructive [facility] disease) ([facility]     Dementia ([facility]     Diabetes mellitus ([facility]      Past Surgical History:   Procedure Laterality Date    ANKLE FRACTURE SURGERY Right 2020    OPEN REDUCTION INTERNAL FIXATION RIGHT ANKLE FRACTURE performed by Negrito Canada DO at [address] Right 2020    ORIF    BRONCHOSCOPY N/A 2020    BRONCHOSCOPY performed by Paula Rivas MD at [address] [place] [address]  2020    [address] [place] [address] performed by Mejia Hernandez MD at [address] [place] [address] N/A 2/10/2020    BRONCHOSCOPY ALVEOLAR LAVAGE performed by Andrea Plasencia MD at [address] [place] [address]  2/10/2020    BRONCHOSCOPY THERAPUTIC ASPIRATION SUBSEQUENT performed by Andrea Plasencia MD at [address],  [address]:   Social History     Tobacco Use    Smoking status: Current Every Day Smoker     Packs/day: 1.00     Types: Cigarettes    Smokeless tobacco: Never Used   Substance Use Topics    Alcohol use: Never     Family:   Family History   Problem Relation Age of Onset    Cancer Father      No current facility-administered medications on file prior to encounter. Current Outpatient Medications on File Prior to Encounter   Medication Sig Dispense Refill    metFORMIN (GLUCOPHAGE) 500 MG tablet Take 500 mg by mouth 2 times daily (with meals)      albuterol sulfate HFA (PROVENTIL HFA) 108 (90 Base) MCG/ACT inhaler Inhale 2 puffs into the lungs every 6 hours as needed for Wheezing 1 Inhaler 3    ipratropium-albuterol (DUONEB) 0.5-2.5 (3) MG/3ML SOLN nebulizer solution Inhale 3 mLs into the lungs every 4 hours (while awake) 360 mL 2      Infusions:    dextrose 5% and 0.45% NaCl with KCl 20 mEq 100 mL/hr at 08/17/21 1314    sodium chloride      dextrose       PRN Medications: traMADol, potassium chloride **OR** potassium alternative oral replacement **OR** potassium chloride, magnesium sulfate, sodium chloride flush, sodium chloride, glucose, dextrose, glucagon (rDNA), dextrose, polyethylene glycol, acetaminophen **OR** acetaminophen, ondansetron, albuterol  Allergies: No Known Allergies    ROS:   Constitutional: negative for chills, fevers and sweats  Eyes: negative for cataracts, icterus and redness  Ears, nose, mouth, throat, and face: negative for epistaxis, hearing loss and sore throat  Respiratory: negative for cough, hemoptysis and sputum  Cardiovascular: negative for chest pain.  Positive for dyspnea and CT ABDOMEN PELVIS WO CONTRAST Additional Contrast? None   Final Result   Findings consistent with cirrhosis and portal hypertension with a nodular   contour of the liver, ascites, splenomegaly. Small bilateral pleural effusions. Gaseous distention of the transverse colon. XR ABDOMEN FOR NG/OG/NE TUBE PLACEMENT   Final Result   The enteric tube is in good position in the stomach. XR ABDOMEN (KUB) (SINGLE AP VIEW)   Final Result   Nonspecific but probably nonobstructive bowel gas pattern. XR CHEST PORTABLE   Final Result   Unchanged small right pleural effusion. Lines and tubes are in good position as described above. XR CHEST PORTABLE   Final Result   Interval development of small right-sided pleural effusion. XR CHEST PORTABLE   Final Result   1. Stable appropriate positions of support apparatus. 2. No significant change in appearance of diffuse hazy opacity throughout   both lungs, likely a combination of asymmetric edema and multifocal pneumonia. XR CHEST PORTABLE   Final Result   Increased pulmonary vascular congestion since yesterday. Decreased right   infrahilar atelectasis or pneumonia. XR CHEST PORTABLE   Final Result   Stable exam demonstrating cardiomegaly with vascular congestion and   persistent strandy airspace disease in the right lung base presumably   atelectasis unless clinical findings suggest pneumonia         XR CHEST PORTABLE   Final Result   Cardiomegaly with pulmonary vascular congestion. No definite pulmonary   edema. Atelectasis in the lung bases         IR PICC WO SQ PORT/PUMP > 5 YEARS   Final Result      XR CHEST PORTABLE   Final Result   Mild cardiomegaly with borderline vascular congestion, accentuated by low   lung volume. XR CHEST PORTABLE   Final Result   1. Endotracheal tube tip is 6-7 cm above the antoine.    2. The enteric tube courses into the stomach with the tip not included on   this examination of the chest.         CT CHEST PULMONARY EMBOLISM W CONTRAST   Final Result   No evidence of pulmonary embolism or acute pulmonary abnormality. XR CHEST PORTABLE   Final Result   1. Vascular cephalization, consistent with mild central venous congestion         US GUIDED PARACENTESIS    (Results Pending)     Attending Supervising [de-identified] Attestation Statement  The patient is a 61 y.o. female. I have performed a history and physical examination of the patient. I discussed the case with my physician assistant Lisa Jade PA-C    I reviewed the patient's Past Medical History, Past Surgical History, Medications, and Allergies. Physical Exam:  Vitals:    08/17/21 1445 08/17/21 1623 08/17/21 1903 08/17/21 1931   BP: (!) 155/90   (!) 155/89   Pulse: 94   98   Resp: 15  18 16   Temp: 99.1 °F (37.3 °C)   99.7 °F (37.6 °C)   TempSrc: Oral   Oral   SpO2:  95% 94% 96%   Weight:       Height:           Physical Examination: General appearance - alert, well appearing, and in no distress  Mental status - alert, oriented to person, place, and time  Eyes - pupils equal and reactive, extraocular eye movements intact  Neck - supple, no significant adenopathy  Chest - clear to auscultation, no wheezes, rales or rhonchi, symmetric air entry  Heart - normal rate, regular rhythm, normal S1, S2, no murmurs, rubs, clicks or gallops  Abdomen - soft, nontender, nondistended, no masses or organomegaly  Extremities - no pedal edema noted          Assessment:     61year old female with a history of dementia, COPD, and diabetes admitted with acute respiratory failure due to COPD exacerbation pneumonia c/b ileus and new diagnosis of cirrhosis, likely ORR. Plan:   1. Continue supportive care  2. Monitor LFTs  3. Monitor and document output  4. Monitor and replenish electrolytes  5. Minimize narcotics and anxiolytics as able  6. Continue NG to LIWS  7. Liver serologies in progress  8.  Repeat KUB in AM  9. Diagnostic and therapeutic paracentesis  10. PT/OT  11. Encourage activity  12. Will follow    Candi Diaz PA-C  2:03 PM 8/17/2021                      61year old female with a history of DM, COPD, dementia admitted with acute respiratory failure secondary to pneumonia c/b ileus and new diagnosis of cirrhosis, likely ORR    Continue supportive care. Liver serology workup. Continue antibiotics. Diagnostic paracentesis if adequate ascites. Encourage nutrition after swallow eval. PT/OT.  Will follow    Leonides Richard MD          [phone]  Angeles Singh Alert and oriented to person, place and time

## 2023-05-10 ENCOUNTER — TELEPHONE (OUTPATIENT)
Dept: ENDOCRINOLOGY | Age: 58
End: 2023-05-10

## 2023-05-15 ENCOUNTER — TELEPHONE (OUTPATIENT)
Dept: PULMONOLOGY | Age: 58
End: 2023-05-15

## 2023-07-06 NOTE — PROGRESS NOTES
EOS report and transfer of care to [facility]. Patient resting in bed, stable condition at hand off. Spoke with patient and inform her that I will generate the letter for [employer] and that she can pick it up anytime

## (undated) DEVICE — PAD,ABDOMINAL,8"X10",ST,LF: Brand: MEDLINE

## (undated) DEVICE — CHLORAPREP 26ML ORANGE

## (undated) DEVICE — PACK ORTH LO EXT VI

## (undated) DEVICE — DRESSING,GAUZE,XEROFORM,CURAD,1"X8",ST: Brand: CURAD

## (undated) DEVICE — GLOVE ORANGE PI 7 1/2   MSG9075

## (undated) DEVICE — YANKAUER,BULB TIP,W/O VENT,RIGID,STERILE: Brand: MEDLINE

## (undated) DEVICE — MAJOR SET UP PK

## (undated) DEVICE — TOWEL OR BLUEE 16X26IN ST 8 PACK ORB08 16X26ORTWL

## (undated) DEVICE — SYRINGE MED 10ML SLIP TIP BLNT FILL AND LUERLOCK DISP

## (undated) DEVICE — GAUZE,SPONGE,4"X4",8PLY,STRL,LF,10/TRAY: Brand: MEDLINE

## (undated) DEVICE — SUTURE ABSRB REV CUT NDL HI STRENGTH BLU VLT OS-6 2 223116

## (undated) DEVICE — BANDAGE COMPR W4INXL15FT BGE E SGL LAYERED CLP CLSR

## (undated) DEVICE — TIP SUCT DIA12FR W STYL CTRL VENT DISPOSABLE FRAZ

## (undated) DEVICE — SUTURE VCRL SZ 2-0 L18IN ABSRB UD CT-1 L36MM 1/2 CIR J839D

## (undated) DEVICE — ELECTRODE PT RET AD L9FT HI MOIST COND ADH HYDRGEL CORDED

## (undated) DEVICE — SUTURE VCRL + SZ 0 L27IN ABSRB UD L36MM CT-1 1/2 CIR VCPP41D

## (undated) DEVICE — SYRINGE MED 50ML LUERSLIP TIP

## (undated) DEVICE — SINGLE USE SUCTION VALVE MAJ-209: Brand: SINGLE USE SUCTION VALVE (STERILE)

## (undated) DEVICE — Z DISCONTINUED USE 2276105 GOWN PROTCT UNIV CHST W28IN L49IN SL 24IN BLU SPUNBOND FLM

## (undated) DEVICE — DRAPE,REIN 53X77,STERILE: Brand: MEDLINE

## (undated) DEVICE — SINGLE USE BIOPSY VALVE MAJ-210: Brand: SINGLE USE BIOPSY VALVE (STERILE)

## (undated) DEVICE — DRAPE C ARM UNIV W41XL74IN CLR PLAS XR VELC CLSR POLY STRP

## (undated) DEVICE — LINER,SOFT,SUCTION CANISTER,1500CC: Brand: MEDLINE

## (undated) DEVICE — Z CONVERTED USE 2273232 BANDAGE COMPR W6INXL11YD E KNIT DBL SELF CLSR EZE-BAND

## (undated) DEVICE — SPLINT ORTH W4XL30IN LAYERED FBRGLS FOAM PD BRTH BK MOLD

## (undated) DEVICE — TUBING, SUCTION, 3/16" X 12', STRAIGHT: Brand: MEDLINE

## (undated) DEVICE — BOWL MED M 16OZ PLAS CAP GRAD

## (undated) DEVICE — UNTHREADED GUIDE WIRE: Brand: FIXOS

## (undated) DEVICE — ELECTRODE,RADIOTRANSLUCENT,FOAM,3PK: Brand: MEDLINE

## (undated) DEVICE — ESMARK: Brand: DEROYAL

## (undated) DEVICE — FRAME EYEWR PROTCT ASST CLR DISP SAFEVIEW

## (undated) DEVICE — 3M™ COBAN™ NL STERILE NON-LATEX SELF-ADHERENT WRAP, 2084S, 4 IN X 5 YD (10 CM X 4,5 M), 18 ROLLS/CASE: Brand: 3M™ COBAN™

## (undated) DEVICE — MEDI-VAC NON-CONDUCTIVE SUCTION TUBING: Brand: CARDINAL HEALTH

## (undated) DEVICE — TRAP,MUCUS SPECIMEN, 80CC: Brand: MEDLINE

## (undated) DEVICE — GLOVE ORANGE PI 7   MSG9070

## (undated) DEVICE — SOLUTION IV IRRIG 500ML 0.9% SODIUM CHL 2F7123

## (undated) DEVICE — DRILL BIT, AO DIA2.6MM X 135MM, SCALED: Brand: VARIAX

## (undated) DEVICE — CONMED SCOPE SAVER BITE BLOCK, 20X27 MM: Brand: SCOPE SAVER

## (undated) DEVICE — PADDING UNDERCAST W4INXL4YD 100% COT CRIMPED FINISH WBRL II

## (undated) DEVICE — GLOVE ORANGE PI 8   MSG9080

## (undated) DEVICE — TUBING, SUCTION, 3/16" X 6', STRAIGHT: Brand: MEDLINE

## (undated) DEVICE — PADDING CAST W6INXL4YD NONSTERILE COT RAYON MICROPLEATED

## (undated) DEVICE — CANNULA,OXY,ADULT,SUPERSOFT,W/7'TUB,SC: Brand: MEDLINE INDUSTRIES, INC.

## (undated) DEVICE — DRAPE UNIV 124X90IN LOWER EXTREMITY

## (undated) DEVICE — 3M™ COBAN™ NL STERILE NON-LATEX SELF-ADHERENT WRAP, 2086S, 6 IN X 5 YD (15 CM X 4,5 M), 12 ROLLS/CASE: Brand: 3M™ COBAN™

## (undated) DEVICE — ENDO CARRY-ON PROCEDURE KIT INCLUDES SUCTION TUBING, LUBRICANT, GAUZE, BIOHAZARD STICKER, TRANSPORT PAD AND INTERCEPT BEDSIDE KIT.: Brand: ENDO CARRY-ON PROCEDURE KIT

## (undated) DEVICE — PADDING CAST W6INXL4YD POLY POR SPUN DACRON SYN VERSATILE

## (undated) DEVICE — BANDAGE COMPR W4INXL12FT E DISP ESMARCH EVEN

## (undated) DEVICE — SUTURE ETHLN SZ 3-0 L30IN NONABSORBABLE BLK FSL L30MM 3/8 1671H